# Patient Record
Sex: FEMALE | Race: WHITE | NOT HISPANIC OR LATINO | Employment: OTHER | URBAN - NONMETROPOLITAN AREA
[De-identification: names, ages, dates, MRNs, and addresses within clinical notes are randomized per-mention and may not be internally consistent; named-entity substitution may affect disease eponyms.]

---

## 2017-01-25 ENCOUNTER — APPOINTMENT (OUTPATIENT)
Dept: GENERAL RADIOLOGY | Facility: HOSPITAL | Age: 77
End: 2017-01-25

## 2017-01-25 ENCOUNTER — APPOINTMENT (OUTPATIENT)
Dept: CT IMAGING | Facility: HOSPITAL | Age: 77
End: 2017-01-25

## 2017-01-25 ENCOUNTER — HOSPITAL ENCOUNTER (EMERGENCY)
Facility: HOSPITAL | Age: 77
Discharge: HOME | End: 2017-01-25
Admitting: EMERGENCY MEDICINE

## 2017-01-25 VITALS
HEART RATE: 98 BPM | OXYGEN SATURATION: 98 % | BODY MASS INDEX: 21.82 KG/M2 | DIASTOLIC BLOOD PRESSURE: 70 MMHG | WEIGHT: 139 LBS | SYSTOLIC BLOOD PRESSURE: 120 MMHG | RESPIRATION RATE: 18 BRPM | TEMPERATURE: 97.8 F | HEIGHT: 67 IN

## 2017-01-25 DIAGNOSIS — R42 DIZZINESS: Primary | ICD-10-CM

## 2017-01-25 DIAGNOSIS — N30.01 ACUTE CYSTITIS WITH HEMATURIA: ICD-10-CM

## 2017-01-25 LAB
ALBUMIN SERPL-MCNC: 4.5 G/DL (ref 3.5–5)
ALBUMIN/GLOB SERPL: 1.3 G/DL (ref 1.1–2.5)
ALP SERPL-CCNC: 86 U/L (ref 24–120)
ALT SERPL W P-5'-P-CCNC: 29 U/L (ref 0–54)
AMYLASE SERPL-CCNC: 88 U/L (ref 30–110)
ANION GAP SERPL CALCULATED.3IONS-SCNC: 13 MMOL/L (ref 4–13)
APTT PPP: 26.4 SECONDS (ref 24.1–34.8)
AST SERPL-CCNC: 28 U/L (ref 7–45)
BACTERIA UR QL AUTO: ABNORMAL /HPF
BASOPHILS # BLD AUTO: 0.02 10*3/MM3 (ref 0–0.2)
BASOPHILS NFR BLD AUTO: 0.2 % (ref 0–2)
BILIRUB SERPL-MCNC: 0.8 MG/DL (ref 0.1–1)
BILIRUB UR QL STRIP: NEGATIVE
BUN BLD-MCNC: 43 MG/DL (ref 5–21)
BUN/CREAT SERPL: 37.1 (ref 7–25)
CALCIUM SPEC-SCNC: 10 MG/DL (ref 8.4–10.4)
CHLORIDE SERPL-SCNC: 95 MMOL/L (ref 98–110)
CLARITY UR: CLEAR
CO2 SERPL-SCNC: 32 MMOL/L (ref 24–31)
COLOR UR: YELLOW
CREAT BLD-MCNC: 1.16 MG/DL (ref 0.5–1.4)
CRP SERPL-MCNC: <0.5 MG/DL (ref 0–0.99)
DEPRECATED RDW RBC AUTO: 51.4 FL (ref 40–54)
DIGOXIN SERPL-MCNC: 1.1 NG/ML (ref 0.8–2)
EOSINOPHIL # BLD AUTO: 0.1 10*3/MM3 (ref 0–0.7)
EOSINOPHIL NFR BLD AUTO: 1.1 % (ref 0–4)
ERYTHROCYTE [DISTWIDTH] IN BLOOD BY AUTOMATED COUNT: 14.4 % (ref 12–15)
FLUAV AG NPH QL: NEGATIVE
FLUBV AG NPH QL IA: NEGATIVE
GFR SERPL CREATININE-BSD FRML MDRD: 45 ML/MIN/1.73
GLOBULIN UR ELPH-MCNC: 3.5 GM/DL
GLUCOSE BLD-MCNC: 154 MG/DL (ref 70–100)
GLUCOSE UR STRIP-MCNC: NEGATIVE MG/DL
HCT VFR BLD AUTO: 46 % (ref 37–47)
HGB BLD-MCNC: 15.1 G/DL (ref 12–16)
HGB UR QL STRIP.AUTO: NEGATIVE
HYALINE CASTS UR QL AUTO: ABNORMAL /LPF
IMM GRANULOCYTES # BLD: 0.04 10*3/MM3 (ref 0–0.03)
IMM GRANULOCYTES NFR BLD: 0.5 % (ref 0–5)
INR PPP: 0.91 (ref 0.91–1.09)
KETONES UR QL STRIP: NEGATIVE
LEUKOCYTE ESTERASE UR QL STRIP.AUTO: ABNORMAL
LIPASE SERPL-CCNC: 120 U/L (ref 23–203)
LYMPHOCYTES # BLD AUTO: 0.85 10*3/MM3 (ref 0.72–4.86)
LYMPHOCYTES NFR BLD AUTO: 9.7 % (ref 15–45)
MCH RBC QN AUTO: 32.6 PG (ref 28–32)
MCHC RBC AUTO-ENTMCNC: 32.8 G/DL (ref 33–36)
MCV RBC AUTO: 99.4 FL (ref 82–98)
MONOCYTES # BLD AUTO: 0.65 10*3/MM3 (ref 0.19–1.3)
MONOCYTES NFR BLD AUTO: 7.4 % (ref 4–12)
NEUTROPHILS # BLD AUTO: 7.1 10*3/MM3 (ref 1.87–8.4)
NEUTROPHILS NFR BLD AUTO: 81.1 % (ref 39–78)
NITRITE UR QL STRIP: POSITIVE
PH UR STRIP.AUTO: 7 [PH] (ref 5–8)
PLATELET # BLD AUTO: 162 10*3/MM3 (ref 130–400)
PMV BLD AUTO: 10.9 FL (ref 6–12)
POTASSIUM BLD-SCNC: 4.8 MMOL/L (ref 3.5–5.3)
PROT SERPL-MCNC: 8 G/DL (ref 6.3–8.7)
PROT UR QL STRIP: NEGATIVE
PROTHROMBIN TIME: 12.5 SECONDS (ref 11.9–14.6)
RBC # BLD AUTO: 4.63 10*6/MM3 (ref 4.2–5.4)
RBC # UR: ABNORMAL /HPF
REF LAB TEST METHOD: ABNORMAL
SODIUM BLD-SCNC: 140 MMOL/L (ref 135–145)
SP GR UR STRIP: 1.02 (ref 1–1.03)
SQUAMOUS #/AREA URNS HPF: ABNORMAL /HPF
TROPONIN I SERPL-MCNC: 0 NG/ML (ref 0–0.07)
UROBILINOGEN UR QL STRIP: ABNORMAL
WBC NRBC COR # BLD: 8.76 10*3/MM3 (ref 4.8–10.8)
WBC UR QL AUTO: ABNORMAL /HPF

## 2017-01-25 PROCEDURE — 80162 ASSAY OF DIGOXIN TOTAL: CPT | Performed by: NURSE PRACTITIONER

## 2017-01-25 PROCEDURE — 85730 THROMBOPLASTIN TIME PARTIAL: CPT | Performed by: NURSE PRACTITIONER

## 2017-01-25 PROCEDURE — 96361 HYDRATE IV INFUSION ADD-ON: CPT

## 2017-01-25 PROCEDURE — 87086 URINE CULTURE/COLONY COUNT: CPT | Performed by: NURSE PRACTITIONER

## 2017-01-25 PROCEDURE — 70450 CT HEAD/BRAIN W/O DYE: CPT

## 2017-01-25 PROCEDURE — 87186 SC STD MICRODIL/AGAR DIL: CPT | Performed by: NURSE PRACTITIONER

## 2017-01-25 PROCEDURE — 25010000002 CEFTRIAXONE: Performed by: NURSE PRACTITIONER

## 2017-01-25 PROCEDURE — 86140 C-REACTIVE PROTEIN: CPT | Performed by: NURSE PRACTITIONER

## 2017-01-25 PROCEDURE — 87040 BLOOD CULTURE FOR BACTERIA: CPT | Performed by: NURSE PRACTITIONER

## 2017-01-25 PROCEDURE — 82150 ASSAY OF AMYLASE: CPT | Performed by: NURSE PRACTITIONER

## 2017-01-25 PROCEDURE — 96365 THER/PROPH/DIAG IV INF INIT: CPT

## 2017-01-25 PROCEDURE — 85025 COMPLETE CBC W/AUTO DIFF WBC: CPT | Performed by: NURSE PRACTITIONER

## 2017-01-25 PROCEDURE — 81001 URINALYSIS AUTO W/SCOPE: CPT | Performed by: NURSE PRACTITIONER

## 2017-01-25 PROCEDURE — 93005 ELECTROCARDIOGRAM TRACING: CPT | Performed by: NURSE PRACTITIONER

## 2017-01-25 PROCEDURE — 87804 INFLUENZA ASSAY W/OPTIC: CPT | Performed by: NURSE PRACTITIONER

## 2017-01-25 PROCEDURE — 85610 PROTHROMBIN TIME: CPT | Performed by: NURSE PRACTITIONER

## 2017-01-25 PROCEDURE — 93010 ELECTROCARDIOGRAM REPORT: CPT | Performed by: INTERNAL MEDICINE

## 2017-01-25 PROCEDURE — 80053 COMPREHEN METABOLIC PANEL: CPT | Performed by: NURSE PRACTITIONER

## 2017-01-25 PROCEDURE — 83690 ASSAY OF LIPASE: CPT | Performed by: NURSE PRACTITIONER

## 2017-01-25 PROCEDURE — 84484 ASSAY OF TROPONIN QUANT: CPT

## 2017-01-25 PROCEDURE — 99284 EMERGENCY DEPT VISIT MOD MDM: CPT

## 2017-01-25 PROCEDURE — 71020 HC CHEST PA AND LATERAL: CPT

## 2017-01-25 PROCEDURE — 87088 URINE BACTERIA CULTURE: CPT | Performed by: NURSE PRACTITIONER

## 2017-01-25 RX ORDER — CEPHALEXIN 250 MG/1
500 CAPSULE ORAL 2 TIMES DAILY
Qty: 14 CAPSULE | Refills: 0 | Status: SHIPPED | OUTPATIENT
Start: 2017-01-25 | End: 2017-02-01

## 2017-01-25 RX ORDER — MECLIZINE HYDROCHLORIDE 25 MG/1
25 TABLET ORAL EVERY 6 HOURS PRN
Qty: 20 TABLET | Refills: 0 | Status: SHIPPED | OUTPATIENT
Start: 2017-01-25 | End: 2017-01-30

## 2017-01-25 RX ORDER — MECLIZINE HYDROCHLORIDE 25 MG/1
25 TABLET ORAL ONCE
Status: COMPLETED | OUTPATIENT
Start: 2017-01-25 | End: 2017-01-25

## 2017-01-25 RX ORDER — SODIUM CHLORIDE 0.9 % (FLUSH) 0.9 %
10 SYRINGE (ML) INJECTION AS NEEDED
Status: DISCONTINUED | OUTPATIENT
Start: 2017-01-25 | End: 2017-01-25 | Stop reason: HOSPADM

## 2017-01-25 RX ORDER — ONDANSETRON 4 MG/1
4 TABLET, ORALLY DISINTEGRATING ORAL EVERY 6 HOURS PRN
Qty: 12 TABLET | Refills: 0 | Status: SHIPPED | OUTPATIENT
Start: 2017-01-25 | End: 2017-01-28

## 2017-01-25 RX ORDER — METHYLPREDNISOLONE 4 MG/1
TABLET ORAL
Qty: 21 TABLET | Refills: 0 | Status: ON HOLD | OUTPATIENT
Start: 2017-01-25 | End: 2017-04-11

## 2017-01-25 RX ORDER — DIGOXIN 125 MCG
125 TABLET ORAL
COMMUNITY

## 2017-01-25 RX ADMIN — SODIUM CHLORIDE 1000 ML: 9 INJECTION, SOLUTION INTRAVENOUS at 15:45

## 2017-01-25 RX ADMIN — MECLIZINE HYDROCHLORIDE 25 MG: 25 TABLET ORAL at 20:08

## 2017-01-25 RX ADMIN — CEFTRIAXONE 1 G: 1 INJECTION, POWDER, FOR SOLUTION INTRAMUSCULAR; INTRAVENOUS at 20:21

## 2017-01-25 NOTE — ED PROVIDER NOTES
"Subjective   HPI Comments: Patient is a 76-year-old female who presents here today with complaint of vertigo.  Patient reports that on Sunday she felt that afternoon.  He states that she sit down and rest.  Symptoms resolved.  She states she has felt I past 2 days and got up this morning and resume activity.  She states she went to lunch and after lunch she became very dizzy.  She reports that she did had 3 episodes of emesis.  She states that she feels as though \"the room is spinning \".  Patient states the dizziness occurs whenever \"my eyes\".  Patient fall she denies any head injury trauma.  She denies any abdominal pain fever or headache.  She states that she was having a slight left earache on Sunday however this resolved.  She presents ER today for further evaluation.    Patient is a 76 y.o. female presenting with dizziness.   History provided by:  Patient   used: No    Dizziness   Quality:  Head spinning and vertigo  Severity:  Moderate  Onset quality:  Sudden  Duration:  2 hours  Timing:  Constant  Progression:  Unchanged  Chronicity:  New  Context: ear pain, head movement, inactivity and physical activity    Context: not when bending over, not with bowel movement, not with eye movement, not with loss of consciousness, not with medication, not when standing up and not when urinating    Relieved by:  Nothing  Worsened by:  Nothing  Ineffective treatments:  None tried  Associated symptoms: nausea and vomiting    Associated symptoms: no blood in stool, no chest pain, no diarrhea, no headaches, no hearing loss, no palpitations, no shortness of breath, no syncope, no tinnitus, no vision changes and no weakness    Risk factors: no anemia, no heart disease, no hx of stroke, no hx of vertigo, no Meniere's disease, no multiple medications and no new medications        Review of Systems   Constitutional: Negative for activity change, appetite change and fever.   HENT: Positive for ear pain. Negative " for congestion, hearing loss and tinnitus.    Respiratory: Negative for shortness of breath.    Cardiovascular: Negative for chest pain, palpitations and syncope.   Gastrointestinal: Positive for nausea and vomiting. Negative for blood in stool and diarrhea.   Endocrine: Negative for cold intolerance and heat intolerance.   Genitourinary: Negative for flank pain and frequency.   Musculoskeletal: Negative for back pain and neck pain.   Skin: Negative for color change and pallor.   Neurological: Positive for dizziness. Negative for weakness and headaches.   Hematological: Negative for adenopathy. Does not bruise/bleed easily.   Psychiatric/Behavioral: Negative for agitation and confusion.   All other systems reviewed and are negative.      Past Medical History   Diagnosis Date   • Abdominal pain, left lower quadrant    • Bowel habit changes    • CAD (coronary artery disease)      LAD stent    • COPD (chronic obstructive pulmonary disease)    • Diabetes mellitus    • DVT (deep venous thrombosis)    • Dysphagia    • Hypertension    • Lymphoma    • Pulmonary emboli    • Sleep apnea, obstructive        Allergies   Allergen Reactions   • Zantac [Ranitidine Hcl]        Past Surgical History   Procedure Laterality Date   • Lumbar spine surgery     • Hysterectomy       total abdominal   • Tonsillectomy     • Back surgery       lower   • Exploratory laparotomy  2005     relapse lymphoma   • Ankle surgery     • Hemorrhoidectomy     • Lung biopsy N/A 1991     open    • Cholecystectomy     • Small intestine surgery     • Colonoscopy w/ biopsies and polypectomy  07/22/2014     Adenomatous tubular type, Diverticulosis sigmoid colon   • Colonoscopy  04/23/2015     Last colon limted lower diverticulosis left colon, Internal Hemorrhoids   • Endoscopy  04/14/2010     Distal ring dilated 48 Fr   • Upper gastrointestinal endoscopy  10/23/2015     normal exam       Family History   Problem Relation Age of Onset   • No Known Problems  Mother    • No Known Problems Father    • Colon cancer Neg Hx    • Colon polyps Neg Hx        Social History     Social History   • Marital status:      Spouse name: N/A   • Number of children: N/A   • Years of education: N/A     Social History Main Topics   • Smoking status: Never Smoker   • Smokeless tobacco: None   • Alcohol use No   • Drug use: No   • Sexual activity: Not Asked     Other Topics Concern   • None     Social History Narrative           Objective   Physical Exam   Constitutional: She is oriented to person, place, and time. She appears well-developed and well-nourished.   HENT:   Head: Normocephalic and atraumatic.   Eyes: Conjunctivae are normal. Pupils are equal, round, and reactive to light.   Neck: Normal range of motion. Neck supple.   Cardiovascular: Normal rate, regular rhythm and normal heart sounds.    Pulmonary/Chest: Effort normal and breath sounds normal.   Abdominal: Soft. Bowel sounds are normal.   Musculoskeletal: Normal range of motion.   Neurological: She is alert and oriented to person, place, and time. She has normal strength. No cranial nerve deficit or sensory deficit. GCS eye subscore is 4. GCS verbal subscore is 5. GCS motor subscore is 6.   Skin: Skin is warm and dry.   Psychiatric: She has a normal mood and affect.   Nursing note and vitals reviewed.      Procedures         ED Course  ED Course   Comment By Time   CT head:   1. No CT evidence of an acute intracranial process.  2. Atrophy and chronic ischemic changes.     Allyssa Benson, APRN 01/25 8070   CXR: 1. Cardiomegaly and chronic lung changes.  2. No definite acute cardiopulmonary process. Allyssa Benson, APRN 01/25 1430   I reevaluated the patient at this time.  She reports that she is feeling much better at this time.  He states that she does not feel dizzy at this time.  Labs are reviewed at this time.  Her glucose was 154, chloride 95, CO2 32.  Her CBC showed no acute abnormalities.  Her CBC and CMP  were compared to previous labs done 11 months ago.  Her urinalysis shows a trace amount leuk esterase, positive nitrates, 0-2 RBCs, 0-2 WBC, 4+ bacteria her flu swab was negative. Allyssa Benson, APRN 01/25 1736   Patient orthostatic vital signs were normal. Allyssa Benson, APRN 01/25 1737   She did attempt ambulate with the assistance of the ER today however she did become very dizzy and stumbled and sent back to bed.  She did not have a fall. Allyssa Benson, APRN 01/25 1826   Chest the lab results as well as the CT scan results with the patient and her daughter.  Patient states she does have a known history of cardiomyopathy.  She is aware of this.  Patient states that she was offered admission to the hospital however she at this time does declined and prefers to go home.  I did have a lengthy discussion with the patient and the daughter regarding this and they are agreeable for to be discharged home at this time.  Patient does tell me up on this conversation that she did have her metoprolol increased began to take on Sunday when the symptoms began.  Advised her to please call Dr. Yo's office in the morning to discuss this with him if he would like to adjust the medication.  At this time she is going to receive 1 g of Rocephin IV prior to be discharged home.  She will also receive meclizine.  She was given be discharged home in stable condition with prescription for meclizine and a Medrol Dosepak as well as a prescription for Keflex.  Patient is advised follow-up Dr. Yo tomorrow for further evaluation.  She is advised of course to return to the ER for any worsening symptoms. Allyssa M Marcelino, APRN 1939   Pt case discussed with Dr. Francisco who agrees with plan of care.  Allyssa COKER Marcelino, APRN 1940                  MDM  Number of Diagnoses or Management Options  Acute cystitis with hematuria: new and requires workup  Dizziness: new and requires workup     Amount and/or Complexity of Data  Reviewed  Clinical lab tests: ordered and reviewed  Tests in the radiology section of CPT®: ordered and reviewed  Decide to obtain previous medical records or to obtain history from someone other than the patient: yes  Discuss the patient with other providers: yes  Independent visualization of images, tracings, or specimens: yes    Patient Progress  Patient progress: stable      Final diagnoses:   Dizziness   Acute cystitis with hematuria            Allyssa Benson, CARL  01/25/17 1941       CARL Rodney  01/25/17 1954

## 2017-01-26 NOTE — ED NOTES
PT REPORTS THAT SHE FELT FINE THIS MORNING, WENT TO LUNCH WITH A FRIEND AND AFTER SHE ATE, STILL AT THE [employer], SHE BECAME HOT AND SWEATY AND IMMEDIATLY VOMITED A LARGE AMOUNT OF YELLOW GREEN BILE. SHE WENT TO HER DOCTORS OFFICE AND WAS SENT TO THE [facility]     Jacquie Diego RN  01/25/17 2002

## 2017-01-27 LAB — BACTERIA SPEC AEROBE CULT: ABNORMAL

## 2017-01-30 LAB
BACTERIA SPEC AEROBE CULT: NORMAL
BACTERIA SPEC AEROBE CULT: NORMAL

## 2017-02-28 RX ORDER — ISOSORBIDE MONONITRATE 30 MG/1
TABLET, EXTENDED RELEASE ORAL
Qty: 90 TABLET | Refills: 3 | Status: SHIPPED | OUTPATIENT
Start: 2017-02-28 | End: 2017-06-26

## 2017-04-06 ENCOUNTER — HOSPITAL ENCOUNTER (INPATIENT)
Facility: HOSPITAL | Age: 77
LOS: 4 days | Discharge: HOME HEALTH | End: 2017-04-11
Attending: FAMILY MEDICINE | Admitting: INTERNAL MEDICINE

## 2017-04-06 ENCOUNTER — APPOINTMENT (OUTPATIENT)
Dept: GENERAL RADIOLOGY | Facility: HOSPITAL | Age: 77
End: 2017-04-06

## 2017-04-06 DIAGNOSIS — I47.1 SVT (SUPRAVENTRICULAR TACHYCARDIA) (HCC): ICD-10-CM

## 2017-04-06 DIAGNOSIS — R26.9 ABNORMALITY OF GAIT AND MOBILITY: Primary | ICD-10-CM

## 2017-04-06 DIAGNOSIS — I50.22 CHRONIC SYSTOLIC CONGESTIVE HEART FAILURE (HCC): ICD-10-CM

## 2017-04-06 DIAGNOSIS — Z78.9 DECREASED ACTIVITIES OF DAILY LIVING (ADL): ICD-10-CM

## 2017-04-06 DIAGNOSIS — R00.2 PALPITATIONS: ICD-10-CM

## 2017-04-06 PROCEDURE — 71010 HC CHEST PA OR AP: CPT

## 2017-04-06 PROCEDURE — 80053 COMPREHEN METABOLIC PANEL: CPT | Performed by: FAMILY MEDICINE

## 2017-04-06 PROCEDURE — 99285 EMERGENCY DEPT VISIT HI MDM: CPT

## 2017-04-06 PROCEDURE — 93010 ELECTROCARDIOGRAM REPORT: CPT | Performed by: INTERNAL MEDICINE

## 2017-04-06 PROCEDURE — 87040 BLOOD CULTURE FOR BACTERIA: CPT | Performed by: FAMILY MEDICINE

## 2017-04-06 PROCEDURE — 80162 ASSAY OF DIGOXIN TOTAL: CPT | Performed by: FAMILY MEDICINE

## 2017-04-06 PROCEDURE — 85025 COMPLETE CBC W/AUTO DIFF WBC: CPT | Performed by: FAMILY MEDICINE

## 2017-04-06 PROCEDURE — 84484 ASSAY OF TROPONIN QUANT: CPT | Performed by: FAMILY MEDICINE

## 2017-04-06 PROCEDURE — 85610 PROTHROMBIN TIME: CPT | Performed by: FAMILY MEDICINE

## 2017-04-06 PROCEDURE — 93005 ELECTROCARDIOGRAM TRACING: CPT | Performed by: FAMILY MEDICINE

## 2017-04-06 RX ORDER — DIAZEPAM 2 MG/1
2 TABLET ORAL 2 TIMES DAILY PRN
COMMUNITY
End: 2017-04-11 | Stop reason: HOSPADM

## 2017-04-06 RX ORDER — SODIUM CHLORIDE 0.9 % (FLUSH) 0.9 %
10 SYRINGE (ML) INJECTION AS NEEDED
Status: DISCONTINUED | OUTPATIENT
Start: 2017-04-06 | End: 2017-04-11 | Stop reason: HOSPADM

## 2017-04-07 ENCOUNTER — APPOINTMENT (OUTPATIENT)
Dept: CARDIOLOGY | Facility: HOSPITAL | Age: 77
End: 2017-04-07
Attending: INTERNAL MEDICINE

## 2017-04-07 PROBLEM — R00.2 PALPITATIONS: Status: ACTIVE | Noted: 2017-04-07

## 2017-04-07 PROBLEM — I47.1 SVT (SUPRAVENTRICULAR TACHYCARDIA) (HCC): Status: ACTIVE | Noted: 2017-04-07

## 2017-04-07 LAB
ALBUMIN SERPL-MCNC: 3.8 G/DL (ref 3.5–5)
ALBUMIN/GLOB SERPL: 1.4 G/DL (ref 1.1–2.5)
ALP SERPL-CCNC: 56 U/L (ref 24–120)
ALT SERPL W P-5'-P-CCNC: 23 U/L (ref 0–54)
ANION GAP SERPL CALCULATED.3IONS-SCNC: 13 MMOL/L (ref 4–13)
AST SERPL-CCNC: 24 U/L (ref 7–45)
BASOPHILS # BLD AUTO: 0.04 10*3/MM3 (ref 0–0.2)
BASOPHILS NFR BLD AUTO: 0.7 % (ref 0–2)
BILIRUB SERPL-MCNC: 0.6 MG/DL (ref 0.1–1)
BUN BLD-MCNC: 36 MG/DL (ref 5–21)
BUN/CREAT SERPL: 26.9 (ref 7–25)
CALCIUM SPEC-SCNC: 9.5 MG/DL (ref 8.4–10.4)
CHLORIDE SERPL-SCNC: 98 MMOL/L (ref 98–110)
CO2 SERPL-SCNC: 32 MMOL/L (ref 24–31)
CREAT BLD-MCNC: 1.34 MG/DL (ref 0.5–1.4)
DEPRECATED RDW RBC AUTO: 56.9 FL (ref 40–54)
DIGOXIN SERPL-MCNC: 1.8 NG/ML (ref 0.8–2)
EOSINOPHIL # BLD AUTO: 0.17 10*3/MM3 (ref 0–0.7)
EOSINOPHIL NFR BLD AUTO: 2.8 % (ref 0–4)
ERYTHROCYTE [DISTWIDTH] IN BLOOD BY AUTOMATED COUNT: 15.3 % (ref 12–15)
GFR SERPL CREATININE-BSD FRML MDRD: 38 ML/MIN/1.73
GLOBULIN UR ELPH-MCNC: 2.7 GM/DL
GLUCOSE BLD-MCNC: 128 MG/DL (ref 70–100)
GLUCOSE BLDC GLUCOMTR-MCNC: 104 MG/DL (ref 70–130)
GLUCOSE BLDC GLUCOMTR-MCNC: 105 MG/DL (ref 70–130)
GLUCOSE BLDC GLUCOMTR-MCNC: 130 MG/DL (ref 70–130)
GLUCOSE BLDC GLUCOMTR-MCNC: 99 MG/DL (ref 70–130)
HCT VFR BLD AUTO: 42.5 % (ref 37–47)
HGB BLD-MCNC: 13.4 G/DL (ref 12–16)
HOLD SPECIMEN: NORMAL
IMM GRANULOCYTES # BLD: 0.02 10*3/MM3 (ref 0–0.03)
IMM GRANULOCYTES NFR BLD: 0.3 % (ref 0–5)
INR PPP: 0.99 (ref 0.91–1.09)
LYMPHOCYTES # BLD AUTO: 1.56 10*3/MM3 (ref 0.72–4.86)
LYMPHOCYTES NFR BLD AUTO: 25.6 % (ref 15–45)
MCH RBC QN AUTO: 32.3 PG (ref 28–32)
MCHC RBC AUTO-ENTMCNC: 31.5 G/DL (ref 33–36)
MCV RBC AUTO: 102.4 FL (ref 82–98)
MONOCYTES # BLD AUTO: 0.67 10*3/MM3 (ref 0.19–1.3)
MONOCYTES NFR BLD AUTO: 11 % (ref 4–12)
NEUTROPHILS # BLD AUTO: 3.63 10*3/MM3 (ref 1.87–8.4)
NEUTROPHILS NFR BLD AUTO: 59.6 % (ref 39–78)
PLATELET # BLD AUTO: 184 10*3/MM3 (ref 130–400)
PMV BLD AUTO: 10.6 FL (ref 6–12)
POTASSIUM BLD-SCNC: 4.3 MMOL/L (ref 3.5–5.3)
PROT SERPL-MCNC: 6.5 G/DL (ref 6.3–8.7)
PROTHROMBIN TIME: 13.4 SECONDS (ref 11.9–14.6)
RBC # BLD AUTO: 4.15 10*6/MM3 (ref 4.2–5.4)
SODIUM BLD-SCNC: 143 MMOL/L (ref 135–145)
TROPONIN I SERPL-MCNC: 0.04 NG/ML (ref 0–0.03)
TROPONIN I SERPL-MCNC: 0.04 NG/ML (ref 0–0.07)
WBC NRBC COR # BLD: 6.09 10*3/MM3 (ref 4.8–10.8)
WHOLE BLOOD HOLD SPECIMEN: NORMAL
WHOLE BLOOD HOLD SPECIMEN: NORMAL

## 2017-04-07 PROCEDURE — 0399T ADULT TRANSTHORACIC ECHO COMPLETE WITH CONTRAST: CPT | Performed by: INTERNAL MEDICINE

## 2017-04-07 PROCEDURE — 93005 ELECTROCARDIOGRAM TRACING: CPT | Performed by: FAMILY MEDICINE

## 2017-04-07 PROCEDURE — 0399T HC MYOCARDL STRAIN IMAG QUAN ASSMT PER SESS: CPT

## 2017-04-07 PROCEDURE — 84484 ASSAY OF TROPONIN QUANT: CPT

## 2017-04-07 PROCEDURE — 93010 ELECTROCARDIOGRAM REPORT: CPT | Performed by: INTERNAL MEDICINE

## 2017-04-07 PROCEDURE — 94799 UNLISTED PULMONARY SVC/PX: CPT

## 2017-04-07 PROCEDURE — 82962 GLUCOSE BLOOD TEST: CPT

## 2017-04-07 PROCEDURE — 25010000002 ENOXAPARIN PER 10 MG: Performed by: INTERNAL MEDICINE

## 2017-04-07 PROCEDURE — 94760 N-INVAS EAR/PLS OXIMETRY 1: CPT

## 2017-04-07 PROCEDURE — 99222 1ST HOSP IP/OBS MODERATE 55: CPT | Performed by: INTERNAL MEDICINE

## 2017-04-07 PROCEDURE — 93306 TTE W/DOPPLER COMPLETE: CPT | Performed by: INTERNAL MEDICINE

## 2017-04-07 PROCEDURE — 25010000002 PERFLUTREN (DEFINITY) 8.476 MG IN SODIUM CHLORIDE 10 ML INJECTION: Performed by: FAMILY MEDICINE

## 2017-04-07 PROCEDURE — C8929 TTE W OR WO FOL WCON,DOPPLER: HCPCS

## 2017-04-07 RX ORDER — DIGOXIN 125 MCG
125 TABLET ORAL
Status: DISCONTINUED | OUTPATIENT
Start: 2017-04-07 | End: 2017-04-11 | Stop reason: HOSPADM

## 2017-04-07 RX ORDER — GABAPENTIN 300 MG/1
300 CAPSULE ORAL 3 TIMES DAILY
Status: DISCONTINUED | OUTPATIENT
Start: 2017-04-07 | End: 2017-04-11 | Stop reason: HOSPADM

## 2017-04-07 RX ORDER — NITROGLYCERIN 0.4 MG/1
0.4 TABLET SUBLINGUAL
Status: DISCONTINUED | OUTPATIENT
Start: 2017-04-07 | End: 2017-04-11 | Stop reason: HOSPADM

## 2017-04-07 RX ORDER — IPRATROPIUM BROMIDE AND ALBUTEROL SULFATE 2.5; .5 MG/3ML; MG/3ML
3 SOLUTION RESPIRATORY (INHALATION) EVERY 4 HOURS PRN
Status: DISCONTINUED | OUTPATIENT
Start: 2017-04-07 | End: 2017-04-11 | Stop reason: HOSPADM

## 2017-04-07 RX ORDER — NICOTINE POLACRILEX 4 MG
15 LOZENGE BUCCAL
Status: DISCONTINUED | OUTPATIENT
Start: 2017-04-07 | End: 2017-04-11 | Stop reason: HOSPADM

## 2017-04-07 RX ORDER — LISINOPRIL 10 MG/1
10 TABLET ORAL DAILY
Status: DISCONTINUED | OUTPATIENT
Start: 2017-04-07 | End: 2017-04-10

## 2017-04-07 RX ORDER — ONDANSETRON 2 MG/ML
4 INJECTION INTRAMUSCULAR; INTRAVENOUS EVERY 6 HOURS PRN
Status: DISCONTINUED | OUTPATIENT
Start: 2017-04-07 | End: 2017-04-11 | Stop reason: HOSPADM

## 2017-04-07 RX ORDER — ASPIRIN 81 MG/1
81 TABLET, CHEWABLE ORAL DAILY
Status: DISCONTINUED | OUTPATIENT
Start: 2017-04-07 | End: 2017-04-11 | Stop reason: HOSPADM

## 2017-04-07 RX ORDER — METOPROLOL SUCCINATE 25 MG/1
25 TABLET, EXTENDED RELEASE ORAL DAILY
Status: DISCONTINUED | OUTPATIENT
Start: 2017-04-07 | End: 2017-04-07

## 2017-04-07 RX ORDER — DEXTROSE MONOHYDRATE 25 G/50ML
25 INJECTION, SOLUTION INTRAVENOUS
Status: DISCONTINUED | OUTPATIENT
Start: 2017-04-07 | End: 2017-04-11 | Stop reason: HOSPADM

## 2017-04-07 RX ORDER — ACETAMINOPHEN 325 MG/1
650 TABLET ORAL EVERY 6 HOURS PRN
Status: DISCONTINUED | OUTPATIENT
Start: 2017-04-07 | End: 2017-04-11 | Stop reason: HOSPADM

## 2017-04-07 RX ORDER — ALPRAZOLAM 0.25 MG/1
0.25 TABLET ORAL EVERY 8 HOURS PRN
Status: DISCONTINUED | OUTPATIENT
Start: 2017-04-07 | End: 2017-04-07

## 2017-04-07 RX ORDER — AMIODARONE HYDROCHLORIDE 200 MG/1
200 TABLET ORAL
Status: DISCONTINUED | OUTPATIENT
Start: 2017-04-08 | End: 2017-04-10

## 2017-04-07 RX ORDER — AMIODARONE HYDROCHLORIDE 200 MG/1
400 TABLET ORAL ONCE
Status: COMPLETED | OUTPATIENT
Start: 2017-04-07 | End: 2017-04-07

## 2017-04-07 RX ORDER — PRAVASTATIN SODIUM 20 MG
20 TABLET ORAL DAILY
Status: DISCONTINUED | OUTPATIENT
Start: 2017-04-07 | End: 2017-04-11 | Stop reason: HOSPADM

## 2017-04-07 RX ORDER — FUROSEMIDE 20 MG/1
20 TABLET ORAL
Status: DISCONTINUED | OUTPATIENT
Start: 2017-04-07 | End: 2017-04-10

## 2017-04-07 RX ORDER — METOPROLOL SUCCINATE 50 MG/1
50 TABLET, EXTENDED RELEASE ORAL DAILY
Status: DISCONTINUED | OUTPATIENT
Start: 2017-04-07 | End: 2017-04-08

## 2017-04-07 RX ORDER — DIAZEPAM 2 MG/1
2 TABLET ORAL
Status: DISCONTINUED | OUTPATIENT
Start: 2017-04-07 | End: 2017-04-10

## 2017-04-07 RX ORDER — PANTOPRAZOLE SODIUM 40 MG/1
40 TABLET, DELAYED RELEASE ORAL
Status: DISCONTINUED | OUTPATIENT
Start: 2017-04-07 | End: 2017-04-11 | Stop reason: HOSPADM

## 2017-04-07 RX ORDER — SODIUM CHLORIDE 0.9 % (FLUSH) 0.9 %
1-10 SYRINGE (ML) INJECTION AS NEEDED
Status: DISCONTINUED | OUTPATIENT
Start: 2017-04-07 | End: 2017-04-11 | Stop reason: HOSPADM

## 2017-04-07 RX ORDER — ISOSORBIDE MONONITRATE 30 MG/1
30 TABLET, EXTENDED RELEASE ORAL
Status: DISCONTINUED | OUTPATIENT
Start: 2017-04-07 | End: 2017-04-11 | Stop reason: HOSPADM

## 2017-04-07 RX ORDER — FUROSEMIDE 20 MG/1
20 TABLET ORAL 2 TIMES DAILY
Status: DISCONTINUED | OUTPATIENT
Start: 2017-04-07 | End: 2017-04-07

## 2017-04-07 RX ADMIN — FUROSEMIDE 20 MG: 20 TABLET ORAL at 16:45

## 2017-04-07 RX ADMIN — AMIODARONE HYDROCHLORIDE 400 MG: 200 TABLET ORAL at 08:31

## 2017-04-07 RX ADMIN — ASPIRIN 81 MG 81 MG: 81 TABLET ORAL at 09:19

## 2017-04-07 RX ADMIN — SODIUM CHLORIDE 6 ML: 9 INJECTION INTRAMUSCULAR; INTRAVENOUS; SUBCUTANEOUS at 13:58

## 2017-04-07 RX ADMIN — DIAZEPAM 2 MG: 2 TABLET ORAL at 16:46

## 2017-04-07 RX ADMIN — ENOXAPARIN SODIUM 40 MG: 40 INJECTION SUBCUTANEOUS at 09:19

## 2017-04-07 RX ADMIN — ISOSORBIDE MONONITRATE 30 MG: 30 TABLET ORAL at 09:19

## 2017-04-07 RX ADMIN — FUROSEMIDE 20 MG: 20 TABLET ORAL at 09:19

## 2017-04-07 RX ADMIN — METOPROLOL SUCCINATE 50 MG: 50 TABLET, FILM COATED, EXTENDED RELEASE ORAL at 09:19

## 2017-04-07 RX ADMIN — GABAPENTIN 300 MG: 300 CAPSULE ORAL at 22:16

## 2017-04-07 RX ADMIN — GABAPENTIN 300 MG: 300 CAPSULE ORAL at 09:19

## 2017-04-07 RX ADMIN — PRAVASTATIN SODIUM 20 MG: 20 TABLET ORAL at 09:19

## 2017-04-07 RX ADMIN — LISINOPRIL 10 MG: 10 TABLET ORAL at 09:19

## 2017-04-07 RX ADMIN — PANTOPRAZOLE SODIUM 40 MG: 40 TABLET, DELAYED RELEASE ORAL at 06:28

## 2017-04-07 RX ADMIN — Medication 10 ML: at 00:00

## 2017-04-07 RX ADMIN — GABAPENTIN 300 MG: 300 CAPSULE ORAL at 16:45

## 2017-04-07 RX ADMIN — DIGOXIN 125 MCG: 0.12 TABLET ORAL at 12:02

## 2017-04-07 NOTE — PROGRESS NOTES
[facility] Medicine Services  INPATIENT PROGRESS NOTE    Length of Stay: 0  Date of Admission: 4/6/2017  Primary Care Physician: Александр Yo DO    Subjective   Chief Complaint: svt    HPI   Patient on one episode of SVT med team call.   and Dr. Carolina respond.  Attempt to give adenosine, patient converted back to sinus rhythm before the adenosine was initiated.  Patient denies chest pain, shortness breath.  This was constant discussed with Dr. Kelsey.  Increase Toprol to 50 daily.  Start patient on low-dose amiodarone.  She was just having this episode twice a week and this should become more more frequent.  They last about a minute usually.  Patient stated her left arm becomes dead weight.  Currently, symptom has resolved.      Review of Systems   Constitutional: Negative for activity change, appetite change, chills and fever.   HENT: Negative for hearing loss, nosebleeds, tinnitus and trouble swallowing.    Eyes: Negative for visual disturbance.   Respiratory: Negative for cough, chest tightness, shortness of breath and wheezing.    Cardiovascular: Negative for chest pain, palpitations and leg swelling.   Gastrointestinal: Negative for abdominal distention, abdominal pain, blood in stool, constipation, diarrhea, nausea and vomiting.   Endocrine: Negative for cold intolerance, heat intolerance, polydipsia, polyphagia and polyuria.   Genitourinary: Negative for decreased urine volume, difficulty urinating, dysuria, flank pain, frequency and hematuria.   Musculoskeletal: Negative for arthralgias, joint swelling and myalgias.   Skin: Negative for rash.   Allergic/Immunologic: Negative for immunocompromised state.   Neurological: Negative for dizziness, syncope, weakness, light-headedness and headaches.   Hematological: Negative for adenopathy. Does not bruise/bleed easily.   Psychiatric/Behavioral: Negative for confusion and sleep disturbance. The patient is not  nervous/anxious.           All pertinent negatives and positives are as above. All other systems have been reviewed and are negative unless otherwise stated.     Objective    Temp:  [97.2 °F (36.2 °C)-98.6 °F (37 °C)] 98.4 °F (36.9 °C)  Heart Rate:  [79-95] 87  Resp:  [16-22] 20  BP: ()/(63-89) 108/74  No intake or output data in the 24 hours ending 04/07/17 0832  Physical Exam   Constitutional: She is oriented to person, place, and time. She appears well-developed and well-nourished.   HENT:   Head: Normocephalic and atraumatic.   Eyes: Conjunctivae and EOM are normal. Pupils are equal, round, and reactive to light.   Neck: Neck supple. No JVD present. No thyromegaly present.   Cardiovascular: Normal rate, regular rhythm, normal heart sounds and intact distal pulses.  Exam reveals no gallop and no friction rub.    No murmur heard.  Pulmonary/Chest: Effort normal and breath sounds normal. No respiratory distress. She has no wheezes. She has no rales. She exhibits no tenderness.   Abdominal: Soft. Bowel sounds are normal. She exhibits no distension. There is no tenderness. There is no rebound and no guarding.   Musculoskeletal: Normal range of motion. She exhibits no edema, tenderness or deformity.   Lymphadenopathy:     She has no cervical adenopathy.   Neurological: She is alert and oriented to person, place, and time. She displays normal reflexes. No cranial nerve deficit. She exhibits normal muscle tone.   Skin: Skin is warm and dry. No rash noted.   Psychiatric: She has a normal mood and affect. Her behavior is normal. Judgment and thought content normal.       Results Review:  Lab Results (last 24 hours)     Procedure Component Value Units Date/Time    Blood Culture [[id number]] Collected:  04/06/17 2353    Specimen:  Blood from Blood, Venous Line Updated:  04/07/17 0007    Protime-INR [[id number]]  (Normal) Collected:  04/06/17 2353    Specimen:  Blood Updated:  04/07/17 0016     Protime 13.4 Seconds       INR 0.99    CBC & Differential [[id number]] Collected:  04/06/17 2353    Specimen:  Blood Updated:  04/07/17 0017    Narrative:       The following orders were created for panel order CBC & Differential.  Procedure                               Abnormality         Status                     ---------                               -----------         ------                     CBC Auto Differential[[id number]]         Abnormal            Final result                 Please view results for these tests on the individual orders.    CBC Auto Differential [[id number]]  (Abnormal) Collected:  04/06/17 2353    Specimen:  Blood Updated:  04/07/17 0017     WBC 6.09 10*3/mm3      RBC 4.15 (L) 10*6/mm3      Hemoglobin 13.4 g/dL      Hematocrit 42.5 %      .4 (H) fL      MCH 32.3 (H) pg      MCHC 31.5 (L) g/dL      RDW 15.3 (H) %      RDW-SD 56.9 (H) fl      MPV 10.6 fL      Platelets 184 10*3/mm3      Neutrophil % 59.6 %      Lymphocyte % 25.6 %      Monocyte % 11.0 %      Eosinophil % 2.8 %      Basophil % 0.7 %      Immature Grans % 0.3 %      Neutrophils, Absolute 3.63 10*3/mm3      Lymphocytes, Absolute 1.56 10*3/mm3      Monocytes, Absolute 0.67 10*3/mm3      Eosinophils, Absolute 0.17 10*3/mm3      Basophils, Absolute 0.04 10*3/mm3      Immature Grans, Absolute 0.02 10*3/mm3     Comprehensive Metabolic Panel [[id number]]  (Abnormal) Collected:  04/06/17 2353    Specimen:  Blood Updated:  04/07/17 0018     Glucose 128 (H) mg/dL      BUN 36 (H) mg/dL      Creatinine 1.34 mg/dL      Sodium 143 mmol/L      Potassium 4.3 mmol/L      Chloride 98 mmol/L      CO2 32.0 (H) mmol/L      Calcium 9.5 mg/dL      Total Protein 6.5 g/dL      Albumin 3.80 g/dL      ALT (SGPT) 23 U/L      AST (SGOT) 24 U/L      Alkaline Phosphatase 56 U/L      Total Bilirubin 0.6 mg/dL      eGFR Non African Amer 38 (L) mL/min/1.73      Globulin 2.7 gm/dL      A/G Ratio 1.4 g/dL      BUN/Creatinine Ratio 26.9 (H)     Anion Gap 13.0 mmol/L     Narrative:        The MDRD GFR formula is only valid for adults with stable renal function between ages 18 and 70.    Digoxin Level [[id number]]  (Normal) Collected:  04/06/17 2353    Specimen:  Blood Updated:  04/07/17 0038     Digoxin 1.80 ng/mL     Troponin [[id number]]  (Abnormal) Collected:  04/06/17 2353    Specimen:  Blood Updated:  04/07/17 0047     Troponin I 0.035 (H) ng/mL     POC Troponin, Rapid [[id number]]  (Normal) Collected:  04/07/17 0302    Specimen:  Blood Updated:  04/07/17 0315     Troponin I 0.04 ng/mL       Serial Number: [id number]    : [id number]       Green Top (Gel) [[id number]] Collected:  04/06/17 2353    Specimen:  Blood Updated:  04/07/17 0401     Extra Tube Hold for add-ons.      Auto resulted.       Green Top (No Gel) [[id number]] Collected:  04/06/17 2353    Specimen:  Blood Updated:  04/07/17 0401     Extra Tube Hold for add-ons.      Auto resulted.       Clover Draw [[id number]] Collected:  04/06/17 2353    Specimen:  Blood Updated:  04/07/17 0401    Narrative:       The following orders were created for panel order Clover Draw.  Procedure                               Abnormality         Status                     ---------                               -----------         ------                     Light Blue Top[[id number]]                                    Final result               Green Top (Gel)[[id number]]                                   Final result               Lavender Top[[id number]]                                      Final result               Red Top[[id number]]                                           Final result               Green Top (No Gel)[[id number]]                                Final result                 Please view results for these tests on the individual orders.    Light Blue Top [[id number]] Collected:  04/06/17 2353    Specimen:  Blood Updated:  04/07/17 0401     Extra Tube hold for add-on      Auto resulted       Lavender Top [[id number]] Collected:  04/06/17 2353    Specimen:   Blood Updated:  04/07/17 0401     Extra Tube hold for add-on      Auto resulted       Red Top [[id number]] Collected:  04/06/17 2353    Specimen:  Blood Updated:  04/07/17 0401     Extra Tube Hold for add-ons.      Auto resulted.       POC Glucose Fingerstick [[id number]]  (Normal) Collected:  04/07/17 0807    Specimen:  Blood Updated:  04/07/17 0818     Glucose 105 mg/dL       : [id number] Liss ChianeMeter ID: [id number]              Cultures:       Radiology Data:    Imaging Results (last 24 hours)     Procedure Component Value Units Date/Time    XR Chest 1 View [[id number]] Collected:  04/07/17 0711     Updated:  04/07/17 0711    Narrative:       EXAMINATION: XR CHEST 1 VW- 4/7/2017 7:07 AM CDT     HISTORY: Chest pain     COMPARISON: 01/25/2017     FINDINGS:  A left subclavian Port-A-Cath remains in place with tip at the  cavoatrial junction. Heart is magnified but felt to be enlarged. Similar  to the previous examination, there is bilateral perihilar interstitial  prominence. There is obscuration of the left hemidiaphragm compatible  with atelectasis. There is pleural fluid versus pleural thickening on  the left.       Impression:       Findings suggest some pulmonary vascular congestion and mild  pulmonary edema with a left pleural effusion. This appears superimposed  upon chronic interstitial changes.  This report was finalized on  by Dr. Zac Dempsey MD.          Allergies   Allergen Reactions   • Zantac [Ranitidine Hcl] Shortness Of Breath       Scheduled meds:     [START ON 4/8/2017] amiodarone 200 mg Oral Q24H   aspirin 81 mg Oral Daily   digoxin 125 mcg Oral Daily   enoxaparin 40 mg Subcutaneous Daily   furosemide 20 mg Oral BID   gabapentin 300 mg Oral TID   insulin lispro 2-7 Units Subcutaneous 4x Daily With Meals & Nightly   isosorbide mononitrate 30 mg Oral Q24H   lisinopril 10 mg Oral Daily   metoprolol succinate XL 25 mg Oral Daily   pantoprazole 40 mg Oral Q AM   pravastatin 20 mg Oral Daily        PRN meds:  •  acetaminophen  •  ALPRAZolam  •  dextrose  •  dextrose  •  glucagon (human recombinant)  •  ipratropium-albuterol  •  nitroglycerin  •  ondansetron  •  sodium chloride  •  sodium chloride    Assessment/Plan     Active Problems:    Chronic systolic congestive heart failure    Coronary artery disease involving native coronary artery of native heart without angina pectoris    Mixed hyperlipidemia    Type 2 diabetes mellitus without complication, without long-term current use of insulin    Palpitations    SVT (supraventricular tachycardia)      Plan:  SVT-converted back by it self, this is a recurrent thing, getting more frequent.  Start amiodarone daily.  Increase metoprolol to 50.  On digoxin.  Discussed with Dr. Kelsey.  Cardiac enzymes slightly elevated.    Diabetes.-Sliding scale    Systolic CHF/CAD- on aspirin, on Toprol, lisinopril, on statin, and imdur.  Echocardiogram pending.  Previous echo 11/2/2016- ejection fraction 30%, diastolic dysfunction grade 1, apical akinesis, aortic stenosis.    Discharge Planning:  Unknown    Rashad Mir MD   04/07/17   8:32 AM

## 2017-04-07 NOTE — PLAN OF CARE
Problem: Patient Care Overview (Adult)  Goal: Plan of Care Review  Outcome: Ongoing (interventions implemented as appropriate)    04/07/17 0600   Coping/Psychosocial Response Interventions   Plan Of Care Reviewed With patient;daughter   Patient Care Overview   Progress improving   Outcome Evaluation   Outcome Summary/Follow up Plan Admission complete, VSS, no c/o pain, cont to monitor       Goal: Adult Individualization and Mutuality  Outcome: Ongoing (interventions implemented as appropriate)    Problem: Acute Coronary Syndrome (ACS) (Adult)  Goal: Signs and Symptoms of Listed Potential Problems Will be Absent or Manageable (Acute Coronary Syndrome)  Outcome: Ongoing (interventions implemented as appropriate)    Problem: Fall Risk (Adult)  Goal: Identify Related Risk Factors and Signs and Symptoms  Outcome: Ongoing (interventions implemented as appropriate)  Goal: Absence of Falls  Outcome: Ongoing (interventions implemented as appropriate)

## 2017-04-07 NOTE — H&P
"   DATE:  04/07/2017  TIME: 5:48 a.m.     ADMITTING PHYSICIAN: Hermann Gomez MD for the hospitalist service.   PRIMARY CARE PROVIDER:  Александр Yo MD     HISTORY OF PRESENT ILLNESS:  Mrs. Torres is a 76-year-old  female who presents to [facility] due to a chief complaint of heart pounding. She relates that she has had 2 such episodes within the past 24 hours. Mrs. Torres relates a history of supraventricular tachycardia. She will often have as many as 2-3 episodes in a week. Presently, she is followed by Dr. Kelsey of the cardiology service. Mrs. Torres believes that her SVT is becoming more frequent and the episodes appear to be lasting longer. She presents now requesting evaluation and treatment. She has no other complaints at this time. She denies chest pain. She relates chronic shortness of breath. She relates these episodes feel \"funny.\"     Mrs. Torres relates a recent history of anxiety and depression. It is my understanding that her  of 58 years recently passed away.  She seems to be struggling with the loss of her . She has been using Valium for anxiety but has not been using any medications for depression. She and I have discussed such and she wishes to consider possibly being treated with antidepressants. Of note, she specifically denies any harmful ideation.     REVIEW OF SYSTEMS: Otherwise unremarkable from a cardiovascular, pulmonary, gastrointestinal, genitourinary, neurologic, psychiatric, metabolic and constitutional standpoint except as noted. She has had no unusual fatigue, malaise, lethargy, or weakness. She has had no fevers, chills, or sweats. Her appetite is stable as is her weight.  She denies chest pain, but relates recurring chest palpitations. She relates chronic shortness of breath, which she attributes to sarcoidosis she has had no lower extremity edema, orthopnea, cough, wheezing, or hemoptysis. She has had no abdominal pain, nausea, vomiting, diarrhea, or " constipation. She has had no dysphagia or odynophagia. She has had no hematemesis, hematochezia, or melena. She has had no flank pain, pelvic pain, hematuria, or dysuria. She has had no skin rashes, arthralgias, myalgias, or swollen joints. She has had no headache, confusion, memory deficits or loss of consciousness. She has had no recent changes in her vision or hearing. She has had no acute motor or sensory deficits. She has had no gait abnormalities, dizziness or falls. She has had no tremors.     PAST MEDICAL HISTORY:  1.   Hypertension.   2.   Dyslipidemia.   3.   Diabetes mellitus type 2.   4.   Coronary artery disease.   5.   Sarcoidosis.   6.   Lymphoma of uncertain type.   7.   Recurring supraventricular tachycardia.   8.   Diverticulosis.   9.   Lower extremity DVT with pulmonary embolism.   10. History of chronic anticoagulation with warfarin.   11. Vertigo.   12. Macular degeneration.   13. Blindness involving the right eye.   14. Syncope.     PAST SURGICAL HISTORY:  1.   Status post tonsillectomy.   2.   Status post appendectomy?   3.   Status post cholecystectomy.   4.   Status post bilateral tubal ligation.   5.   Status post hysterectomy.   6.   Status post cardiac stent deployment.   7.   Status post lumbosacral spine surgery on 3 occasions.   8.   Status post bilateral cataract resection.   9.   Status post exploratory laparotomy with lymph node biopsy.   10. Status post hemorrhoidectomy.   11. Status post right foot surgery.   12. Status post sinus surgery.     ALLERGIES:  ZANTAC.     HOME MEDICATIONS:  1.   Aspirin 81 mg p.o. daily.   2.   Calcitrate 1 p.o. b.i.d.   3.   Valium 2 mg p.o. b.i.d. p.r.n. for anxiety.   4.   Digoxin 0.125 mg p.o. daily.   5.   Lasix 20 mg p.o. b.i.d.   6.   Neurontin 300 mg p.o. t.i.d.   7.   Imdur 30 mg p.o. daily.   8.   Lisinopril 10 mg p.o. daily.   9.   Mag-Ox 250 mg p.o. daily.   10. Metformin 1 g p.o. b.i.d. with meals.   11. Toprol-XL 25 mg p.o. daily.   12.  Multiple vitamin 1 p.o. daily.   13. Prilosec 20 mg p.o. daily.   14. Pravachol 20 mg p.o. daily.   15. Prednisone 10 mg p.o. every other day.     Note, the above medications cannot be independently confirmed. There appeared to be some conflicting medications. For example, there are also additional doses of digoxin, methylprednisolone and Lopressor listed on her medications.     SOCIAL HISTORY: Significant for being a resident of [place], [place]. She lives alone. She is . She was  for 58 years. Her  recently . She has 2 sons and a daughter, all reported to be in good health. She is a retired . 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 She has a high school education. She has no history of tobacco, alcohol or drug use. She is Sikh. She has no recent history of travel outside this region.     She designates her daughter, Vic Mobley, to serve as a surrogate for healthcare matters should such become necessary.     The patient wishes to be a FULL CODE; however, she does not wish to remain on a formal life support for more than 72 hours. She also specifically does not wish to have a feeding tube placed under any circumstances. Her daughter, who is also her POWER-OF- was present throughout discussion and agrees with the above.     FAMILY HISTORY: Significant for having a brother and 3 sisters. Her brother is living, but has a history of stroke with significant disability. Her 3 sisters are likewise, living, although 1 has a significant history of COPD and uses home oxygen. Her father is  due to metastatic prostate cancer. Her mother is  due to abdominal cancer of uncertain type.     PHYSICAL EXAMINATION:    VITAL SIGNS: Temperature is 98.2, pulse is 80, respirations are 18 and unlabored, blood pressure is 113/89, O2 saturation is 95% breathing ambient air, weight is 143 pounds.     GENERAL: This is a 76-year-old  female appearing her documented  age. She is resting comfortably in bed. She is in no apparent distress. She is articulate in her speech. She is interactive and cooperative. She proves to be a fairly good historian. Her daughter and granddaughter were present throughout the interview and exam.     HEAD AND NECK: Essentially unremarkable except as noted. I see no signs of acute trauma. Eyes, nose, and throat appear grossly unremarkable. Sclerae are clear. There is no discharge from the nostrils. Mucous membranes are moist. Neck is supple. She has no cervical or clavicular adenopathy. She has no definite carotid bruits. There are no masses of the head or neck. Neck veins do not appear pathologically distended.     CARDIAC: Reveals S1 and S2 with a regular rhythm. She has no definite murmurs, rubs, or gallops.     LUNGS: Reveals bilateral breath sounds that are clear to auscultation throughout. She has no rales, wheezes, or rhonchi.     ABDOMEN: Reveals bowel sounds to be present. Her abdomen is nontender, nondistended and soft.     EXTREMITIES: No lower extremity edema, erythema or calf tenderness.     NEUROLOGIC: Reveals the patient to be awake and alert. She seems oriented to person, place, time and situation. Cranial nerves II-XII appear grossly intact. She exhibits no definite focal, motor or sensory deficits. She seems able to move all extremities without difficulty and at will. Her gait was not tested.     PSYCHIATRIC: Reveals her mood to be stable. Affect is flat. Thought processes are organized in that she is able to answer questions appropriately and provide a coherent history. Speech is fluent. There is no flight of ideas. There are no obvious short-term or long-term memory deficits. The patient relates episodes of anxiety. She relates also feeling depressed. She denies any sort of harmful ideation to herself or others at this time.     DIAGNOSTIC DATA: Laboratory studies demonstrate a metabolic panel, which is essentially unremarkable  except for glucose of 128. BUN is 36, CO2 is 32.     Liver function testing is unremarkable.     CBC demonstrates a white blood cell count of 6.09, hemoglobin 13.4, hematocrit 42.5 and platelet count of 184,000. She has a macrocytic red blood cell indices.     Prothrombin time is 13.4 with an INR of 0.99.     Digoxin is 1.8.     Initial troponin is 0.035 with a followup troponin 0.04.     Chest x-ray demonstrates partial opacification of the left lung. Formal interpretation by the radiologist is pending at this time.     This study is somewhat similar to previous studies date 2015 and 2016.     EKG demonstrates sinus rhythm with PVCs.  There is evidence of LVH. Inferior infarct of indeterminate age cannot be excluded. Likewise, anteroseptal infarct of indeterminate age cannot be excluded.     IMPRESSIONS:  1.   Recurring SVT.   2.   Coronary artery disease.   3.   Hypertension.   4.   Dyslipidemia.   5.   Diabetes mellitus type 2.   6.   History of lymphoma of uncertain type.   7.   Anxiety disorder.   8.   Depression.     PLAN: At this time, Ms. Torres will be admitted to [facility] for further evaluation and treatment. Her admitting diagnoses are as noted. Her condition at this time is judged to be stable. She will be placed on telemetry.     I have asked the nursing staff to obtain vital signs per protocol. She will be confined to bedrest with bathroom privileges with assistance. Her allergy to ZANTAC is noted.  I have asked the nursing staff to monitor input and output. Daily weights will be obtained. She will be maintained on an ADA diet. IV fluids will be saline locked. Oxygen will be used as needed to maintain her O2 saturations greater than 92%. She is a FULL CODE. Fall precautions are to be instituted. I will consult the cardiology service. As noted, Mrs. Torres has seen Dr. Kelsey in the past.     ADMISSION MEDICATIONS:    1.   Aspirin 81 mg p.o. daily.   2.   Digoxin 0.125 mg p.o. daily.   3.    Lovenox 40 mg subcutaneous daily.   4.   Lasix 20 mg p.o. b.i.d.   5.   Neurontin 300 mg p.o. t.i.d.   6.   Humalog sliding scale.   7.   Imdur 30 mg p.o. daily.   8.   Lisinopril 10 mg p.o. daily.   9.   Toprol-XL 25 mg p.o. daily.   10. Protonix 40 mg p.o. daily.   11. Pravachol 20 mg p.o. daily.   12. Tylenol 650 mg p.o. q.6 h. p.r.n. for fever and/or discomfort.   13. Xanax 0.25 mg p.o. q.8 h. p.r.n. for anxiety.   14. DuoNeb 1 unit q.4h. p.r.n. for shortness of breath.   15. Nitroglycerin sublingual tablets 0.4 mg p.o. p.r.n. for chest pain.   16. Zofran 4 mg IV q.6 h. p.r.n. for nausea and vomiting.     I will continue to follow Mrs. Torres closely through the morning pending return of the hospitalist team. The nursing staff may call should they have any questions or concerns. Please refer to the medical record for additional information, orders and/or comments.       cc:         YANICK Soliman/[id number]  D:  04/07/2017 07:06:36(Eastern Time)  T:  04/07/2017 07:28:54(Eastern Time)  Voice ID:  [id number]/Document ID:  [id number]

## 2017-04-07 NOTE — PROGRESS NOTES
Discharge Planning Assessment  [facility]     Patient Name: Padmini Torres  MRN: [id number]  Today's Date: 4/7/2017    Admit Date: 4/6/2017          Discharge Needs Assessment       04/07/17 1145    Living Environment    Lives With alone    Living Arrangements apartment    Quality Of Family Relationships supportive    Able to Return to Prior Living Arrangements yes    Discharge Needs Assessment    Concerns To Be Addressed no discharge needs identified;denies needs/concerns at this time    Readmission Within The Last 30 Days no previous admission in last 30 days    Anticipated Changes Related to Illness none    Equipment Currently Used at Home cane, straight;walker, rolling    Equipment Needed After Discharge none    Transportation Available family or friend will provide    Discharge Disposition home or self-care            Discharge Plan       04/07/17 1145    Case Management/Social Work Plan    Plan PT resides at home alone and has family who assist as needed. PT denies any dc needs at this time. PT was encouraged to accept  services, but PT has declined. PT states that she is not typically homebound. Will follow.     Patient/Family In Agreement With Plan yes        Discharge Placement     No information found                Demographic Summary     None            Functional Status     None            Psychosocial     None            Abuse/Neglect     None            Legal     None            Substance Abuse     None            Patient Forms     None          MERCEDEZ Paige

## 2017-04-07 NOTE — ED PROVIDER NOTES
Subjective   HPI Comments: Patient is a 76-year-old  female who presents tonight for rapid heart rate fatigue and numbness.  Patient reports that she has had an off and then again history of rapid heart rate where her heart rate go up to 200 to 2:30 at the highest that she seen.  Patient reports that sometime ago Dr. Brooks her cardiologist recommended implantation of a defibrillator.  She was reluctant to go through with this and so instead they put her on some medications to see how they would do.  At first this seemed to actually help however of the last several months she has had increasing episodes of this and finally tonight she had a couple of episodes the other one last one lasting longer than normal for her although she cannot [place] time.  She states that she was up above 200 when she checked her heart rate so she came in to be evaluated and treated.  During this time she began to feel very fatigued and had numbness of her hands and feet.  Patient reports that all symptoms are now resolved and she feels back to her normal self.  She reports that she did not take anything for this or attempt any other treatments.      History provided by:  Patient   used: No        Review of Systems   Constitutional: Positive for fatigue. Negative for activity change, appetite change, chills and fever.   HENT: Negative for congestion, dental problem and drooling.    Eyes: Negative for discharge and itching.   Respiratory: Negative for apnea, cough, chest tightness, shortness of breath, wheezing and stridor.    Cardiovascular: Positive for palpitations. Negative for chest pain and leg swelling.   Gastrointestinal: Negative for abdominal pain, diarrhea, nausea and vomiting.   Endocrine: Negative for polydipsia and polyuria.   Genitourinary: Negative for difficulty urinating and dysuria.   Skin: Negative for color change, pallor and rash.   Neurological: Positive for numbness. Negative for dizziness,  facial asymmetry and headaches.   Hematological: Negative for adenopathy. Does not bruise/bleed easily.   Psychiatric/Behavioral: Negative for behavioral problems and suicidal ideas.   All other systems reviewed and are negative.      Past Medical History:   Diagnosis Date   • Abdominal pain, left lower quadrant    • Bowel habit changes    • CAD (coronary artery disease)     LAD stent    • COPD (chronic obstructive pulmonary disease)    • Diabetes mellitus    • DVT (deep venous thrombosis)    • Dysphagia    • Hypertension    • Lymphoma    • Pulmonary emboli    • Sleep apnea, obstructive        Allergies   Allergen Reactions   • Zantac [Ranitidine Hcl] Shortness Of Breath       Past Surgical History:   Procedure Laterality Date   • ANKLE SURGERY     • BACK SURGERY      lower   • CHOLECYSTECTOMY     • COLONOSCOPY  04/23/2015    Last colon limted lower diverticulosis left colon, Internal Hemorrhoids   • COLONOSCOPY W/ BIOPSIES AND POLYPECTOMY  07/22/2014    Adenomatous tubular type, Diverticulosis sigmoid colon   • ENDOSCOPY  04/14/2010    Distal ring dilated 48 Fr   • EXPLORATORY LAPAROTOMY  2005    relapse lymphoma   • HEMORRHOIDECTOMY     • HYSTERECTOMY      total abdominal   • LUMBAR SPINE SURGERY     • LUNG BIOPSY N/A 1991    open    • SMALL INTESTINE SURGERY     • TONSILLECTOMY     • UPPER GASTROINTESTINAL ENDOSCOPY  10/23/2015    normal exam       Family History   Problem Relation Age of Onset   • No Known Problems Mother    • No Known Problems Father    • Colon cancer Neg Hx    • Colon polyps Neg Hx        Social History     Social History   • Marital status:      Spouse name: N/A   • Number of children: N/A   • Years of education: N/A     Social History Main Topics   • Smoking status: Never Smoker   • Smokeless tobacco: None   • Alcohol use No   • Drug use: No   • Sexual activity: Not Asked     Other Topics Concern   • None     Social History Narrative       Lab Results (last 24 hours)     Procedure  Component Value Units Date/Time    Digoxin Level [[id number]]  (Normal) Collected:  04/06/17 2353    Specimen:  Blood Updated:  04/07/17 0038     Digoxin 1.80 ng/mL     CBC & Differential [[id number]] Collected:  04/06/17 2353    Specimen:  Blood Updated:  04/07/17 0017    Narrative:       The following orders were created for panel order CBC & Differential.  Procedure                               Abnormality         Status                     ---------                               -----------         ------                     CBC Auto Differential[[id number]]         Abnormal            Final result                 Please view results for these tests on the individual orders.    Comprehensive Metabolic Panel [[id number]]  (Abnormal) Collected:  04/06/17 2353    Specimen:  Blood Updated:  04/07/17 0018     Glucose 128 (H) mg/dL      BUN 36 (H) mg/dL      Creatinine 1.34 mg/dL      Sodium 143 mmol/L      Potassium 4.3 mmol/L      Chloride 98 mmol/L      CO2 32.0 (H) mmol/L      Calcium 9.5 mg/dL      Total Protein 6.5 g/dL      Albumin 3.80 g/dL      ALT (SGPT) 23 U/L      AST (SGOT) 24 U/L      Alkaline Phosphatase 56 U/L      Total Bilirubin 0.6 mg/dL      eGFR Non African Amer 38 (L) mL/min/1.73      Globulin 2.7 gm/dL      A/G Ratio 1.4 g/dL      BUN/Creatinine Ratio 26.9 (H)     Anion Gap 13.0 mmol/L     Narrative:       The MDRD GFR formula is only valid for adults with stable renal function between ages 18 and 70.    Protime-INR [[id number]]  (Normal) Collected:  04/06/17 2353    Specimen:  Blood Updated:  04/07/17 0016     Protime 13.4 Seconds      INR 0.99    CBC Auto Differential [[id number]]  (Abnormal) Collected:  04/06/17 2353    Specimen:  Blood Updated:  04/07/17 0017     WBC 6.09 10*3/mm3      RBC 4.15 (L) 10*6/mm3      Hemoglobin 13.4 g/dL      Hematocrit 42.5 %      .4 (H) fL      MCH 32.3 (H) pg      MCHC 31.5 (L) g/dL      RDW 15.3 (H) %      RDW-SD 56.9 (H) fl      MPV 10.6 fL      Platelets 184  "10*3/mm3      Neutrophil % 59.6 %      Lymphocyte % 25.6 %      Monocyte % 11.0 %      Eosinophil % 2.8 %      Basophil % 0.7 %      Immature Grans % 0.3 %      Neutrophils, Absolute 3.63 10*3/mm3      Lymphocytes, Absolute 1.56 10*3/mm3      Monocytes, Absolute 0.67 10*3/mm3      Eosinophils, Absolute 0.17 10*3/mm3      Basophils, Absolute 0.04 10*3/mm3      Immature Grans, Absolute 0.02 10*3/mm3     Blood Culture [[id number]] Collected:  04/06/17 2353    Specimen:  Blood from Blood, Venous Line Updated:  04/07/17 0007    Troponin [[id number]]  (Abnormal) Collected:  04/06/17 2353    Specimen:  Blood Updated:  04/07/17 0047     Troponin I 0.035 (H) ng/mL     POC Troponin, Rapid [[id number]]  (Normal) Collected:  04/07/17 0302    Specimen:  Blood Updated:  04/07/17 0315     Troponin I 0.04 ng/mL       Serial Number: [id number]    : [id number]             Objective   Physical Exam   Constitutional: She is oriented to person, place, and time. She appears well-developed and well-nourished.   HENT:   Head: Normocephalic and atraumatic.   Eyes: Conjunctivae and EOM are normal. Pupils are equal, round, and reactive to light.   Neck: Normal range of motion. Neck supple.   Cardiovascular: Normal rate, regular rhythm, normal heart sounds and intact distal pulses.  Exam reveals no gallop and no friction rub.    No murmur heard.  Pulmonary/Chest: Effort normal and breath sounds normal. No respiratory distress. She has no wheezes. She has no rales. She exhibits no tenderness.   Abdominal: Soft. Bowel sounds are normal.   Musculoskeletal: She exhibits no edema, tenderness or deformity.   Neurological: She is alert and oriented to person, place, and time.   Skin: Skin is warm and dry.   Psychiatric: She has a normal mood and affect. Her behavior is normal.   Nursing note and vitals reviewed.      Procedures         XR Chest 1 View    (Results Pending)       /89  Pulse 82  Temp 97.2 °F (36.2 °C)  Resp 22  Ht 67\" (170.2 cm)  " Wt 143 lb (64.9 kg)  SpO2 94%  BMI 22.4 kg/m2    ED Course    ED Course       Medications   sodium chloride 0.9 % flush 10 mL (10 mL Intravenous Given 4/7/17 0000)            MDM  Number of Diagnoses or Management Options  Palpitations: new and requires workup  SVT (supraventricular tachycardia): new and requires workup     Amount and/or Complexity of Data Reviewed  Clinical lab tests: ordered and reviewed  Tests in the radiology section of CPT®: ordered and reviewed  Tests in the medicine section of CPT®: ordered and reviewed  Independent visualization of images, tracings, or specimens: yes    Risk of Complications, Morbidity, and/or Mortality  Presenting problems: moderate  Diagnostic procedures: moderate  Management options: moderate  General comments: Patient has been admitted to the service of Dr. Hermann Gomez for further evaluation and treatment.  Patient is currently in no acute distress.  All findings have been discussed with the patient she indicates agreement and understanding of the plan.    Patient Progress  Patient progress: improved      Final diagnoses:   Palpitations   SVT (supraventricular tachycardia)          Maria Isabel Rosario DO  04/07/17 0424

## 2017-04-07 NOTE — PLAN OF CARE
Problem: Patient Care Overview (Adult)  Goal: Plan of Care Review  Outcome: Ongoing (interventions implemented as appropriate)    04/07/17 1549   Coping/Psychosocial Response Interventions   Plan Of Care Reviewed With patient;daughter   Patient Care Overview   Progress no change   Outcome Evaluation   Outcome Summary/Follow up Plan VSS, no c/o pain, echo complete/results pending, s 79-92 with first degree/BBB, had 7 minute run SVT HR up to 227; RRT called;        Goal: Adult Individualization and Mutuality  Outcome: Ongoing (interventions implemented as appropriate)  Goal: Discharge Needs Assessment  Outcome: Ongoing (interventions implemented as appropriate)    Problem: Acute Coronary Syndrome (ACS) (Adult)  Goal: Signs and Symptoms of Listed Potential Problems Will be Absent or Manageable (Acute Coronary Syndrome)  Outcome: Ongoing (interventions implemented as appropriate)    Problem: Fall Risk (Adult)  Goal: Identify Related Risk Factors and Signs and Symptoms  Outcome: Outcome(s) achieved Date Met:  04/07/17  Goal: Absence of Falls  Outcome: Ongoing (interventions implemented as appropriate)    Problem: Arrhythmia/Dysrhythmia (Symptomatic) (Adult)  Goal: Signs and Symptoms of Listed Potential Problems Will be Absent or Manageable (Arrhythmia/Dysrhythmia)  Outcome: Ongoing (interventions implemented as appropriate)    Problem: Cardiac: Heart Failure (Adult)  Goal: Signs and Symptoms of Listed Potential Problems Will be Absent or Manageable (Cardiac: Heart Failure)  Outcome: Ongoing (interventions implemented as appropriate)

## 2017-04-07 NOTE — CONSULTS
[facility], [facility] Consult Note    Referring Provider: Mir  Reason for Consultation: SVT  Patient Care Team:  Александр Yo DO as PCP - General  Александр Yo DO as PCP - Family Medicine  Heber Liu MD as PCP - Claims Attributed - PLEASE DO NOT REMOVE  Freeman Kelsey MD as Cardiologist (Cardiology)  Александр Yo DO as MSSP ACO Attributed - PLEASE DO NOT REMOVE    Chief complaint heart palpitations    Subjective .     History of present illness:  This patient is a 75 y/o white female with a h/o CAD, CHF, and SVT.  She presents to the ER with heart palpitations.  She states that she is having more frequent episodes of heart palpitations with associated left arm heaviness and light headedness.  She admits to worsening CARRANZA with typical household chores.  She denies any CP, orthopnea, or leg swelling.  RRT was called this am for SVT.    Review of Systems  A 10-point review of systems is obtained and negative except for otherwise mentioned above.    History  Past Medical History:   Diagnosis Date   • Abdominal pain, left lower quadrant    • Bowel habit changes    • CAD (coronary artery disease)     LAD stent    • COPD (chronic obstructive pulmonary disease)    • Diabetes mellitus    • DVT (deep venous thrombosis)    • Dysphagia    • Hypertension    • Lymphoma    • Pulmonary emboli    • Sleep apnea, obstructive    , Past Surgical History:   Procedure Laterality Date   • ANKLE SURGERY     • BACK SURGERY      lower   • CHOLECYSTECTOMY     • COLONOSCOPY  04/23/2015    Last colon limted lower diverticulosis left colon, Internal Hemorrhoids   • COLONOSCOPY W/ BIOPSIES AND POLYPECTOMY  07/22/2014    Adenomatous tubular type, Diverticulosis sigmoid colon   • ENDOSCOPY  04/14/2010    Distal ring dilated 48 Fr   • EXPLORATORY LAPAROTOMY  2005    relapse lymphoma   • HEMORRHOIDECTOMY     • HYSTERECTOMY      total abdominal   • LUMBAR SPINE SURGERY     • LUNG BIOPSY N/A 1991     open    • SMALL INTESTINE SURGERY     • TONSILLECTOMY     • UPPER GASTROINTESTINAL ENDOSCOPY  10/23/2015    normal exam   , Family History   Problem Relation Age of Onset   • No Known Problems Mother    • No Known Problems Father    • Colon cancer Neg Hx    • Colon polyps Neg Hx    , Social History   Substance Use Topics   • Smoking status: Never Smoker   • Smokeless tobacco: None   • Alcohol use No   , Prescriptions Prior to Admission   Medication Sig Dispense Refill Last Dose   • aspirin 81 MG chewable tablet Chew 81 mg daily.   4/6/2017 at 0800   • calcium citrate-vitamin d (CALCITRATE) 315-250 MG-UNIT tablet tablet Take 1 tablet by mouth 2 (Two) Times a Day.   4/6/2017 at 0800   • diazePAM (VALIUM) 2 MG tablet Take 2 mg by mouth 2 (Two) Times a Day As Needed for Anxiety.   4/6/2017 at 2000   • digoxin (LANOXIN) 125 MCG tablet Take 125 mcg by mouth Daily.   4/6/2017 at 0800   • furosemide (LASIX) 20 MG tablet Take 20 mg by mouth 2 (two) times a day.   4/6/2017 at 0800   • gabapentin (NEURONTIN) 300 MG capsule Take 300 mg by mouth 3 (three) times a day.   4/6/2017 at 2000   • isosorbide mononitrate (IMDUR) 30 MG 24 hr tablet TAKE 1 TABLET EVERY DAY 90 tablet 3 4/6/2017 at 2000   • lisinopril (PRINIVIL,ZESTRIL) 10 MG tablet Take 10 mg by mouth daily.   4/6/2017 at 0800   • magnesium oxide (MAGOX) 400 (241.3 MG) MG tablet tablet Take 250 mg by mouth Daily.   4/6/2017 at 2000   • metFORMIN (GLUCOPHAGE) 1000 MG tablet Take 1,000 mg by mouth 2 (two) times a day with meals.   4/6/2017 at 0800   • metoprolol succinate XL (TOPROL-XL) 25 MG 24 hr tablet Take 25 mg by mouth daily.   4/6/2017 at 0800   • Multiple Vitamins-Minerals (MULTIVITAMIN ADULT PO) Take  by mouth.   4/6/2017 at Unknown time   • omeprazole (PriLOSEC) 20 MG capsule Take 20 mg by mouth daily.   4/6/2017 at Unknown time   • pravastatin (PRAVACHOL) 20 MG tablet Take 20 mg by mouth daily.   4/6/2017 at Unknown time   • predniSONE (DELTASONE) 10 MG tablet  "Take 10 mg by mouth Every Other Day.   4/6/2017 at 0800   • digoxin (LANOXIN) 0.25 MG/ML injection 0.25 mcg daily.   Unknown at Unknown time   • MethylPREDNISolone (MEDROL, MAXIMINO,) 4 MG tablet Take as directed on package instructions. 21 tablet 0 Unknown at Unknown time   • metoprolol tartrate (LOPRESSOR) 25 MG tablet Take 25 mg by mouth 2 (two) times a day.   Unknown at Unknown time    and Allergies:  Zantac [ranitidine hcl]    Objective     Vital Sign Min/Max for last 24 hours  Temp  Min: 97.2 °F (36.2 °C)  Max: 98.6 °F (37 °C)   BP  Min: 96/76  Max: 126/72   Pulse  Min: 79  Max: 95   Resp  Min: 16  Max: 22   SpO2  Min: 90 %  Max: 97 %   Flow (L/min)  Min: 2  Max: 2   Weight  Min: 143 lb (64.9 kg)  Max: 144 lb 9.6 oz (65.6 kg)     Flowsheet Rows         First Filed Value    Admission Height  67\" (170.2 cm) Documented at 04/06/2017 2343    Admission Weight  143 lb (64.9 kg) Documented at 04/06/2017 2343             Echo EF Estimated  Lab Results   Component Value Date    ECHOEFEST 30 11/02/2016         Physical Exam:     General Appearance:    Alert, cooperative, in no acute distress   Head:    Normocephalic, without obvious abnormality, atraumatic   Eyes:            Lids and lashes normal, conjunctivae and sclerae normal, no   icterus, PERRLA, EOMI   Throat:   Oral mucosa pink and moist   Neck:   No adenopathy, supple, trachea midline, no thyromegaly, no   carotid bruit, no JVD   Lungs:     Clear to auscultation bilaterally,respirations regular, even     and unlabored    Heart:    Regular rhythm and normal rate, normal S1 and S2, no            murmur, no gallop, no rub, no click   Chest Wall:    No abnormalities observed   Abdomen:     Normal bowel sounds present in all four quadrants, no       masses, no organomegaly, soft non-tender, non-distended    Extremities:   No edema, no cyanosis, no clubbing   Pulses:   Pulses palpable and equal bilaterally   Skin:   No bleeding, bruising or rash   Psychiatric:   Displays " appropriate mood and affect           Results Review:    I reviewed the patient's new clinical results.      Results from last 7 days  Lab Units 04/06/17  2353   WBC 10*3/mm3 6.09   HEMOGLOBIN g/dL 13.4   HEMATOCRIT % 42.5   PLATELETS 10*3/mm3 184       Results from last 7 days  Lab Units 04/06/17  2353   SODIUM mmol/L 143   POTASSIUM mmol/L 4.3   CHLORIDE mmol/L 98   TOTAL CO2 mmol/L 32.0*   BUN mg/dL 36*   CREATININE mg/dL 1.34   GLUCOSE mg/dL 128*   CALCIUM mg/dL 9.5       Results from last 7 days  Lab Units 04/06/17  2353   SODIUM mmol/L 143   POTASSIUM mmol/L 4.3   CHLORIDE mmol/L 98   TOTAL CO2 mmol/L 32.0*   BUN mg/dL 36*   CREATININE mg/dL 1.34   CALCIUM mg/dL 9.5   BILIRUBIN mg/dL 0.6   ALK PHOS U/L 56   ALT (SGPT) U/L 23   AST (SGOT) U/L 24   GLUCOSE mg/dL 128*       Results from last 7 days  Lab Units 04/06/17  2353   TROPONIN I ng/mL 0.035*         Assessment/Plan     1.  Heart palpitations  2.  Supraventricular Tachycardia  3.  Coronary artery disease  4.  Congestive heart failure  5.  Hypertension  6.  Hyperlipidemia  7.  Diabetes mellitus type 2  8.  Anxiety  9.  Depression    Await echo.  Trend cardiac markers.  Patient would like to reconsider AICD now.  Amiodarone started.  Will continue to monitor.      I discussed the patients findings and my recommendations with patient, nursing staff and consulting provider    Candie Mccormick PA-C  04/07/17  10:41 AM

## 2017-04-08 ENCOUNTER — APPOINTMENT (OUTPATIENT)
Dept: GENERAL RADIOLOGY | Facility: HOSPITAL | Age: 77
End: 2017-04-08

## 2017-04-08 LAB
ALBUMIN SERPL-MCNC: 3.3 G/DL (ref 3.5–5)
ALBUMIN/GLOB SERPL: 1.2 G/DL (ref 1.1–2.5)
ALP SERPL-CCNC: 58 U/L (ref 24–120)
ALT SERPL W P-5'-P-CCNC: 22 U/L (ref 0–54)
ANION GAP SERPL CALCULATED.3IONS-SCNC: 13 MMOL/L (ref 4–13)
ARTICHOKE IGE QN: 53 MG/DL (ref 0–99)
AST SERPL-CCNC: 23 U/L (ref 7–45)
BASOPHILS # BLD AUTO: 0.01 10*3/MM3 (ref 0–0.2)
BASOPHILS NFR BLD AUTO: 0.2 % (ref 0–2)
BH CV ECHO MEAS - AO MAX PG (FULL): 11.4 MMHG
BH CV ECHO MEAS - AO MAX PG: 14.1 MMHG
BH CV ECHO MEAS - AO MEAN PG (FULL): 5 MMHG
BH CV ECHO MEAS - AO MEAN PG: 6 MMHG
BH CV ECHO MEAS - AO ROOT AREA (BSA CORRECTED): 2.5
BH CV ECHO MEAS - AO ROOT AREA: 14.5 CM^2
BH CV ECHO MEAS - AO ROOT DIAM: 4.3 CM
BH CV ECHO MEAS - AO V2 MAX: 188 CM/SEC
BH CV ECHO MEAS - AO V2 MEAN: 109 CM/SEC
BH CV ECHO MEAS - AO V2 VTI: 43 CM
BH CV ECHO MEAS - AVA(I,A): 1.2 CM^2
BH CV ECHO MEAS - AVA(I,D): 1.2 CM^2
BH CV ECHO MEAS - AVA(V,A): 1.4 CM^2
BH CV ECHO MEAS - AVA(V,D): 1.4 CM^2
BH CV ECHO MEAS - BSA(HAYCOCK): 1.7 M^2
BH CV ECHO MEAS - BSA: 1.7 M^2
BH CV ECHO MEAS - BZI_BMI: 24.7 KILOGRAMS/M^2
BH CV ECHO MEAS - BZI_METRIC_HEIGHT: 162.6 CM
BH CV ECHO MEAS - BZI_METRIC_WEIGHT: 65.3 KG
BH CV ECHO MEAS - CONTRAST EF 4CH: 28.8 ML/M^2
BH CV ECHO MEAS - EDV(CUBED): 216 ML
BH CV ECHO MEAS - EDV(MOD-SP4): 205 ML
BH CV ECHO MEAS - EDV(TEICH): 180 ML
BH CV ECHO MEAS - EF(CUBED): 27.1 %
BH CV ECHO MEAS - EF(MOD-SP4): 28.8 %
BH CV ECHO MEAS - EF(TEICH): 21.5 %
BH CV ECHO MEAS - ESV(CUBED): 157.5 ML
BH CV ECHO MEAS - ESV(MOD-SP4): 146 ML
BH CV ECHO MEAS - ESV(TEICH): 141.3 ML
BH CV ECHO MEAS - FS: 10 %
BH CV ECHO MEAS - IVS/LVPW: 0.91
BH CV ECHO MEAS - IVSD: 1 CM
BH CV ECHO MEAS - LA DIMENSION: 4.3 CM
BH CV ECHO MEAS - LA/AO: 1
BH CV ECHO MEAS - LAT PEAK E' VEL: 7.9 CM/SEC
BH CV ECHO MEAS - LV DIASTOLIC VOL/BSA (35-75): 120.5 ML/M^2
BH CV ECHO MEAS - LV MASS(C)D: 263 GRAMS
BH CV ECHO MEAS - LV MASS(C)DI: 154.6 GRAMS/M^2
BH CV ECHO MEAS - LV MAX PG: 2.7 MMHG
BH CV ECHO MEAS - LV MEAN PG: 1 MMHG
BH CV ECHO MEAS - LV SYSTOLIC VOL/BSA (12-30): 85.8 ML/M^2
BH CV ECHO MEAS - LV V1 MAX: 82 CM/SEC
BH CV ECHO MEAS - LV V1 MEAN: 53.7 CM/SEC
BH CV ECHO MEAS - LV V1 VTI: 16.6 CM
BH CV ECHO MEAS - LVIDD: 6 CM
BH CV ECHO MEAS - LVIDS: 5.4 CM
BH CV ECHO MEAS - LVLD AP4: 9.4 CM
BH CV ECHO MEAS - LVLS AP4: 8.9 CM
BH CV ECHO MEAS - LVOT AREA (M): 3.1 CM^2
BH CV ECHO MEAS - LVOT AREA: 3.1 CM^2
BH CV ECHO MEAS - LVOT DIAM: 2 CM
BH CV ECHO MEAS - LVPWD: 1.1 CM
BH CV ECHO MEAS - MED PEAK E' VEL: 3.59 CM/SEC
BH CV ECHO MEAS - MR ALIAS VEL: 30.8 CM/SEC
BH CV ECHO MEAS - MR ERO: 0.04 CM^2
BH CV ECHO MEAS - MR FLOW RATE: 17.4 CM^3/SEC
BH CV ECHO MEAS - MR MAX PG: 62.4 MMHG
BH CV ECHO MEAS - MR MAX VEL: 395 CM/SEC
BH CV ECHO MEAS - MR MEAN PG: 38 MMHG
BH CV ECHO MEAS - MR MEAN VEL: 277 CM/SEC
BH CV ECHO MEAS - MR PISA RADIUS: 0.3 CM
BH CV ECHO MEAS - MR PISA: 0.57 CM^2
BH CV ECHO MEAS - MR VOLUME: 5.3 ML
BH CV ECHO MEAS - MR VTI: 120 CM
BH CV ECHO MEAS - MV A MAX VEL: 69.4 CM/SEC
BH CV ECHO MEAS - MV DEC TIME: 0.13 SEC
BH CV ECHO MEAS - MV E MAX VEL: 91.2 CM/SEC
BH CV ECHO MEAS - MV E/A: 1.3
BH CV ECHO MEAS - RAP SYSTOLE: 10 MMHG
BH CV ECHO MEAS - RVSP: 45.8 MMHG
BH CV ECHO MEAS - SI(AO): 367.1 ML/M^2
BH CV ECHO MEAS - SI(CUBED): 34.4 ML/M^2
BH CV ECHO MEAS - SI(LVOT): 30.7 ML/M^2
BH CV ECHO MEAS - SI(MOD-SP4): 34.7 ML/M^2
BH CV ECHO MEAS - SI(TEICH): 22.7 ML/M^2
BH CV ECHO MEAS - SV(AO): 624.4 ML
BH CV ECHO MEAS - SV(CUBED): 58.5 ML
BH CV ECHO MEAS - SV(LVOT): 52.2 ML
BH CV ECHO MEAS - SV(MOD-SP4): 59 ML
BH CV ECHO MEAS - SV(TEICH): 38.7 ML
BH CV ECHO MEAS - TR MAX VEL: 299 CM/SEC
BILIRUB SERPL-MCNC: 0.8 MG/DL (ref 0.1–1)
BUN BLD-MCNC: 37 MG/DL (ref 5–21)
BUN/CREAT SERPL: 23.1 (ref 7–25)
CALCIUM SPEC-SCNC: 9.3 MG/DL (ref 8.4–10.4)
CHLORIDE SERPL-SCNC: 101 MMOL/L (ref 98–110)
CHOLEST SERPL-MCNC: 126 MG/DL (ref 130–200)
CO2 SERPL-SCNC: 27 MMOL/L (ref 24–31)
CREAT BLD-MCNC: 1.6 MG/DL (ref 0.5–1.4)
DEPRECATED RDW RBC AUTO: 54.7 FL (ref 40–54)
E/E' RATIO: 25.4
EOSINOPHIL # BLD AUTO: 0.13 10*3/MM3 (ref 0–0.7)
EOSINOPHIL NFR BLD AUTO: 2.3 % (ref 0–4)
ERYTHROCYTE [DISTWIDTH] IN BLOOD BY AUTOMATED COUNT: 15 % (ref 12–15)
GFR SERPL CREATININE-BSD FRML MDRD: 31 ML/MIN/1.73
GLOBULIN UR ELPH-MCNC: 2.7 GM/DL
GLUCOSE BLD-MCNC: 83 MG/DL (ref 70–100)
GLUCOSE BLDC GLUCOMTR-MCNC: 106 MG/DL (ref 70–130)
GLUCOSE BLDC GLUCOMTR-MCNC: 132 MG/DL (ref 70–130)
GLUCOSE BLDC GLUCOMTR-MCNC: 84 MG/DL (ref 70–130)
GLUCOSE BLDC GLUCOMTR-MCNC: 95 MG/DL (ref 70–130)
HBA1C MFR BLD: 6 %
HCT VFR BLD AUTO: 40.6 % (ref 37–47)
HDLC SERPL-MCNC: 39 MG/DL
HGB BLD-MCNC: 13.2 G/DL (ref 12–16)
IMM GRANULOCYTES # BLD: 0.02 10*3/MM3 (ref 0–0.03)
IMM GRANULOCYTES NFR BLD: 0.3 % (ref 0–5)
LDLC/HDLC SERPL: 1.28 {RATIO}
LEFT ATRIUM VOLUME INDEX: 31.1 ML/M2
LEFT ATRIUM VOLUME: 52.9 CM3
LV EF 2D ECHO EST: 25 %
LYMPHOCYTES # BLD AUTO: 1.08 10*3/MM3 (ref 0.72–4.86)
LYMPHOCYTES NFR BLD AUTO: 18.9 % (ref 15–45)
MCH RBC QN AUTO: 32.4 PG (ref 28–32)
MCHC RBC AUTO-ENTMCNC: 32.5 G/DL (ref 33–36)
MCV RBC AUTO: 99.5 FL (ref 82–98)
MONOCYTES # BLD AUTO: 0.79 10*3/MM3 (ref 0.19–1.3)
MONOCYTES NFR BLD AUTO: 13.8 % (ref 4–12)
NEUTROPHILS # BLD AUTO: 3.69 10*3/MM3 (ref 1.87–8.4)
NEUTROPHILS NFR BLD AUTO: 64.5 % (ref 39–78)
NT-PROBNP SERPL-MCNC: ABNORMAL PG/ML (ref 0–1800)
PLATELET # BLD AUTO: 127 10*3/MM3 (ref 130–400)
PMV BLD AUTO: 11.3 FL (ref 6–12)
POTASSIUM BLD-SCNC: 4.3 MMOL/L (ref 3.5–5.3)
PROT SERPL-MCNC: 6 G/DL (ref 6.3–8.7)
RBC # BLD AUTO: 4.08 10*6/MM3 (ref 4.2–5.4)
SODIUM BLD-SCNC: 141 MMOL/L (ref 135–145)
TRIGL SERPL-MCNC: 186 MG/DL (ref 0–149)
TSH SERPL DL<=0.05 MIU/L-ACNC: 2.56 MIU/ML (ref 0.47–4.68)
VIT B12 BLD-MCNC: 760 PG/ML (ref 239–931)
WBC NRBC COR # BLD: 5.72 10*3/MM3 (ref 4.8–10.8)

## 2017-04-08 PROCEDURE — 82607 VITAMIN B-12: CPT | Performed by: FAMILY MEDICINE

## 2017-04-08 PROCEDURE — 25010000002 ENOXAPARIN PER 10 MG: Performed by: INTERNAL MEDICINE

## 2017-04-08 PROCEDURE — 84443 ASSAY THYROID STIM HORMONE: CPT | Performed by: FAMILY MEDICINE

## 2017-04-08 PROCEDURE — 71010 HC CHEST PA OR AP: CPT

## 2017-04-08 PROCEDURE — 85025 COMPLETE CBC W/AUTO DIFF WBC: CPT | Performed by: FAMILY MEDICINE

## 2017-04-08 PROCEDURE — 80061 LIPID PANEL: CPT | Performed by: FAMILY MEDICINE

## 2017-04-08 PROCEDURE — 83036 HEMOGLOBIN GLYCOSYLATED A1C: CPT | Performed by: FAMILY MEDICINE

## 2017-04-08 PROCEDURE — 83880 ASSAY OF NATRIURETIC PEPTIDE: CPT | Performed by: FAMILY MEDICINE

## 2017-04-08 PROCEDURE — 82962 GLUCOSE BLOOD TEST: CPT

## 2017-04-08 PROCEDURE — 99232 SBSQ HOSP IP/OBS MODERATE 35: CPT | Performed by: INTERNAL MEDICINE

## 2017-04-08 PROCEDURE — 80053 COMPREHEN METABOLIC PANEL: CPT | Performed by: FAMILY MEDICINE

## 2017-04-08 RX ORDER — POLYETHYLENE GLYCOL 3350 17 G/17G
17 POWDER, FOR SOLUTION ORAL DAILY
Status: DISCONTINUED | OUTPATIENT
Start: 2017-04-08 | End: 2017-04-11 | Stop reason: HOSPADM

## 2017-04-08 RX ORDER — METOPROLOL SUCCINATE 25 MG/1
25 TABLET, EXTENDED RELEASE ORAL DAILY
Status: DISCONTINUED | OUTPATIENT
Start: 2017-04-09 | End: 2017-04-10

## 2017-04-08 RX ADMIN — DIAZEPAM 2 MG: 2 TABLET ORAL at 08:23

## 2017-04-08 RX ADMIN — METOPROLOL SUCCINATE 50 MG: 50 TABLET, FILM COATED, EXTENDED RELEASE ORAL at 08:22

## 2017-04-08 RX ADMIN — ISOSORBIDE MONONITRATE 30 MG: 30 TABLET ORAL at 08:22

## 2017-04-08 RX ADMIN — GABAPENTIN 300 MG: 300 CAPSULE ORAL at 08:23

## 2017-04-08 RX ADMIN — ENOXAPARIN SODIUM 40 MG: 40 INJECTION SUBCUTANEOUS at 08:23

## 2017-04-08 RX ADMIN — POLYETHYLENE GLYCOL (3350) 17 G: 17 POWDER, FOR SOLUTION ORAL at 21:03

## 2017-04-08 RX ADMIN — ASPIRIN 81 MG 81 MG: 81 TABLET ORAL at 08:23

## 2017-04-08 RX ADMIN — PANTOPRAZOLE SODIUM 40 MG: 40 TABLET, DELAYED RELEASE ORAL at 05:28

## 2017-04-08 RX ADMIN — FUROSEMIDE 20 MG: 20 TABLET ORAL at 08:23

## 2017-04-08 RX ADMIN — LISINOPRIL 10 MG: 10 TABLET ORAL at 08:24

## 2017-04-08 RX ADMIN — AMIODARONE HYDROCHLORIDE 200 MG: 200 TABLET ORAL at 08:23

## 2017-04-08 RX ADMIN — GABAPENTIN 300 MG: 300 CAPSULE ORAL at 17:17

## 2017-04-08 RX ADMIN — GABAPENTIN 300 MG: 300 CAPSULE ORAL at 21:03

## 2017-04-08 RX ADMIN — DIGOXIN 125 MCG: 0.12 TABLET ORAL at 13:42

## 2017-04-08 RX ADMIN — PRAVASTATIN SODIUM 20 MG: 20 TABLET ORAL at 08:23

## 2017-04-08 NOTE — PLAN OF CARE
Problem: Patient Care Overview (Adult)  Goal: Plan of Care Review  Outcome: Ongoing (interventions implemented as appropriate)    04/08/17 1421   Coping/Psychosocial Response Interventions   Plan Of Care Reviewed With patient   Patient Care Overview   Progress improving   Outcome Evaluation   Outcome Summary/Follow up Plan patient reports feeling better today         Problem: Acute Coronary Syndrome (ACS) (Adult)  Goal: Signs and Symptoms of Listed Potential Problems Will be Absent or Manageable (Acute Coronary Syndrome)  Outcome: Ongoing (interventions implemented as appropriate)    Problem: Fall Risk (Adult)  Goal: Absence of Falls  Outcome: Ongoing (interventions implemented as appropriate)    Problem: Arrhythmia/Dysrhythmia (Symptomatic) (Adult)  Goal: Signs and Symptoms of Listed Potential Problems Will be Absent or Manageable (Arrhythmia/Dysrhythmia)  Outcome: Ongoing (interventions implemented as appropriate)    Problem: Cardiac: Heart Failure (Adult)  Goal: Signs and Symptoms of Listed Potential Problems Will be Absent or Manageable (Cardiac: Heart Failure)  Outcome: Ongoing (interventions implemented as appropriate)

## 2017-04-08 NOTE — PROGRESS NOTES
[facility] Medicine Services  INPATIENT PROGRESS NOTE    Length of Stay: 1  Date of Admission: 4/6/2017  Primary Care Physician: Александр Yo DO    Subjective   Chief Complaint: SVT    HPI    A. fib, rate controlled.  Hypotension, stable.  Patient denies any chest pain, shortness breath.  Echocardiogram showed worsening of ejection fraction from previous echocardiogram.    Review of Systems   Constitutional: Negative for activity change, appetite change, chills and fever.   HENT: Negative for hearing loss, nosebleeds, tinnitus and trouble swallowing.    Eyes: Negative for visual disturbance.   Respiratory: Negative for cough, chest tightness, shortness of breath and wheezing.    Cardiovascular: Negative for chest pain, palpitations and leg swelling.   Gastrointestinal: Negative for abdominal distention, abdominal pain, blood in stool, constipation, diarrhea, nausea and vomiting.   Endocrine: Negative for cold intolerance, heat intolerance, polydipsia, polyphagia and polyuria.   Genitourinary: Negative for decreased urine volume, difficulty urinating, dysuria, flank pain, frequency and hematuria.   Musculoskeletal: Negative for arthralgias, joint swelling and myalgias.   Skin: Negative for rash.   Allergic/Immunologic: Negative for immunocompromised state.   Neurological: Negative for dizziness, syncope, weakness, light-headedness and headaches.   Hematological: Negative for adenopathy. Does not bruise/bleed easily.   Psychiatric/Behavioral: Negative for confusion and sleep disturbance. The patient is not nervous/anxious.           All pertinent negatives and positives are as above. All other systems have been reviewed and are negative unless otherwise stated.     Objective    Temp:  [97.5 °F (36.4 °C)-98.4 °F (36.9 °C)] 97.7 °F (36.5 °C)  Heart Rate:  [] 84  Resp:  [18-20] 20  BP: ()/(41-67) 97/67    Intake/Output Summary (Last 24 hours) at 04/08/17 1023  Last data  filed at 04/07/17 1928   Gross per 24 hour   Intake              480 ml   Output                0 ml   Net              480 ml     Physical Exam   Constitutional: She is oriented to person, place, and time. She appears well-developed and well-nourished.   HENT:   Head: Normocephalic and atraumatic.   Eyes: Conjunctivae and EOM are normal. Pupils are equal, round, and reactive to light.   Neck: Neck supple. No JVD present. No thyromegaly present.   Cardiovascular: Normal rate, regular rhythm, normal heart sounds and intact distal pulses.  Exam reveals no gallop and no friction rub.    No murmur heard.  Pulmonary/Chest: Effort normal and breath sounds normal. No respiratory distress. She has no wheezes. She has no rales. She exhibits no tenderness.   Abdominal: Soft. Bowel sounds are normal. She exhibits no distension. There is no tenderness. There is no rebound and no guarding.   Musculoskeletal: Normal range of motion. She exhibits no edema, tenderness or deformity.   Lymphadenopathy:     She has no cervical adenopathy.   Neurological: She is alert and oriented to person, place, and time. She displays normal reflexes. No cranial nerve deficit. She exhibits normal muscle tone.   Skin: Skin is warm and dry. No rash noted.   Psychiatric: She has a normal mood and affect. Her behavior is normal. Judgment and thought content normal.       Results Review:  Lab Results (last 24 hours)     Procedure Component Value Units Date/Time    POC Glucose Fingerstick [[id number]]  (Normal) Collected:  04/07/17 1201    Specimen:  Blood Updated:  04/07/17 1213     Glucose 99 mg/dL       : [id number] Adrian Flanagan ID: [id number]       POC Glucose Fingerstick [[id number]]  (Normal) Collected:  04/07/17 1636    Specimen:  Blood Updated:  04/07/17 1713     Glucose 104 mg/dL       : [id number] Liss BurgessaneMeter ID: [id number]       POC Glucose Fingerstick [[id number]]  (Normal) Collected:  04/07/17 1950    Specimen:  Blood Updated:   04/07/17 2001     Glucose 130 mg/dL       : [id number] Naomy RiceMeter ID: [id number]       Blood Culture [[id number]]  (Normal) Collected:  04/06/17 2353    Specimen:  Blood from Blood, Venous Line Updated:  04/08/17 0105     Blood Culture No growth at 24 hours    CBC & Differential [[id number]] Collected:  04/08/17 0519    Specimen:  Blood Updated:  04/08/17 0554    Narrative:       The following orders were created for panel order CBC & Differential.  Procedure                               Abnormality         Status                     ---------                               -----------         ------                     CBC Auto Differential[[id number]]         Abnormal            Final result                 Please view results for these tests on the individual orders.    CBC Auto Differential [[id number]]  (Abnormal) Collected:  04/08/17 0519    Specimen:  Blood Updated:  04/08/17 0554     WBC 5.72 10*3/mm3      RBC 4.08 (L) 10*6/mm3      Hemoglobin 13.2 g/dL      Hematocrit 40.6 %      MCV 99.5 (H) fL      MCH 32.4 (H) pg      MCHC 32.5 (L) g/dL      RDW 15.0 %      RDW-SD 54.7 (H) fl      MPV 11.3 fL      Platelets 127 (L) 10*3/mm3      Neutrophil % 64.5 %      Lymphocyte % 18.9 %      Monocyte % 13.8 (H) %      Eosinophil % 2.3 %      Basophil % 0.2 %      Immature Grans % 0.3 %      Neutrophils, Absolute 3.69 10*3/mm3      Lymphocytes, Absolute 1.08 10*3/mm3      Monocytes, Absolute 0.79 10*3/mm3      Eosinophils, Absolute 0.13 10*3/mm3      Basophils, Absolute 0.01 10*3/mm3      Immature Grans, Absolute 0.02 10*3/mm3     TSH [[id number]]  (Normal) Collected:  04/08/17 0519    Specimen:  Blood Updated:  04/08/17 0633     TSH 2.560 mIU/mL     Comprehensive Metabolic Panel [[id number]]  (Abnormal) Collected:  04/08/17 0520    Specimen:  Blood Updated:  04/08/17 0636     Glucose 83 mg/dL      BUN 37 (H) mg/dL      Creatinine 1.60 (H) mg/dL      Sodium 141 mmol/L      Potassium 4.3 mmol/L      Chloride 101  mmol/L      CO2 27.0 mmol/L      Calcium 9.3 mg/dL      Total Protein 6.0 (L) g/dL      Albumin 3.30 (L) g/dL      ALT (SGPT) 22 U/L      AST (SGOT) 23 U/L      Alkaline Phosphatase 58 U/L      Total Bilirubin 0.8 mg/dL      eGFR Non African Amer 31 (L) mL/min/1.73      Globulin 2.7 gm/dL      A/G Ratio 1.2 g/dL      BUN/Creatinine Ratio 23.1     Anion Gap 13.0 mmol/L     Narrative:       The MDRD GFR formula is only valid for adults with stable renal function between ages 18 and 70.    BNP [[id number]]  (Abnormal) Collected:  04/08/17 0520    Specimen:  Blood Updated:  04/08/17 0644     proBNP 35010.0 (H) pg/mL     Lipid Panel [[id number]]  (Abnormal) Collected:  04/08/17 0520    Specimen:  Blood Updated:  04/08/17 0646     Total Cholesterol 126 (L) mg/dL      Triglycerides 186 (H) mg/dL      HDL Cholesterol 39 (L) mg/dL      LDL Cholesterol  53 mg/dL      LDL/HDL Ratio 1.28    Vitamin B12 [[id number]]  (Normal) Collected:  04/08/17 0519    Specimen:  Blood Updated:  04/08/17 0652     Vitamin B-12 760 pg/mL     Hemoglobin A1c [[id number]] Collected:  04/08/17 0519    Specimen:  Blood Updated:  04/08/17 0703     Hemoglobin A1C 6.0 %     Narrative:       Less than 6.0           Non-Diabetic Range  6.0-7.0                 ADA Therapeutic Target  Greater than 7.0        Action Suggested    POC Glucose Fingerstick [[id number]]  (Normal) Collected:  04/08/17 0812    Specimen:  Blood Updated:  04/08/17 0848     Glucose 95 mg/dL       : [id number] Liss ChianeMeter ID: [id number]              Cultures:  Blood Culture   Date Value Ref Range Status   04/06/2017 No growth at 24 hours  Preliminary       Radiology Data:        Interpretation Summary      · Left ventricular function is moderately decreased. Estimated EF = 25%.  · The left ventricular cavity is mildly dilated.  · Left ventricular diastolic dysfunction (grade I) consistent with impaired relaxation.  · Mild aortic valve stenosis is present.  · There is a trivial  pericardial effusion. There is no evidence of cardiac tamponade       Allergies   Allergen Reactions   • Zantac [Ranitidine Hcl] Shortness Of Breath       Scheduled meds:     amiodarone 200 mg Oral Q24H   aspirin 81 mg Oral Daily   diazePAM 2 mg Oral BID AC   digoxin 125 mcg Oral Daily   enoxaparin 40 mg Subcutaneous Daily   furosemide 20 mg Oral BID AC   gabapentin 300 mg Oral TID   insulin lispro 2-7 Units Subcutaneous 4x Daily With Meals & Nightly   isosorbide mononitrate 30 mg Oral Q24H   lisinopril 10 mg Oral Daily   metoprolol succinate XL 50 mg Oral Daily   pantoprazole 40 mg Oral Q AM   pravastatin 20 mg Oral Daily       PRN meds:  •  acetaminophen  •  dextrose  •  dextrose  •  glucagon (human recombinant)  •  ipratropium-albuterol  •  nitroglycerin  •  ondansetron  •  sodium chloride  •  sodium chloride    Assessment/Plan     Active Problems:    Chronic systolic congestive heart failure    Coronary artery disease involving native coronary artery of native heart without angina pectoris    Mixed hyperlipidemia    Type 2 diabetes mellitus without complication, without long-term current use of insulin    Palpitations    SVT (supraventricular tachycardia)      Plan:  SVT-converted back by it self, this is a recurrent thing, getting more frequent. Start amiodarone daily. Increase metoprolol to 50.  On digoxin. Discussed with Dr. Kelsey. Cardiac enzymes trend down to normal.     Hyperlipidemia -on pravachol    Diabetes.-Sliding scale.  Hemoglobin A1c 6.     Systolic CHF/CAD- on aspirin, on Toprol, lisinopril, on statin, and imdur.  Echocardiogram- ejection fraction 25%, pericardial effusion, diastolic dysfunction, grade 1.  Ejection fraction worsened since last echocardiogram.  BNP 11,000.  Possible ICD placement in the future.  On by mouth Lasix.     Discharge Planning: Pending cardiology.    Rashad Mir MD   04/08/17   10:23 AM

## 2017-04-08 NOTE — PROGRESS NOTES
"[facility], [facility] Progress Note     LOS: 1 day   Patient Care Team:  Александр Yo DO as PCP - General  Александр Yo DO as PCP - Family Medicine  Heber Liu MD as PCP - Claims Attributed - PLEASE DO NOT REMOVE  Freeman Kelsey MD as Cardiologist (Cardiology)  Александр Yo DO as MSSP ACO Attributed - PLEASE DO NOT REMOVE    Chief Complaint:  SVT  Subjective     Interval History:     No CP, SOB, or heart palpitations    Review of Systems:   A 10-point review of systems is obtained and negative except for otherwise mentioned above.    Objective     Vital Sign Min/Max for last 24 hours  Temp  Min: 97.5 °F (36.4 °C)  Max: 98.4 °F (36.9 °C)   BP  Min: 102/41  Max: 118/65   Pulse  Min: 80  Max: 115   Resp  Min: 18  Max: 20   SpO2  Min: 90 %  Max: 99 %   Flow (L/min)  Min: 2  Max: 2   Weight  Min: 143 lb 3.2 oz (65 kg)  Max: 143 lb 3.2 oz (65 kg)     Flowsheet Rows         First Filed Value    Admission Height  67\" (170.2 cm) Documented at 04/06/2017 2343    Admission Weight  143 lb (64.9 kg) Documented at 04/06/2017 2343          Physical Exam:   General Appearance: alert, appears stated age and cooperative  Lungs: clear to auscultation, respirations regular, respirations even and respirations unlabored  Heart: regular rhythm & normal rate, normal S1, S2, 2/6 murmur, no tim, no rub and no click  Extremities: trace edema       Results Review:     I reviewed the patient's new clinical results.      Results from last 7 days  Lab Units 04/08/17  0519   WBC 10*3/mm3 5.72   HEMOGLOBIN g/dL 13.2   HEMATOCRIT % 40.6   PLATELETS 10*3/mm3 127*       Results from last 7 days  Lab Units 04/08/17  0520   SODIUM mmol/L 141   POTASSIUM mmol/L 4.3   CHLORIDE mmol/L 101   TOTAL CO2 mmol/L 27.0   BUN mg/dL 37*   CREATININE mg/dL 1.60*   GLUCOSE mg/dL 83   CALCIUM mg/dL 9.3       Results from last 7 days  Lab Units 04/08/17  0520   SODIUM mmol/L 141   POTASSIUM mmol/L 4.3   CHLORIDE mmol/L " 101   TOTAL CO2 mmol/L 27.0   BUN mg/dL 37*   CREATININE mg/dL 1.60*   CALCIUM mg/dL 9.3   BILIRUBIN mg/dL 0.8   ALK PHOS U/L 58   ALT (SGPT) U/L 22   AST (SGOT) U/L 23   GLUCOSE mg/dL 83       Results from last 7 days  Lab Units 04/06/17  2353   TROPONIN I ng/mL 0.035*       Results from last 7 days  Lab Units 04/08/17  0519   TSH mIU/mL 2.560       Results from last 7 days  Lab Units 04/08/17  0520   CHOLESTEROL mg/dL 126*   TRIGLYCERIDES mg/dL 186*   HDL CHOL mg/dL 39*       Medication Review: yes    Assessment/Plan     1.  SVT  2.  Ischemic cardiomyopathy  3.  Chronic systolic congestive heart failure  4.  Coronary artery disease, stable    Continue amiodarone.  Continue OMT for HF.  Possible ICD implant in future.    Candie Mccormick PA-C  04/08/17  8:00 AM

## 2017-04-08 NOTE — PLAN OF CARE
Problem: Patient Care Overview (Adult)  Goal: Plan of Care Review  Outcome: Ongoing (interventions implemented as appropriate)    04/08/17 0239   Coping/Psychosocial Response Interventions   Plan Of Care Reviewed With patient   Patient Care Overview   Progress improving   Outcome Evaluation   Outcome Summary/Follow up Plan VSS, EF 30%, no c/o pain, will cont to monitor       Goal: Adult Individualization and Mutuality  Outcome: Ongoing (interventions implemented as appropriate)    Problem: Acute Coronary Syndrome (ACS) (Adult)  Goal: Signs and Symptoms of Listed Potential Problems Will be Absent or Manageable (Acute Coronary Syndrome)  Outcome: Ongoing (interventions implemented as appropriate)    Problem: Fall Risk (Adult)  Goal: Absence of Falls  Outcome: Ongoing (interventions implemented as appropriate)    Problem: Arrhythmia/Dysrhythmia (Symptomatic) (Adult)  Goal: Signs and Symptoms of Listed Potential Problems Will be Absent or Manageable (Arrhythmia/Dysrhythmia)  Outcome: Ongoing (interventions implemented as appropriate)    Problem: Cardiac: Heart Failure (Adult)  Goal: Signs and Symptoms of Listed Potential Problems Will be Absent or Manageable (Cardiac: Heart Failure)  Outcome: Ongoing (interventions implemented as appropriate)

## 2017-04-09 LAB
ALBUMIN SERPL-MCNC: 3.2 G/DL (ref 3.5–5)
ALBUMIN/GLOB SERPL: 1.2 G/DL (ref 1.1–2.5)
ALP SERPL-CCNC: 61 U/L (ref 24–120)
ALT SERPL W P-5'-P-CCNC: 20 U/L (ref 0–54)
ANION GAP SERPL CALCULATED.3IONS-SCNC: 8 MMOL/L (ref 4–13)
AST SERPL-CCNC: 18 U/L (ref 7–45)
BASOPHILS # BLD AUTO: 0.02 10*3/MM3 (ref 0–0.2)
BASOPHILS NFR BLD AUTO: 0.3 % (ref 0–2)
BILIRUB SERPL-MCNC: 0.7 MG/DL (ref 0.1–1)
BUN BLD-MCNC: 41 MG/DL (ref 5–21)
BUN/CREAT SERPL: 25.5 (ref 7–25)
CALCIUM SPEC-SCNC: 8.5 MG/DL (ref 8.4–10.4)
CHLORIDE SERPL-SCNC: 98 MMOL/L (ref 98–110)
CO2 SERPL-SCNC: 35 MMOL/L (ref 24–31)
CREAT BLD-MCNC: 1.61 MG/DL (ref 0.5–1.4)
DEPRECATED RDW RBC AUTO: 55.3 FL (ref 40–54)
EOSINOPHIL # BLD AUTO: 0.11 10*3/MM3 (ref 0–0.7)
EOSINOPHIL NFR BLD AUTO: 1.9 % (ref 0–4)
ERYTHROCYTE [DISTWIDTH] IN BLOOD BY AUTOMATED COUNT: 15.1 % (ref 12–15)
GFR SERPL CREATININE-BSD FRML MDRD: 31 ML/MIN/1.73
GLOBULIN UR ELPH-MCNC: 2.6 GM/DL
GLUCOSE BLD-MCNC: 103 MG/DL (ref 70–100)
GLUCOSE BLDC GLUCOMTR-MCNC: 103 MG/DL (ref 70–130)
GLUCOSE BLDC GLUCOMTR-MCNC: 164 MG/DL (ref 70–130)
GLUCOSE BLDC GLUCOMTR-MCNC: 87 MG/DL (ref 70–130)
GLUCOSE BLDC GLUCOMTR-MCNC: 91 MG/DL (ref 70–130)
HCT VFR BLD AUTO: 40.7 % (ref 37–47)
HGB BLD-MCNC: 12.9 G/DL (ref 12–16)
IMM GRANULOCYTES # BLD: 0.01 10*3/MM3 (ref 0–0.03)
IMM GRANULOCYTES NFR BLD: 0.2 % (ref 0–5)
LYMPHOCYTES # BLD AUTO: 1.34 10*3/MM3 (ref 0.72–4.86)
LYMPHOCYTES NFR BLD AUTO: 22.9 % (ref 15–45)
MCH RBC QN AUTO: 32.3 PG (ref 28–32)
MCHC RBC AUTO-ENTMCNC: 31.7 G/DL (ref 33–36)
MCV RBC AUTO: 101.8 FL (ref 82–98)
MONOCYTES # BLD AUTO: 0.77 10*3/MM3 (ref 0.19–1.3)
MONOCYTES NFR BLD AUTO: 13.2 % (ref 4–12)
NEUTROPHILS # BLD AUTO: 3.6 10*3/MM3 (ref 1.87–8.4)
NEUTROPHILS NFR BLD AUTO: 61.5 % (ref 39–78)
PLATELET # BLD AUTO: 146 10*3/MM3 (ref 130–400)
PMV BLD AUTO: 10.6 FL (ref 6–12)
POTASSIUM BLD-SCNC: 4.4 MMOL/L (ref 3.5–5.3)
PROT SERPL-MCNC: 5.8 G/DL (ref 6.3–8.7)
RBC # BLD AUTO: 4 10*6/MM3 (ref 4.2–5.4)
SODIUM BLD-SCNC: 141 MMOL/L (ref 135–145)
WBC NRBC COR # BLD: 5.85 10*3/MM3 (ref 4.8–10.8)

## 2017-04-09 PROCEDURE — G8978 MOBILITY CURRENT STATUS: HCPCS

## 2017-04-09 PROCEDURE — 63710000001 INSULIN LISPRO (HUMAN) PER 5 UNITS: Performed by: INTERNAL MEDICINE

## 2017-04-09 PROCEDURE — 82962 GLUCOSE BLOOD TEST: CPT

## 2017-04-09 PROCEDURE — 97162 PT EVAL MOD COMPLEX 30 MIN: CPT

## 2017-04-09 PROCEDURE — G8979 MOBILITY GOAL STATUS: HCPCS

## 2017-04-09 PROCEDURE — 80053 COMPREHEN METABOLIC PANEL: CPT | Performed by: FAMILY MEDICINE

## 2017-04-09 PROCEDURE — 97166 OT EVAL MOD COMPLEX 45 MIN: CPT

## 2017-04-09 PROCEDURE — 99232 SBSQ HOSP IP/OBS MODERATE 35: CPT | Performed by: INTERNAL MEDICINE

## 2017-04-09 PROCEDURE — 85025 COMPLETE CBC W/AUTO DIFF WBC: CPT | Performed by: FAMILY MEDICINE

## 2017-04-09 PROCEDURE — G8988 SELF CARE GOAL STATUS: HCPCS

## 2017-04-09 PROCEDURE — 25010000002 ENOXAPARIN PER 10 MG: Performed by: FAMILY MEDICINE

## 2017-04-09 PROCEDURE — G8987 SELF CARE CURRENT STATUS: HCPCS

## 2017-04-09 RX ADMIN — GABAPENTIN 300 MG: 300 CAPSULE ORAL at 21:14

## 2017-04-09 RX ADMIN — PRAVASTATIN SODIUM 20 MG: 20 TABLET ORAL at 09:05

## 2017-04-09 RX ADMIN — INSULIN LISPRO 2 UNITS: 100 INJECTION, SOLUTION INTRAVENOUS; SUBCUTANEOUS at 21:14

## 2017-04-09 RX ADMIN — DIGOXIN 125 MCG: 0.12 TABLET ORAL at 12:55

## 2017-04-09 RX ADMIN — GABAPENTIN 300 MG: 300 CAPSULE ORAL at 09:05

## 2017-04-09 RX ADMIN — METOPROLOL SUCCINATE 25 MG: 25 TABLET, FILM COATED, EXTENDED RELEASE ORAL at 09:05

## 2017-04-09 RX ADMIN — FUROSEMIDE 20 MG: 20 TABLET ORAL at 09:05

## 2017-04-09 RX ADMIN — GABAPENTIN 300 MG: 300 CAPSULE ORAL at 16:57

## 2017-04-09 RX ADMIN — ACETAMINOPHEN 650 MG: 325 TABLET ORAL at 21:48

## 2017-04-09 RX ADMIN — AMIODARONE HYDROCHLORIDE 200 MG: 200 TABLET ORAL at 09:05

## 2017-04-09 RX ADMIN — LISINOPRIL 10 MG: 10 TABLET ORAL at 09:04

## 2017-04-09 RX ADMIN — POLYETHYLENE GLYCOL (3350) 17 G: 17 POWDER, FOR SOLUTION ORAL at 09:04

## 2017-04-09 RX ADMIN — DIAZEPAM 2 MG: 2 TABLET ORAL at 17:00

## 2017-04-09 RX ADMIN — ASPIRIN 81 MG 81 MG: 81 TABLET ORAL at 09:05

## 2017-04-09 RX ADMIN — ISOSORBIDE MONONITRATE 30 MG: 30 TABLET ORAL at 09:05

## 2017-04-09 RX ADMIN — DIAZEPAM 2 MG: 2 TABLET ORAL at 09:05

## 2017-04-09 RX ADMIN — PANTOPRAZOLE SODIUM 40 MG: 40 TABLET, DELAYED RELEASE ORAL at 06:03

## 2017-04-09 RX ADMIN — ENOXAPARIN SODIUM 30 MG: 30 INJECTION SUBCUTANEOUS at 09:05

## 2017-04-09 NOTE — PROGRESS NOTES
"    RE:  Padmini Torres  :  1940         Subjective: Patient notes some low blood pressure earlier today.  Denies any significant dizziness but did feel that she was unsteady on her feet when walking with physical therapy.  Had no further significant dysrhythmias on telemetry.    ROS: Pertinent positives and negatives listed above.  All other systems reviewed and negative.    Objective:   /51 (BP Location: Right arm, Patient Position: Sitting)  Pulse 70  Temp 98.2 °F (36.8 °C) (Temporal Artery )   Resp 18  Ht 64.02\" (162.6 cm)  Wt 142 lb 12.8 oz (64.8 kg)  SpO2 94%  BMI 24.5 kg/m2  Temp:  [97.8 °F (36.6 °C)-99.3 °F (37.4 °C)] 98.2 °F (36.8 °C)  Heart Rate:  [70-89] 70  Resp:  [16-20] 18  BP: ()/(43-58) 134/51    Intake/Output Summary (Last 24 hours) at 17 1106  Last data filed at 17 1907   Gross per 24 hour   Intake              360 ml   Output                0 ml   Net              360 ml       Current Facility-Administered Medications:   •  acetaminophen (TYLENOL) tablet 650 mg, 650 mg, Oral, Q6H PRN, Hermann Gomez MD  •  amiodarone (PACERONE) tablet 200 mg, 200 mg, Oral, Q24H, Rashad Mir MD, 200 mg at 17 09  •  aspirin chewable tablet 81 mg, 81 mg, Oral, Daily, Hermann Gomez MD, 81 mg at 17  •  dextrose (D50W) solution 25 g, 25 g, Intravenous, Q15 Min PRN, Hermann Gomez MD  •  dextrose (GLUTOSE) oral gel 15 g, 15 g, Oral, Q15 Min PRN, Hermann Gomez MD  •  diazePAM (VALIUM) tablet 2 mg, 2 mg, Oral, BID AC, Rashad Mir MD, 2 mg at 17 09  •  digoxin (LANOXIN) tablet 125 mcg, 125 mcg, Oral, Daily, Hermann Gomez MD, 125 mcg at 17 1342  •  enoxaparin (LOVENOX) syringe 30 mg, 30 mg, Subcutaneous, Daily, Rashad Mir MD, 30 mg at 17  •  furosemide (LASIX) tablet 20 mg, 20 mg, Oral, BID AC, Rashad Mir MD, 20 mg at 17  •  gabapentin (NEURONTIN) capsule 300 mg, 300 mg, Oral, TID, Hermann Goemz MD, 300 mg at 17 " 0905  •  glucagon (human recombinant) (GLUCAGEN DIAGNOSTIC) injection 1 mg, 1 mg, Subcutaneous, Q15 Min PRN, Hermann Gomez MD  •  insulin lispro (humaLOG) injection 2-7 Units, 2-7 Units, Subcutaneous, 4x Daily With Meals & Nightly, Hermann Gomez MD  •  ipratropium-albuterol (DUO-NEB) nebulizer solution 3 mL, 3 mL, Nebulization, Q4H PRN, Hermann Gomez MD  •  isosorbide mononitrate (IMDUR) 24 hr tablet 30 mg, 30 mg, Oral, Q24H, Hermann Gomez MD, 30 mg at 04/09/17 0905  •  lisinopril (PRINIVIL,ZESTRIL) tablet 10 mg, 10 mg, Oral, Daily, Hermann Gomez MD, 10 mg at 04/09/17 0904  •  metoprolol succinate XL (TOPROL-XL) 24 hr tablet 25 mg, 25 mg, Oral, Daily, Rashad Mir MD, 25 mg at 04/09/17 0905  •  nitroglycerin (NITROSTAT) SL tablet 0.4 mg, 0.4 mg, Sublingual, Q5 Min PRN, Hermann Gomez MD  •  ondansetron (ZOFRAN) injection 4 mg, 4 mg, Intravenous, Q6H PRN, Hermann Gomez MD  •  pantoprazole (PROTONIX) EC tablet 40 mg, 40 mg, Oral, Q AM, Hermann Gomez MD, 40 mg at 04/09/17 0603  •  polyethylene glycol (MIRALAX) packet 17 g, 17 g, Oral, Daily, Rashad Mir MD, 17 g at 04/09/17 0904  •  pravastatin (PRAVACHOL) tablet 20 mg, 20 mg, Oral, Daily, Hermann Gomez MD, 20 mg at 04/09/17 0905  •  sodium chloride 0.9 % flush 1-10 mL, 1-10 mL, Intravenous, PRN, Hermann Gomez MD  •  sodium chloride 0.9 % flush 10 mL, 10 mL, Intravenous, PRN, Maria Isabel Rosario DO, 10 mL at 04/07/17 0000    Physical Exam:   Gen: Alert and oriented x3, no acute distress  Heart: Regular rate and rhythm, no murmurs, rubs, or gallops  Chest: Lungs clear to auscultation bilaterally, normal respiratory effort  Abd: Soft, non tender, bowel sounds are positive  Ext: Mild lower extremity erythema with trace edema      Lab Results (last 24 hours)     Procedure Component Value Units Date/Time    POC Glucose Fingerstick [[id number]]  (Abnormal) Collected:  04/08/17 1144    Specimen:  Blood Updated:  04/08/17 1155     Glucose 132 (H) mg/dL       : [id number]  Liss SarmientoMeter ID: [id number]       POC Glucose Fingerstick [[id number]]  (Normal) Collected:  04/08/17 1553    Specimen:  Blood Updated:  04/08/17 1616     Glucose 84 mg/dL       : [id number] Jaswant AllisonMeter ID: [id number]       POC Glucose Fingerstick [[id number]]  (Normal) Collected:  04/08/17 1911    Specimen:  Blood Updated:  04/08/17 1924     Glucose 106 mg/dL       : [id number] Jaswant AllisonMeter ID: [id number]       Blood Culture [[id number]]  (Normal) Collected:  04/06/17 2353    Specimen:  Blood from Blood, Venous Line Updated:  04/09/17 0105     Blood Culture No growth at 2 days    CBC & Differential [[id number]] Collected:  04/09/17 0424    Specimen:  Blood Updated:  04/09/17 0440    Narrative:       The following orders were created for panel order CBC & Differential.  Procedure                               Abnormality         Status                     ---------                               -----------         ------                     CBC Auto Differential[[id number]]         Abnormal            Final result                 Please view results for these tests on the individual orders.    CBC Auto Differential [[id number]]  (Abnormal) Collected:  04/09/17 0424    Specimen:  Blood Updated:  04/09/17 0440     WBC 5.85 10*3/mm3      RBC 4.00 (L) 10*6/mm3      Hemoglobin 12.9 g/dL      Hematocrit 40.7 %      .8 (H) fL      MCH 32.3 (H) pg      MCHC 31.7 (L) g/dL      RDW 15.1 (H) %      RDW-SD 55.3 (H) fl      MPV 10.6 fL      Platelets 146 10*3/mm3      Neutrophil % 61.5 %      Lymphocyte % 22.9 %      Monocyte % 13.2 (H) %      Eosinophil % 1.9 %      Basophil % 0.3 %      Immature Grans % 0.2 %      Neutrophils, Absolute 3.60 10*3/mm3      Lymphocytes, Absolute 1.34 10*3/mm3      Monocytes, Absolute 0.77 10*3/mm3      Eosinophils, Absolute 0.11 10*3/mm3      Basophils, Absolute 0.02 10*3/mm3      Immature Grans, Absolute 0.01 10*3/mm3     Comprehensive Metabolic Panel [[id number]]  (Abnormal)  Collected:  04/09/17 0424    Specimen:  Blood Updated:  04/09/17 0459     Glucose 103 (H) mg/dL      BUN 41 (H) mg/dL      Creatinine 1.61 (H) mg/dL      Sodium 141 mmol/L      Potassium 4.4 mmol/L      Chloride 98 mmol/L      CO2 35.0 (H) mmol/L      Calcium 8.5 mg/dL      Total Protein 5.8 (L) g/dL      Albumin 3.20 (L) g/dL      ALT (SGPT) 20 U/L      AST (SGOT) 18 U/L      Alkaline Phosphatase 61 U/L      Total Bilirubin 0.7 mg/dL      eGFR Non African Amer 31 (L) mL/min/1.73      Globulin 2.6 gm/dL      A/G Ratio 1.2 g/dL      BUN/Creatinine Ratio 25.5 (H)     Anion Gap 8.0 mmol/L     Narrative:       The MDRD GFR formula is only valid for adults with stable renal function between ages 18 and 70.    POC Glucose Fingerstick [[id number]]  (Normal) Collected:  04/09/17 0811    Specimen:  Blood Updated:  04/09/17 0830     Glucose 91 mg/dL       : [id number] Liss ChianeMeter ID: [id number]           Imaging Results (last 24 hours)     Procedure Component Value Units Date/Time    XR Chest 1 View [[id number]] Collected:  04/08/17 1644     Updated:  04/08/17 1708    Narrative:       XR CHEST 1 VW-     HISTORY: Increasing shortness of air.     FINDINGS: Today's exam is compared to a prior study of two days earlier.  There is cardiomegaly with mild pulmonary venous hypertension. No  evidence of breezy pulmonary edema. There is persistent consolidation  within the left lower lobe with partial silhouetting of the left  hemidiaphragm. A left-sided effusion is also present with blunting of  the costophrenic angle. A dual-lumen infusion catheter is in place via  left-sided approach.       Impression:       1. Persistent left lower lobe consolidation. There is a small left-sided  effusion with blunting of the costophrenic angle.  2. The right lung remains clear.  3. Cardiomegaly with mild pulmonary venous hypertension.  This report was finalized on 04/08/2017 17:05 by Dr. Andrea Bhat MD.            Assessment:   1.  Paroxysmal supraventricular tachycardia: stable  2. Chronic systolic congestive heart failure: EF 25%.  3. Coronary artery disease  4. Mixed hyperlipidemia  5. Type II diabetes mellitus      Plan:   - Stable from a cardiac standpoint  - Declines LifeVest, once to discuss ICD as an outpatient with Dr. Kelsey  - Continue current medical therapy  - Okay to discharge home from a cardiac standpoint.

## 2017-04-09 NOTE — PROGRESS NOTES
[facility]  INPATIENT PROGRESS NOTE    Length of Stay: 2  Date of Admission: 4/6/2017  Primary Care Physician: Александр Yo DO    Subjective   Chief Complaint: SVT    HPI   A. fib, rate is control.  Hypotension, stable.  Denies any chest pain, shortness breath.  Patient family wants to talk to  Dr. Kelsey tomorrow. Pt in agreement with icd.       Review of Systems   Constitutional: Negative for activity change, appetite change, chills and fever.   HENT: Negative for hearing loss, nosebleeds, tinnitus and trouble swallowing.   Eyes: Negative for visual disturbance.   Respiratory: Negative for cough, chest tightness, shortness of breath and wheezing.   Cardiovascular: Negative for chest pain, palpitations and leg swelling.   Gastrointestinal: Negative for abdominal distention, abdominal pain, blood in stool, constipation, diarrhea, nausea and vomiting.   Endocrine: Negative for cold intolerance, heat intolerance, polydipsia, polyphagia and polyuria.   Genitourinary: Negative for decreased urine volume, difficulty urinating, dysuria, flank pain, frequency and hematuria.   Musculoskeletal: Negative for arthralgias, joint swelling and myalgias.   Skin: Negative for rash.   Allergic/Immunologic: Negative for immunocompromised state.   Neurological: Negative for dizziness, syncope, weakness, light-headedness and headaches.   Hematological: Negative for adenopathy. Does not bruise/bleed easily.   Psychiatric/Behavioral: Negative for confusion and sleep disturbance. The patient is not nervous/anxious.     All pertinent negatives and positives are as above. All other systems have been reviewed and are negative unless otherwise stated.     Objective    Temp:  [97.8 °F (36.6 °C)-99.3 °F (37.4 °C)] 98.4 °F (36.9 °C)  Heart Rate:  [70-89] 87  Resp:  [16-20] 18  BP: ()/(43-57) 92/56    Intake/Output Summary (Last 24 hours) at 04/09/17 1546  Last data filed at 04/08/17  1907   Gross per 24 hour   Intake              360 ml   Output                0 ml   Net              360 ml     Physical Exam  Constitutional: She is oriented to person, place, and time. She appears well-developed and well-nourished.   HENT:   Head: Normocephalic and atraumatic.   Eyes: Conjunctivae and EOM are normal. Pupils are equal, round, and reactive to light.   Neck: Neck supple. No JVD present. No thyromegaly present.   Cardiovascular: Normal rate, regular rhythm, normal heart sounds and intact distal pulses. Exam reveals no gallop and no friction rub.   No murmur heard.  Pulmonary/Chest: Effort normal and breath sounds normal. No respiratory distress. She has no wheezes. She has no rales. She exhibits no tenderness.   Abdominal: Soft. Bowel sounds are normal. She exhibits no distension. There is no tenderness. There is no rebound and no guarding.   Musculoskeletal: Normal range of motion. She exhibits no edema, tenderness or deformity.   Lymphadenopathy:   She has no cervical adenopathy.   Neurological: She is alert and oriented to person, place, and time. She displays normal reflexes. No cranial nerve deficit. She exhibits normal muscle tone.   Skin: Skin is warm and dry. No rash noted.   Psychiatric: She has a normal mood and affect. Her behavior is normal. Judgment and thought content normal.      Results Review:  Lab Results (last 24 hours)     Procedure Component Value Units Date/Time    POC Glucose Fingerstick [[id number]]  (Normal) Collected:  04/08/17 1553    Specimen:  Blood Updated:  04/08/17 1616     Glucose 84 mg/dL       : [id number] Texere AllisonMeter ID: [id number]       POC Glucose Fingerstick [[id number]]  (Normal) Collected:  04/08/17 1911    Specimen:  Blood Updated:  04/08/17 1924     Glucose 106 mg/dL       : [id number] Texere AllisonMeter ID: [id number]       Blood Culture [[id number]]  (Normal) Collected:  04/06/17 2353    Specimen:  Blood from Blood, Venous Line Updated:  04/09/17  0105     Blood Culture No growth at 2 days    CBC & Differential [[id number]] Collected:  04/09/17 0424    Specimen:  Blood Updated:  04/09/17 0440    Narrative:       The following orders were created for panel order CBC & Differential.  Procedure                               Abnormality         Status                     ---------                               -----------         ------                     CBC Auto Differential[[id number]]         Abnormal            Final result                 Please view results for these tests on the individual orders.    CBC Auto Differential [[id number]]  (Abnormal) Collected:  04/09/17 0424    Specimen:  Blood Updated:  04/09/17 0440     WBC 5.85 10*3/mm3      RBC 4.00 (L) 10*6/mm3      Hemoglobin 12.9 g/dL      Hematocrit 40.7 %      .8 (H) fL      MCH 32.3 (H) pg      MCHC 31.7 (L) g/dL      RDW 15.1 (H) %      RDW-SD 55.3 (H) fl      MPV 10.6 fL      Platelets 146 10*3/mm3      Neutrophil % 61.5 %      Lymphocyte % 22.9 %      Monocyte % 13.2 (H) %      Eosinophil % 1.9 %      Basophil % 0.3 %      Immature Grans % 0.2 %      Neutrophils, Absolute 3.60 10*3/mm3      Lymphocytes, Absolute 1.34 10*3/mm3      Monocytes, Absolute 0.77 10*3/mm3      Eosinophils, Absolute 0.11 10*3/mm3      Basophils, Absolute 0.02 10*3/mm3      Immature Grans, Absolute 0.01 10*3/mm3     Comprehensive Metabolic Panel [[id number]]  (Abnormal) Collected:  04/09/17 0424    Specimen:  Blood Updated:  04/09/17 0459     Glucose 103 (H) mg/dL      BUN 41 (H) mg/dL      Creatinine 1.61 (H) mg/dL      Sodium 141 mmol/L      Potassium 4.4 mmol/L      Chloride 98 mmol/L      CO2 35.0 (H) mmol/L      Calcium 8.5 mg/dL      Total Protein 5.8 (L) g/dL      Albumin 3.20 (L) g/dL      ALT (SGPT) 20 U/L      AST (SGOT) 18 U/L      Alkaline Phosphatase 61 U/L      Total Bilirubin 0.7 mg/dL      eGFR Non African Amer 31 (L) mL/min/1.73      Globulin 2.6 gm/dL      A/G Ratio 1.2 g/dL      BUN/Creatinine Ratio  25.5 (H)     Anion Gap 8.0 mmol/L     Narrative:       The MDRD GFR formula is only valid for adults with stable renal function between ages 18 and 70.    POC Glucose Fingerstick [[id number]]  (Normal) Collected:  04/09/17 0811    Specimen:  Blood Updated:  04/09/17 0830     Glucose 91 mg/dL       : [id number] Liss ChianeMeter ID: [id number]       POC Glucose Fingerstick [[id number]]  (Normal) Collected:  04/09/17 1152    Specimen:  Blood Updated:  04/09/17 1209     Glucose 103 mg/dL       : [id number] Liss ChianeMeter ID: [id number]              Cultures:  Blood Culture   Date Value Ref Range Status   04/06/2017 No growth at 2 days  Preliminary       Radiology Data:    Imaging Results (last 24 hours)     Procedure Component Value Units Date/Time    XR Chest 1 View [[id number]] Collected:  04/08/17 1644     Updated:  04/08/17 1708    Narrative:       XR CHEST 1 VW-     HISTORY: Increasing shortness of air.     FINDINGS: Today's exam is compared to a prior study of two days earlier.  There is cardiomegaly with mild pulmonary venous hypertension. No  evidence of breezy pulmonary edema. There is persistent consolidation  within the left lower lobe with partial silhouetting of the left  hemidiaphragm. A left-sided effusion is also present with blunting of  the costophrenic angle. A dual-lumen infusion catheter is in place via  left-sided approach.       Impression:       1. Persistent left lower lobe consolidation. There is a small left-sided  effusion with blunting of the costophrenic angle.  2. The right lung remains clear.  3. Cardiomegaly with mild pulmonary venous hypertension.  This report was finalized on 04/08/2017 17:05 by Dr. Andrea Bhat MD.          Allergies   Allergen Reactions   • Zantac [Ranitidine Hcl] Shortness Of Breath       Scheduled meds:     amiodarone 200 mg Oral Q24H   aspirin 81 mg Oral Daily   diazePAM 2 mg Oral BID AC   digoxin 125 mcg Oral Daily   enoxaparin 30 mg Subcutaneous  Daily   furosemide 20 mg Oral BID AC   gabapentin 300 mg Oral TID   insulin lispro 2-7 Units Subcutaneous 4x Daily With Meals & Nightly   isosorbide mononitrate 30 mg Oral Q24H   lisinopril 10 mg Oral Daily   metoprolol succinate XL 25 mg Oral Daily   pantoprazole 40 mg Oral Q AM   polyethylene glycol 17 g Oral Daily   pravastatin 20 mg Oral Daily       PRN meds:  •  acetaminophen  •  dextrose  •  dextrose  •  glucagon (human recombinant)  •  ipratropium-albuterol  •  nitroglycerin  •  ondansetron  •  sodium chloride  •  sodium chloride    Assessment/Plan     Active Problems:    Chronic systolic congestive heart failure    Coronary artery disease involving native coronary artery of native heart without angina pectoris    Mixed hyperlipidemia    Type 2 diabetes mellitus without complication, without long-term current use of insulin    Palpitations    SVT (supraventricular tachycardia)      Plan:  SVT-converted back by it self, this is a recurrent thing, getting more frequent. Start amiodarone daily. Toprol xl decrease back to 25 due to hypotension. On digoxin.  Cardiac enzymes trend down to normal.      Hyperlipidemia -on pravachol     Diabetes.-Sliding scale. Hemoglobin A1c 6.      Systolic CHF/CAD- on aspirin, on Toprol, lisinopril, on statin, and imdur. Echocardiogram- ejection fraction 25%, pericardial effusion, diastolic dysfunction, grade 1. Ejection fraction worsened since last echocardiogram. BNP 11,000. Possible ICD placement in the future. On by mouth Lasix.      Discharge Planning: Patient wants to discuss ICD with Dr. Kelsey.   Plan for discharge tomorrow. Pt in agreement with icd.     Rashad Mir MD   04/09/17   3:46 PM

## 2017-04-09 NOTE — PLAN OF CARE
Problem: Patient Care Overview (Adult)  Goal: Plan of Care Review  Outcome: Ongoing (interventions implemented as appropriate)    04/09/17 1110   Coping/Psychosocial Response Interventions   Plan Of Care Reviewed With patient   Patient Care Overview   Progress progress toward functional goals as expected   Outcome Evaluation   Outcome Summary/Follow up Plan OT eval completed. Pt. required CGA/Min A for sit to stand t/f from bed. Pt. supervision for toileting ADL. Pt. CGA with straight cane for functional mob with LOB episiode and decreased visual attention to L side causing pt. to run into wall and grab on to hand rails in [place]. Pt. cont to benefit from skilled OT due to decreased balance, decreased strength, decreased activity tolerance, and decreased independence in ADLs. Anticipated dc home with HH and assist. 4/9/17 1057         Problem: Inpatient Occupational Therapy  Goal: Transfer Training Goal 1 LTG- OT  Outcome: Ongoing (interventions implemented as appropriate)    04/09/17 1110   Transfer Training OT LTG   Transfer Training OT LTG, Date Established 04/09/17   Transfer Training OT LTG, Time to Achieve by discharge   Transfer Training OT LTG, Activity Type all transfers   Transfer Training OT LTG, [place] Level independent   Transfer Training OT LTG, Assist Device cane, straight       Goal: Strength Goal LTG- OT  Outcome: Ongoing (interventions implemented as appropriate)    04/09/17 1110   Strength OT LTG   Strength Goal OT LTG, Date Established 04/09/17   Strength Goal OT LTG, Time to Achieve by discharge   Strength Goal OT LTG, Measure to Achieve Pt. will complete BUE strengthening exercises to increase BUE strength to 5/5 for ADLs       Goal: Patient Education Goal LTG- OT  Outcome: Ongoing (interventions implemented as appropriate)    04/09/17 1110   Patient Education OT LTG   Patient Education OT LTG, Date Established 04/09/17   Patient Education OT LTG, Time to Achieve by discharge   Patient  Education OT LTG, Education Type HEP;posture/body mechanics;home safety;energy conservation;work simplification;adaptive breathing   Patient Education OT LTG, Education Understanding independent;demonstrates adequately;verbalizes understanding   Patient Education OT LTG, Additional Goal Pt. will demonstrate good safety awaress by visually scanning environment and not grabbing onto items in [place].        Goal: ADL Goal LTG- OT  Outcome: Ongoing (interventions implemented as appropriate)    04/09/17 1110   ADL OT LTG   ADL OT LTG, Date Established 04/09/17   ADL OT LTG, Time to Achieve by discharge   ADL OT LTG, Activity Type ADL skills   ADL OT LTG, [place] Level independent

## 2017-04-09 NOTE — PLAN OF CARE
Problem: Inpatient Physical Therapy  Goal: Transfer Training Goal 1 LTG- PT  Outcome: Ongoing (interventions implemented as appropriate)    04/09/17 1117   Transfer Training PT LTG   Transfer Training PT LTG, Date Established 04/09/17   Transfer Training PT LTG, Time to Achieve by discharge   Transfer Training PT LTG, Activity Type all transfers   Transfer Training PT LTG, [place] Level conditional independence   Transfer Training PT LTG, Assist Device cane, straight       Goal: Gait Training Goal LTG- PT  Outcome: Ongoing (interventions implemented as appropriate)    04/09/17 1117   Gait Training PT LTG   Gait Training Goal PT LTG, Date Established 04/09/17   Gait Training Goal PT LTG, Time to Achieve by discharge   Gait Training Goal PT LTG, [place] Level conditional independence   Gait Training Goal PT LTG, Assist Device cane, straight   Gait Training Goal PT LTG, Distance to Achieve no LOB or sway and demonstrates knowledge of balance deficits       Goal: Stair Training Goal LTG- PT  Outcome: Ongoing (interventions implemented as appropriate)    04/09/17 1117   Stair Training PT LTG   Stair Training Goal PT LTG, Date Established 04/09/17   Stair Training Goal PT LTG, Time to Achieve by discharge   Stair Training Goal PT LTG, [place] Level supervision required   Stair Training Goal PT LTG, Assist Device 2 handrails   Stair Training Goal PT LTG, Distance to Achieve 4 (home)        Goal: Dynamic Standing Balance Goal LTG- PT  Outcome: Ongoing (interventions implemented as appropriate)    04/09/17 1117   Dynamic Standing Balance PT LTG   Dynamic Standing Balance PT LTG, Date Established 04/09/17   Dynamic Standing Balance PT LTG, Time to Achieve by discharge   Dynamic Standing Balance PT LTG, [place] Level supervision required   Dynamic Standing Balance PT LTG, Additional Goal dual task ambulation, head turns, no LOB or sway

## 2017-04-09 NOTE — PLAN OF CARE
Problem: Patient Care Overview (Adult)  Goal: Plan of Care Review  Outcome: Ongoing (interventions implemented as appropriate)    04/09/17 0329   Coping/Psychosocial Response Interventions   Plan Of Care Reviewed With patient   Patient Care Overview   Progress progress toward functional goals as expected         Problem: Acute Coronary Syndrome (ACS) (Adult)  Goal: Signs and Symptoms of Listed Potential Problems Will be Absent or Manageable (Acute Coronary Syndrome)  Outcome: Ongoing (interventions implemented as appropriate)    Problem: Fall Risk (Adult)  Goal: Absence of Falls  Outcome: Ongoing (interventions implemented as appropriate)    Problem: Arrhythmia/Dysrhythmia (Symptomatic) (Adult)  Goal: Signs and Symptoms of Listed Potential Problems Will be Absent or Manageable (Arrhythmia/Dysrhythmia)  Outcome: Ongoing (interventions implemented as appropriate)    Problem: Cardiac: Heart Failure (Adult)  Goal: Signs and Symptoms of Listed Potential Problems Will be Absent or Manageable (Cardiac: Heart Failure)  Outcome: Ongoing (interventions implemented as appropriate)

## 2017-04-09 NOTE — PLAN OF CARE
Problem: Patient Care Overview (Adult)  Goal: Plan of Care Review  Outcome: Ongoing (interventions implemented as appropriate)    04/09/17 1118   Coping/Psychosocial Response Interventions   Plan Of Care Reviewed With patient   Patient Care Overview   Progress progress toward functional goals as expected   Outcome Evaluation   Outcome Summary/Follow up Plan PT evaluation performed. Upon assessment pt presents primarily with cardiopulmonary deficits, decreased tolerance to activity and sluggish balance systems placing her at an increased risk for falls and impairing her functional levels. She was CGA for bed mobility, transfers and gait but she did demonstrate several LOB, sway and veers towards the left during ambulation with a SPC. She does demonstrate chronic visual impairments but her vestibular and somatosensory systems are also impaired. She is unaware of her balance deficits which further increases her chances of falling at home and/or community. We will work with her mainly regarding her cardiopulmonary, balance and gait distrubances in order to improve her levels before her return home and decrease her fall risk. We will also practice stairs. Thank you for this referral.

## 2017-04-09 NOTE — PLAN OF CARE
Problem: Patient Care Overview (Adult)  Goal: Plan of Care Review  Outcome: Ongoing (interventions implemented as appropriate)    04/09/17 2270   Coping/Psychosocial Response Interventions   Plan Of Care Reviewed With patient   Patient Care Overview   Progress no change   Outcome Evaluation   Outcome Summary/Follow up Plan Patient is staying overnight to see Dr. Kelsey tomorrow to discuss AICD placement. No complaints today other than weakness. Up with assist. Cont. to monitor overall condition and vitals.          Problem: Acute Coronary Syndrome (ACS) (Adult)  Goal: Signs and Symptoms of Listed Potential Problems Will be Absent or Manageable (Acute Coronary Syndrome)  Outcome: Ongoing (interventions implemented as appropriate)    Problem: Fall Risk (Adult)  Goal: Absence of Falls  Outcome: Ongoing (interventions implemented as appropriate)    Problem: Arrhythmia/Dysrhythmia (Symptomatic) (Adult)  Goal: Signs and Symptoms of Listed Potential Problems Will be Absent or Manageable (Arrhythmia/Dysrhythmia)  Outcome: Ongoing (interventions implemented as appropriate)    Problem: Cardiac: Heart Failure (Adult)  Goal: Signs and Symptoms of Listed Potential Problems Will be Absent or Manageable (Cardiac: Heart Failure)  Outcome: Ongoing (interventions implemented as appropriate)

## 2017-04-09 NOTE — PROGRESS NOTES
Acute Care - Physical Therapy Initial Evaluation  [facility]     Patient Name: Padmini Torres  : 1940  MRN: [id number]  Today's Date: 2017   Onset of Illness/Injury or Date of Surgery Date: 17  Date of Referral to PT: 17  Referring Physician: Dr Mir      Admit Date: 2017     Visit Dx:    ICD-10-CM ICD-9-CM   1. Abnormality of gait and mobility R26.9 781.2   2. Palpitations R00.2 785.1   3. SVT (supraventricular tachycardia) I47.1 427.89   4. Decreased activities of daily living (ADL) Z78.9 V49.89     Patient Active Problem List   Diagnosis   • Chronic systolic congestive heart failure   • Congestive heart failure with LV diastolic dysfunction, NYHA class 2   • Coronary artery disease involving native coronary artery of native heart without angina pectoris   • Mixed hyperlipidemia   • Type 2 diabetes mellitus without complication, without long-term current use of insulin   • Palpitations   • SVT (supraventricular tachycardia)     Past Medical History:   Diagnosis Date   • Abdominal pain, left lower quadrant    • Bowel habit changes    • CAD (coronary artery disease)     LAD stent    • COPD (chronic obstructive pulmonary disease)    • Diabetes mellitus    • DVT (deep venous thrombosis)    • Dysphagia    • Hypertension    • Lymphoma    • Pulmonary emboli    • Sleep apnea, obstructive      Past Surgical History:   Procedure Laterality Date   • ANKLE SURGERY     • BACK SURGERY      lower   • CHOLECYSTECTOMY     • COLONOSCOPY  2015    Last colon limted lower diverticulosis left colon, Internal Hemorrhoids   • COLONOSCOPY W/ BIOPSIES AND POLYPECTOMY  2014    Adenomatous tubular type, Diverticulosis sigmoid colon   • ENDOSCOPY  2010    Distal ring dilated 48 Fr   • EXPLORATORY LAPAROTOMY      relapse lymphoma   • HEMORRHOIDECTOMY     • HYSTERECTOMY      total abdominal   • LUMBAR SPINE SURGERY     • LUNG BIOPSY N/A     open    • SMALL INTESTINE SURGERY     •  TONSILLECTOMY     • UPPER GASTROINTESTINAL ENDOSCOPY  10/23/2015    normal exam          PT ASSESSMENT (last 72 hours)      PT Evaluation       04/09/17 1014 04/09/17 1013    Rehab Evaluation    Document Type evaluation   See MAR  -ND evaluation  -TC    Subjective Information agree to therapy;complains of;weakness  -ND no complaints;agree to therapy  -TC    General Information    Patient Profile Review yes  -ND yes  -TC    Onset of Illness/Injury or Date of Surgery Date 04/07/17  -ND 04/07/17  -TC    Referring Physician Dr Mir  -ND Dr Mir  -TC    General Observations Pt. sitting EOB, alert, 2L O2 via nc, telemetry  -ND pt was sitting EOB, on 2L O2 per NC   -TC    Pertinent History Of Current Problem pt complains of heart pounding that occurs at lease 2-3 times a week; she has a history of recurrent SVT, decreasing EF, CHF and CAD exacerbating her symptoms  -ND pt complains of heart pounding that occurs at lease 2-3 times a week; she has a history of recurrent SVT, decreasing EF, CHF and CAD exacerbating her symptoms   -TC    Precautions/Limitations fall precautions;oxygen therapy device and L/min  -ND fall precautions  -TC    Prior Level of Function independent:;all household mobility;community mobility;ADL's  -ND independent:  -TC    Equipment Currently Used at Home cane, straight;commode;shower chair  -ND cane, straight;commode;shower chair  -TC    Plans/Goals Discussed With patient;agreed upon  -ND patient;agreed upon  -TC    Risks Reviewed patient:;LOB;nausea/vomiting;dizziness;increased discomfort;change in vital signs;increased drainage;lines disloged  -ND patient:;dizziness;increased discomfort;change in vital signs  -TC    Benefits Reviewed patient:;improve function;increase independence;increase strength;decrease pain;increase balance;decrease risk of DVT;improve skin integrity;increase knowledge  -ND patient:;improve function;increase independence;increase strength;increase balance  -TC    Barriers  to Rehab medically complex  -ND     Living Environment    Lives With alone  -ND alone  -TC    Living Arrangements house  -ND house  -TC    Home Accessibility stairs (2 railings present);tub/shower is not walk in;grab bars present (toilet)  -ND stairs (2 railings present);stairs to enter home;tub/shower is not walk in;grab bars present (toilet)  -TC    Number of Stairs to Enter Home 4  -ND 4  -TC    Stair Railings at Home outside, present at both sides  -ND outside, present at both sides  -TC    Clinical Impression    Date of Referral to PT  04/08/17  -TC    Functional Level At Time Of Evaluation  CGA  -TC    Patient/Family Goals Statement  return home independently   -TC    Criteria for Skilled Therapeutic Interventions Met  treatment indicated  -TC    Pathology/Pathophysiology Noted (Describe Specifically for Each System)  musculoskeletal;cardiovascular;pulmonary  -TC    Functional Limitations in Following Categories (Describe Specific Limitations)  self-care;home management  -TC    Rehab Potential  good, to achieve stated therapy goals  -TC    Predicted Duration of Therapy Intervention (days/wks)  10 days  -TC    Vital Signs    Pretreatment Heart Rate (beats/min) 87  -ND 87  -TC    Intratreatment Heart Rate (beats/min) 95  -ND 95  -TC    Posttreatment Heart Rate (beats/min) 92  -ND 92  -TC    Pre SpO2 (%) 93  -ND 93  -TC    O2 Delivery Pre Treatment supplemental O2   2L O2  -ND supplemental O2   2L   -TC    Intra SpO2 (%) 85  -ND 85  -TC    O2 Delivery Intra Treatment supplemental O2   2L O2 per NC; resated to 93% within 30s )  -ND supplemental O2   2L O2 per NC; resated to 93% within 30s   -TC    Post SpO2 (%) 93  -ND 93  -TC    O2 Delivery Post Treatment supplemental O2   2L O2  -ND supplemental O2   2L  -TC    Pre Patient Position Sitting  -ND     Intra Patient Position Standing  -ND     Post Patient Position Sitting  -ND     Pain Assessment    Pain Assessment No/denies pain  -ND     Vision  Assessment/Intervention    Visual Impairment inattention to the left;needs cues to attend visually;WFL  -ND WFL with corrective lenses  -TC    Visual Impairment Comment Mild inattention to L causing pt to bump into wall/unsafe in general surroundings  -ND     Cognitive Assessment/Intervention    Current Cognitive/Communication Assessment functional  -ND functional  -TC    Orientation Status oriented x 4  -ND oriented x 4;situation  -TC    Follows Commands/Answers Questions 100% of the time;able to follow multi-step instructions;needs cueing  -ND     Personal Safety mild impairment;decreased awareness, need for safety;decreased awareness, need for assist;decreased insight to deficits  -ND mild impairment   unaware of balance impairments   -TC    Personal Safety Interventions fall prevention program maintained;gait belt;nonskid shoes/slippers when out of bed;supervised activity  -ND fall prevention program maintained;gait belt;muscle strengthening facilitated;nonskid shoes/slippers when out of bed  -TC    ROM (Range of Motion)    General ROM no range of motion deficits identified  -ND no range of motion deficits identified  -TC    MMT (Manual Muscle Testing)    General MMT Assessment upper extremity strength deficits identified  -ND no strength deficits identified  -TC    General MMT Assessment Detail Functionally 4/5  -ND no significant strength deficits noted   -TC    Bed Mobility, Assessment/Treatment    Bed Mobility, Comment sitting EOB  -ND pt sitting EOB and returned to chair   -TC    Transfer Assessment/Treatment    Transfers, Sit-Stand [place] verbal cues required;nonverbal cues required (demo/gesture);contact guard assist;minimum assist (75% patient effort)  -ND verbal cues required;nonverbal cues required (demo/gesture);supervision required  -TC    Transfers, Stand-Sit [place] verbal cues required;nonverbal cues required (demo/gesture);contact guard assist  -ND verbal cues required;supervision  required  -TC    Transfers, Sit-Stand-Sit, Assist Device straight cane  -ND     Toilet Transfer, [place] verbal cues required;nonverbal cues required (demo/gesture);supervision required  -ND supervision required;verbal cues required  -TC    Toilet Transfer, Assistive Device straight cane  -ND     Transfer, Safety Issues step length decreased  -ND     Transfer, Impairments strength decreased;impaired balance;postural control impaired  -ND     Gait Assessment/Treatment    Gait, [place] Level  verbal cues required;nonverbal cues required (demo/gesture);contact guard assist  -TC    Gait, Assistive Device  straight cane  -TC    Gait, Distance (Feet)  175   175, standing rest, 25ft standing rest 150ft to room  -TC    Gait, Gait Deviations  decreased heel strike;forward flexed posture;leans to the right;step length decreased;stride length decreased;stride width increased   heels shuffle   -TC    Gait, Safety Issues  balance decreased during turns;loses balance backward   LOB backward and to the L   -TC    Gait, Impairments  impaired balance;postural control impaired;impaired vision  -TC    Gait, Comment  Pt demonstrated 1 LOB posterolaterally to the left during ambulation with intermittent swaying and veering to the left. She was able to self correct but did require verbal cues and tactile assist of CGA.   -TC    Stairs Assessment/Treatment    Stairs, Comment  unsafe during evaluation to attempt   -TC    Motor Skills/Interventions    Additional Documentation Balance Skills Training (Group)  -ND     Balance Skills Training    Sitting-Level of Assistance Independent  -ND     Sitting-Balance Support Feet supported  -ND     Standing-Level of Assistance Contact guard  -ND     Static Standing Balance Support assistive device  -ND     Gait Balance-Level of Assistance Contact guard  -ND     Gait Balance Support assistive device  -ND     Sensory Assessment/Intervention    Sensory Impairment --   WFL per pt;  -ND      Positioning and Restraints    Pre-Treatment Position in bed  -ND in bed  -TC    Post Treatment Position chair  -ND chair  -TC    In Chair sitting;call light within reach;encouraged to call for assist  -ND sitting;call light within reach;encouraged to call for assist  -TC      04/07/17 1145 04/07/17 0400    General Information    Equipment Currently Used at Home cane, straight;walker, rolling  -[place] cane, straight  -JM    Living Environment    Lives With alone  -CONNER alone  -    Living Arrangements apartment  -CONNER apartment  -JM    Home Accessibility  no concerns  -    Stair Railings at Home  none  -    Type of Financial/Environmental Concern  none  -    Transportation Available family or friend will provide  -CONNER car  -      User Key  (r) = Recorded By, (t) = Taken By, (c) = Cosigned By    Initials Name Provider Type    JEAN Moralez, RN Registered Nurse    SALVATORE Haynes, PT Physical Therapist    CONNER Jennings, MSW     GERARDO Botello, OTR/L Occupational Therapist          Physical Therapy Education     Title: PT OT SLP Therapies (Active)     Topic: Physical Therapy (Active)     Point: Mobility training (Active)    Learning Progress Summary    Learner Readiness Method Response Comment Documented by Status   Patient Acceptance E NR POC, benefits of therapy, her balance deficits and increased fall risk, home safety precautions, posture and importance of improving this to dec fall risk and improve mechanics of gait.  04/09/17 1110 Active               Point: Body mechanics (Active)    Learning Progress Summary    Learner Readiness Method Response Comment Documented by Status   Patient Acceptance E NR POC, benefits of therapy, her balance deficits and increased fall risk, home safety precautions, posture and importance of improving this to dec fall risk and improve mechanics of gait.  04/09/17 1110 Active               Point: Precautions (Active)    Learning Progress Summary     Learner Readiness Method Response Comment Documented by Status   Patient Acceptance E NR POC, benefits of therapy, her balance deficits and increased fall risk, home safety precautions, posture and importance of improving this to dec fall risk and improve mechanics of gait.  04/09/17 1110 Active                      User Key     Initials Effective Dates Name Provider Type Discipline     08/02/16 -  Beata Haynes, PT Physical Therapist PT                PT Recommendation and Plan  Anticipated Discharge Disposition: home  Planned Therapy Interventions: balance training, gait training, neuromuscular re-education, ROM (Range of Motion), strengthening, stair training  PT Frequency: daily, 2 times/day  Plan of Care Review  Plan Of Care Reviewed With: patient  Progress: progress toward functional goals as expected  Outcome Summary/Follow up Plan: PT evaluation performed. Upon assessment pt presents primarily with cardiopulmonary deficits, decreased tolerance to activity and sluggish balance systems placing her at an increased risk for falls and impairing her functional levels. She was CGA for bed mobility, transfers and gait but she did demonstrate several LOB, sway and veers towards the left during ambulation with a SPC. She does demonstrate chronic visual impairments but her vestibular and somatosensory systems are also impaired. She is unaware of her balance deficits which further increases her chances of falling at home and/or community. We will work with her mainly regarding her cardiopulmonary, balance and gait distrubances in order to improve her levels before her return home and decrease her fall risk. We will also practice stairs. Thank you for this referral.           IP PT Goals       04/09/17 1117          Transfer Training PT LTG    Transfer Training PT LTG, Date Established 04/09/17  -TC      Transfer Training PT LTG, Time to Achieve by discharge  -TC      Transfer Training PT LTG, Activity Type  all transfers  -TC      Transfer Training PT LTG, [place] Level conditional independence  -TC      Transfer Training PT LTG, Assist Device cane, straight  -TC      Gait Training PT LTG    Gait Training Goal PT LTG, Date Established 04/09/17  -TC      Gait Training Goal PT LTG, Time to Achieve by discharge  -TC      Gait Training Goal PT LTG, [place] Level conditional independence  -TC      Gait Training Goal PT LTG, Assist Device cane, straight  -TC      Gait Training Goal PT LTG, Distance to Achieve no LOB or sway and demonstrates knowledge of balance deficits  -TC      Stair Training PT LTG    Stair Training Goal PT LTG, Date Established 04/09/17  -TC      Stair Training Goal PT LTG, Time to Achieve by discharge  -TC      Stair Training Goal PT LTG, [place] Level supervision required  -TC      Stair Training Goal PT LTG, Assist Device 2 handrails  -TC      Stair Training Goal PT LTG, Distance to Achieve 4 (home)   -TC      Dynamic Standing Balance PT LTG    Dynamic Standing Balance PT LTG, Date Established 04/09/17  -TC      Dynamic Standing Balance PT LTG, Time to Achieve by discharge  -TC      Dynamic Standing Balance PT LTG, [place] Level supervision required  -TC      Dynamic Standing Balance PT LTG, Additional Goal dual task ambulation, head turns, no LOB or sway   -TC        User Key  (r) = Recorded By, (t) = Taken By, (c) = Cosigned By    Initials Name Provider Type    TC Beata Haynes, PT Physical Therapist                Outcome Measures       04/09/17 1116 04/09/17 1100       How much help from another person do you currently need...    Turning from your back to your side while in flat bed without using bedrails?  4  -TC     Moving from lying on back to sitting on the side of a flat bed without bedrails?  4  -TC     Moving to and from a bed to a chair (including a wheelchair)?  3  -TC     Standing up from a chair using your arms (e.g., wheelchair, bedside chair)?  3  -TC      Climbing 3-5 steps with a railing?  3  -TC     To walk in hospital room?  3  -TC     AM-PAC 6 Clicks Score  20  -TC     How much help from another is currently needed...    Putting on and taking off regular lower body clothing? 3  -ND      Bathing (including washing, rinsing, and drying) 3  -ND      Toileting (which includes using toilet bed pan or urinal) 3  -ND      Putting on and taking off regular upper body clothing 4  -ND      Taking care of personal grooming (such as brushing teeth) 4  -ND      Eating meals 4  -ND      Score 21  -ND      Functional Assessment    Outcome Measure Options AM-PAC 6 Clicks Daily Activity (OT)  -ND AM-PAC 6 Clicks Basic Mobility (PT)  -TC       User Key  (r) = Recorded By, (t) = Taken By, (c) = Cosigned By    Initials Name Provider Type    TC Beata Haynes, PT Physical Therapist    ND Shira Botello, OTR/L Occupational Therapist           Time Calculation:       Therapy Charges for Today     Code Description Service Date Service Provider Modifiers Qty    34029479189 HC PT EVAL MOD COMPLEXITY 3 4/9/2017 Beata Haynes, PT GP 1    84907746735 HC PT MOBILITY CURRENT 4/9/2017 Beata Haynes, PT GP, CJ 1    14014813682 HC PT MOBILITY PROJECTED 4/9/2017 Beata Haynes, PT GP, CI 1          PT G-Codes  Outcome Measure Options: AM-PAC 6 Clicks Daily Activity (OT)  Functional Limitation: Mobility: Walking and moving around  Mobility: Walking and Moving Around Current Status (): At least 20 percent but less than 40 percent impaired, limited or restricted  Mobility: Walking and Moving Around Goal Status (): At least 1 percent but less than 20 percent impaired, limited or restricted      Beata Haynes, PT  4/9/2017

## 2017-04-09 NOTE — PROGRESS NOTES
Acute Care - Occupational Therapy Initial Evaluation  [facility]     Patient Name: Padmini Torres  : 1940  MRN: [id number]  Today's Date: 2017  Onset of Illness/Injury or Date of Surgery Date: 17  Date of Referral to OT: 17  Referring Physician: Dr Mir    Admit Date: 2017       ICD-10-CM ICD-9-CM   1. Abnormality of gait and mobility R26.9 781.2   2. Palpitations R00.2 785.1   3. SVT (supraventricular tachycardia) I47.1 427.89   4. Decreased activities of daily living (ADL) Z78.9 V49.89     Patient Active Problem List   Diagnosis   • Chronic systolic congestive heart failure   • Congestive heart failure with LV diastolic dysfunction, NYHA class 2   • Coronary artery disease involving native coronary artery of native heart without angina pectoris   • Mixed hyperlipidemia   • Type 2 diabetes mellitus without complication, without long-term current use of insulin   • Palpitations   • SVT (supraventricular tachycardia)     Past Medical History:   Diagnosis Date   • Abdominal pain, left lower quadrant    • Bowel habit changes    • CAD (coronary artery disease)     LAD stent    • COPD (chronic obstructive pulmonary disease)    • Diabetes mellitus    • DVT (deep venous thrombosis)    • Dysphagia    • Hypertension    • Lymphoma    • Pulmonary emboli    • Sleep apnea, obstructive      Past Surgical History:   Procedure Laterality Date   • ANKLE SURGERY     • BACK SURGERY      lower   • CHOLECYSTECTOMY     • COLONOSCOPY  2015    Last colon limted lower diverticulosis left colon, Internal Hemorrhoids   • COLONOSCOPY W/ BIOPSIES AND POLYPECTOMY  2014    Adenomatous tubular type, Diverticulosis sigmoid colon   • ENDOSCOPY  2010    Distal ring dilated 48 Fr   • EXPLORATORY LAPAROTOMY      relapse lymphoma   • HEMORRHOIDECTOMY     • HYSTERECTOMY      total abdominal   • LUMBAR SPINE SURGERY     • LUNG BIOPSY N/A     open    • SMALL INTESTINE SURGERY     • TONSILLECTOMY     •  UPPER GASTROINTESTINAL ENDOSCOPY  10/23/2015    normal exam          OT ASSESSMENT FLOWSHEET (last 72 hours)      OT Evaluation       04/09/17 1014 04/09/17 1013 04/07/17 1145 04/07/17 0400       Rehab Evaluation    Document Type evaluation   See MAR  -ND evaluation  -TC       Subjective Information agree to therapy;complains of;weakness  -ND no complaints;agree to therapy  -TC       General Information    Patient Profile Review yes  -ND yes  -TC       Onset of Illness/Injury or Date of Surgery Date 04/07/17  -ND 04/07/17  -TC       Referring Physician Dr Mir  -ND Dr Mir  -TC       General Observations Pt. sitting EOB, alert, 2L O2 via nc, telemetry  -ND pt was sitting EOB, on 2L O2 per NC   -TC       Pertinent History Of Current Problem pt complains of heart pounding that occurs at lease 2-3 times a week; she has a history of recurrent SVT, decreasing EF, CHF and CAD exacerbating her symptoms  -ND pt complains of heart pounding that occurs at lease 2-3 times a week; she has a history of recurrent SVT, decreasing EF, CHF and CAD exacerbating her symptoms   -TC       Precautions/Limitations fall precautions;oxygen therapy device and L/min  -ND fall precautions  -TC       Prior Level of Function independent:;all household mobility;community mobility;ADL's  -ND independent:  -TC       Equipment Currently Used at Home cane, straight;commode;shower chair  -ND cane, straight;commode;shower chair  -TC cane, straight;walker, rolling  -[place] cane, straight  -JM     Plans/Goals Discussed With patient;agreed upon  -ND patient;agreed upon  -TC       Risks Reviewed patient:;LOB;nausea/vomiting;dizziness;increased discomfort;change in vital signs;increased drainage;lines disloged  -ND patient:;dizziness;increased discomfort;change in vital signs  -TC       Benefits Reviewed patient:;improve function;increase independence;increase strength;decrease pain;increase balance;decrease risk of DVT;improve skin integrity;increase  knowledge  -ND patient:;improve function;increase independence;increase strength;increase balance  -       Barriers to Rehab medically complex  -ND        Living Environment    Lives With alone  -ND alone  -TC alone  -CONNER alone  -     Living Arrangements house  -ND house  -TC apartment  -CONNER apartment  -     Home Accessibility stairs (2 railings present);tub/shower is not walk in;grab bars present (toilet)  -ND stairs (2 railings present);stairs to enter home;tub/shower is not walk in;grab bars present (toilet)  -TC  no concerns  -     Number of Stairs to Enter Home 4  -ND 4  -TC       Stair Railings at Home outside, present at both sides  -ND outside, present at both sides  -  none  -     Type of Financial/Environmental Concern    none  -     Transportation Available   family or friend will provide  -CONNER car  -     Clinical Impression    Date of Referral to OT 04/09/17  -ND        OT Diagnosis decreased adl  -ND        Prognosis good  -ND        Impairments Found (describe specific impairments) aerobic capacity/endurance;ergonomics and body mechanics;gait, locomotion, and balance  -ND        Patient/Family Goals Statement to go home  -ND        Criteria for Skilled Therapeutic Interventions Met yes;treatment indicated  -ND        Rehab Potential good, to achieve stated therapy goals  -ND        Therapy Frequency 3-5 times/wk  -ND        Predicted Duration of Therapy Intervention (days/wks) 10 days  -ND        Anticipated Equipment Needs At Discharge --   none  -ND        Anticipated Discharge Disposition home;home with home health  -ND        Functional Level Prior    Ambulation    1-->assistive equipment   cane  -     Transferring    0-->independent  -     Toileting    0-->independent  -     Bathing    0-->independent  -     Dressing    0-->independent  -     Eating    0-->independent  -     Communication    0-->understands/communicates without difficulty  -     Swallowing     0-->swallows foods/liquids without difficulty  -JM     Vital Signs    Pretreatment Heart Rate (beats/min) 87  -ND 87  -TC       Intratreatment Heart Rate (beats/min) 95  -ND 95  -TC       Posttreatment Heart Rate (beats/min) 92  -ND 92  -TC       Pre SpO2 (%) 93  -ND 93  -TC       O2 Delivery Pre Treatment supplemental O2   2L O2  -ND supplemental O2   2L   -TC       Intra SpO2 (%) 85  -ND 85  -TC       O2 Delivery Intra Treatment supplemental O2   2L O2 per NC; resated to 93% within 30s )  -ND supplemental O2   2L O2 per NC; resated to 93% within 30s   -TC       Post SpO2 (%) 93  -ND 93  -TC       O2 Delivery Post Treatment supplemental O2   2L O2  -ND supplemental O2   2L  -TC       Pre Patient Position Sitting  -ND        Intra Patient Position Standing  -ND        Post Patient Position Sitting  -ND        Pain Assessment    Pain Assessment No/denies pain  -ND        Vision Assessment/Intervention    Visual Impairment inattention to the left;needs cues to attend visually;WFL  -ND WFL with corrective lenses  -TC       Visual Impairment Comment Mild inattention to L causing pt to bump into wall/unsafe in general surroundings  -ND        Cognitive Assessment/Intervention    Current Cognitive/Communication Assessment functional  -ND functional  -TC       Orientation Status oriented x 4  -ND oriented x 4;situation  -TC       Follows Commands/Answers Questions 100% of the time;able to follow multi-step instructions;needs cueing  -ND        Personal Safety mild impairment;decreased awareness, need for safety;decreased awareness, need for assist;decreased insight to deficits  -ND mild impairment   unaware of balance impairments   -TC       Personal Safety Interventions fall prevention program maintained;gait belt;nonskid shoes/slippers when out of bed;supervised activity  -ND fall prevention program maintained;gait belt;muscle strengthening facilitated;nonskid shoes/slippers when out of bed  -TC       ROM (Range of  Motion)    General ROM no range of motion deficits identified  -ND no range of motion deficits identified  -TC       MMT (Manual Muscle Testing)    General MMT Assessment upper extremity strength deficits identified  -ND no strength deficits identified  -TC       General MMT Assessment Detail Functionally 4/5  -ND no significant strength deficits noted   -TC       Bed Mobility, Assessment/Treatment    Bed Mobility, Comment sitting EOB  -ND pt sitting EOB and returned to chair   -TC       Transfer Assessment/Treatment    Transfers, Sit-Stand [place] verbal cues required;nonverbal cues required (demo/gesture);contact guard assist;minimum assist (75% patient effort)  -ND verbal cues required;nonverbal cues required (demo/gesture);supervision required  -TC       Transfers, Stand-Sit [place] verbal cues required;nonverbal cues required (demo/gesture);contact guard assist  -ND verbal cues required;supervision required  -TC       Transfers, Sit-Stand-Sit, Assist Device straight cane  -ND        Toilet Transfer, [place] verbal cues required;nonverbal cues required (demo/gesture);supervision required  -ND supervision required;verbal cues required  -TC       Toilet Transfer, Assistive Device straight cane  -ND        Transfer, Safety Issues step length decreased  -ND        Transfer, Impairments strength decreased;impaired balance;postural control impaired  -ND        Functional Mobility    Functional Mobility- Ind. Level verbal cues required;nonverbal cues required (demo/gesture);contact guard assist  -ND        Functional Mobility- Device straight cane  -ND        Functional Mobility-Distance (Feet) --   IN [place] in room  -ND        Functional Mobility- Safety Issues step length decreased  -ND        Functional Mobility- Comment Pt. shuffles feet requires verbal cueing for larger steps and visually attending to surroundings. Pt. had Episode of balance loss while ambulating in [place] and running into door   -ND         Stairs Assessment/Treatment    Stairs, Comment  unsafe during evaluation to attempt   -TC       Upper Body Dressing Assessment/Training    UB Dressing Assess/Train, Clothing Type doffing:;donning:;pull over  -ND        UB Dressing Assess/Train, Position edge of bed  -ND        UB Dressing Assess/Train, [place] set up required  -ND        Lower Body Dressing Assessment/Training    LB Dressing Assess/Train, Clothing Type doffing:;donning:;slipper socks  -ND        LB Dressing Assess/Train, Position edge of bed  -ND        LB Dressing Assess/Train, [place] supervision required  -ND        LB Dressing Assess/Train, Impairments impaired balance  -ND        Toileting Assessment/Training    Toileting Assess/Train, Assistive Device grab bars  -ND        Toileting Assess/Train, Position sitting;standing  -ND        Toileting Assess/Train, Indepen Level supervision required  -ND        Toileting Assess/Train, Impairments strength decreased;impaired balance  -ND        Motor Skills/Interventions    Additional Documentation Balance Skills Training (Group)  -ND        Balance Skills Training    Sitting-Level of Assistance Independent  -ND        Sitting-Balance Support Feet supported  -ND        Standing-Level of Assistance Contact guard  -ND        Static Standing Balance Support assistive device  -ND        Gait Balance-Level of Assistance Contact guard  -ND        Gait Balance Support assistive device  -ND        Sensory Assessment/Intervention    Sensory Impairment --   WFL per pt;  -ND        General Therapy Interventions    Planned Therapy Interventions activity intolerance;ADL retraining;balance training;bed mobility training;energy conservation;home exercise program;strengthening;transfer training;visual retraining  -ND        Positioning and Restraints    Pre-Treatment Position in bed  -ND in bed  -TC       Post Treatment Position chair  -ND chair  -TC       In Chair sitting;call light within  reach;encouraged to call for assist  -ND sitting;call light within reach;encouraged to call for assist  -TC         User Key  (r) = Recorded By, (t) = Taken By, (c) = Cosigned By    Initials Name Effective Dates    JEAN Moralez, RN 12/30/16 -     TC Beata Shalini [place], PT 08/02/16 -     CONNER Yana Jennings, MSW 09/15/16 -     ND Shira Botello, OTR/L 10/21/16 -            Occupational Therapy Education     Title: PT OT SLP Therapies (Active)     Topic: Occupational Therapy (Done)     Point: ADL training (Done)    Description: Instruct learner(s) on proper safety adaptation and remediation techniques during self care or transfers.   Instruct in proper use of assistive devices.    Learning Progress Summary    Learner Readiness Method Response Comment Documented by Status   Patient Acceptance E VU,NR Pt. educated on role of OT, safe t/f, attention to surroundings, progression with poc ND 04/09/17 1116 Done               Point: Home exercise program (Done)    Description: Instruct learner(s) on appropriate technique for monitoring, assisting and/or progressing therapeutic exercises/activities.    Learning Progress Summary    Learner Readiness Method Response Comment Documented by Status   Patient Acceptance E VU,NR Pt. educated on role of OT, safe t/f, attention to surroundings, progression with poc ND 04/09/17 1116 Done               Point: Body mechanics (Done)    Description: Instruct learner(s) on proper positioning and spine alignment during self-care, functional mobility activities and/or exercises.    Learning Progress Summary    Learner Readiness Method Response Comment Documented by Status   Patient Acceptance E VU,NR Pt. educated on role of OT, safe t/f, attention to surroundings, progression with poc ND 04/09/17 1116 Done                      User Key     Initials Effective Dates Name Provider Type Discipline    ND 10/21/16 -  Shira Botello, OTR/L Occupational Therapist OT                  OT  Recommendation and Plan  Anticipated Equipment Needs At Discharge:  (none)  Anticipated Discharge Disposition: home, home with home health  Planned Therapy Interventions: activity intolerance, ADL retraining, balance training, bed mobility training, energy conservation, home exercise program, strengthening, transfer training, visual retraining  Therapy Frequency: 3-5 times/wk  Plan of Care Review  Plan Of Care Reviewed With: patient  Progress: progress toward functional goals as expected  Outcome Summary/Follow up Plan: OT eval completed. Pt. required CGA/Min A for sit to stand t/f from bed. Pt. supervision for toileting ADL. Pt. CGA with straight cane for functional mob with LOB episiode and decreased visual attention to L side causing pt. to run into wall and grab on to hand rails in [place]. Pt. cont to benefit from skilled OT due to decreased balance, decreased strength, decreased activity tolerance, and decreased independence in ADLs. Anticipated dc home with HH and assist. 4/9/17 1057          OT Goals       04/09/17 1110          Transfer Training OT LTG    Transfer Training OT LTG, Date Established 04/09/17  -ND      Transfer Training OT LTG, Time to Achieve by discharge  -ND      Transfer Training OT LTG, Activity Type all transfers  -ND      Transfer Training OT LTG, [place] Level independent  -ND      Transfer Training OT LTG, Assist Device cane, straight  -ND      Strength OT LTG    Strength Goal OT LTG, Date Established 04/09/17  -ND      Strength Goal OT LTG, Time to Achieve by discharge  -ND      Strength Goal OT LTG, Measure to Achieve Pt. will complete BUE strengthening exercises to increase BUE strength to 5/5 for ADLs  -ND      Patient Education OT LTG    Patient Education OT LTG, Date Established 04/09/17  -ND      Patient Education OT LTG, Time to Achieve by discharge  -ND      Patient Education OT LTG, Education Type HEP;posture/body mechanics;home safety;energy conservation;work  simplification;adaptive breathing  -ND      Patient Education OT LTG, Education Understanding independent;demonstrates adequately;verbalizes understanding  -ND      Patient Education OT LTG, Additional Goal Pt. will demonstrate good safety awaress by visually scanning environment and not grabbing onto items in [place].   -ND      ADL OT LTG    ADL OT LTG, Date Established 04/09/17  -ND      ADL OT LTG, Time to Achieve by discharge  -ND      ADL OT LTG, Activity Type ADL skills  -ND      ADL OT LTG, [place] Level independent  -ND        User Key  (r) = Recorded By, (t) = Taken By, (c) = Cosigned By    Initials Name Provider Type    ND Shira Botello, OTR/L Occupational Therapist                Outcome Measures       04/09/17 1116 04/09/17 1100       How much help from another person do you currently need...    Turning from your back to your side while in flat bed without using bedrails?  4  -TC     Moving from lying on back to sitting on the side of a flat bed without bedrails?  4  -TC     Moving to and from a bed to a chair (including a wheelchair)?  3  -TC     Standing up from a chair using your arms (e.g., wheelchair, bedside chair)?  3  -TC     Climbing 3-5 steps with a railing?  3  -TC     To walk in hospital room?  3  -TC     AM-PAC 6 Clicks Score  20  -TC     How much help from another is currently needed...    Putting on and taking off regular lower body clothing? 3  -ND      Bathing (including washing, rinsing, and drying) 3  -ND      Toileting (which includes using toilet bed pan or urinal) 3  -ND      Putting on and taking off regular upper body clothing 4  -ND      Taking care of personal grooming (such as brushing teeth) 4  -ND      Eating meals 4  -ND      Score 21  -ND      Functional Assessment    Outcome Measure Options AM-PAC 6 Clicks Daily Activity (OT)  -ND AM-PAC 6 Clicks Basic Mobility (PT)  -TC       User Key  (r) = Recorded By, (t) = Taken By, (c) = Cosigned By    Initials Name Provider  Type    TC Beata Haynes, PT Physical Therapist    ND Shira Botello, OTR/L Occupational Therapist          Time Calculation:   OT Start Time: 1014  OT Stop Time: 1057  OT Time Calculation (min): 43 min    Therapy Charges for Today     Code Description Service Date Service Provider Modifiers Qty    87383888057 HC OT SELFCARE CURRENT 4/9/2017 Shira Botello, OTR/L GO, CJ 1    04066028340 HC OT SELFCARE PROJECTED 4/9/2017 Shira Botello OTR/L GO, CI 1    32393918329 HC OT EVAL MOD COMPLEXITY 3 4/9/2017 Shira Botello OTR/L GO, KX 1          OT G-codes  OT Functional Scales Options: AM-PAC 6 Clicks Daily Activity (OT)  Functional Limitation: Self care  Self Care Current Status (): At least 20 percent but less than 40 percent impaired, limited or restricted  Self Care Goal Status (): At least 1 percent but less than 20 percent impaired, limited or restricted    Shira Botello OTR/L  4/9/2017

## 2017-04-10 ENCOUNTER — APPOINTMENT (OUTPATIENT)
Dept: GENERAL RADIOLOGY | Facility: HOSPITAL | Age: 77
End: 2017-04-10

## 2017-04-10 ENCOUNTER — APPOINTMENT (OUTPATIENT)
Dept: MRI IMAGING | Facility: HOSPITAL | Age: 77
End: 2017-04-10

## 2017-04-10 LAB
ANION GAP SERPL CALCULATED.3IONS-SCNC: 11 MMOL/L (ref 4–13)
BUN BLD-MCNC: 38 MG/DL (ref 5–21)
BUN/CREAT SERPL: 23.9 (ref 7–25)
CALCIUM SPEC-SCNC: 8.7 MG/DL (ref 8.4–10.4)
CHLORIDE SERPL-SCNC: 97 MMOL/L (ref 98–110)
CO2 SERPL-SCNC: 32 MMOL/L (ref 24–31)
CREAT BLD-MCNC: 1.59 MG/DL (ref 0.5–1.4)
DEPRECATED RDW RBC AUTO: 56 FL (ref 40–54)
ERYTHROCYTE [DISTWIDTH] IN BLOOD BY AUTOMATED COUNT: 15.1 % (ref 12–15)
GFR SERPL CREATININE-BSD FRML MDRD: 31 ML/MIN/1.73
GLUCOSE BLD-MCNC: 78 MG/DL (ref 70–100)
GLUCOSE BLDC GLUCOMTR-MCNC: 147 MG/DL (ref 70–130)
GLUCOSE BLDC GLUCOMTR-MCNC: 154 MG/DL (ref 70–130)
GLUCOSE BLDC GLUCOMTR-MCNC: 82 MG/DL (ref 70–130)
GLUCOSE BLDC GLUCOMTR-MCNC: 92 MG/DL (ref 70–130)
HCT VFR BLD AUTO: 39.9 % (ref 37–47)
HGB BLD-MCNC: 12.6 G/DL (ref 12–16)
MCH RBC QN AUTO: 32 PG (ref 28–32)
MCHC RBC AUTO-ENTMCNC: 31.6 G/DL (ref 33–36)
MCV RBC AUTO: 101.3 FL (ref 82–98)
PLATELET # BLD AUTO: 143 10*3/MM3 (ref 130–400)
PMV BLD AUTO: 10.5 FL (ref 6–12)
POTASSIUM BLD-SCNC: 4.5 MMOL/L (ref 3.5–5.3)
RBC # BLD AUTO: 3.94 10*6/MM3 (ref 4.2–5.4)
SODIUM BLD-SCNC: 140 MMOL/L (ref 135–145)
WBC NRBC COR # BLD: 5.64 10*3/MM3 (ref 4.8–10.8)

## 2017-04-10 PROCEDURE — 82962 GLUCOSE BLOOD TEST: CPT

## 2017-04-10 PROCEDURE — 97535 SELF CARE MNGMENT TRAINING: CPT

## 2017-04-10 PROCEDURE — 97116 GAIT TRAINING THERAPY: CPT

## 2017-04-10 PROCEDURE — 97110 THERAPEUTIC EXERCISES: CPT

## 2017-04-10 PROCEDURE — 97530 THERAPEUTIC ACTIVITIES: CPT

## 2017-04-10 PROCEDURE — 25010000002 ENOXAPARIN PER 10 MG: Performed by: FAMILY MEDICINE

## 2017-04-10 PROCEDURE — 80048 BASIC METABOLIC PNL TOTAL CA: CPT | Performed by: FAMILY MEDICINE

## 2017-04-10 PROCEDURE — 73080 X-RAY EXAM OF ELBOW: CPT

## 2017-04-10 PROCEDURE — 73030 X-RAY EXAM OF SHOULDER: CPT

## 2017-04-10 PROCEDURE — 70551 MRI BRAIN STEM W/O DYE: CPT

## 2017-04-10 PROCEDURE — 99233 SBSQ HOSP IP/OBS HIGH 50: CPT | Performed by: INTERNAL MEDICINE

## 2017-04-10 PROCEDURE — 85027 COMPLETE CBC AUTOMATED: CPT | Performed by: FAMILY MEDICINE

## 2017-04-10 RX ORDER — LISINOPRIL 5 MG/1
5 TABLET ORAL DAILY
Status: DISCONTINUED | OUTPATIENT
Start: 2017-04-11 | End: 2017-04-11 | Stop reason: HOSPADM

## 2017-04-10 RX ORDER — DIAZEPAM 2 MG/1
1 TABLET ORAL
Status: DISCONTINUED | OUTPATIENT
Start: 2017-04-10 | End: 2017-04-11 | Stop reason: HOSPADM

## 2017-04-10 RX ORDER — AMIODARONE HYDROCHLORIDE 200 MG/1
100 TABLET ORAL
Status: DISCONTINUED | OUTPATIENT
Start: 2017-04-11 | End: 2017-04-11 | Stop reason: HOSPADM

## 2017-04-10 RX ORDER — METOPROLOL SUCCINATE 25 MG/1
12.5 TABLET, EXTENDED RELEASE ORAL DAILY
Status: DISCONTINUED | OUTPATIENT
Start: 2017-04-11 | End: 2017-04-11 | Stop reason: HOSPADM

## 2017-04-10 RX ADMIN — ISOSORBIDE MONONITRATE 30 MG: 30 TABLET ORAL at 08:58

## 2017-04-10 RX ADMIN — POLYETHYLENE GLYCOL (3350) 17 G: 17 POWDER, FOR SOLUTION ORAL at 08:59

## 2017-04-10 RX ADMIN — DIAZEPAM 2 MG: 2 TABLET ORAL at 09:01

## 2017-04-10 RX ADMIN — PRAVASTATIN SODIUM 20 MG: 20 TABLET ORAL at 08:57

## 2017-04-10 RX ADMIN — ASPIRIN 81 MG 81 MG: 81 TABLET ORAL at 08:58

## 2017-04-10 RX ADMIN — GABAPENTIN 300 MG: 300 CAPSULE ORAL at 08:58

## 2017-04-10 RX ADMIN — FUROSEMIDE 20 MG: 20 TABLET ORAL at 08:58

## 2017-04-10 RX ADMIN — ACETAMINOPHEN 650 MG: 325 TABLET ORAL at 10:02

## 2017-04-10 RX ADMIN — AMIODARONE HYDROCHLORIDE 200 MG: 200 TABLET ORAL at 08:58

## 2017-04-10 RX ADMIN — GABAPENTIN 300 MG: 300 CAPSULE ORAL at 17:18

## 2017-04-10 RX ADMIN — GABAPENTIN 300 MG: 300 CAPSULE ORAL at 20:20

## 2017-04-10 RX ADMIN — LISINOPRIL 10 MG: 10 TABLET ORAL at 08:57

## 2017-04-10 RX ADMIN — ACETAMINOPHEN 650 MG: 325 TABLET ORAL at 20:20

## 2017-04-10 RX ADMIN — ENOXAPARIN SODIUM 30 MG: 30 INJECTION SUBCUTANEOUS at 08:58

## 2017-04-10 RX ADMIN — PANTOPRAZOLE SODIUM 40 MG: 40 TABLET, DELAYED RELEASE ORAL at 06:09

## 2017-04-10 RX ADMIN — METOPROLOL SUCCINATE 25 MG: 25 TABLET, FILM COATED, EXTENDED RELEASE ORAL at 08:57

## 2017-04-10 RX ADMIN — DIAZEPAM 1 MG: 2 TABLET ORAL at 17:19

## 2017-04-10 RX ADMIN — DIGOXIN 125 MCG: 0.12 TABLET ORAL at 12:20

## 2017-04-10 RX ADMIN — INSULIN LISPRO 2 UNITS: 100 INJECTION, SOLUTION INTRAVENOUS; SUBCUTANEOUS at 12:21

## 2017-04-10 NOTE — PROGRESS NOTES
[facility] Medicine Services  INPATIENT PROGRESS NOTE    Length of Stay: 3  Date of Admission: 4/6/2017  Primary Care Physician: Александр Yo DO    Subjective   Chief Complaint: follow up    HPI   She is surrounded by very concern family members (left shoulder x-ray, left arm pain) - felt heavy   States at the beginning she could not raise it.  Unclear as to how long this has been going on because our conversation has moved from one question to the others (arrythmia/life vest, duration of palpitation, etc.)    Her family is also concerned that her speech has been slowed.  No gross weakness.  Pt been working with her. Concern about stroke.    From Dr. Cagle's standpoint covering for Dr. Kelsey, she is cleared to go.  However, family and patient has questions for Dr. Kelsey    Family is concern about her staying by herself but has no concrete input on how she forsee her sister.  I'll have  to revisit and look at options.        Review of Systems   Denies dizziness, lightheadedness, nausea, vomiting, abdominal pain, difficulty breathing  All pertinent negatives and positives are as above. All other systems have been reviewed and are negative unless otherwise stated.     Objective    Temp:  [97.9 °F (36.6 °C)-98.8 °F (37.1 °C)] 97.9 °F (36.6 °C)  Heart Rate:  [76-89] 80  Resp:  [16-20] 16  BP: ()/(40-69) 91/44  Physical Exam   Constitutional: She is oriented to person, place, and time. She appears well-developed and well-nourished.   HENT:   Head: Normocephalic and atraumatic.   droopy left eyelid particularly the lower part, no icterus or scleral injection   Eyes: Conjunctivae and EOM are normal. Pupils are equal, round, and reactive to light.   Neck: Normal range of motion. Neck supple. No JVD present. No tracheal deviation present. No thyromegaly present.   Cardiovascular: Normal rate, regular rhythm and normal heart sounds.    Pulmonary/Chest: Effort normal and  breath sounds normal.   Musculoskeletal: Normal range of motion.   No gross tenderness of left shoulder or crepitation or limitation of ROM, no elbow or arm discomfort   Lymphadenopathy:     She has no cervical adenopathy.   Neurological: She is alert and oriented to person, place, and time.   Skin: Skin is warm and dry.   Psychiatric: She has a normal mood and affect. Her behavior is normal. Judgment and thought content normal.           Results Review:  I have reviewed the labs, radiology results, and diagnostic studies.    Laboratory Data:     Results from last 7 days  Lab Units 04/10/17  0443 04/09/17  0424 04/08/17  0519   WBC 10*3/mm3 5.64 5.85 5.72   HEMOGLOBIN g/dL 12.6 12.9 13.2   HEMATOCRIT % 39.9 40.7 40.6   PLATELETS 10*3/mm3 143 146 127*          Results from last 7 days  Lab Units 04/10/17  0443 04/09/17  0424 04/08/17  0520 04/06/17  2353   SODIUM mmol/L 140 141 141 143   POTASSIUM mmol/L 4.5 4.4 4.3 4.3   CHLORIDE mmol/L 97* 98 101 98   TOTAL CO2 mmol/L 32.0* 35.0* 27.0 32.0*   BUN mg/dL 38* 41* 37* 36*   CREATININE mg/dL 1.59* 1.61* 1.60* 1.34   CALCIUM mg/dL 8.7 8.5 9.3 9.5   BILIRUBIN mg/dL  --  0.7 0.8 0.6   ALK PHOS U/L  --  61 58 56   ALT (SGPT) U/L  --  20 22 23   AST (SGOT) U/L  --  18 23 24   GLUCOSE mg/dL 78 103* 83 128*       Culture Data:   Blood Culture   Date Value Ref Range Status   04/06/2017 No growth at 3 days  Preliminary       Radiology Data:   Imaging Results (last 24 hours)     ** No results found for the last 24 hours. **          I have reviewed the patient current medications.     Assessment/Plan     Hospital Problem List     Chronic diastolic congestive heart failure    Coronary artery disease involving native coronary artery of native heart without angina pectoris    Mixed hyperlipidemia    Type 2 diabetes mellitus without complication, without long-term current use of insulin    Palpitations    SVT (supraventricular tachycardia)      In addition to above diagnoses    Left  arm/shoulder/elbow pain - ? arthritis  Slurred speech - concern for stroke  - MRi of brain; imaging study of arm    Inform Rick about Dr. Cagle's recommendation and patient's desire to see and ask futher questions from Dr. Kelsey.  Cont. Present management  Stop Lasix - ? Chronicity of elevated creatinine and low bP readings  Other plan per orders.    Ninoska (nurse) was with me in the entire time of encounter        Discharge Planning:prob in a day   Parish Perez MD   04/10/17   12:22 PM

## 2017-04-10 NOTE — PROGRESS NOTES
Continued Stay Note   [facility]     Patient Name: Padmini Torres  MRN: [id number]  Today's Date: 4/10/2017    Admit Date: 4/6/2017          Discharge Plan       04/10/17 0857    Case Management/Social Work Plan    Plan (P)  Pt continues to plan to dc home where she resides alone. Pt has family who can assist as needed. Pt has no known dc needs at this time. Sw will follow.     Patient/Family In Agreement With Plan (P)  yes              Discharge Codes     None            Britt Rivas

## 2017-04-10 NOTE — PLAN OF CARE
Problem: Patient Care Overview (Adult)  Goal: Plan of Care Review  Outcome: Ongoing (interventions implemented as appropriate)    04/10/17 0969   Coping/Psychosocial Response Interventions   Plan Of Care Reviewed With patient   Patient Care Overview   Progress progress towards functional goals is fair   Outcome Evaluation   Outcome Summary/Follow up Plan Pt. working well with PT. Pt. is SBA for transfers and CGA or ambulation. Pt. walked with straight cane, 150' x 2 with one standing rest. Pt. was slightly unsteady and had 2 LOB, but was able to self correct. Pt.'s O2 sat was 90% on 1 liter with activity. Pt. plans to return home upon discharge.

## 2017-04-10 NOTE — PLAN OF CARE
Problem: Patient Care Overview (Adult)  Goal: Plan of Care Review  Outcome: Ongoing (interventions implemented as appropriate)    04/10/17 1102   Coping/Psychosocial Response Interventions   Plan Of Care Reviewed With patient   Patient Care Overview   Progress improving   Outcome Evaluation   Outcome Summary/Follow up Plan Pt. had no problems with ue exs to increase her performance and act. rosalia with ot poc!

## 2017-04-10 NOTE — PLAN OF CARE
Problem: Patient Care Overview (Adult)  Goal: Plan of Care Review  Outcome: Ongoing (interventions implemented as appropriate)    04/10/17 1442   Coping/Psychosocial Response Interventions   Plan Of Care Reviewed With patient   Patient Care Overview   Progress improving   Outcome Evaluation   Outcome Summary/Follow up Plan Pt. improving with her adl ot poc ability!

## 2017-04-10 NOTE — PLAN OF CARE
Problem: Patient Care Overview (Adult)  Goal: Plan of Care Review  Outcome: Ongoing (interventions implemented as appropriate)    04/10/17 1829   Coping/Psychosocial Response Interventions   Plan Of Care Reviewed With patient   Patient Care Overview   Progress improving   Outcome Evaluation   Outcome Summary/Follow up Plan patient had complaints of pain in left arm one time this shift which was relieved with tylenol. walked with standby assistance with Physical therapy. no episodes of SVT this shift         Problem: Acute Coronary Syndrome (ACS) (Adult)  Goal: Signs and Symptoms of Listed Potential Problems Will be Absent or Manageable (Acute Coronary Syndrome)  Outcome: Ongoing (interventions implemented as appropriate)    Problem: Fall Risk (Adult)  Goal: Absence of Falls  Outcome: Ongoing (interventions implemented as appropriate)    Problem: Arrhythmia/Dysrhythmia (Symptomatic) (Adult)  Goal: Signs and Symptoms of Listed Potential Problems Will be Absent or Manageable (Arrhythmia/Dysrhythmia)  Outcome: Ongoing (interventions implemented as appropriate)    Problem: Cardiac: Heart Failure (Adult)  Goal: Signs and Symptoms of Listed Potential Problems Will be Absent or Manageable (Cardiac: Heart Failure)  Outcome: Ongoing (interventions implemented as appropriate)

## 2017-04-10 NOTE — PLAN OF CARE
Problem: Patient Care Overview (Adult)  Goal: Plan of Care Review  Outcome: Ongoing (interventions implemented as appropriate)    04/10/17 0317   Coping/Psychosocial Response Interventions   Plan Of Care Reviewed With patient   Patient Care Overview   Progress no change   Outcome Evaluation   Outcome Summary/Follow up Plan Dr. Kelsey to consult with pt today about AICD placement. EF 30% Medicated for c/o Left Arm pain with relief. BP remains low. Continue to monitor.         Problem: Acute Coronary Syndrome (ACS) (Adult)  Goal: Signs and Symptoms of Listed Potential Problems Will be Absent or Manageable (Acute Coronary Syndrome)  Outcome: Ongoing (interventions implemented as appropriate)    04/10/17 0317   Acute Coronary Syndrome (ACS)   Problems Assessed (Acute Coronary Syndrome (ACS)) all   Problems Present (Acute Coronary Syndrome (ACS)) none         Problem: Fall Risk (Adult)  Goal: Absence of Falls  Outcome: Ongoing (interventions implemented as appropriate)    04/10/17 0317   Fall Risk (Adult)   Absence of Falls making progress toward outcome         Problem: Arrhythmia/Dysrhythmia (Symptomatic) (Adult)  Goal: Signs and Symptoms of Listed Potential Problems Will be Absent or Manageable (Arrhythmia/Dysrhythmia)  Outcome: Ongoing (interventions implemented as appropriate)    04/10/17 0317   Arrhythmia/Dysrhythmia (Symptomatic)   Problems Assessed (Arrhythmia/Dysrhythmia) all   Problems Present (Arrhythmia/Dysrhythmia) other (see comments)  (EF 30%)         Problem: Cardiac: Heart Failure (Adult)  Goal: Signs and Symptoms of Listed Potential Problems Will be Absent or Manageable (Cardiac: Heart Failure)  Outcome: Ongoing (interventions implemented as appropriate)    04/10/17 0317   Cardiac: Heart Failure   Problems Assessed (Heart Failure) all   Problems Present (Heart Failure) none

## 2017-04-11 VITALS
BODY MASS INDEX: 24.97 KG/M2 | HEIGHT: 64 IN | HEART RATE: 84 BPM | WEIGHT: 146.25 LBS | OXYGEN SATURATION: 95 % | SYSTOLIC BLOOD PRESSURE: 108 MMHG | DIASTOLIC BLOOD PRESSURE: 50 MMHG | RESPIRATION RATE: 18 BRPM | TEMPERATURE: 97.8 F

## 2017-04-11 LAB
ANION GAP SERPL CALCULATED.3IONS-SCNC: 8 MMOL/L (ref 4–13)
BUN BLD-MCNC: 37 MG/DL (ref 5–21)
BUN/CREAT SERPL: 26.6 (ref 7–25)
CALCIUM SPEC-SCNC: 9 MG/DL (ref 8.4–10.4)
CHLORIDE SERPL-SCNC: 98 MMOL/L (ref 98–110)
CO2 SERPL-SCNC: 32 MMOL/L (ref 24–31)
CREAT BLD-MCNC: 1.39 MG/DL (ref 0.5–1.4)
GFR SERPL CREATININE-BSD FRML MDRD: 37 ML/MIN/1.73
GLUCOSE BLD-MCNC: 107 MG/DL (ref 70–100)
GLUCOSE BLDC GLUCOMTR-MCNC: 165 MG/DL (ref 70–130)
GLUCOSE BLDC GLUCOMTR-MCNC: 91 MG/DL (ref 70–130)
POTASSIUM BLD-SCNC: 4 MMOL/L (ref 3.5–5.3)
SODIUM BLD-SCNC: 138 MMOL/L (ref 135–145)

## 2017-04-11 PROCEDURE — 25010000002 ENOXAPARIN PER 10 MG: Performed by: FAMILY MEDICINE

## 2017-04-11 PROCEDURE — 97535 SELF CARE MNGMENT TRAINING: CPT | Performed by: OCCUPATIONAL THERAPIST

## 2017-04-11 PROCEDURE — 80048 BASIC METABOLIC PNL TOTAL CA: CPT | Performed by: INTERNAL MEDICINE

## 2017-04-11 PROCEDURE — 25010000002 HEPARIN FLUSH (PORCINE) 100 UNIT/ML SOLUTION: Performed by: INTERNAL MEDICINE

## 2017-04-11 PROCEDURE — 97110 THERAPEUTIC EXERCISES: CPT

## 2017-04-11 PROCEDURE — 82962 GLUCOSE BLOOD TEST: CPT

## 2017-04-11 PROCEDURE — 97116 GAIT TRAINING THERAPY: CPT

## 2017-04-11 RX ORDER — METOPROLOL SUCCINATE 25 MG/1
12.5 TABLET, EXTENDED RELEASE ORAL DAILY
Qty: 30 TABLET | Refills: 0 | Status: SHIPPED | OUTPATIENT
Start: 2017-04-11 | End: 2017-06-26 | Stop reason: DRUGHIGH

## 2017-04-11 RX ORDER — DIAZEPAM 2 MG/1
1 TABLET ORAL
Qty: 10 TABLET | Refills: 0 | Status: SHIPPED | OUTPATIENT
Start: 2017-04-11 | End: 2017-04-17

## 2017-04-11 RX ORDER — FUROSEMIDE 20 MG/1
10 TABLET ORAL DAILY
Qty: 15 TABLET | Refills: 0 | Status: SHIPPED | OUTPATIENT
Start: 2017-04-11 | End: 2017-06-26

## 2017-04-11 RX ORDER — AMIODARONE HYDROCHLORIDE 100 MG/1
100 TABLET ORAL
Qty: 30 TABLET | Refills: 0 | Status: SHIPPED | OUTPATIENT
Start: 2017-04-11 | End: 2017-05-01 | Stop reason: SDUPTHER

## 2017-04-11 RX ORDER — LISINOPRIL 5 MG/1
5 TABLET ORAL DAILY
Qty: 30 TABLET | Refills: 0 | Status: SHIPPED | OUTPATIENT
Start: 2017-04-11 | End: 2018-01-01 | Stop reason: HOSPADM

## 2017-04-11 RX ADMIN — PRAVASTATIN SODIUM 20 MG: 20 TABLET ORAL at 10:06

## 2017-04-11 RX ADMIN — ENOXAPARIN SODIUM 30 MG: 30 INJECTION SUBCUTANEOUS at 10:12

## 2017-04-11 RX ADMIN — ISOSORBIDE MONONITRATE 30 MG: 30 TABLET ORAL at 10:07

## 2017-04-11 RX ADMIN — ASPIRIN 81 MG 81 MG: 81 TABLET ORAL at 10:07

## 2017-04-11 RX ADMIN — ACETAMINOPHEN 650 MG: 325 TABLET ORAL at 02:16

## 2017-04-11 RX ADMIN — Medication 500 UNITS: at 11:35

## 2017-04-11 RX ADMIN — INSULIN LISPRO 2 UNITS: 100 INJECTION, SOLUTION INTRAVENOUS; SUBCUTANEOUS at 11:39

## 2017-04-11 RX ADMIN — DIAZEPAM 1 MG: 2 TABLET ORAL at 05:50

## 2017-04-11 RX ADMIN — GABAPENTIN 300 MG: 300 CAPSULE ORAL at 10:06

## 2017-04-11 RX ADMIN — METOPROLOL SUCCINATE 12.5 MG: 25 TABLET, FILM COATED, EXTENDED RELEASE ORAL at 10:06

## 2017-04-11 RX ADMIN — PANTOPRAZOLE SODIUM 40 MG: 40 TABLET, DELAYED RELEASE ORAL at 05:50

## 2017-04-11 RX ADMIN — DIGOXIN 125 MCG: 0.12 TABLET ORAL at 11:38

## 2017-04-11 RX ADMIN — AMIODARONE HYDROCHLORIDE 100 MG: 200 TABLET ORAL at 10:06

## 2017-04-11 RX ADMIN — LISINOPRIL 5 MG: 5 TABLET ORAL at 10:06

## 2017-04-11 NOTE — PLAN OF CARE
Problem: Patient Care Overview (Adult)  Goal: Plan of Care Review  Outcome: Ongoing (interventions implemented as appropriate)    04/11/17 1054   Coping/Psychosocial Response Interventions   Plan Of Care Reviewed With patient   Patient Care Overview   Progress progress toward functional goals as expected   Outcome Evaluation   Outcome Summary/Follow up Plan Pt. on RA with Ox SAT ranging from 88-94% during treatment. Pt. required stand by assist with sit to/from stand. Ambulated 200' with straight cane and CGA with no LOB. Pt. does tend to sway from side to side occasionally during ambulation. Pt. performed balance activities with B UE support on rail and CGA. Will benefit from continued endurance and balance activities.

## 2017-04-11 NOTE — PLAN OF CARE
Problem: Patient Care Overview (Adult)  Goal: Plan of Care Review  Outcome: Ongoing (interventions implemented as appropriate)    04/11/17 1518   Coping/Psychosocial Response Interventions   Plan Of Care Reviewed With patient   Patient Care Overview   Progress progress toward functional goals as expected   Outcome Evaluation   Outcome Summary/Follow up Plan Pt. is Mod I with sit to/from stand using straight cane. Pt. Performed B LE execises and ambulated 200' CGA with cane. Pt. ascended/descended 10 stairs with right handrail and a step-to pattern, stand by assist. Ox SAT ranged between 88-94% on RA during treatment. Pt. would benefit from continued skilled PT services to increase strength, endurance, and balance activities.

## 2017-04-11 NOTE — PLAN OF CARE
Problem: Patient Care Overview (Adult)  Goal: Plan of Care Review  Outcome: Ongoing (interventions implemented as appropriate)    04/11/17 0348   Coping/Psychosocial Response Interventions   Plan Of Care Reviewed With patient   Patient Care Overview   Progress improving   Outcome Evaluation   Outcome Summary/Follow up Plan Complaints of chronic back/hip pain, tylenol given per MAR. VSS. No runs of SVT this shift.        Goal: Adult Individualization and Mutuality  Outcome: Ongoing (interventions implemented as appropriate)  Goal: Discharge Needs Assessment  Outcome: Ongoing (interventions implemented as appropriate)    Problem: Acute Coronary Syndrome (ACS) (Adult)  Goal: Signs and Symptoms of Listed Potential Problems Will be Absent or Manageable (Acute Coronary Syndrome)  Outcome: Ongoing (interventions implemented as appropriate)    Problem: Fall Risk (Adult)  Goal: Absence of Falls  Outcome: Ongoing (interventions implemented as appropriate)    Problem: Arrhythmia/Dysrhythmia (Symptomatic) (Adult)  Goal: Signs and Symptoms of Listed Potential Problems Will be Absent or Manageable (Arrhythmia/Dysrhythmia)  Outcome: Ongoing (interventions implemented as appropriate)    Problem: Cardiac: Heart Failure (Adult)  Goal: Signs and Symptoms of Listed Potential Problems Will be Absent or Manageable (Cardiac: Heart Failure)  Outcome: Ongoing (interventions implemented as appropriate)

## 2017-04-11 NOTE — PROGRESS NOTES
Continued Stay Note   Candi     Patient Name: Padmini Torres  MRN: [id number]  Today's Date: 4/11/2017    Admit Date: 4/6/2017          Discharge Plan       04/11/17 0930    Case Management/Social Work Plan    Plan (P)  Sw spoke with pt regarding HH. Pt is agreeable to having HH and is requesting [facility]. Sw will send HH referral once entered.     Patient/Family In Agreement With Plan (P)  yes              Discharge Codes     None            Britt Rivas

## 2017-04-11 NOTE — PLAN OF CARE
Problem: Patient Care Overview (Adult)  Goal: Plan of Care Review  Outcome: Ongoing (interventions implemented as appropriate)    04/11/17 6126   Coping/Psychosocial Response Interventions   Plan Of Care Reviewed With patient   Patient Care Overview   Progress improving   Outcome Evaluation   Outcome Summary/Follow up Plan Pt. completed toileting, transfers, and fxl mobility at Mod I/S. O2 remained above 92 throughout tx. Rec pt. have HH at [place] to ensure safe enviroment.

## 2017-04-11 NOTE — PROGRESS NOTES
LOS: 3 days   Patient Care Team:  Александр Yo DO as PCP - General  Александр Yo DO as PCP - Family Medicine  Heber Liu MD as PCP - Claims Attributed - PLEASE DO NOT REMOVE  Freeman Kelsey MD as Cardiologist (Cardiology)  Александр Yo DO as MSSP ACO Attributed - PLEASE DO NOT REMOVE    Chief Complaint: Shortness of breath, SVT  Subjective  No chest pain   Mild shortness of breath  No new complaints  Feels very tired  Get tired on walking down 1 length of the hallway in 4B with reath    Interval History: Improved overall    Patient Complaints:   Denies chest pain currently. Denies excessive shortness of breath. Denies abdominal pain, nausea vomiting or diarrhoea.    Telemetry: no malignant arrhythmia. No significant pauses.    Review of Systems:    The following systems were reviewed and negative; ENT, respiratory,  gastrointestinal, integument, hematologic / lymphatic, neurological, behavioral/psych and allergies / immunologic    Labs:    WBC WBC   Date Value Ref Range Status   04/10/2017 5.64 4.80 - 10.80 10*3/mm3 Final   04/09/2017 5.85 4.80 - 10.80 10*3/mm3 Final   04/08/2017 5.72 4.80 - 10.80 10*3/mm3 Final      HGB Hemoglobin   Date Value Ref Range Status   04/10/2017 12.6 12.0 - 16.0 g/dL Final   04/09/2017 12.9 12.0 - 16.0 g/dL Final   04/08/2017 13.2 12.0 - 16.0 g/dL Final      HCT Hematocrit   Date Value Ref Range Status   04/10/2017 39.9 37.0 - 47.0 % Final   04/09/2017 40.7 37.0 - 47.0 % Final   04/08/2017 40.6 37.0 - 47.0 % Final      Platlets Platelets   Date Value Ref Range Status   04/10/2017 143 130 - 400 10*3/mm3 Final   04/09/2017 146 130 - 400 10*3/mm3 Final   04/08/2017 127 (L) 130 - 400 10*3/mm3 Final      MCV MCV   Date Value Ref Range Status   04/10/2017 101.3 (H) 82.0 - 98.0 fL Final   04/09/2017 101.8 (H) 82.0 - 98.0 fL Final   04/08/2017 99.5 (H) 82.0 - 98.0 fL Final        Results from last 7 days  Lab Units 04/10/17  0443 04/09/17  0424 04/08/17  0520 04/06/17  2376    SODIUM mmol/L 140 141 141 143   POTASSIUM mmol/L 4.5 4.4 4.3 4.3   CHLORIDE mmol/L 97* 98 101 98   TOTAL CO2 mmol/L 32.0* 35.0* 27.0 32.0*   BUN mg/dL 38* 41* 37* 36*   CREATININE mg/dL 1.59* 1.61* 1.60* 1.34   CALCIUM mg/dL 8.7 8.5 9.3 9.5   BILIRUBIN mg/dL  --  0.7 0.8 0.6   ALK PHOS U/L  --  61 58 56   ALT (SGPT) U/L  --  20 22 23   AST (SGOT) U/L  --  18 23 24   GLUCOSE mg/dL 78 103* 83 128*     Lab Results   Component Value Date    CKTOTAL 40 02/20/2016    CKMB 0.73 02/20/2016    TROPONINI 0.035 (H) 04/06/2017     PT/INR:  No results found for: PROTIME/No results found for: INR    Imaging Results (last 72 hours)     Procedure Component Value Units Date/Time    XR Chest 1 View [[id number]] Collected:  04/08/17 1644     Updated:  04/08/17 1708    Narrative:       XR CHEST 1 VW-     HISTORY: Increasing shortness of air.     FINDINGS: Today's exam is compared to a prior study of two days earlier.  There is cardiomegaly with mild pulmonary venous hypertension. No  evidence of breezy pulmonary edema. There is persistent consolidation  within the left lower lobe with partial silhouetting of the left  hemidiaphragm. A left-sided effusion is also present with blunting of  the costophrenic angle. A dual-lumen infusion catheter is in place via  left-sided approach.       Impression:       1. Persistent left lower lobe consolidation. There is a small left-sided  effusion with blunting of the costophrenic angle.  2. The right lung remains clear.  3. Cardiomegaly with mild pulmonary venous hypertension.  This report was finalized on 04/08/2017 17:05 by Dr. Andrea Bhat MD.    MRI Brain Without Contrast [[id number]] Collected:  04/10/17 1443     Updated:  04/10/17 1447    Narrative:       EXAMINATION: MRI BRAIN WO CONTRAST-     4/10/2017 3:28 PM EDT     HISTORY: slurred speech; R26.9-Unspecified abnormalities of gait and  mobility; R00.2-Palpitations; I47.1-Supraventricular tachycardia;  Z78.9-Other specified health  status     Noncontrast MR imaging of the brain.     No acute parenchymal ischemia based on the DWI sequence.     Moderate diffuse cortical atrophy.  Mild small vessel disease within the periventricular and subcortical  white matter.  Ventricle size is appropriate.     No brain hemorrhage or abnormal calcification.     Summary:  1. Atrophy and small vessel disease.  2. No acute intracranial abnormality.        This report was finalized on 04/10/2017 14:44 by Dr. Dank Unger MD.    XR Elbow 3+ View Left [[id number]] Collected:  04/10/17 1445     Updated:  04/10/17 1449    Narrative:       XR ELBOW 3+ VW LEFT- 4/10/2017 13:34 CST     History: LEFT elbow pain; R26.9-Unspecified abnormalities of gait and  mobility; R00.2-Palpitations; I47.1-Supraventricular tachycardia;  Z78.9-Other specified health status     Comparison: None      Findings:   Frontal, lateral and oblique views of the left elbow were obtained.      There is no fracture or dislocation. No significant joint space  narrowing is noted. There is no significant joint effusion.       No gross soft tissue abnormality is visualized.        Impression:       Impression:   1. Unremarkable radiographs of the left elbow.        This report was finalized on 04/10/2017 14:46 by Dr. Leighton Persaud MD.    XR Shoulder 2+ View Left [[id number]] Collected:  04/10/17 1446     Updated:  04/10/17 1450    Narrative:       Left shoulder, 3 views 4/10/2017 13:34 CST     History: pain left shoulder; R26.9-Unspecified abnormalities of gait and  mobility; R00.2-Palpitations; I47.1-Supraventricular tachycardia;  Z78.9-Other specified health status     Comparison: 02/20/2016      Findings:   Internal rotation, external rotation and scapular Y views of the left  shoulder are submitted.      There is no acute fracture or dislocation. Cystic changes are again  noted in the distal clavicle. The glenohumeral joint is maintained. The  patient's port is partially visualized. A suture line is  also seen in  the lower LEFT thorax.        Impression:       Impression:   1. Degenerative changes again noted in the LEFT acromioclavicular joint.        This report was finalized on 04/10/2017 14:47 by Dr. Leighton Persaud MD.          Objective     Medication Review:   Current Facility-Administered Medications   Medication Dose Route Frequency Provider Last Rate Last Dose   • acetaminophen (TYLENOL) tablet 650 mg  650 mg Oral Q6H PRN Hermann Gomez MD   650 mg at 04/10/17 2020   • [START ON 4/11/2017] amiodarone (PACERONE) tablet 100 mg  100 mg Oral Q24H Freeman Kelsey MD       • aspirin chewable tablet 81 mg  81 mg Oral Daily Hermann Gomez MD   81 mg at 04/10/17 0858   • dextrose (D50W) solution 25 g  25 g Intravenous Q15 Min PRN Hermann Gomez MD       • dextrose (GLUTOSE) oral gel 15 g  15 g Oral Q15 Min PRN Hermann Gomez MD       • diazePAM (VALIUM) tablet 1 mg  1 mg Oral BID AC Parish Perez MD   1 mg at 04/10/17 1719   • digoxin (LANOXIN) tablet 125 mcg  125 mcg Oral Daily Hermann Gomez MD   125 mcg at 04/10/17 1220   • enoxaparin (LOVENOX) syringe 30 mg  30 mg Subcutaneous Daily Rashad Mir MD   30 mg at 04/10/17 0858   • gabapentin (NEURONTIN) capsule 300 mg  300 mg Oral TID Hermann Gomez MD   300 mg at 04/10/17 2020   • glucagon (human recombinant) (GLUCAGEN DIAGNOSTIC) injection 1 mg  1 mg Subcutaneous Q15 Min PRN Hermann Gomez MD       • insulin lispro (humaLOG) injection 2-7 Units  2-7 Units Subcutaneous 4x Daily With Meals & Nightly Hermann Gomez MD   2 Units at 04/10/17 1221   • ipratropium-albuterol (DUO-NEB) nebulizer solution 3 mL  3 mL Nebulization Q4H PRN Hermann Gomez MD       • isosorbide mononitrate (IMDUR) 24 hr tablet 30 mg  30 mg Oral Q24H Hermann Gomez MD   30 mg at 04/10/17 0858   • [START ON 4/11/2017] lisinopril (PRINIVIL,ZESTRIL) tablet 5 mg  5 mg Oral Daily Freeman Kelsey MD       • [START ON 4/11/2017] metoprolol succinate XL (TOPROL-XL) 24 hr tablet 12.5 mg  12.5 mg Oral  "Daily Freeman Kelsey MD       • nitroglycerin (NITROSTAT) SL tablet 0.4 mg  0.4 mg Sublingual Q5 Min PRN Hermann Gomez MD       • ondansetron (ZOFRAN) injection 4 mg  4 mg Intravenous Q6H PRN Hermann Gomez MD       • pantoprazole (PROTONIX) EC tablet 40 mg  40 mg Oral Q AM Hermann Gomez MD   40 mg at 04/10/17 0609   • polyethylene glycol (MIRALAX) packet 17 g  17 g Oral Daily Rashad Mir MD   17 g at 04/10/17 0859   • pravastatin (PRAVACHOL) tablet 20 mg  20 mg Oral Daily Hermann Gomez MD   20 mg at 04/10/17 0857   • sodium chloride 0.9 % flush 1-10 mL  1-10 mL Intravenous PRN Hermann Gomez MD       • sodium chloride 0.9 % flush 10 mL  10 mL Intravenous PRN Maria Isabel Rosario DO   10 mL at 04/07/17 0000       Vital Sign Min/Max for last 24 hours  Temp  Min: 97.9 °F (36.6 °C)  Max: 98.8 °F (37.1 °C)   BP  Min: 86/47  Max: 135/69   Pulse  Min: 74  Max: 87   Resp  Min: 16  Max: 20   SpO2  Min: 92 %  Max: 97 %   Flow (L/min)  Min: 2  Max: 2   Weight  Min: 146 lb 4 oz (66.3 kg)  Max: 146 lb 4 oz (66.3 kg)     Flowsheet Rows         First Filed Value    Admission Height  67\" (170.2 cm) Documented at 04/06/2017 2343    Admission Weight  143 lb (64.9 kg) Documented at 04/06/2017 2343          Physical Exam:  Ears ears appear intact with no abnormalities noted  Nose nares normal, septum midline, mucosa normal and no drainage  Neck suppple, trachea midline, no thyromegaly, no carotid bruit and no JVD  Back no kyphosis present, no scoliosis present, no skin lesions, erythema, or scars, no tenderness to percussion or palpation and range of motion normal  Lungs respirations regular, respirations even and respirations unlabored  Heart normal S1, S2, 2/6 pansystolic murmur in the left sternal border,   no rub and no click  Abdomen normal bowel sounds, no masses, no hepatomegaly, no splenomegaly, no guarding and no rebound tenderness  Skin no bleeding, bruising or rash     Results Review:   I reviewed the patient's new clinical " results.  I reviewed the patient's new imaging results and agree with the interpretation.  I reviewed the patient's other test results and agree with the interpretation  I personally viewed and interpreted the patient's EKG/Telemetry data  Discussed with patient and family    Medication Review: Performed    Assessment/Plan     Active Problems:    Chronic systolic congestive heart failure    Coronary artery disease involving native coronary artery of native heart without angina pectoris    Mixed hyperlipidemia    Type 2 diabetes mellitus without complication, without long-term current use of insulin    Palpitations    SVT (supraventricular tachycardia)    Plan  Decrease Amiodarone to 100 mg daily  Decrease Metoprolol to 12.5 mg  Decrease Lisinopril to 5 mg daily  May need BiVICD in future  Has severe LV dysfunction for several years now  Needs physical therapy now  Supportive care  Telemetry  Optimal medical therapy  Deep vein thrombosis prophylaxis/precautions  Low salt cardiac diet.   Avoid NSAIDS    Freeman Kelsey MD  04/10/17  10:58 PM

## 2017-04-11 NOTE — DISCHARGE SUMMARY
"    [facility] Medicine Services  DISCHARGE SUMMARY       Date of Admission: 4/6/2017  Date of Discharge:  4/11/2017  Primary Care Physician: Александр Yo,     Discharge Diagnoses:  Hospital Problem List     Chronic systolic congestive heart failure    Coronary artery disease involving native coronary artery of native heart without angina pectoris    Mixed hyperlipidemia    Type 2 diabetes mellitus without complication, without long-term current use of insulin    Palpitations    SVT (supraventricular tachycardia)   Low blood pressure reading - improved   Elevated creatinine likely secondary to low blood pressure reading (prerenal) - resolved          Presenting Problem/History of Present Illness:  Palpitations [R00.2]  Palpitations [R00.2]         Hospital Course  Padmini is a 76-year-old  woman who presented with chief complaint of heart pounding he has had 2 episodes within the past 24 hours prior to admission.  She relates history of supraventricular tachycardia.  She has known history of chronic systolic heart failure.  It has been recommended for her to use LifeVest however she was adamant on using this.   She is anxious about the idea of being \"shocked\".  Her medications has been adjusted by Dr. Kelsey  particularly she had low normal blood pressure readings and increase in serum creatinine during this hospitalization.  Prior to discharge him her blood pressure has been running anywhere from /49-64.  Her serum creatinine is 1.39 from 1.59 prior to discharge.  She denies any symptoms such as palpitation in the past 24 hours, lightheadedness or dizziness.       She has not complained of any slurred speech or significant pain in her left arm.  She underwent MRI of the brain which showed no acute intracranial process.  X-ray of her shoulder and elbow did not show any significant findings besides acromioclavicular joint arthritis.      Overall she is improved and " felt to be medically stable to be released from hospitalization.  I referred her  to home health.  I informed her about the changes made in her medication.  She expressed readiness to go home.    Note that she previously takes metformin 1000 mg per day.  Given her serum creatinine of 1.39, I stopped this as I am unsure of which direction her serum creatinine subsequently will fall.   Her A1c level is 6.  I will defer to her primary care provider follow-up on this matter.    Consults:  Dr. Kelsey - cardiologists      Pertinent Test Results:  Lab Results (last 72 hours)     Procedure Component Value Units Date/Time    POC Glucose Fingerstick [[id number]]  (Normal) Collected:  04/08/17 1553    Specimen:  Blood Updated:  04/08/17 1616     Glucose 84 mg/dL       : [id number] Wood AllisonMeter ID: [id number]       POC Glucose Fingerstick [[id number]]  (Normal) Collected:  04/08/17 1911    Specimen:  Blood Updated:  04/08/17 1924     Glucose 106 mg/dL       : [id number] Wood AllisonMeter ID: [id number]       CBC & Differential [[id number]] Collected:  04/09/17 0424    Specimen:  Blood Updated:  04/09/17 0440    Narrative:       The following orders were created for panel order CBC & Differential.  Procedure                               Abnormality         Status                     ---------                               -----------         ------                     CBC Auto Differential[[id number]]         Abnormal            Final result                 Please view results for these tests on the individual orders.    CBC Auto Differential [[id number]]  (Abnormal) Collected:  04/09/17 0424    Specimen:  Blood Updated:  04/09/17 0440     WBC 5.85 10*3/mm3      RBC 4.00 (L) 10*6/mm3      Hemoglobin 12.9 g/dL      Hematocrit 40.7 %      .8 (H) fL      MCH 32.3 (H) pg      MCHC 31.7 (L) g/dL      RDW 15.1 (H) %      RDW-SD 55.3 (H) fl      MPV 10.6 fL      Platelets 146 10*3/mm3      Neutrophil % 61.5 %      Lymphocyte %  22.9 %      Monocyte % 13.2 (H) %      Eosinophil % 1.9 %      Basophil % 0.3 %      Immature Grans % 0.2 %      Neutrophils, Absolute 3.60 10*3/mm3      Lymphocytes, Absolute 1.34 10*3/mm3      Monocytes, Absolute 0.77 10*3/mm3      Eosinophils, Absolute 0.11 10*3/mm3      Basophils, Absolute 0.02 10*3/mm3      Immature Grans, Absolute 0.01 10*3/mm3     Comprehensive Metabolic Panel [[id number]]  (Abnormal) Collected:  04/09/17 0424    Specimen:  Blood Updated:  04/09/17 0459     Glucose 103 (H) mg/dL      BUN 41 (H) mg/dL      Creatinine 1.61 (H) mg/dL      Sodium 141 mmol/L      Potassium 4.4 mmol/L      Chloride 98 mmol/L      CO2 35.0 (H) mmol/L      Calcium 8.5 mg/dL      Total Protein 5.8 (L) g/dL      Albumin 3.20 (L) g/dL      ALT (SGPT) 20 U/L      AST (SGOT) 18 U/L      Alkaline Phosphatase 61 U/L      Total Bilirubin 0.7 mg/dL      eGFR Non African Amer 31 (L) mL/min/1.73      Globulin 2.6 gm/dL      A/G Ratio 1.2 g/dL      BUN/Creatinine Ratio 25.5 (H)     Anion Gap 8.0 mmol/L     Narrative:       The MDRD GFR formula is only valid for adults with stable renal function between ages 18 and 70.    POC Glucose Fingerstick [[id number]]  (Normal) Collected:  04/09/17 0811    Specimen:  Blood Updated:  04/09/17 0830     Glucose 91 mg/dL       : [id number] Liss ChianeMeter ID: [id number]       POC Glucose Fingerstick [[id number]]  (Normal) Collected:  04/09/17 1152    Specimen:  Blood Updated:  04/09/17 1209     Glucose 103 mg/dL       : [id number] Liss ChianeMeter ID: [id number]       POC Glucose Fingerstick [[id number]]  (Normal) Collected:  04/09/17 1705    Specimen:  Blood Updated:  04/09/17 1729     Glucose 87 mg/dL       : [id number] [facility] ID: [id number]       POC Glucose Fingerstick [[id number]]  (Abnormal) Collected:  04/09/17 1933    Specimen:  Blood Updated:  04/09/17 1944     Glucose 164 (H) mg/dL       : [id number] Juanjose Morton ID: [id number]       CBC (No Diff)  [[id number]]  (Abnormal) Collected:  04/10/17 0443    Specimen:  Blood Updated:  04/10/17 0515     WBC 5.64 10*3/mm3      RBC 3.94 (L) 10*6/mm3      Hemoglobin 12.6 g/dL      Hematocrit 39.9 %      .3 (H) fL      MCH 32.0 pg      MCHC 31.6 (L) g/dL      RDW 15.1 (H) %      RDW-SD 56.0 (H) fl      MPV 10.5 fL      Platelets 143 10*3/mm3     Basic Metabolic Panel [[id number]]  (Abnormal) Collected:  04/10/17 0443    Specimen:  Blood Updated:  04/10/17 0616     Glucose 78 mg/dL      BUN 38 (H) mg/dL      Creatinine 1.59 (H) mg/dL      Sodium 140 mmol/L      Potassium 4.5 mmol/L      Chloride 97 (L) mmol/L      CO2 32.0 (H) mmol/L      Calcium 8.7 mg/dL      eGFR Non African Amer 31 (L) mL/min/1.73      BUN/Creatinine Ratio 23.9     Anion Gap 11.0 mmol/L     Narrative:       The MDRD GFR formula is only valid for adults with stable renal function between ages 18 and 70.    POC Glucose Fingerstick [[id number]]  (Normal) Collected:  04/10/17 0813    Specimen:  Blood Updated:  04/10/17 0824     Glucose 82 mg/dL       : [id number] Xavier FranciscoaMeter ID: [id number]       POC Glucose Fingerstick [[id number]]  (Abnormal) Collected:  04/10/17 1102    Specimen:  Blood Updated:  04/10/17 1113     Glucose 154 (H) mg/dL       : [id number] Xavier FranciscoaMeter ID: [id number]       POC Glucose Fingerstick [[id number]]  (Normal) Collected:  04/10/17 1650    Specimen:  Blood Updated:  04/10/17 1722     Glucose 92 mg/dL       : [id number] Lawrence MortonaMeter ID: [id number]       POC Glucose Fingerstick [[id number]]  (Abnormal) Collected:  04/10/17 2019    Specimen:  Blood Updated:  04/10/17 2037     Glucose 147 (H) mg/dL       : [id number] DeBoe AutumnMeter ID: [id number]       Blood Culture [[id number]]  (Normal) Collected:  04/06/17 2353    Specimen:  Blood from Blood, Venous Line Updated:  04/11/17 0107     Blood Culture No growth at 4 days    Basic Metabolic Panel [[id number]]  (Abnormal) Collected:  04/11/17 0700     Specimen:  Blood Updated:  04/11/17 0753     Glucose 107 (H) mg/dL      BUN 37 (H) mg/dL      Creatinine 1.39 mg/dL      Sodium 138 mmol/L      Potassium 4.0 mmol/L      Chloride 98 mmol/L      CO2 32.0 (H) mmol/L      Calcium 9.0 mg/dL      eGFR Non African Amer 37 (L) mL/min/1.73      BUN/Creatinine Ratio 26.6 (H)     Anion Gap 8.0 mmol/L     Narrative:       The MDRD GFR formula is only valid for adults with stable renal function between ages 18 and 70.    POC Glucose Fingerstick [[id number]]  (Normal) Collected:  04/11/17 0813    Specimen:  Blood Updated:  04/11/17 0830     Glucose 91 mg/dL       : [id number] Novaliqter ID: [id number]       POC Glucose Fingerstick [[id number]]  (Abnormal) Collected:  04/11/17 1042    Specimen:  Blood Updated:  04/11/17 1201     Glucose 165 (H) mg/dL       : [id number] The SandpitaMeter ID: [id number]           Imaging Results (last 72 hours)     Procedure Component Value Units Date/Time    XR Chest 1 View [[id number]] Collected:  04/08/17 1644     Updated:  04/08/17 1708    Narrative:       XR CHEST 1 VW-     HISTORY: Increasing shortness of air.     FINDINGS: Today's exam is compared to a prior study of two days earlier.  There is cardiomegaly with mild pulmonary venous hypertension. No  evidence of breezy pulmonary edema. There is persistent consolidation  within the left lower lobe with partial silhouetting of the left  hemidiaphragm. A left-sided effusion is also present with blunting of  the costophrenic angle. A dual-lumen infusion catheter is in place via  left-sided approach.       Impression:       1. Persistent left lower lobe consolidation. There is a small left-sided  effusion with blunting of the costophrenic angle.  2. The right lung remains clear.  3. Cardiomegaly with mild pulmonary venous hypertension.  This report was finalized on 04/08/2017 17:05 by Dr. Andrea Bhat MD.    MRI Brain Without Contrast [[id number]] Collected:   04/10/17 1443     Updated:  04/10/17 1447    Narrative:       EXAMINATION: MRI BRAIN WO CONTRAST-     4/10/2017 3:28 PM EDT     HISTORY: slurred speech; R26.9-Unspecified abnormalities of gait and  mobility; R00.2-Palpitations; I47.1-Supraventricular tachycardia;  Z78.9-Other specified health status     Noncontrast MR imaging of the brain.     No acute parenchymal ischemia based on the DWI sequence.     Moderate diffuse cortical atrophy.  Mild small vessel disease within the periventricular and subcortical  white matter.  Ventricle size is appropriate.     No brain hemorrhage or abnormal calcification.     Summary:  1. Atrophy and small vessel disease.  2. No acute intracranial abnormality.        This report was finalized on 04/10/2017 14:44 by Dr. Dank Unger MD.    XR Elbow 3+ View Left [[id number]] Collected:  04/10/17 1445     Updated:  04/10/17 1449    Narrative:       XR ELBOW 3+ VW LEFT- 4/10/2017 13:34 CST     History: LEFT elbow pain; R26.9-Unspecified abnormalities of gait and  mobility; R00.2-Palpitations; I47.1-Supraventricular tachycardia;  Z78.9-Other specified health status     Comparison: None      Findings:   Frontal, lateral and oblique views of the left elbow were obtained.      There is no fracture or dislocation. No significant joint space  narrowing is noted. There is no significant joint effusion.       No gross soft tissue abnormality is visualized.        Impression:       Impression:   1. Unremarkable radiographs of the left elbow.        This report was finalized on 04/10/2017 14:46 by Dr. Leighton Persaud MD.    XR Shoulder 2+ View Left [[id number]] Collected:  04/10/17 1446     Updated:  04/10/17 1450    Narrative:       Left shoulder, 3 views 4/10/2017 13:34 CST     History: pain left shoulder; R26.9-Unspecified abnormalities of gait and  mobility; R00.2-Palpitations; I47.1-Supraventricular tachycardia;  Z78.9-Other specified health status     Comparison: 02/20/2016      Findings:  "  Internal rotation, external rotation and scapular Y views of the left  shoulder are submitted.      There is no acute fracture or dislocation. Cystic changes are again  noted in the distal clavicle. The glenohumeral joint is maintained. The  patient's port is partially visualized. A suture line is also seen in  the lower LEFT thorax.        Impression:       Impression:   1. Degenerative changes again noted in the LEFT acromioclavicular joint.        This report was finalized on 04/10/2017 14:47 by Dr. Leighton Persaud MD.            Condition on Discharge:  Stable    Physical Exam on Discharge:  /50 (BP Location: Left arm, Patient Position: Sitting)  Pulse 84  Temp 97.8 °F (36.6 °C) (Temporal Artery )   Resp 18  Ht 64.02\" (162.6 cm)  Wt 146 lb 4 oz (66.3 kg)  SpO2 95%  BMI 25.09 kg/m2  Physical Exam  Constitutional: She is oriented to person, place, and time. She appears well-developed and well-nourished.   HENT:   Head: Normocephalic and atraumatic.   droopy left eyelid particularly the lower part, no icterus or scleral injection   Eyes: Conjunctivae and EOM are normal. Pupils are equal, round, and reactive to light.   Neck: Normal range of motion. Neck supple. No JVD present. No tracheal deviation present. No thyromegaly present.   Cardiovascular: Normal rate, regular rhythm and normal heart sounds.   Pulmonary/Chest: Effort normal and breath sounds normal.   Musculoskeletal: Normal range of motion.   No gross tenderness of left shoulder or crepitation or limitation of ROM, no elbow or arm discomfort   Lymphadenopathy:   She has no cervical adenopathy.   Neurological: She is alert and oriented to person, place, and time.   Skin: Skin is warm and dry.   Psychiatric: She has a normal mood and affect. Her behavior is normal. Judgment and thought content normal.           Discharge Disposition:  Home-Health Care [facility]    Discharge Medications:   Padmini Torres   Home Medication Instructions " [facility]:[id number]    Printed on:04/11/17 4394   Medication Information                      amiodarone (PACERONE) 100 MG tablet  Take 1 tablet by mouth Daily.             aspirin 81 MG chewable tablet  Chew 81 mg daily.             calcium citrate-vitamin d (CALCITRATE) 315-250 MG-UNIT tablet tablet  Take 1 tablet by mouth 2 (Two) Times a Day.             diazePAM (VALIUM) 2 MG tablet  Take 0.5 tablets by mouth 2 (Two) Times a Day Before Meals for 12 doses.             digoxin (LANOXIN) 125 MCG tablet  Take 125 mcg by mouth Daily.             furosemide (LASIX) 20 MG tablet  Take 0.5 tablets by mouth Daily.             gabapentin (NEURONTIN) 300 MG capsule  Take 300 mg by mouth 3 (three) times a day.             isosorbide mononitrate (IMDUR) 30 MG 24 hr tablet  TAKE 1 TABLET EVERY DAY             lisinopril (PRINIVIL,ZESTRIL) 5 MG tablet  Take 1 tablet by mouth Daily.             magnesium oxide (MAGOX) 400 (241.3 MG) MG tablet tablet  Take 250 mg by mouth Daily.             metoprolol succinate XL (TOPROL-XL) 25 MG 24 hr tablet  Take 0.5 tablets by mouth Daily.             Multiple Vitamins-Minerals (MULTIVITAMIN ADULT PO)  Take  by mouth.             omeprazole (PriLOSEC) 20 MG capsule  Take 20 mg by mouth daily.             pravastatin (PRAVACHOL) 20 MG tablet  Take 20 mg by mouth daily.                 Discharge Diet:   Diet Instructions     Diet: Regular, Consistent Carbohydrate, Cardiac; Thin Liquids, No Restrictions       Discharge Diet:   Regular  Consistent Carbohydrate  Cardiac      Fluid Consistency:  Thin Liquids, No Restrictions                 Discharge Care Plan / Instructions: Encouraged to weigh herself daily and record and bring record to his primary care provider to make necessary adjustments on medications; encouraged to monitor blood pressure and similarly bring record to his primary care provider.  Informed about the changes in medications and rationale of the changes made    Activity at  Discharge:   Activity Instructions     Activity as Tolerated                     Follow-up Appointments: Dr. Yo in 1 week; Dr. Kelsey per his recommendation  Test Results Pending at Discharge:    Order Current Status    Blood Culture Preliminary result           Parish Perez MD  04/11/17  3:29 PM    Time: Greater than 30 minutes    Please note that portions of this note may have been completed with a voice recognition program. Efforts were made to edit the dictations, but occasionally words are mistranscribed.

## 2017-04-12 LAB — BACTERIA SPEC AEROBE CULT: NORMAL

## 2017-04-12 NOTE — PLAN OF CARE
Problem: Inpatient Physical Therapy  Goal: Transfer Training Goal 1 LTG- PT  Outcome: Unable to achieve outcome(s) by discharge Date Met:  04/12/17 04/09/17 1117 04/12/17 0805   Transfer Training PT LTG   Transfer Training PT LTG, Date Established 04/09/17 --    Transfer Training PT LTG, Time to Achieve by discharge --    Transfer Training PT LTG, Activity Type all transfers --    Transfer Training PT LTG, [place] Level conditional independence --    Transfer Training PT LTG, Assist Device cane, straight --    Transfer Training PT LTG, Date Goal Reviewed --  04/12/17   Transfer Training PT LTG, Outcome --  goal met       Goal: Gait Training Goal LTG- PT  Outcome: Unable to achieve outcome(s) by discharge Date Met:  04/12/17 04/09/17 1117 04/12/17 0805   Gait Training PT LTG   Gait Training Goal PT LTG, Date Established 04/09/17 --    Gait Training Goal PT LTG, Time to Achieve by discharge --    Gait Training Goal PT LTG, [place] Level conditional independence --    Gait Training Goal PT LTG, Assist Device cane, straight --    Gait Training Goal PT LTG, Distance to Achieve no LOB or sway and demonstrates knowledge of balance deficits --    Gait Training Goal PT LTG, Date Goal Reviewed --  04/12/17   Gait Training Goal PT LTG, Outcome --  goal not met   Gait Training Goal PT LTG, Reason Goal Not Met --  discharged from facility       Goal: Stair Training Goal LTG- PT  Outcome: Unable to achieve outcome(s) by discharge Date Met:  04/12/17 04/09/17 1117 04/12/17 0805   Stair Training PT LTG   Stair Training Goal PT LTG, Date Established 04/09/17 --    Stair Training Goal PT LTG, Time to Achieve by discharge --    Stair Training Goal PT LTG, [place] Level supervision required --    Stair Training Goal PT LTG, Assist Device 2 handrails --    Stair Training Goal PT LTG, Distance to Achieve 4 (home)  --    Stair Training Goal PT LTG, Date Goal Reviewed --  04/12/17   Stair Training Goal PT LTG,  Outcome --  goal not met   Stair Training Goal PT LTG, Reason Goal Not Met --  discharged from facility       Goal: Dynamic Standing Balance Goal LTG- PT  Outcome: Unable to achieve outcome(s) by discharge Date Met:  04/12/17 04/09/17 1117 04/12/17 0805   Dynamic Standing Balance PT LTG   Dynamic Standing Balance PT LTG, Date Established 04/09/17 --    Dynamic Standing Balance PT LTG, Time to Achieve by discharge --    Dynamic Standing Balance PT LTG, [place] Level supervision required --    Dynamic Standing Balance PT LTG, Additional Goal dual task ambulation, head turns, no LOB or sway  --    Dynamic Standing Balance PT LTG, Date Goal Reviewed --  04/12/17   Dynamic Standing Balance PT LTG, Outcome --  goal not met   Dynamic Standing Balance PT LTG, Reason Goal Not Met --  discharged from facility

## 2017-04-12 NOTE — THERAPY DISCHARGE NOTE
Acute Care - Physical Therapy Discharge Summary  [facility]       Patient Name: Padmini Torres  : 1940  MRN: [id number]    Today's Date: 2017  Onset of Illness/Injury or Date of Surgery Date: 17    Date of Referral to PT: 17  Referring Physician: Dr Mir      Admit Date: 2017      PT Recommendation and Plan    Visit Dx:    ICD-10-CM ICD-9-CM   1. Abnormality of gait and mobility R26.9 781.2   2. Palpitations R00.2 785.1   3. SVT (supraventricular tachycardia) I47.1 427.89   4. Decreased activities of daily living (ADL) Z78.9 V49.89   5. Chronic systolic congestive heart failure I50.22 428.22     428.0             Outcome Measures       17 1123 17 1000 04/10/17 1335    How much help from another person do you currently need...    Turning from your back to your side while in flat bed without using bedrails?  4  -LIBORIO (r) RR (t) LIBORIO (c)     Moving from lying on back to sitting on the side of a flat bed without bedrails?  4  -LIBORIO (r) RR (t) LIBORIO (c)     Moving to and from a bed to a chair (including a wheelchair)?  4  -LIBORIO (r) RR (t) LIBORIO (c)     Standing up from a chair using your arms (e.g., wheelchair, bedside chair)?  4  -LIBORIO (r) RR (t) LIBORIO (c)     Climbing 3-5 steps with a railing?  3  -LIBORIO (r) RR (t) LIBORIO (c)     To walk in hospital room?  3  -LIBORIO (r) RR (t) LIBORIO (c)     AM-PAC 6 Clicks Score  22  -LIBORIO (r) RR (t)     How much help from another is currently needed...    Putting on and taking off regular lower body clothing? 4  -CH  4  -CJ    Bathing (including washing, rinsing, and drying) 4  -CH  4  -CJ    Toileting (which includes using toilet bed pan or urinal) 4  -CH  3  -CJ    Putting on and taking off regular upper body clothing 4  -CH  4  -CJ    Taking care of personal grooming (such as brushing teeth) 4  -CH  4  -CJ    Eating meals 4  -CH  4  -CJ    Score 24  -CH  23  -CJ    Functional Assessment    Outcome Measure Options  AM-PAC 6 Clicks Basic Mobility (PT)  -LIBORIO AM-PAC 6 Clicks  Daily Activity (OT)  -      04/10/17 0939 04/10/17 0725 04/09/17 1116    How much help from another person do you currently need...    Turning from your back to your side while in flat bed without using bedrails? 4  -MF      Moving from lying on back to sitting on the side of a flat bed without bedrails? 4  -MF      Moving to and from a bed to a chair (including a wheelchair)? 3  -MF      Standing up from a chair using your arms (e.g., wheelchair, bedside chair)? 3  -MF      Climbing 3-5 steps with a railing? 3  -MF      To walk in hospital room? 3  -MF      AM-PAC 6 Clicks Score 20  -MF      How much help from another is currently needed...    Putting on and taking off regular lower body clothing?  3  - 3  -ND    Bathing (including washing, rinsing, and drying)  3  - 3  -ND    Toileting (which includes using toilet bed pan or urinal)  3  - 3  -ND    Putting on and taking off regular upper body clothing  4  - 4  -ND    Taking care of personal grooming (such as brushing teeth)  4  - 4  -ND    Eating meals  4  - 4  -ND    Score  21  - 21  -ND    Functional Assessment    Outcome Measure Options AM-PAC 6 Clicks Basic Mobility (PT)  -MF AM-PAC 6 Clicks Daily Activity (OT)  - AM-PAC 6 Clicks Daily Activity (OT)  -ND      04/09/17 1100          How much help from another person do you currently need...    Turning from your back to your side while in flat bed without using bedrails? 4  -TC      Moving from lying on back to sitting on the side of a flat bed without bedrails? 4  -TC      Moving to and from a bed to a chair (including a wheelchair)? 3  -TC      Standing up from a chair using your arms (e.g., wheelchair, bedside chair)? 3  -TC      Climbing 3-5 steps with a railing? 3  -TC      To walk in hospital room? 3  -TC      AM-PAC 6 Clicks Score 20  -TC      Functional Assessment    Outcome Measure Options AM-PAC 6 Clicks Basic Mobility (PT)  -TC        User Key  (r) = Recorded By, (t) = Taken By, (c)  = Cosigned By    Initials Name Provider Type    CH Ludmila Dean, OTR/L Occupational Therapist    FLORI Ford, SLAUGHTER/L Occupational Therapy Assistant    LIBORIO Haque, PT DPT Physical Therapist    MF Nicki Benson, PTA Physical Therapy Assistant     Beata Haynes, PT Physical Therapist    GERARDO Botello, OTR/L Occupational Therapist    RR Rosibel Stern, PTA Student PTA Student                      IP PT Goals       04/12/17 0805 04/09/17 1117       Transfer Training PT LTG    Transfer Training PT LTG, Date Established  04/09/17  -TC     Transfer Training PT LTG, Time to Achieve  by discharge  -TC     Transfer Training PT LTG, Activity Type  all transfers  -TC     Transfer Training PT LTG, [place] Level  conditional independence  -TC     Transfer Training PT LTG, Assist Device  cane, straight  -TC     Transfer Training PT  LTG, Date Goal Reviewed 04/12/17  -      Transfer Training PT LTG, Outcome goal met  -      Gait Training PT LTG    Gait Training Goal PT LTG, Date Established  04/09/17  -TC     Gait Training Goal PT LTG, Time to Achieve  by discharge  -TC     Gait Training Goal PT LTG, [place] Level  conditional independence  -TC     Gait Training Goal PT LTG, Assist Device  cane, straight  -TC     Gait Training Goal PT LTG, Distance to Achieve  no LOB or sway and demonstrates knowledge of balance deficits  -TC     Gait Training Goal PT LTG, Date Goal Reviewed 04/12/17  -      Gait Training Goal PT LTG, Outcome goal not met  -      Gait Training Goal PT LTG, Reason Goal Not Met discharged from facility  -      Stair Training PT LTG    Stair Training Goal PT LTG, Date Established  04/09/17  -     Stair Training Goal PT LTG, Time to Achieve  by discharge  -TC     Stair Training Goal PT LTG, [place] Level  supervision required  -TC     Stair Training Goal PT LTG, Assist Device  2 handrails  -TC     Stair Training Goal PT LTG, Distance to Achieve  4 (home)    -     Stair Training Goal PT LTG, Date Goal Reviewed 04/12/17  -      Stair Training Goal PT LTG, Outcome goal not met  -      Stair Training Goal PT LTG, Reason Goal Not Met discharged from facility  -      Dynamic Standing Balance PT LTG    Dynamic Standing Balance PT LTG, Date Established  04/09/17  -     Dynamic Standing Balance PT LTG, Time to Achieve  by discharge  -     Dynamic Standing Balance PT LTG, [place] Level  supervision required  -     Dynamic Standing Balance PT LTG, Additional Goal  dual task ambulation, head turns, no LOB or sway   -     Dynamic Standing Balance PT LTG, Date Goal Reviewed 04/12/17  -      Dynamic Standing Balance PT LTG, Outcome goal not met  -      Dynamic Standing Balance PT LTG, Reason Goal Not Met discharged from facility  -        User Key  (r) = Recorded By, (t) = Taken By, (c) = Cosigned By    Initials Name Provider Type    LYNSEY Benson, PTA Physical Therapy Assistant     Beata Haynes, PT Physical Therapist              PT Discharge Summary  Anticipated Discharge Disposition: home with home health  Reason for Discharge: Discharge from facility  Outcomes Achieved: Patient able to partially acheive established goals  Discharge Destination: Home with home health      Nicki Benson PTA   4/12/2017

## 2017-04-12 NOTE — THERAPY DISCHARGE NOTE
Acute Care - Occupational Therapy Discharge Summary  [facility]     Patient Name: Padmini Torres  : 1940  MRN: [id number]    Today's Date: 2017  Onset of Illness/Injury or Date of Surgery Date: 17    Date of Referral to OT: 17  Referring Physician: Dr Mir      Admit Date: 2017        OT Recommendation and Plan    Visit Dx:    ICD-10-CM ICD-9-CM   1. Abnormality of gait and mobility R26.9 781.2   2. Palpitations R00.2 785.1   3. SVT (supraventricular tachycardia) I47.1 427.89   4. Decreased activities of daily living (ADL) Z78.9 V49.89   5. Chronic systolic congestive heart failure I50.22 428.22     428.0                     OT Goals       17 0711 17 1110       Transfer Training OT LTG    Transfer Training OT LTG, Date Established  17  -ND     Transfer Training OT LTG, Time to Achieve  by discharge  -ND     Transfer Training OT LTG, Activity Type  all transfers  -ND     Transfer Training OT LTG, [place] Level  independent  -ND     Transfer Training OT LTG, Assist Device  cane, straight  -ND     Transfer Training OT LTG, Date Goal Reviewed 17  -TS      Transfer Training OT LTG, Outcome goal not met  -TS      Transfer Training OT LTG, Reason Goal Not Met discharged from facility  -TS      Strength OT LTG    Strength Goal OT LTG, Date Established  17  -ND     Strength Goal OT LTG, Time to Achieve  by discharge  -ND     Strength Goal OT LTG, Measure to Achieve  Pt. will complete BUE strengthening exercises to increase BUE strength to 5/5 for ADLs  -ND     Strength Goal OT LTG, Date Goal Reviewed 17  -TS      Strength Goal OT LTG, Outcome goal not met  -TS      Strength Goal OT LTG, Reason Goal Not Met discharged from facility  -TS      Patient Education OT LTG    Patient Education OT LTG, Date Established  17  -ND     Patient Education OT LTG, Time to Achieve  by discharge  -ND     Patient Education OT LTG, Education Type  HEP;posture/body  mechanics;home safety;energy conservation;work simplification;adaptive breathing  -ND     Patient Education OT LTG, Education Understanding  independent;demonstrates adequately;verbalizes understanding  -ND     Patient Education OT LTG, Additional Goal  Pt. will demonstrate good safety awaress by visually scanning environment and not grabbing onto items in [place].   -ND     Patient Education OT LTG, Date Goal Reviewed 04/12/17  -TS      Patient Education OT LTG Outcome goal not met  -TS      Patient Education OT LTG, Reason Goal Not Met discharged from facility  -TS      ADL OT LTG    ADL OT LTG, Date Established  04/09/17  -ND     ADL OT LTG, Time to Achieve  by discharge  -ND     ADL OT LTG, Activity Type  ADL skills  -ND     ADL OT LTG, [place] Level  independent  -ND     ADL OT LTG, Date Goal Reviewed 04/12/17  -TS      ADL OT LTG, Outcome goal not met  -TS      ADL OT LTG, Reason Goal Not Met discharged from facility  -TS        User Key  (r) = Recorded By, (t) = Taken By, (c) = Cosigned By    Initials Name Provider Type     Darlene Membreno SLAUGHTER/L Occupational Therapy Assistant    RUBIA Ordonez/L Occupational Therapist                Outcome Measures       04/11/17 1123 04/11/17 1000 04/10/17 1335    How much help from another person do you currently need...    Turning from your back to your side while in flat bed without using bedrails?  4  -LIBORIO (r) RR (t) LIBORIO (c)     Moving from lying on back to sitting on the side of a flat bed without bedrails?  4  -LIBORIO (r) RR (t) LIBORIO (c)     Moving to and from a bed to a chair (including a wheelchair)?  4  -LIBORIO (r) RR (t) LIBORIO (c)     Standing up from a chair using your arms (e.g., wheelchair, bedside chair)?  4  -LIBORIO (r) RR (t) LIBORIO (c)     Climbing 3-5 steps with a railing?  3  -LIBORIO (r) RR (t) LIBORIO (c)     To walk in hospital room?  3  -LIBORIO (r) RR (t) LIBORIO (c)     AM-PAC 6 Clicks Score  22  -LIBORIO (r) RR (t)     How much help from another is currently needed...     Putting on and taking off regular lower body clothing? 4  -CH  4  -CJ    Bathing (including washing, rinsing, and drying) 4  -  4  -CJ    Toileting (which includes using toilet bed pan or urinal) 4  -  3  -CJ    Putting on and taking off regular upper body clothing 4  -  4  -CJ    Taking care of personal grooming (such as brushing teeth) 4  -  4  -CJ    Eating meals 4  -  4  -CJ    Score 24  -CH  23  -CJ    Functional Assessment    Outcome Measure Options  AM-PAC 6 Clicks Basic Mobility (PT)  -LIBORIO AM-PAC 6 Clicks Daily Activity (OT)  -CJ      04/10/17 0939 04/10/17 0725 04/09/17 1116    How much help from another person do you currently need...    Turning from your back to your side while in flat bed without using bedrails? 4  -MF      Moving from lying on back to sitting on the side of a flat bed without bedrails? 4  -MF      Moving to and from a bed to a chair (including a wheelchair)? 3  -MF      Standing up from a chair using your arms (e.g., wheelchair, bedside chair)? 3  -MF      Climbing 3-5 steps with a railing? 3  -MF      To walk in hospital room? 3  -MF      AM-PAC 6 Clicks Score 20  -MF      How much help from another is currently needed...    Putting on and taking off regular lower body clothing?  3  - 3  -ND    Bathing (including washing, rinsing, and drying)  3  - 3  -ND    Toileting (which includes using toilet bed pan or urinal)  3  - 3  -ND    Putting on and taking off regular upper body clothing  4  - 4  -ND    Taking care of personal grooming (such as brushing teeth)  4  - 4  -ND    Eating meals  4  - 4  -ND    Score  21  - 21  -ND    Functional Assessment    Outcome Measure Options AM-PAC 6 Clicks Basic Mobility (PT)  -MF AM-PAC 6 Clicks Daily Activity (OT)  -CJ AM-PAC 6 Clicks Daily Activity (OT)  -ND      04/09/17 1100          How much help from another person do you currently need...    Turning from your back to your side while in flat bed without using bedrails? 4   -TC      Moving from lying on back to sitting on the side of a flat bed without bedrails? 4  -TC      Moving to and from a bed to a chair (including a wheelchair)? 3  -TC      Standing up from a chair using your arms (e.g., wheelchair, bedside chair)? 3  -TC      Climbing 3-5 steps with a railing? 3  -TC      To walk in hospital room? 3  -TC      AM-PAC 6 Clicks Score 20  -TC      Functional Assessment    Outcome Measure Options AM-PAC 6 Clicks Basic Mobility (PT)  -TC        User Key  (r) = Recorded By, (t) = Taken By, (c) = Cosigned By    Initials Name Provider Type    CH Ludmila Dean, OTR/L Occupational Therapist    FLORI Ford, SLAUGHTER/L Occupational Therapy Assistant    LIBORIO Haque, PT DPT Physical Therapist    LYNSEY Benson, PTA Physical Therapy Assistant    TC Beata Haynes, PT Physical Therapist    GERARDO Botello, OTR/L Occupational Therapist    RR Rosibel Stern, PTA Student PTA Student              OT Discharge Summary  Reason for Discharge: Discharge from facility  Outcomes Achieved: Refer to plan of care for updates on goals achieved  Discharge Destination: Home with assist, Home with home health      Darlene Membreno, SUKHJINDER/CIELO  4/12/2017

## 2017-04-12 NOTE — PLAN OF CARE
Problem: Inpatient Occupational Therapy  Goal: Transfer Training Goal 1 LTG- OT  Outcome: Unable to achieve outcome(s) by discharge Date Met:  04/12/17 04/09/17 1110 04/12/17 0711   Transfer Training OT LTG   Transfer Training OT LTG, Date Established 04/09/17 --    Transfer Training OT LTG, Time to Achieve by discharge --    Transfer Training OT LTG, Activity Type all transfers --    Transfer Training OT LTG, [place] Level independent --    Transfer Training OT LTG, Assist Device cane, straight --    Transfer Training OT LTG, Date Goal Reviewed --  04/12/17   Transfer Training OT LTG, Outcome --  goal not met   Transfer Training OT LTG, Reason Goal Not Met --  discharged from facility       Goal: Strength Goal LTG- OT  Outcome: Unable to achieve outcome(s) by discharge Date Met:  04/12/17 04/09/17 1110 04/12/17 0711   Strength OT LTG   Strength Goal OT LTG, Date Established 04/09/17 --    Strength Goal OT LTG, Time to Achieve by discharge --    Strength Goal OT LTG, Measure to Achieve Pt. will complete BUE strengthening exercises to increase BUE strength to 5/5 for ADLs --    Strength Goal OT LTG, Date Goal Reviewed --  04/12/17   Strength Goal OT LTG, Outcome --  goal not met   Strength Goal OT LTG, Reason Goal Not Met --  discharged from facility       Goal: Patient Education Goal LTG- OT  Outcome: Unable to achieve outcome(s) by discharge Date Met:  04/12/17 04/09/17 1110 04/12/17 0711   Patient Education OT LTG   Patient Education OT LTG, Date Established 04/09/17 --    Patient Education OT LTG, Time to Achieve by discharge --    Patient Education OT LTG, Education Type HEP;posture/body mechanics;home safety;energy conservation;work simplification;adaptive breathing --    Patient Education OT LTG, Education Understanding independent;demonstrates adequately;verbalizes understanding --    Patient Education OT LTG, Additional Goal Pt. will demonstrate good safety awaress by visually scanning  environment and not grabbing onto items in [place].  --    Patient Education OT LTG, Date Goal Reviewed --  04/12/17   Patient Education OT LTG Outcome --  goal not met   Patient Education OT LTG, Reason Goal Not Met --  discharged from facility       Goal: ADL Goal LTG- OT  Outcome: Unable to achieve outcome(s) by discharge Date Met:  04/12/17 04/09/17 1110 04/12/17 0711   ADL OT LTG   ADL OT LTG, Date Established 04/09/17 --    ADL OT LTG, Time to Achieve by discharge --    ADL OT LTG, Activity Type ADL skills --    ADL OT LTG, [place] Level independent --    ADL OT LTG, Date Goal Reviewed --  04/12/17   ADL OT LTG, Outcome --  goal not met   ADL OT LTG, Reason Goal Not Met --  discharged from facility

## 2017-05-01 RX ORDER — AMIODARONE HYDROCHLORIDE 100 MG/1
100 TABLET ORAL
Qty: 90 TABLET | Refills: 0 | Status: SHIPPED | OUTPATIENT
Start: 2017-05-01 | End: 2017-07-08 | Stop reason: SDUPTHER

## 2017-05-25 ENCOUNTER — TRANSCRIBE ORDERS (OUTPATIENT)
Dept: ADMINISTRATIVE | Facility: HOSPITAL | Age: 77
End: 2017-05-25

## 2017-05-25 ENCOUNTER — HOSPITAL ENCOUNTER (OUTPATIENT)
Dept: CT IMAGING | Facility: HOSPITAL | Age: 77
Discharge: HOME | End: 2017-05-25
Attending: INTERNAL MEDICINE | Admitting: INTERNAL MEDICINE

## 2017-05-25 DIAGNOSIS — K57.30 DIVERTICULOSIS OF COLON WITHOUT DIVERTICULITIS: Primary | ICD-10-CM

## 2017-05-25 DIAGNOSIS — K57.30 DIVERTICULOSIS OF COLON WITHOUT DIVERTICULITIS: ICD-10-CM

## 2017-05-25 PROCEDURE — 74176 CT ABD & PELVIS W/O CONTRAST: CPT

## 2017-06-26 ENCOUNTER — APPOINTMENT (OUTPATIENT)
Dept: GENERAL RADIOLOGY | Facility: HOSPITAL | Age: 77
End: 2017-06-26

## 2017-06-26 ENCOUNTER — HOSPITAL ENCOUNTER (INPATIENT)
Facility: HOSPITAL | Age: 77
LOS: 4 days | Discharge: HOME | End: 2017-06-30
Attending: FAMILY MEDICINE | Admitting: FAMILY MEDICINE

## 2017-06-26 DIAGNOSIS — R09.02 HYPOXIA: ICD-10-CM

## 2017-06-26 DIAGNOSIS — R79.89 ELEVATED TSH: ICD-10-CM

## 2017-06-26 DIAGNOSIS — N28.9 ACUTE RENAL INSUFFICIENCY: ICD-10-CM

## 2017-06-26 DIAGNOSIS — I50.9 CONGESTIVE HEART FAILURE, UNSPECIFIED CONGESTIVE HEART FAILURE CHRONICITY, UNSPECIFIED CONGESTIVE HEART FAILURE TYPE: Primary | ICD-10-CM

## 2017-06-26 PROBLEM — R06.02 SOB (SHORTNESS OF BREATH) ON EXERTION: Status: ACTIVE | Noted: 2017-06-26

## 2017-06-26 LAB
ALBUMIN SERPL-MCNC: 4.3 G/DL (ref 3.5–5)
ALBUMIN/GLOB SERPL: 1.6 G/DL (ref 1.1–2.5)
ALP SERPL-CCNC: 62 U/L (ref 24–120)
ALT SERPL W P-5'-P-CCNC: 25 U/L (ref 0–54)
ANION GAP SERPL CALCULATED.3IONS-SCNC: 14 MMOL/L (ref 4–13)
APTT PPP: 30.9 SECONDS (ref 24.1–34.8)
ARTERIAL PATENCY WRIST A: ABNORMAL
AST SERPL-CCNC: 23 U/L (ref 7–45)
ATMOSPHERIC PRESS: ABNORMAL MMHG
BASE EXCESS BLDA CALC-SCNC: 2.6 MMOL/L (ref -2–2)
BASOPHILS # BLD AUTO: 0.03 10*3/MM3 (ref 0–0.2)
BASOPHILS NFR BLD AUTO: 0.5 % (ref 0–2)
BDY SITE: ABNORMAL
BILIRUB SERPL-MCNC: 0.9 MG/DL (ref 0.1–1)
BILIRUB UR QL STRIP: NEGATIVE
BUN BLD-MCNC: 41 MG/DL (ref 5–21)
BUN/CREAT SERPL: 25.3 (ref 7–25)
CALCIUM SPEC-SCNC: 10.2 MG/DL (ref 8.4–10.4)
CHLORIDE SERPL-SCNC: 97 MMOL/L (ref 98–110)
CK MB SERPL-CCNC: 1.17 NG/ML (ref 0–5)
CLARITY UR: CLEAR
CO2 SERPL-SCNC: 31 MMOL/L (ref 24–31)
COLOR UR: YELLOW
CREAT BLD-MCNC: 1.62 MG/DL (ref 0.5–1.4)
DEPRECATED RDW RBC AUTO: 61 FL (ref 40–54)
DIGOXIN SERPL-MCNC: 1.8 NG/ML (ref 0.8–2)
EOSINOPHIL # BLD AUTO: 0.1 10*3/MM3 (ref 0–0.7)
EOSINOPHIL NFR BLD AUTO: 1.5 % (ref 0–4)
ERYTHROCYTE [DISTWIDTH] IN BLOOD BY AUTOMATED COUNT: 16.7 % (ref 12–15)
GAS FLOW AIRWAY: 1 LPM
GFR SERPL CREATININE-BSD FRML MDRD: 31 ML/MIN/1.73
GLOBULIN UR ELPH-MCNC: 2.7 GM/DL
GLUCOSE BLD-MCNC: 97 MG/DL (ref 70–100)
GLUCOSE UR STRIP-MCNC: NEGATIVE MG/DL
HCO3 BLDA-SCNC: 27.9 MMOL/L (ref 22–26)
HCT VFR BLD AUTO: 47.1 % (ref 37–47)
HGB BLD-MCNC: 14.9 G/DL (ref 12–16)
HGB UR QL STRIP.AUTO: NEGATIVE
IMM GRANULOCYTES # BLD: 0.02 10*3/MM3 (ref 0–0.03)
IMM GRANULOCYTES NFR BLD: 0.3 % (ref 0–5)
INR PPP: 0.96 (ref 0.91–1.09)
KETONES UR QL STRIP: NEGATIVE
LEUKOCYTE ESTERASE UR QL STRIP.AUTO: NEGATIVE
LYMPHOCYTES # BLD AUTO: 1.43 10*3/MM3 (ref 0.72–4.86)
LYMPHOCYTES NFR BLD AUTO: 22.1 % (ref 15–45)
MCH RBC QN AUTO: 31.7 PG (ref 28–32)
MCHC RBC AUTO-ENTMCNC: 31.6 G/DL (ref 33–36)
MCV RBC AUTO: 100.2 FL (ref 82–98)
MODALITY: ABNORMAL
MONOCYTES # BLD AUTO: 0.78 10*3/MM3 (ref 0.19–1.3)
MONOCYTES NFR BLD AUTO: 12 % (ref 4–12)
MYOGLOBIN SERPL-MCNC: 72.3 NG/ML (ref 0–110)
NEUTROPHILS # BLD AUTO: 4.12 10*3/MM3 (ref 1.87–8.4)
NEUTROPHILS NFR BLD AUTO: 63.6 % (ref 39–78)
NITRITE UR QL STRIP: NEGATIVE
NT-PROBNP SERPL-MCNC: ABNORMAL PG/ML (ref 0–1800)
PCO2 BLDA: 45.2 MM HG (ref 35–45)
PH BLDA: 7.41 PH UNITS (ref 7.35–7.45)
PH UR STRIP.AUTO: <=5 [PH] (ref 5–8)
PLATELET # BLD AUTO: 183 10*3/MM3 (ref 130–400)
PMV BLD AUTO: 10.9 FL (ref 6–12)
PO2 BLDA: 78.4 MM HG (ref 80–100)
POTASSIUM BLD-SCNC: 4.9 MMOL/L (ref 3.5–5.3)
PROT SERPL-MCNC: 7 G/DL (ref 6.3–8.7)
PROT UR QL STRIP: NEGATIVE
PROTHROMBIN TIME: 13.1 SECONDS (ref 11.9–14.6)
RBC # BLD AUTO: 4.7 10*6/MM3 (ref 4.2–5.4)
SAO2 % BLDCOA: 95.6 % (ref 94–100)
SAO2 % BLDCOA: 95.6 % (ref 94–100)
SODIUM BLD-SCNC: 142 MMOL/L (ref 135–145)
SP GR UR STRIP: 1.01 (ref 1–1.03)
T4 FREE SERPL-MCNC: 1.46 NG/DL (ref 0.78–2.19)
TROPONIN I SERPL-MCNC: 0.02 NG/ML (ref 0–0.07)
TSH SERPL DL<=0.05 MIU/L-ACNC: 11.4 MIU/ML (ref 0.47–4.68)
UROBILINOGEN UR QL STRIP: NORMAL
WBC NRBC COR # BLD: 6.48 10*3/MM3 (ref 4.8–10.8)

## 2017-06-26 PROCEDURE — 85610 PROTHROMBIN TIME: CPT | Performed by: FAMILY MEDICINE

## 2017-06-26 PROCEDURE — 93010 ELECTROCARDIOGRAM REPORT: CPT | Performed by: INTERNAL MEDICINE

## 2017-06-26 PROCEDURE — 25010000002 FUROSEMIDE PER 20 MG: Performed by: FAMILY MEDICINE

## 2017-06-26 PROCEDURE — 36600 WITHDRAWAL OF ARTERIAL BLOOD: CPT

## 2017-06-26 PROCEDURE — 85730 THROMBOPLASTIN TIME PARTIAL: CPT | Performed by: FAMILY MEDICINE

## 2017-06-26 PROCEDURE — 82803 BLOOD GASES ANY COMBINATION: CPT

## 2017-06-26 PROCEDURE — 83874 ASSAY OF MYOGLOBIN: CPT | Performed by: FAMILY MEDICINE

## 2017-06-26 PROCEDURE — 83880 ASSAY OF NATRIURETIC PEPTIDE: CPT | Performed by: FAMILY MEDICINE

## 2017-06-26 PROCEDURE — 81003 URINALYSIS AUTO W/O SCOPE: CPT | Performed by: FAMILY MEDICINE

## 2017-06-26 PROCEDURE — 84439 ASSAY OF FREE THYROXINE: CPT | Performed by: FAMILY MEDICINE

## 2017-06-26 PROCEDURE — 84443 ASSAY THYROID STIM HORMONE: CPT | Performed by: FAMILY MEDICINE

## 2017-06-26 PROCEDURE — 80162 ASSAY OF DIGOXIN TOTAL: CPT | Performed by: FAMILY MEDICINE

## 2017-06-26 PROCEDURE — 80053 COMPREHEN METABOLIC PANEL: CPT | Performed by: FAMILY MEDICINE

## 2017-06-26 PROCEDURE — 84484 ASSAY OF TROPONIN QUANT: CPT

## 2017-06-26 PROCEDURE — 93005 ELECTROCARDIOGRAM TRACING: CPT | Performed by: FAMILY MEDICINE

## 2017-06-26 PROCEDURE — 82553 CREATINE MB FRACTION: CPT | Performed by: FAMILY MEDICINE

## 2017-06-26 PROCEDURE — 71010 HC CHEST PA OR AP: CPT

## 2017-06-26 PROCEDURE — 25010000002 ENOXAPARIN PER 10 MG: Performed by: FAMILY MEDICINE

## 2017-06-26 PROCEDURE — 85025 COMPLETE CBC W/AUTO DIFF WBC: CPT | Performed by: FAMILY MEDICINE

## 2017-06-26 PROCEDURE — 99284 EMERGENCY DEPT VISIT MOD MDM: CPT

## 2017-06-26 RX ORDER — I-VITE, TAB 1000-60-2MG (60/BT) 300MCG-200
1 TAB ORAL EVERY 12 HOURS SCHEDULED
Status: DISCONTINUED | OUTPATIENT
Start: 2017-06-26 | End: 2017-06-30 | Stop reason: HOSPADM

## 2017-06-26 RX ORDER — FUROSEMIDE 10 MG/ML
20 INJECTION INTRAMUSCULAR; INTRAVENOUS ONCE
Status: COMPLETED | OUTPATIENT
Start: 2017-06-26 | End: 2017-06-26

## 2017-06-26 RX ORDER — PANTOPRAZOLE SODIUM 40 MG/10ML
40 INJECTION, POWDER, LYOPHILIZED, FOR SOLUTION INTRAVENOUS
Status: DISCONTINUED | OUTPATIENT
Start: 2017-06-27 | End: 2017-06-27 | Stop reason: CLARIF

## 2017-06-26 RX ORDER — ASPIRIN 81 MG/1
81 TABLET ORAL DAILY
COMMUNITY

## 2017-06-26 RX ORDER — DIGOXIN 125 MCG
125 TABLET ORAL
Status: DISCONTINUED | OUTPATIENT
Start: 2017-06-27 | End: 2017-06-30 | Stop reason: HOSPADM

## 2017-06-26 RX ORDER — ASPIRIN 81 MG/1
81 TABLET, CHEWABLE ORAL DAILY
Status: DISCONTINUED | OUTPATIENT
Start: 2017-06-26 | End: 2017-06-30 | Stop reason: HOSPADM

## 2017-06-26 RX ORDER — MAGNESIUM OXIDE 400 MG/1
400 TABLET ORAL DAILY
COMMUNITY
End: 2017-06-30 | Stop reason: HOSPADM

## 2017-06-26 RX ORDER — GABAPENTIN 300 MG/1
300 CAPSULE ORAL EVERY 8 HOURS SCHEDULED
Status: DISCONTINUED | OUTPATIENT
Start: 2017-06-26 | End: 2017-06-30 | Stop reason: HOSPADM

## 2017-06-26 RX ORDER — ISOSORBIDE MONONITRATE 30 MG/1
30 TABLET, EXTENDED RELEASE ORAL
Status: DISCONTINUED | OUTPATIENT
Start: 2017-06-26 | End: 2017-06-30 | Stop reason: HOSPADM

## 2017-06-26 RX ORDER — METOPROLOL SUCCINATE 25 MG/1
25 TABLET, EXTENDED RELEASE ORAL DAILY
COMMUNITY

## 2017-06-26 RX ORDER — POLYETHYLENE GLYCOL 3350 17 G/17G
17 POWDER, FOR SOLUTION ORAL ONCE
Status: DISCONTINUED | OUTPATIENT
Start: 2017-06-26 | End: 2017-06-26

## 2017-06-26 RX ORDER — FUROSEMIDE 10 MG/ML
20 INJECTION INTRAMUSCULAR; INTRAVENOUS 2 TIMES DAILY
Status: DISCONTINUED | OUTPATIENT
Start: 2017-06-26 | End: 2017-06-28

## 2017-06-26 RX ORDER — VIT A/VIT C/VIT E/ZINC/COPPER 2148-113
1 TABLET ORAL EVERY 12 HOURS
COMMUNITY
End: 2018-01-01

## 2017-06-26 RX ORDER — METOPROLOL SUCCINATE 25 MG/1
12.5 TABLET, EXTENDED RELEASE ORAL DAILY
Status: DISCONTINUED | OUTPATIENT
Start: 2017-06-26 | End: 2017-06-30 | Stop reason: HOSPADM

## 2017-06-26 RX ORDER — FUROSEMIDE 20 MG/1
20 TABLET ORAL DAILY
COMMUNITY
End: 2017-06-30 | Stop reason: HOSPADM

## 2017-06-26 RX ORDER — ATORVASTATIN CALCIUM 10 MG/1
10 TABLET, FILM COATED ORAL DAILY
Status: DISCONTINUED | OUTPATIENT
Start: 2017-06-27 | End: 2017-06-30 | Stop reason: HOSPADM

## 2017-06-26 RX ORDER — SENNOSIDES 8.6 MG
650 CAPSULE ORAL EVERY 8 HOURS SCHEDULED
Status: DISCONTINUED | OUTPATIENT
Start: 2017-06-26 | End: 2017-06-30 | Stop reason: HOSPADM

## 2017-06-26 RX ORDER — SODIUM CHLORIDE 0.9 % (FLUSH) 0.9 %
10 SYRINGE (ML) INJECTION AS NEEDED
Status: DISCONTINUED | OUTPATIENT
Start: 2017-06-26 | End: 2017-06-30 | Stop reason: HOSPADM

## 2017-06-26 RX ORDER — ISOSORBIDE MONONITRATE 30 MG/1
30 TABLET, EXTENDED RELEASE ORAL DAILY
COMMUNITY
End: 2018-01-01

## 2017-06-26 RX ORDER — SENNOSIDES 8.6 MG
650 CAPSULE ORAL EVERY 12 HOURS
COMMUNITY

## 2017-06-26 RX ORDER — PANTOPRAZOLE SODIUM 40 MG/10ML
40 INJECTION, POWDER, LYOPHILIZED, FOR SOLUTION INTRAVENOUS
Status: DISCONTINUED | OUTPATIENT
Start: 2017-06-27 | End: 2017-06-26 | Stop reason: CLARIF

## 2017-06-26 RX ORDER — SODIUM CHLORIDE 0.9 % (FLUSH) 0.9 %
1-10 SYRINGE (ML) INJECTION AS NEEDED
Status: DISCONTINUED | OUTPATIENT
Start: 2017-06-26 | End: 2017-06-30 | Stop reason: HOSPADM

## 2017-06-26 RX ORDER — LISINOPRIL 5 MG/1
5 TABLET ORAL DAILY
Status: DISCONTINUED | OUTPATIENT
Start: 2017-06-27 | End: 2017-06-30 | Stop reason: HOSPADM

## 2017-06-26 RX ORDER — AMIODARONE HYDROCHLORIDE 200 MG/1
100 TABLET ORAL
Status: DISCONTINUED | OUTPATIENT
Start: 2017-06-27 | End: 2017-06-30 | Stop reason: HOSPADM

## 2017-06-26 RX ORDER — POLYETHYLENE GLYCOL 3350 17 G/17G
17 POWDER, FOR SOLUTION ORAL DAILY
Status: DISCONTINUED | OUTPATIENT
Start: 2017-06-26 | End: 2017-06-30 | Stop reason: HOSPADM

## 2017-06-26 RX ADMIN — ISOSORBIDE MONONITRATE 30 MG: 30 TABLET, EXTENDED RELEASE ORAL at 17:54

## 2017-06-26 RX ADMIN — ACETAMINOPHEN 650 MG: 650 TABLET, FILM COATED, EXTENDED RELEASE ORAL at 20:36

## 2017-06-26 RX ADMIN — ENOXAPARIN SODIUM 30 MG: 30 INJECTION SUBCUTANEOUS at 17:55

## 2017-06-26 RX ADMIN — FUROSEMIDE 20 MG: 10 INJECTION, SOLUTION INTRAMUSCULAR; INTRAVENOUS at 11:56

## 2017-06-26 RX ADMIN — Medication 1 TABLET: at 20:36

## 2017-06-26 RX ADMIN — GABAPENTIN 300 MG: 300 CAPSULE ORAL at 20:36

## 2017-06-26 RX ADMIN — POLYETHYLENE GLYCOL 3350 17 G: 17 POWDER, FOR SOLUTION ORAL at 20:36

## 2017-06-26 RX ADMIN — FUROSEMIDE 20 MG: 10 INJECTION, SOLUTION INTRAMUSCULAR; INTRAVENOUS at 17:55

## 2017-06-27 ENCOUNTER — APPOINTMENT (OUTPATIENT)
Dept: CARDIOLOGY | Facility: HOSPITAL | Age: 77
End: 2017-06-27
Attending: FAMILY MEDICINE

## 2017-06-27 LAB
ALBUMIN SERPL-MCNC: 3.5 G/DL (ref 3.5–5)
ALBUMIN/GLOB SERPL: 1.5 G/DL (ref 1.1–2.5)
ALP SERPL-CCNC: 49 U/L (ref 24–120)
ALT SERPL W P-5'-P-CCNC: 29 U/L (ref 0–54)
ANION GAP SERPL CALCULATED.3IONS-SCNC: 8 MMOL/L (ref 4–13)
ARTICHOKE IGE QN: 44 MG/DL (ref 0–99)
AST SERPL-CCNC: 19 U/L (ref 7–45)
BASOPHILS # BLD AUTO: 0.03 10*3/MM3 (ref 0–0.2)
BASOPHILS NFR BLD AUTO: 0.7 % (ref 0–2)
BH CV ECHO MEAS - AO MAX PG (FULL): 13.1 MMHG
BH CV ECHO MEAS - AO MAX PG: 15.1 MMHG
BH CV ECHO MEAS - AO MEAN PG (FULL): 8 MMHG
BH CV ECHO MEAS - AO MEAN PG: 9 MMHG
BH CV ECHO MEAS - AO ROOT AREA (BSA CORRECTED): 2
BH CV ECHO MEAS - AO ROOT AREA: 10.2 CM^2
BH CV ECHO MEAS - AO ROOT DIAM: 3.6 CM
BH CV ECHO MEAS - AO V2 MAX: 194 CM/SEC
BH CV ECHO MEAS - AO V2 MEAN: 138 CM/SEC
BH CV ECHO MEAS - AO V2 VTI: 35 CM
BH CV ECHO MEAS - AVA(I,A): 1.2 CM^2
BH CV ECHO MEAS - AVA(I,D): 1.2 CM^2
BH CV ECHO MEAS - AVA(V,A): 1.1 CM^2
BH CV ECHO MEAS - AVA(V,D): 1.1 CM^2
BH CV ECHO MEAS - BSA(HAYCOCK): 1.8 M^2
BH CV ECHO MEAS - BSA: 1.8 M^2
BH CV ECHO MEAS - BZI_BMI: 24.6 KILOGRAMS/M^2
BH CV ECHO MEAS - BZI_METRIC_HEIGHT: 170.2 CM
BH CV ECHO MEAS - BZI_METRIC_WEIGHT: 71.2 KG
BH CV ECHO MEAS - CONTRAST EF 4CH: 15.5 ML/M^2
BH CV ECHO MEAS - EDV(CUBED): 123.5 ML
BH CV ECHO MEAS - EDV(MOD-SP4): 148 ML
BH CV ECHO MEAS - EDV(TEICH): 117.1 ML
BH CV ECHO MEAS - EF(CUBED): 23.7 %
BH CV ECHO MEAS - EF(MOD-SP4): 15.5 %
BH CV ECHO MEAS - EF(TEICH): 19 %
BH CV ECHO MEAS - ESV(CUBED): 94.2 ML
BH CV ECHO MEAS - ESV(MOD-SP4): 125 ML
BH CV ECHO MEAS - ESV(TEICH): 94.9 ML
BH CV ECHO MEAS - FS: 8.6 %
BH CV ECHO MEAS - IVS/LVPW: 1.2
BH CV ECHO MEAS - IVSD: 1.1 CM
BH CV ECHO MEAS - LA DIMENSION: 3.8 CM
BH CV ECHO MEAS - LA/AO: 1.1
BH CV ECHO MEAS - LV DIASTOLIC VOL/BSA (35-75): 81.1 ML/M^2
BH CV ECHO MEAS - LV MASS(C)D: 180.7 GRAMS
BH CV ECHO MEAS - LV MASS(C)DI: 99 GRAMS/M^2
BH CV ECHO MEAS - LV MAX PG: 1.9 MMHG
BH CV ECHO MEAS - LV MEAN PG: 1 MMHG
BH CV ECHO MEAS - LV SYSTOLIC VOL/BSA (12-30): 68.5 ML/M^2
BH CV ECHO MEAS - LV V1 MAX: 69.6 CM/SEC
BH CV ECHO MEAS - LV V1 MEAN: 46.8 CM/SEC
BH CV ECHO MEAS - LV V1 VTI: 13.4 CM
BH CV ECHO MEAS - LVIDD: 5 CM
BH CV ECHO MEAS - LVIDS: 4.6 CM
BH CV ECHO MEAS - LVLD AP4: 9.2 CM
BH CV ECHO MEAS - LVLS AP4: 8.2 CM
BH CV ECHO MEAS - LVOT AREA (M): 3.1 CM^2
BH CV ECHO MEAS - LVOT AREA: 3.1 CM^2
BH CV ECHO MEAS - LVOT DIAM: 2 CM
BH CV ECHO MEAS - LVPWD: 0.9 CM
BH CV ECHO MEAS - MV A MAX VEL: 88.3 CM/SEC
BH CV ECHO MEAS - MV DEC TIME: 0.12 SEC
BH CV ECHO MEAS - MV E MAX VEL: 80.2 CM/SEC
BH CV ECHO MEAS - MV E/A: 0.91
BH CV ECHO MEAS - PI END-D VEL: 102 CM/SEC
BH CV ECHO MEAS - RAP SYSTOLE: 5 MMHG
BH CV ECHO MEAS - RVSP: 31.8 MMHG
BH CV ECHO MEAS - SI(AO): 195.3 ML/M^2
BH CV ECHO MEAS - SI(CUBED): 16.1 ML/M^2
BH CV ECHO MEAS - SI(LVOT): 23.1 ML/M^2
BH CV ECHO MEAS - SI(MOD-SP4): 12.6 ML/M^2
BH CV ECHO MEAS - SI(TEICH): 12.2 ML/M^2
BH CV ECHO MEAS - SV(AO): 356.3 ML
BH CV ECHO MEAS - SV(CUBED): 29.3 ML
BH CV ECHO MEAS - SV(LVOT): 42.1 ML
BH CV ECHO MEAS - SV(MOD-SP4): 23 ML
BH CV ECHO MEAS - SV(TEICH): 22.3 ML
BH CV ECHO MEAS - TR MAX VEL: 259 CM/SEC
BILIRUB SERPL-MCNC: 0.7 MG/DL (ref 0.1–1)
BUN BLD-MCNC: 42 MG/DL (ref 5–21)
BUN/CREAT SERPL: 27.3 (ref 7–25)
CALCIUM SPEC-SCNC: 9.4 MG/DL (ref 8.4–10.4)
CHLORIDE SERPL-SCNC: 98 MMOL/L (ref 98–110)
CHOLEST SERPL-MCNC: 116 MG/DL (ref 130–200)
CO2 SERPL-SCNC: 35 MMOL/L (ref 24–31)
CREAT BLD-MCNC: 1.54 MG/DL (ref 0.5–1.4)
DEPRECATED RDW RBC AUTO: 60 FL (ref 40–54)
EOSINOPHIL # BLD AUTO: 0.11 10*3/MM3 (ref 0–0.7)
EOSINOPHIL NFR BLD AUTO: 2.7 % (ref 0–4)
ERYTHROCYTE [DISTWIDTH] IN BLOOD BY AUTOMATED COUNT: 16.5 % (ref 12–15)
GFR SERPL CREATININE-BSD FRML MDRD: 33 ML/MIN/1.73
GLOBULIN UR ELPH-MCNC: 2.4 GM/DL
GLUCOSE BLD-MCNC: 67 MG/DL (ref 70–100)
HBA1C MFR BLD: 6.2 %
HCT VFR BLD AUTO: 43 % (ref 37–47)
HDLC SERPL-MCNC: 47 MG/DL
HGB BLD-MCNC: 13.3 G/DL (ref 12–16)
IMM GRANULOCYTES # BLD: 0.01 10*3/MM3 (ref 0–0.03)
IMM GRANULOCYTES NFR BLD: 0.2 % (ref 0–5)
LDLC/HDLC SERPL: 0.98 {RATIO}
LV EF 2D ECHO EST: 25 %
LYMPHOCYTES # BLD AUTO: 1.3 10*3/MM3 (ref 0.72–4.86)
LYMPHOCYTES NFR BLD AUTO: 31.4 % (ref 15–45)
MCH RBC QN AUTO: 31.1 PG (ref 28–32)
MCHC RBC AUTO-ENTMCNC: 30.9 G/DL (ref 33–36)
MCV RBC AUTO: 100.5 FL (ref 82–98)
MONOCYTES # BLD AUTO: 0.59 10*3/MM3 (ref 0.19–1.3)
MONOCYTES NFR BLD AUTO: 14.3 % (ref 4–12)
NEUTROPHILS # BLD AUTO: 2.1 10*3/MM3 (ref 1.87–8.4)
NEUTROPHILS NFR BLD AUTO: 50.7 % (ref 39–78)
NT-PROBNP SERPL-MCNC: ABNORMAL PG/ML (ref 0–1800)
PLATELET # BLD AUTO: 151 10*3/MM3 (ref 130–400)
PMV BLD AUTO: 11.2 FL (ref 6–12)
POTASSIUM BLD-SCNC: 4.4 MMOL/L (ref 3.5–5.3)
PROT SERPL-MCNC: 5.9 G/DL (ref 6.3–8.7)
RBC # BLD AUTO: 4.28 10*6/MM3 (ref 4.2–5.4)
SODIUM BLD-SCNC: 141 MMOL/L (ref 135–145)
T3FREE SERPL-MCNC: 3.15 PG/ML (ref 2.77–5.27)
T4 FREE SERPL-MCNC: 1.6 NG/DL (ref 0.78–2.19)
TRIGL SERPL-MCNC: 115 MG/DL (ref 0–149)
TSH SERPL DL<=0.05 MIU/L-ACNC: 10.6 MIU/ML (ref 0.47–4.68)
WBC NRBC COR # BLD: 4.14 10*3/MM3 (ref 4.8–10.8)

## 2017-06-27 PROCEDURE — 85025 COMPLETE CBC W/AUTO DIFF WBC: CPT | Performed by: FAMILY MEDICINE

## 2017-06-27 PROCEDURE — 25010000002 ENOXAPARIN PER 10 MG: Performed by: FAMILY MEDICINE

## 2017-06-27 PROCEDURE — 83880 ASSAY OF NATRIURETIC PEPTIDE: CPT | Performed by: FAMILY MEDICINE

## 2017-06-27 PROCEDURE — 84443 ASSAY THYROID STIM HORMONE: CPT | Performed by: FAMILY MEDICINE

## 2017-06-27 PROCEDURE — 84439 ASSAY OF FREE THYROXINE: CPT | Performed by: FAMILY MEDICINE

## 2017-06-27 PROCEDURE — 93321 DOPPLER ECHO F-UP/LMTD STD: CPT | Performed by: INTERNAL MEDICINE

## 2017-06-27 PROCEDURE — 25010000002 PERFLUTREN (DEFINITY) 8.476 MG IN SODIUM CHLORIDE 10 ML INJECTION: Performed by: FAMILY MEDICINE

## 2017-06-27 PROCEDURE — C8924 2D TTE W OR W/O FOL W/CON,FU: HCPCS

## 2017-06-27 PROCEDURE — 83036 HEMOGLOBIN GLYCOSYLATED A1C: CPT | Performed by: FAMILY MEDICINE

## 2017-06-27 PROCEDURE — 84481 FREE ASSAY (FT-3): CPT | Performed by: FAMILY MEDICINE

## 2017-06-27 PROCEDURE — 93325 DOPPLER ECHO COLOR FLOW MAPG: CPT

## 2017-06-27 PROCEDURE — 93321 DOPPLER ECHO F-UP/LMTD STD: CPT

## 2017-06-27 PROCEDURE — 25010000002 FUROSEMIDE PER 20 MG: Performed by: FAMILY MEDICINE

## 2017-06-27 PROCEDURE — 99222 1ST HOSP IP/OBS MODERATE 55: CPT | Performed by: INTERNAL MEDICINE

## 2017-06-27 PROCEDURE — 93308 TTE F-UP OR LMTD: CPT | Performed by: INTERNAL MEDICINE

## 2017-06-27 PROCEDURE — 80053 COMPREHEN METABOLIC PANEL: CPT | Performed by: FAMILY MEDICINE

## 2017-06-27 PROCEDURE — 93325 DOPPLER ECHO COLOR FLOW MAPG: CPT | Performed by: INTERNAL MEDICINE

## 2017-06-27 PROCEDURE — 80061 LIPID PANEL: CPT | Performed by: FAMILY MEDICINE

## 2017-06-27 RX ORDER — PANTOPRAZOLE SODIUM 40 MG/10ML
40 INJECTION, POWDER, LYOPHILIZED, FOR SOLUTION INTRAVENOUS
Status: DISCONTINUED | OUTPATIENT
Start: 2017-06-28 | End: 2017-06-28

## 2017-06-27 RX ADMIN — FUROSEMIDE 20 MG: 10 INJECTION, SOLUTION INTRAMUSCULAR; INTRAVENOUS at 18:12

## 2017-06-27 RX ADMIN — ACETAMINOPHEN 650 MG: 650 TABLET, FILM COATED, EXTENDED RELEASE ORAL at 14:06

## 2017-06-27 RX ADMIN — ATORVASTATIN CALCIUM 10 MG: 10 TABLET, FILM COATED ORAL at 10:22

## 2017-06-27 RX ADMIN — ISOSORBIDE MONONITRATE 30 MG: 30 TABLET, EXTENDED RELEASE ORAL at 10:22

## 2017-06-27 RX ADMIN — SODIUM CHLORIDE 3 ML: 9 INJECTION INTRAMUSCULAR; INTRAVENOUS; SUBCUTANEOUS at 09:56

## 2017-06-27 RX ADMIN — Medication 1 TABLET: at 21:07

## 2017-06-27 RX ADMIN — GABAPENTIN 300 MG: 300 CAPSULE ORAL at 14:06

## 2017-06-27 RX ADMIN — GABAPENTIN 300 MG: 300 CAPSULE ORAL at 06:22

## 2017-06-27 RX ADMIN — Medication 1 TABLET: at 10:23

## 2017-06-27 RX ADMIN — POLYETHYLENE GLYCOL 3350 17 G: 17 POWDER, FOR SOLUTION ORAL at 10:21

## 2017-06-27 RX ADMIN — ACETAMINOPHEN 650 MG: 650 TABLET, FILM COATED, EXTENDED RELEASE ORAL at 06:22

## 2017-06-27 RX ADMIN — AMIODARONE HYDROCHLORIDE 100 MG: 200 TABLET ORAL at 10:22

## 2017-06-27 RX ADMIN — LISINOPRIL 5 MG: 5 TABLET ORAL at 10:22

## 2017-06-27 RX ADMIN — ENOXAPARIN SODIUM 30 MG: 30 INJECTION SUBCUTANEOUS at 18:12

## 2017-06-27 RX ADMIN — GABAPENTIN 300 MG: 300 CAPSULE ORAL at 21:07

## 2017-06-27 RX ADMIN — ACETAMINOPHEN 650 MG: 650 TABLET, FILM COATED, EXTENDED RELEASE ORAL at 21:07

## 2017-06-27 RX ADMIN — METOPROLOL SUCCINATE 12.5 MG: 25 TABLET, FILM COATED, EXTENDED RELEASE ORAL at 10:22

## 2017-06-27 RX ADMIN — Medication 81 MG: at 10:23

## 2017-06-27 RX ADMIN — DIGOXIN 125 MCG: 0.12 TABLET ORAL at 12:39

## 2017-06-27 RX ADMIN — FUROSEMIDE 20 MG: 10 INJECTION, SOLUTION INTRAMUSCULAR; INTRAVENOUS at 10:27

## 2017-06-27 RX ADMIN — PANTOPRAZOLE SODIUM 40 MG: 40 INJECTION, POWDER, FOR SOLUTION INTRAVENOUS at 06:22

## 2017-06-28 LAB
ALBUMIN SERPL-MCNC: 3.3 G/DL (ref 3.5–5)
ALBUMIN/GLOB SERPL: 1.3 G/DL (ref 1.1–2.5)
ALP SERPL-CCNC: 51 U/L (ref 24–120)
ALT SERPL W P-5'-P-CCNC: 28 U/L (ref 0–54)
ANION GAP SERPL CALCULATED.3IONS-SCNC: 8 MMOL/L (ref 4–13)
AST SERPL-CCNC: 18 U/L (ref 7–45)
BASOPHILS # BLD AUTO: 0.02 10*3/MM3 (ref 0–0.2)
BASOPHILS NFR BLD AUTO: 0.4 % (ref 0–2)
BILIRUB SERPL-MCNC: 0.8 MG/DL (ref 0.1–1)
BUN BLD-MCNC: 43 MG/DL (ref 5–21)
BUN/CREAT SERPL: 27.2 (ref 7–25)
CALCIUM SPEC-SCNC: 9.1 MG/DL (ref 8.4–10.4)
CHLORIDE SERPL-SCNC: 94 MMOL/L (ref 98–110)
CO2 SERPL-SCNC: 37 MMOL/L (ref 24–31)
CREAT BLD-MCNC: 1.58 MG/DL (ref 0.5–1.4)
DEPRECATED RDW RBC AUTO: 59.7 FL (ref 40–54)
EOSINOPHIL # BLD AUTO: 0.08 10*3/MM3 (ref 0–0.7)
EOSINOPHIL NFR BLD AUTO: 1.8 % (ref 0–4)
ERYTHROCYTE [DISTWIDTH] IN BLOOD BY AUTOMATED COUNT: 16.3 % (ref 12–15)
GFR SERPL CREATININE-BSD FRML MDRD: 32 ML/MIN/1.73
GLOBULIN UR ELPH-MCNC: 2.5 GM/DL
GLUCOSE BLD-MCNC: 101 MG/DL (ref 70–100)
HCT VFR BLD AUTO: 41.7 % (ref 37–47)
HGB BLD-MCNC: 13 G/DL (ref 12–16)
IMM GRANULOCYTES # BLD: 0 10*3/MM3 (ref 0–0.03)
IMM GRANULOCYTES NFR BLD: 0 % (ref 0–5)
LYMPHOCYTES # BLD AUTO: 0.99 10*3/MM3 (ref 0.72–4.86)
LYMPHOCYTES NFR BLD AUTO: 21.9 % (ref 15–45)
MCH RBC QN AUTO: 31.4 PG (ref 28–32)
MCHC RBC AUTO-ENTMCNC: 31.2 G/DL (ref 33–36)
MCV RBC AUTO: 100.7 FL (ref 82–98)
MONOCYTES # BLD AUTO: 0.7 10*3/MM3 (ref 0.19–1.3)
MONOCYTES NFR BLD AUTO: 15.5 % (ref 4–12)
NEUTROPHILS # BLD AUTO: 2.73 10*3/MM3 (ref 1.87–8.4)
NEUTROPHILS NFR BLD AUTO: 60.4 % (ref 39–78)
PLATELET # BLD AUTO: 130 10*3/MM3 (ref 130–400)
PMV BLD AUTO: 10.6 FL (ref 6–12)
POTASSIUM BLD-SCNC: 4.4 MMOL/L (ref 3.5–5.3)
PROT SERPL-MCNC: 5.8 G/DL (ref 6.3–8.7)
RBC # BLD AUTO: 4.14 10*6/MM3 (ref 4.2–5.4)
SODIUM BLD-SCNC: 139 MMOL/L (ref 135–145)
WBC NRBC COR # BLD: 4.52 10*3/MM3 (ref 4.8–10.8)

## 2017-06-28 PROCEDURE — 25010000002 FUROSEMIDE PER 20 MG: Performed by: FAMILY MEDICINE

## 2017-06-28 PROCEDURE — 99232 SBSQ HOSP IP/OBS MODERATE 35: CPT | Performed by: INTERNAL MEDICINE

## 2017-06-28 PROCEDURE — 80053 COMPREHEN METABOLIC PANEL: CPT | Performed by: FAMILY MEDICINE

## 2017-06-28 PROCEDURE — 25010000002 ENOXAPARIN PER 10 MG: Performed by: FAMILY MEDICINE

## 2017-06-28 PROCEDURE — 85025 COMPLETE CBC W/AUTO DIFF WBC: CPT | Performed by: FAMILY MEDICINE

## 2017-06-28 RX ORDER — PANTOPRAZOLE SODIUM 40 MG/1
40 TABLET, DELAYED RELEASE ORAL
Status: DISCONTINUED | OUTPATIENT
Start: 2017-06-28 | End: 2017-06-30 | Stop reason: HOSPADM

## 2017-06-28 RX ORDER — BUMETANIDE 0.25 MG/ML
1 INJECTION INTRAMUSCULAR; INTRAVENOUS 2 TIMES DAILY
Status: DISCONTINUED | OUTPATIENT
Start: 2017-06-28 | End: 2017-06-30 | Stop reason: HOSPADM

## 2017-06-28 RX ADMIN — ACETAMINOPHEN 650 MG: 650 TABLET, FILM COATED, EXTENDED RELEASE ORAL at 21:19

## 2017-06-28 RX ADMIN — Medication 81 MG: at 08:40

## 2017-06-28 RX ADMIN — BUMETANIDE 1 MG: 0.25 INJECTION, SOLUTION INTRAMUSCULAR; INTRAVENOUS at 17:54

## 2017-06-28 RX ADMIN — ISOSORBIDE MONONITRATE 30 MG: 30 TABLET, EXTENDED RELEASE ORAL at 08:40

## 2017-06-28 RX ADMIN — ATORVASTATIN CALCIUM 10 MG: 10 TABLET, FILM COATED ORAL at 08:40

## 2017-06-28 RX ADMIN — ACETAMINOPHEN 650 MG: 650 TABLET, FILM COATED, EXTENDED RELEASE ORAL at 15:20

## 2017-06-28 RX ADMIN — Medication 1 TABLET: at 21:19

## 2017-06-28 RX ADMIN — ACETAMINOPHEN 650 MG: 650 TABLET, FILM COATED, EXTENDED RELEASE ORAL at 06:28

## 2017-06-28 RX ADMIN — GABAPENTIN 300 MG: 300 CAPSULE ORAL at 06:28

## 2017-06-28 RX ADMIN — METOPROLOL SUCCINATE 12.5 MG: 25 TABLET, FILM COATED, EXTENDED RELEASE ORAL at 08:40

## 2017-06-28 RX ADMIN — LISINOPRIL 5 MG: 5 TABLET ORAL at 08:40

## 2017-06-28 RX ADMIN — DIGOXIN 125 MCG: 0.12 TABLET ORAL at 12:36

## 2017-06-28 RX ADMIN — POLYETHYLENE GLYCOL 3350 17 G: 17 POWDER, FOR SOLUTION ORAL at 08:40

## 2017-06-28 RX ADMIN — PANTOPRAZOLE SODIUM 40 MG: 40 TABLET, DELAYED RELEASE ORAL at 06:28

## 2017-06-28 RX ADMIN — Medication 1 TABLET: at 12:36

## 2017-06-28 RX ADMIN — GABAPENTIN 300 MG: 300 CAPSULE ORAL at 21:19

## 2017-06-28 RX ADMIN — GABAPENTIN 300 MG: 300 CAPSULE ORAL at 15:20

## 2017-06-28 RX ADMIN — FUROSEMIDE 20 MG: 10 INJECTION, SOLUTION INTRAMUSCULAR; INTRAVENOUS at 08:40

## 2017-06-28 RX ADMIN — AMIODARONE HYDROCHLORIDE 100 MG: 200 TABLET ORAL at 08:40

## 2017-06-28 RX ADMIN — ENOXAPARIN SODIUM 30 MG: 30 INJECTION SUBCUTANEOUS at 17:54

## 2017-06-29 LAB
ALBUMIN SERPL-MCNC: 3.4 G/DL (ref 3.5–5)
ALBUMIN/GLOB SERPL: 1.3 G/DL (ref 1.1–2.5)
ALP SERPL-CCNC: 61 U/L (ref 24–120)
ALT SERPL W P-5'-P-CCNC: 24 U/L (ref 0–54)
ANION GAP SERPL CALCULATED.3IONS-SCNC: 9 MMOL/L (ref 4–13)
AST SERPL-CCNC: 19 U/L (ref 7–45)
BASOPHILS # BLD AUTO: 0.03 10*3/MM3 (ref 0–0.2)
BASOPHILS NFR BLD AUTO: 0.6 % (ref 0–2)
BILIRUB SERPL-MCNC: 0.8 MG/DL (ref 0.1–1)
BUN BLD-MCNC: 43 MG/DL (ref 5–21)
BUN/CREAT SERPL: 28.9 (ref 7–25)
CALCIUM SPEC-SCNC: 8.9 MG/DL (ref 8.4–10.4)
CHLORIDE SERPL-SCNC: 92 MMOL/L (ref 98–110)
CO2 SERPL-SCNC: 38 MMOL/L (ref 24–31)
CREAT BLD-MCNC: 1.49 MG/DL (ref 0.5–1.4)
DEPRECATED RDW RBC AUTO: 58.6 FL (ref 40–54)
EOSINOPHIL # BLD AUTO: 0.1 10*3/MM3 (ref 0–0.7)
EOSINOPHIL NFR BLD AUTO: 2.1 % (ref 0–4)
ERYTHROCYTE [DISTWIDTH] IN BLOOD BY AUTOMATED COUNT: 16.4 % (ref 12–15)
GFR SERPL CREATININE-BSD FRML MDRD: 34 ML/MIN/1.73
GLOBULIN UR ELPH-MCNC: 2.7 GM/DL
GLUCOSE BLD-MCNC: 76 MG/DL (ref 70–100)
HCT VFR BLD AUTO: 44.3 % (ref 37–47)
HGB BLD-MCNC: 13.9 G/DL (ref 12–16)
IMM GRANULOCYTES # BLD: 0.01 10*3/MM3 (ref 0–0.03)
IMM GRANULOCYTES NFR BLD: 0.2 % (ref 0–5)
LYMPHOCYTES # BLD AUTO: 1.65 10*3/MM3 (ref 0.72–4.86)
LYMPHOCYTES NFR BLD AUTO: 34.1 % (ref 15–45)
MCH RBC QN AUTO: 31 PG (ref 28–32)
MCHC RBC AUTO-ENTMCNC: 31.4 G/DL (ref 33–36)
MCV RBC AUTO: 98.9 FL (ref 82–98)
MONOCYTES # BLD AUTO: 0.71 10*3/MM3 (ref 0.19–1.3)
MONOCYTES NFR BLD AUTO: 14.7 % (ref 4–12)
NEUTROPHILS # BLD AUTO: 2.34 10*3/MM3 (ref 1.87–8.4)
NEUTROPHILS NFR BLD AUTO: 48.3 % (ref 39–78)
PLATELET # BLD AUTO: 147 10*3/MM3 (ref 130–400)
PMV BLD AUTO: 11 FL (ref 6–12)
POTASSIUM BLD-SCNC: 4.4 MMOL/L (ref 3.5–5.3)
PROT SERPL-MCNC: 6.1 G/DL (ref 6.3–8.7)
RBC # BLD AUTO: 4.48 10*6/MM3 (ref 4.2–5.4)
SODIUM BLD-SCNC: 139 MMOL/L (ref 135–145)
WBC NRBC COR # BLD: 4.84 10*3/MM3 (ref 4.8–10.8)

## 2017-06-29 PROCEDURE — 99232 SBSQ HOSP IP/OBS MODERATE 35: CPT | Performed by: INTERNAL MEDICINE

## 2017-06-29 PROCEDURE — 80053 COMPREHEN METABOLIC PANEL: CPT | Performed by: FAMILY MEDICINE

## 2017-06-29 PROCEDURE — 25010000002 ENOXAPARIN PER 10 MG: Performed by: FAMILY MEDICINE

## 2017-06-29 PROCEDURE — 85025 COMPLETE CBC W/AUTO DIFF WBC: CPT | Performed by: FAMILY MEDICINE

## 2017-06-29 RX ADMIN — Medication 1 TABLET: at 20:05

## 2017-06-29 RX ADMIN — Medication 1 TABLET: at 08:57

## 2017-06-29 RX ADMIN — LISINOPRIL 5 MG: 5 TABLET ORAL at 08:57

## 2017-06-29 RX ADMIN — ACETAMINOPHEN 650 MG: 650 TABLET, FILM COATED, EXTENDED RELEASE ORAL at 06:07

## 2017-06-29 RX ADMIN — ACETAMINOPHEN 650 MG: 650 TABLET, FILM COATED, EXTENDED RELEASE ORAL at 21:20

## 2017-06-29 RX ADMIN — BUMETANIDE 1 MG: 0.25 INJECTION, SOLUTION INTRAMUSCULAR; INTRAVENOUS at 18:29

## 2017-06-29 RX ADMIN — BUMETANIDE 1 MG: 0.25 INJECTION, SOLUTION INTRAMUSCULAR; INTRAVENOUS at 08:58

## 2017-06-29 RX ADMIN — GABAPENTIN 300 MG: 300 CAPSULE ORAL at 21:21

## 2017-06-29 RX ADMIN — DIGOXIN 125 MCG: 0.12 TABLET ORAL at 12:22

## 2017-06-29 RX ADMIN — PANTOPRAZOLE SODIUM 40 MG: 40 TABLET, DELAYED RELEASE ORAL at 06:07

## 2017-06-29 RX ADMIN — POLYETHYLENE GLYCOL 3350 17 G: 17 POWDER, FOR SOLUTION ORAL at 08:58

## 2017-06-29 RX ADMIN — ATORVASTATIN CALCIUM 10 MG: 10 TABLET, FILM COATED ORAL at 08:57

## 2017-06-29 RX ADMIN — ISOSORBIDE MONONITRATE 30 MG: 30 TABLET, EXTENDED RELEASE ORAL at 08:57

## 2017-06-29 RX ADMIN — ENOXAPARIN SODIUM 30 MG: 30 INJECTION SUBCUTANEOUS at 18:34

## 2017-06-29 RX ADMIN — Medication 81 MG: at 08:57

## 2017-06-29 RX ADMIN — METOPROLOL SUCCINATE 12.5 MG: 25 TABLET, FILM COATED, EXTENDED RELEASE ORAL at 08:57

## 2017-06-29 RX ADMIN — GABAPENTIN 300 MG: 300 CAPSULE ORAL at 14:27

## 2017-06-29 RX ADMIN — ACETAMINOPHEN 650 MG: 650 TABLET, FILM COATED, EXTENDED RELEASE ORAL at 14:27

## 2017-06-29 RX ADMIN — GABAPENTIN 300 MG: 300 CAPSULE ORAL at 06:07

## 2017-06-29 RX ADMIN — AMIODARONE HYDROCHLORIDE 100 MG: 200 TABLET ORAL at 08:58

## 2017-06-30 VITALS
WEIGHT: 156.2 LBS | SYSTOLIC BLOOD PRESSURE: 106 MMHG | BODY MASS INDEX: 24.52 KG/M2 | RESPIRATION RATE: 20 BRPM | OXYGEN SATURATION: 91 % | TEMPERATURE: 97.4 F | HEIGHT: 67 IN | HEART RATE: 87 BPM | DIASTOLIC BLOOD PRESSURE: 56 MMHG

## 2017-06-30 LAB
ANION GAP SERPL CALCULATED.3IONS-SCNC: 9 MMOL/L (ref 4–13)
BASOPHILS # BLD AUTO: 0.01 10*3/MM3 (ref 0–0.2)
BASOPHILS NFR BLD AUTO: 0.2 % (ref 0–2)
BUN BLD-MCNC: 40 MG/DL (ref 5–21)
BUN/CREAT SERPL: 26.8 (ref 7–25)
CALCIUM SPEC-SCNC: 9.1 MG/DL (ref 8.4–10.4)
CHLORIDE SERPL-SCNC: 93 MMOL/L (ref 98–110)
CO2 SERPL-SCNC: 39 MMOL/L (ref 24–31)
CREAT BLD-MCNC: 1.49 MG/DL (ref 0.5–1.4)
DEPRECATED RDW RBC AUTO: 58.8 FL (ref 40–54)
EOSINOPHIL # BLD AUTO: 0.07 10*3/MM3 (ref 0–0.7)
EOSINOPHIL NFR BLD AUTO: 1.5 % (ref 0–4)
ERYTHROCYTE [DISTWIDTH] IN BLOOD BY AUTOMATED COUNT: 16.3 % (ref 12–15)
GFR SERPL CREATININE-BSD FRML MDRD: 34 ML/MIN/1.73
GLUCOSE BLD-MCNC: 67 MG/DL (ref 70–100)
HCT VFR BLD AUTO: 47.8 % (ref 37–47)
HGB BLD-MCNC: 15.2 G/DL (ref 12–16)
IMM GRANULOCYTES # BLD: 0.01 10*3/MM3 (ref 0–0.03)
IMM GRANULOCYTES NFR BLD: 0.2 % (ref 0–5)
LYMPHOCYTES # BLD AUTO: 0.97 10*3/MM3 (ref 0.72–4.86)
LYMPHOCYTES NFR BLD AUTO: 21.2 % (ref 15–45)
MCH RBC QN AUTO: 31.5 PG (ref 28–32)
MCHC RBC AUTO-ENTMCNC: 31.8 G/DL (ref 33–36)
MCV RBC AUTO: 99 FL (ref 82–98)
MONOCYTES # BLD AUTO: 0.59 10*3/MM3 (ref 0.19–1.3)
MONOCYTES NFR BLD AUTO: 12.9 % (ref 4–12)
NEUTROPHILS # BLD AUTO: 2.93 10*3/MM3 (ref 1.87–8.4)
NEUTROPHILS NFR BLD AUTO: 64 % (ref 39–78)
PLATELET # BLD AUTO: 145 10*3/MM3 (ref 130–400)
PMV BLD AUTO: 10.9 FL (ref 6–12)
POTASSIUM BLD-SCNC: 4.1 MMOL/L (ref 3.5–5.3)
RBC # BLD AUTO: 4.83 10*6/MM3 (ref 4.2–5.4)
SODIUM BLD-SCNC: 141 MMOL/L (ref 135–145)
WBC NRBC COR # BLD: 4.58 10*3/MM3 (ref 4.8–10.8)

## 2017-06-30 PROCEDURE — 80048 BASIC METABOLIC PNL TOTAL CA: CPT | Performed by: FAMILY MEDICINE

## 2017-06-30 PROCEDURE — 99232 SBSQ HOSP IP/OBS MODERATE 35: CPT | Performed by: INTERNAL MEDICINE

## 2017-06-30 PROCEDURE — 85025 COMPLETE CBC W/AUTO DIFF WBC: CPT | Performed by: FAMILY MEDICINE

## 2017-06-30 PROCEDURE — 25010000002 HEPARIN FLUSH (PORCINE) 100 UNIT/ML SOLUTION: Performed by: FAMILY MEDICINE

## 2017-06-30 RX ORDER — SODIUM CHLORIDE 0.9 % (FLUSH) 0.9 %
10 SYRINGE (ML) INJECTION EVERY 12 HOURS SCHEDULED
Status: DISCONTINUED | OUTPATIENT
Start: 2017-06-30 | End: 2017-06-30 | Stop reason: HOSPADM

## 2017-06-30 RX ORDER — SODIUM CHLORIDE 0.9 % (FLUSH) 0.9 %
10 SYRINGE (ML) INJECTION AS NEEDED
Status: DISCONTINUED | OUTPATIENT
Start: 2017-06-30 | End: 2017-06-30 | Stop reason: HOSPADM

## 2017-06-30 RX ORDER — BUMETANIDE 1 MG/1
1 TABLET ORAL DAILY
Qty: 30 TABLET | Refills: 2 | Status: SHIPPED | OUTPATIENT
Start: 2017-06-30 | End: 2017-06-30

## 2017-06-30 RX ORDER — PANTOPRAZOLE SODIUM 40 MG/1
40 TABLET, DELAYED RELEASE ORAL DAILY
Qty: 30 TABLET | Refills: 2 | Status: SHIPPED | OUTPATIENT
Start: 2017-06-30 | End: 2017-08-16 | Stop reason: ALTCHOICE

## 2017-06-30 RX ORDER — BUMETANIDE 1 MG/1
1 TABLET ORAL DAILY
Qty: 30 TABLET | Refills: 2 | Status: SHIPPED | OUTPATIENT
Start: 2017-06-30 | End: 2017-08-30

## 2017-06-30 RX ADMIN — POLYETHYLENE GLYCOL 3350 17 G: 17 POWDER, FOR SOLUTION ORAL at 09:20

## 2017-06-30 RX ADMIN — Medication 500 UNITS: at 16:00

## 2017-06-30 RX ADMIN — ACETAMINOPHEN 650 MG: 650 TABLET, FILM COATED, EXTENDED RELEASE ORAL at 14:40

## 2017-06-30 RX ADMIN — DIGOXIN 125 MCG: 0.12 TABLET ORAL at 11:24

## 2017-06-30 RX ADMIN — BUMETANIDE 1 MG: 0.25 INJECTION, SOLUTION INTRAMUSCULAR; INTRAVENOUS at 09:20

## 2017-06-30 RX ADMIN — LISINOPRIL 5 MG: 5 TABLET ORAL at 09:20

## 2017-06-30 RX ADMIN — ATORVASTATIN CALCIUM 10 MG: 10 TABLET, FILM COATED ORAL at 09:20

## 2017-06-30 RX ADMIN — METOPROLOL SUCCINATE 12.5 MG: 25 TABLET, FILM COATED, EXTENDED RELEASE ORAL at 09:20

## 2017-06-30 RX ADMIN — AMIODARONE HYDROCHLORIDE 100 MG: 200 TABLET ORAL at 09:21

## 2017-06-30 RX ADMIN — Medication 10 ML: at 16:01

## 2017-06-30 RX ADMIN — Medication 81 MG: at 09:20

## 2017-06-30 RX ADMIN — GABAPENTIN 300 MG: 300 CAPSULE ORAL at 14:40

## 2017-06-30 RX ADMIN — ACETAMINOPHEN 650 MG: 650 TABLET, FILM COATED, EXTENDED RELEASE ORAL at 05:11

## 2017-06-30 RX ADMIN — PANTOPRAZOLE SODIUM 40 MG: 40 TABLET, DELAYED RELEASE ORAL at 05:10

## 2017-06-30 RX ADMIN — GABAPENTIN 300 MG: 300 CAPSULE ORAL at 05:11

## 2017-06-30 RX ADMIN — Medication 1 TABLET: at 09:20

## 2017-06-30 RX ADMIN — ISOSORBIDE MONONITRATE 30 MG: 30 TABLET, EXTENDED RELEASE ORAL at 09:20

## 2017-07-10 RX ORDER — AMIODARONE HYDROCHLORIDE 100 MG/1
TABLET ORAL
Qty: 90 TABLET | Refills: 11 | Status: SHIPPED | OUTPATIENT
Start: 2017-07-10 | End: 2018-01-01 | Stop reason: SDUPTHER

## 2017-08-16 ENCOUNTER — OFFICE VISIT (OUTPATIENT)
Dept: CARDIOLOGY | Facility: CLINIC | Age: 77
End: 2017-08-16

## 2017-08-16 VITALS
HEART RATE: 79 BPM | BODY MASS INDEX: 20.4 KG/M2 | HEIGHT: 67 IN | WEIGHT: 130 LBS | DIASTOLIC BLOOD PRESSURE: 52 MMHG | SYSTOLIC BLOOD PRESSURE: 100 MMHG

## 2017-08-16 DIAGNOSIS — I25.10 CORONARY ARTERY DISEASE INVOLVING NATIVE CORONARY ARTERY OF NATIVE HEART WITHOUT ANGINA PECTORIS: ICD-10-CM

## 2017-08-16 DIAGNOSIS — E78.2 MIXED HYPERLIPIDEMIA: ICD-10-CM

## 2017-08-16 DIAGNOSIS — I50.30 CONGESTIVE HEART FAILURE WITH LV DIASTOLIC DYSFUNCTION, NYHA CLASS 2 (HCC): ICD-10-CM

## 2017-08-16 DIAGNOSIS — I50.22 CHRONIC SYSTOLIC CONGESTIVE HEART FAILURE (HCC): Primary | ICD-10-CM

## 2017-08-16 DIAGNOSIS — I83.892 VARICOSE VEINS OF LEFT LOWER EXTREMITIES WITH OTHER COMPLICATIONS: ICD-10-CM

## 2017-08-16 DIAGNOSIS — N28.9 RENAL INSUFFICIENCY: ICD-10-CM

## 2017-08-16 DIAGNOSIS — E11.9 TYPE 2 DIABETES MELLITUS WITHOUT COMPLICATION, WITHOUT LONG-TERM CURRENT USE OF INSULIN (HCC): ICD-10-CM

## 2017-08-16 DIAGNOSIS — I47.1 SVT (SUPRAVENTRICULAR TACHYCARDIA) (HCC): ICD-10-CM

## 2017-08-16 DIAGNOSIS — R06.02 SOB (SHORTNESS OF BREATH) ON EXERTION: ICD-10-CM

## 2017-08-16 DIAGNOSIS — I50.9 CONGESTIVE HEART FAILURE, UNSPECIFIED CONGESTIVE HEART FAILURE CHRONICITY, UNSPECIFIED CONGESTIVE HEART FAILURE TYPE: ICD-10-CM

## 2017-08-16 DIAGNOSIS — R00.2 PALPITATIONS: ICD-10-CM

## 2017-08-16 PROCEDURE — 99214 OFFICE O/P EST MOD 30 MIN: CPT | Performed by: INTERNAL MEDICINE

## 2017-08-16 PROCEDURE — 93000 ELECTROCARDIOGRAM COMPLETE: CPT | Performed by: INTERNAL MEDICINE

## 2017-08-16 RX ORDER — METOCLOPRAMIDE 5 MG/1
5 TABLET ORAL DAILY
COMMUNITY
Start: 2017-08-07 | End: 2018-01-01

## 2017-08-16 NOTE — PROGRESS NOTES
Padmini Torres  [id number]  1940  77 y.o.  female    Referring Provider: Александр Yo DO    Reason for Follow-up Visit: coronary artery disease  Stented coronary artery.   Severe LV dysfunction  She does not want ICD or Lifevest  Engorged left leg veins  congestive heart failure   Subjective .     History of present illness:  Padmini Torres is a 77 y.o. yo female with history of congestive heart failure who presents today for   Chief Complaint   Patient presents with   • Congestive Heart Failure     2 mo d/c f/u   • Coronary Artery Disease   .    History  Past Medical History:   Diagnosis Date   • Abdominal pain, left lower quadrant    • Bowel habit changes    • CAD (coronary artery disease)     LAD stent    • COPD (chronic obstructive pulmonary disease)    • Diabetes mellitus    • DVT (deep venous thrombosis)    • Dysphagia    • Hypertension    • Lymphoma    • Pulmonary emboli    • Sleep apnea, obstructive    ,   Past Surgical History:   Procedure Laterality Date   • ANKLE SURGERY     • BACK SURGERY      lower   • CHOLECYSTECTOMY     • COLONOSCOPY  04/23/2015    Last colon limted lower diverticulosis left colon, Internal Hemorrhoids   • COLONOSCOPY W/ BIOPSIES AND POLYPECTOMY  07/22/2014    Adenomatous tubular type, Diverticulosis sigmoid colon   • CORONARY STENT PLACEMENT      x 1   • ENDOSCOPY  04/14/2010    Distal ring dilated 48 Fr   • EXPLORATORY LAPAROTOMY  2005    relapse lymphoma   • HEMORRHOIDECTOMY     • HYSTERECTOMY      total abdominal   • LUMBAR SPINE SURGERY     • LUNG BIOPSY N/A 1991    open    • SMALL INTESTINE SURGERY     • TONSILLECTOMY     • UPPER GASTROINTESTINAL ENDOSCOPY  10/23/2015    normal exam   ,   Family History   Problem Relation Age of Onset   • No Known Problems Mother    • No Known Problems Father    • Heart disease Maternal Grandmother    • Colon cancer Neg Hx    • Colon polyps Neg Hx    ,   Social History   Substance Use Topics   • Smoking status: Never Smoker   • Smokeless  tobacco: None   • Alcohol use No   ,     Medications  Current Outpatient Prescriptions   Medication Sig Dispense Refill   • acetaminophen (TYLENOL ARTHRITIS PAIN) 650 MG 8 hr tablet Take 650 mg by mouth Every 12 (Twelve) Hours.     • amiodarone (PACERONE) 100 MG tablet TAKE 1 TABLET EVERY DAY 90 tablet 11   • aspirin 81 MG EC tablet Take 81 mg by mouth Daily.     • bumetanide (BUMEX) 1 MG tablet Take 1 tablet by mouth Daily. 30 tablet 2   • Calcium Carbonate-Vitamin D 600-200 MG-UNIT capsule Take 1 capsule by mouth Daily.     • digoxin (LANOXIN) 125 MCG tablet Take 125 mcg by mouth Daily.     • gabapentin (NEURONTIN) 300 MG capsule Take 300 mg by mouth 3 (three) times a day.     • isosorbide mononitrate (IMDUR) 30 MG 24 hr tablet Take 30 mg by mouth Daily.     • lisinopril (PRINIVIL,ZESTRIL) 5 MG tablet Take 1 tablet by mouth Daily. 30 tablet 0   • metoclopramide (REGLAN) 5 MG tablet Take 5 mg by mouth Daily.     • metoprolol succinate XL (TOPROL-XL) 25 MG 24 hr tablet Take 25 mg by mouth Daily.     • Multiple Vitamins-Minerals (PRESERVISION AREDS) tablet Take 1 tablet by mouth Every 12 (Twelve) Hours.     • pravastatin (PRAVACHOL) 20 MG tablet Take 20 mg by mouth daily.       No current facility-administered medications for this visit.        Allergies:  Zantac [ranitidine hcl]    Review of Systems  Review of Systems   Constitution: Positive for weakness and malaise/fatigue.   HENT: Negative.    Eyes: Negative.    Cardiovascular: Positive for dyspnea on exertion. Negative for chest pain, claudication, cyanosis, irregular heartbeat, leg swelling, near-syncope, orthopnea, palpitations, paroxysmal nocturnal dyspnea and syncope.   Respiratory: Negative.    Endocrine: Negative.    Hematologic/Lymphatic: Negative.    Skin: Negative.    Musculoskeletal: Positive for arthritis and back pain.   Gastrointestinal: Negative for anorexia.   Genitourinary: Negative.    Psychiatric/Behavioral: Negative.        Objective  "    Physical Exam:  /52  Pulse 79  Ht 67\" (170.2 cm)  Wt 130 lb (59 kg)  BMI 20.36 kg/m2  Physical Exam   Constitutional: She appears well-developed.   HENT:   Head: Normocephalic.   Neck: Normal carotid pulses and no JVD present. No tracheal tenderness present. Carotid bruit is not present. No tracheal deviation and no edema present.   Cardiovascular: Regular rhythm and normal pulses.    Murmur heard.   Systolic murmur is present with a grade of 2/6   Pulmonary/Chest: Effort normal. No stridor.   Abdominal: Soft.   Neurological: She is alert. She has normal strength. No cranial nerve deficit or sensory deficit.   Skin: Skin is warm.   Psychiatric: She has a normal mood and affect. Her speech is normal and behavior is normal.   Severely dilated left leg veins    Results Review:       ECG 12 Lead  Date/Time: 8/16/2017 10:13 AM  Performed by: ZAINAB LORA  Authorized by: ZAINAB LORA   Comparison: compared with previous ECG from 6/26/2017  Similar to previous ECG  Rhythm: sinus rhythm  Rate: normal  Conduction: left bundle branch block  QRS axis: left              Assessment/Plan   Patient Active Problem List   Diagnosis   • Chronic systolic congestive heart failure   • Congestive heart failure with LV diastolic dysfunction, NYHA class 2   • Coronary artery disease involving native coronary artery of native heart without angina pectoris   • Mixed hyperlipidemia   • Type 2 diabetes mellitus without complication, without long-term current use of insulin   • Palpitations   • SVT (supraventricular tachycardia)   • Congestive heart failure   • SOB (shortness of breath) on exertion   • Renal insufficiency   • 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 No chest pain or excessive dyspnea. No palpitations. No significant pedal edema. Compliant with medications and diet. Latest labs and medications reviewed.  Severe LV dysfunction       Plan:  Flexible diuretic " dosing  Monitor for any signs of bleeding including red or dark stools. Fall precautions.  Patient is asked to monitor BP at home or work, several times per month and return with written values at next office visit.   Keep follow up with me in 3 months  Close follow up with you as scheduled.  Intensive factor modifications.  See order list.   She prefers medical therapy and not ICD      Return in about 4 months (around 12/16/2017).

## 2017-08-30 ENCOUNTER — OFFICE VISIT (OUTPATIENT)
Dept: VASCULAR SURGERY | Facility: CLINIC | Age: 77
End: 2017-08-30

## 2017-08-30 VITALS
WEIGHT: 130 LBS | HEIGHT: 67 IN | HEART RATE: 74 BPM | BODY MASS INDEX: 20.4 KG/M2 | SYSTOLIC BLOOD PRESSURE: 120 MMHG | DIASTOLIC BLOOD PRESSURE: 64 MMHG

## 2017-08-30 DIAGNOSIS — I87.2 VENOUS (PERIPHERAL) INSUFFICIENCY: Primary | ICD-10-CM

## 2017-08-30 DIAGNOSIS — E78.2 MIXED HYPERLIPIDEMIA: ICD-10-CM

## 2017-08-30 DIAGNOSIS — I83.93 VARICOSE VEINS OF BOTH LOWER EXTREMITIES: ICD-10-CM

## 2017-08-30 PROCEDURE — 99213 OFFICE O/P EST LOW 20 MIN: CPT | Performed by: NURSE PRACTITIONER

## 2017-08-30 RX ORDER — FUROSEMIDE 20 MG/1
TABLET ORAL
COMMUNITY
Start: 2017-08-29 | End: 2018-01-01

## 2017-08-30 NOTE — PROGRESS NOTES
08/30/2017      Freeman Kelsey MD  [address]  [place] [address]    Padmini Torres  1940    Chief Complaint   Patient presents with   • Varicose Veins     Complaint of lower extremity varicose veins with burning and stinging.       Dear Freeman Kelsey MD:      HPI  I had the pleasure of seeing your patient Padmini Torres in the office today.  Thank you kindly for this consultation.  As you recall, Padmini Torres is a 77 y.o.  female who you are currently following for Routine health maintenance.  She does have complaints of varicose veins to her left lower extremity and reports some burning and stinging to the largest 1.  She does have a history of bilateral lower extremity DVT about 6 years ago with pulmonary embolus.  She was undergoing chemotherapy at that time and was placed on Coumadin.  She does state that she is no longer taking this.  She denies wearing compression stockings however has tried them in the past.  She has mild swelling to her lower extremities and denies any cramping or heaviness or tiredness to her lower extremities.  She does report congestive heart failure in the past requiring hospitalization.    Past Medical History:   Diagnosis Date   • Abdominal pain, left lower quadrant    • Bowel habit changes    • CAD (coronary artery disease)     LAD stent    • COPD (chronic obstructive pulmonary disease)    • Diabetes mellitus    • DVT (deep venous thrombosis)    • Dysphagia    • Hypertension    • Lymphoma    • Pulmonary emboli    • Varicose veins of legs        Past Surgical History:   Procedure Laterality Date   • ANKLE SURGERY     • BACK SURGERY      lower   • CHOLECYSTECTOMY     • COLONOSCOPY  04/23/2015    Last colon limted lower diverticulosis left colon, Internal Hemorrhoids   • COLONOSCOPY W/ BIOPSIES AND POLYPECTOMY  07/22/2014    Adenomatous tubular type, Diverticulosis sigmoid colon   • CORONARY STENT PLACEMENT      x 1   • ENDOSCOPY  04/14/2010    Distal ring dilated 48  Fr   • EXPLORATORY LAPAROTOMY  2005    relapse lymphoma   • HEMORRHOIDECTOMY     • HYSTERECTOMY      total abdominal   • LUMBAR SPINE SURGERY     • LUNG BIOPSY N/A 1991    open    • SMALL INTESTINE SURGERY     • TONSILLECTOMY     • UPPER GASTROINTESTINAL ENDOSCOPY  10/23/2015    normal exam       Family History   Problem Relation Age of Onset   • Diabetes Mother    • Cancer Father      prostate with bone metastasis   • Heart disease Maternal Grandmother    • Stroke Brother    • Colon cancer Neg Hx    • Colon polyps Neg Hx        Social History     Social History   • Marital status:      Spouse name: N/A   • Number of children: N/A   • Years of education: N/A     Occupational History   • Not on file.     Social History Main Topics   • Smoking status: Never Smoker   • Smokeless tobacco: Never Used   • Alcohol use No   • Drug use: No   • Sexual activity: Defer     Other Topics Concern   • Not on file     Social History Narrative       Allergies   Allergen Reactions   • Zantac [Ranitidine Hcl] Shortness Of Breath       Prior to Admission medications    Medication Sig Start Date End Date Taking? Authorizing Provider   acetaminophen (TYLENOL ARTHRITIS PAIN) 650 MG 8 hr tablet Take 650 mg by mouth Every 12 (Twelve) Hours.   Yes Historical Provider, MD   amiodarone (PACERONE) 100 MG tablet TAKE 1 TABLET EVERY DAY 7/10/17  Yes Freeman Kelsey MD   aspirin 81 MG EC tablet Take 81 mg by mouth Daily.   Yes Historical Provider, MD   Calcium Carbonate-Vitamin D 600-200 MG-UNIT capsule Take 1 capsule by mouth Daily.   Yes Historical Provider, MD   digoxin (LANOXIN) 125 MCG tablet Take 125 mcg by mouth Daily.   Yes Historical Provider, MD   furosemide (LASIX) 20 MG tablet  8/29/17  Yes Historical Provider, MD   gabapentin (NEURONTIN) 300 MG capsule Take 300 mg by mouth 3 (three) times a day.   Yes Historical Provider, MD   isosorbide mononitrate (IMDUR) 30 MG 24 hr tablet Take 30 mg by mouth Daily.   Yes Historical Provider, MD  "  lisinopril (PRINIVIL,ZESTRIL) 5 MG tablet Take 1 tablet by mouth Daily. 4/11/17  Yes Parish Perez MD   metoclopramide (REGLAN) 5 MG tablet Take 5 mg by mouth Daily. 8/7/17  Yes Historical Provider, MD   metoprolol succinate XL (TOPROL-XL) 25 MG 24 hr tablet Take 25 mg by mouth Daily.   Yes Historical Provider, MD   Multiple Vitamins-Minerals (PRESERVISION AREDS) tablet Take 1 tablet by mouth Every 12 (Twelve) Hours.   Yes Historical Provider, MD   pravastatin (PRAVACHOL) 20 MG tablet Take 20 mg by mouth daily.   Yes Historical Provider, MD   bumetanide (BUMEX) 1 MG tablet Take 1 tablet by mouth Daily. 6/30/17 8/30/17 Yes Rashad Mir MD       Review of Systems   Constitutional: Negative.    HENT: Negative.    Eyes: Negative.    Respiratory: Negative.    Cardiovascular: Positive for leg swelling.        Varicose veins to bilateral lower extremities left greater than right   Gastrointestinal: Negative.    Endocrine: Negative.    Genitourinary: Negative.    Musculoskeletal: Negative.         Hip pain, uses a cane to aid in ambulation   Skin: Negative.    Allergic/Immunologic: Negative.    Neurological: Negative.    Hematological: Negative.    Psychiatric/Behavioral: Negative.        /64  Pulse 74  Ht 67\" (170.2 cm)  Wt 130 lb (59 kg)  BMI 20.36 kg/m2  Physical Exam   Constitutional: She is oriented to person, place, and time. She appears well-developed and well-nourished. No distress.   HENT:   Head: Normocephalic and atraumatic.   Mouth/Throat: Oropharynx is clear and moist.   Eyes: Pupils are equal, round, and reactive to light. No scleral icterus.   Neck: Normal range of motion. Neck supple. No JVD present. Carotid bruit is not present. No thyromegaly present.   Cardiovascular: Normal rate, regular rhythm, S2 normal, intact distal pulses and normal pulses.  Exam reveals no gallop and no friction rub.    Murmur heard.  Varicose veins to bilateral lower extremities left greater than right " with mild swelling   Pulmonary/Chest: Effort normal and breath sounds normal.   Abdominal: Soft. Normal aorta and bowel sounds are normal. There is no hepatosplenomegaly.   Musculoskeletal: Normal range of motion.   Neurological: She is alert and oriented to person, place, and time. No cranial nerve deficit.   Skin: Skin is warm and dry. She is not diaphoretic.   Psychiatric: She has a normal mood and affect. Her behavior is normal. Judgment and thought content normal.   Nursing note and vitals reviewed.      Patient Active Problem List   Diagnosis   • Chronic systolic congestive heart failure   • Congestive heart failure with LV diastolic dysfunction, NYHA class 2   • Coronary artery disease involving native coronary artery of native heart without angina pectoris   • Mixed hyperlipidemia   • Type 2 diabetes mellitus without complication, without long-term current use of insulin   • Palpitations   • SVT (supraventricular tachycardia)   • Congestive heart failure   • SOB (shortness of breath) on exertion   • Renal insufficiency   • 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   • Varicose veins of both lower extremities         ICD-10-CM ICD-9-CM   1. Venous (peripheral) insufficiency I87.2 459.81   2. Mixed hyperlipidemia E78.2 272.2   3. Varicose veins of both lower extremities I83.93 454.9       Plan: After thoroughly evaluating Padmini Torres, I believe the best course of action is to initially remain conservative from a vascular standpoint.  I will give her a prescription for compression stockings in the 20-30 mm pressure gradient range.  I did give her instructions on how to wears these on a daily basis.  We will see her back in 3 months with a venous valvular insufficiency study.  The patient can continue taking her current medication regimen as previously planned.  This was all discussed in full with complete understanding.    Thank you for allowing me to participate in the care of your  patient.  Please do not hesitate with any questions or concerns.  I will keep you aware of any further encounters with Padmini Torres.        Sincerely yours,         CARL Orta, DO

## 2017-09-11 ENCOUNTER — TRANSCRIBE ORDERS (OUTPATIENT)
Dept: ADMINISTRATIVE | Facility: HOSPITAL | Age: 77
End: 2017-09-11

## 2017-09-11 DIAGNOSIS — R63.4 LOSS OF WEIGHT: Primary | ICD-10-CM

## 2017-09-14 ENCOUNTER — HOSPITAL ENCOUNTER (OUTPATIENT)
Dept: CT IMAGING | Facility: HOSPITAL | Age: 77
Discharge: HOME | End: 2017-09-14
Attending: INTERNAL MEDICINE | Admitting: INTERNAL MEDICINE

## 2017-09-14 DIAGNOSIS — R63.4 LOSS OF WEIGHT: ICD-10-CM

## 2017-09-14 LAB
CREAT BLD-MCNC: 1.18 MG/DL (ref 0.5–1.4)
CREAT BLDA-MCNC: 1.4 MG/DL (ref 0.6–1.3)
GFR SERPL CREATININE-BSD FRML MDRD: 44 ML/MIN/1.73

## 2017-09-14 PROCEDURE — 82565 ASSAY OF CREATININE: CPT | Performed by: INTERNAL MEDICINE

## 2017-09-14 PROCEDURE — 82565 ASSAY OF CREATININE: CPT

## 2017-09-14 PROCEDURE — 74177 CT ABD & PELVIS W/CONTRAST: CPT

## 2017-09-14 PROCEDURE — 71260 CT THORAX DX C+: CPT

## 2017-09-14 PROCEDURE — 0 IOPAMIDOL 61 % SOLUTION: Performed by: INTERNAL MEDICINE

## 2017-09-14 RX ADMIN — IOPAMIDOL 100 ML: 612 INJECTION, SOLUTION INTRAVENOUS at 11:30

## 2017-10-13 ENCOUNTER — PATIENT OUTREACH (OUTPATIENT)
Dept: CASE MANAGEMENT | Facility: OTHER | Age: 77
End: 2017-10-13

## 2017-10-13 NOTE — OUTREACH NOTE
Patient stated she is doing okay. Stated she is following prescribed medication regimen. Stated she received her flu vaccine at [facility] recently, and eye exam completed about one month ago with Dr Calderon. No questions or concerns voiced at time of call.

## 2017-12-20 ENCOUNTER — OFFICE VISIT (OUTPATIENT)
Dept: CARDIOLOGY | Facility: CLINIC | Age: 77
End: 2017-12-20

## 2017-12-20 VITALS
WEIGHT: 132 LBS | HEART RATE: 78 BPM | BODY MASS INDEX: 20.72 KG/M2 | SYSTOLIC BLOOD PRESSURE: 102 MMHG | OXYGEN SATURATION: 95 % | DIASTOLIC BLOOD PRESSURE: 58 MMHG | HEIGHT: 67 IN

## 2017-12-20 DIAGNOSIS — I25.10 CORONARY ARTERY DISEASE INVOLVING NATIVE CORONARY ARTERY OF NATIVE HEART WITHOUT ANGINA PECTORIS: ICD-10-CM

## 2017-12-20 DIAGNOSIS — I50.9 CONGESTIVE HEART FAILURE, UNSPECIFIED CONGESTIVE HEART FAILURE CHRONICITY, UNSPECIFIED CONGESTIVE HEART FAILURE TYPE: ICD-10-CM

## 2017-12-20 DIAGNOSIS — R06.02 SOB (SHORTNESS OF BREATH) ON EXERTION: ICD-10-CM

## 2017-12-20 DIAGNOSIS — E11.9 TYPE 2 DIABETES MELLITUS WITHOUT COMPLICATION, WITHOUT LONG-TERM CURRENT USE OF INSULIN (HCC): ICD-10-CM

## 2017-12-20 DIAGNOSIS — I50.22 CHRONIC SYSTOLIC CONGESTIVE HEART FAILURE (HCC): Primary | ICD-10-CM

## 2017-12-20 DIAGNOSIS — I50.30 CONGESTIVE HEART FAILURE WITH LV DIASTOLIC DYSFUNCTION, NYHA CLASS 2 (HCC): ICD-10-CM

## 2017-12-20 DIAGNOSIS — I47.1 SVT (SUPRAVENTRICULAR TACHYCARDIA) (HCC): ICD-10-CM

## 2017-12-20 DIAGNOSIS — R00.2 PALPITATIONS: ICD-10-CM

## 2017-12-20 DIAGNOSIS — E78.2 MIXED HYPERLIPIDEMIA: ICD-10-CM

## 2017-12-20 DIAGNOSIS — N28.9 RENAL INSUFFICIENCY: ICD-10-CM

## 2017-12-20 PROCEDURE — 99214 OFFICE O/P EST MOD 30 MIN: CPT | Performed by: INTERNAL MEDICINE

## 2017-12-20 PROCEDURE — 93000 ELECTROCARDIOGRAM COMPLETE: CPT | Performed by: INTERNAL MEDICINE

## 2017-12-20 NOTE — PROGRESS NOTES
Padmini Torres  [id number]  1940  77 y.o.  female    Referring Provider: Александр Yo DO    Reason for Follow-up Visit:  Routine follow up.  coronary artery disease  Stented coronary artery.   Severe LV dysfunction  She does not want ICD or Lifevest  Engorged left leg veins  congestive heart failure   Overall dramatically better    Subjective    Overall feeling well   No chest pain   Moderate exertional shortness of breath on exertion relieved with rest  No significant cough or wheezing  Going on for several months  No palpitations  No associated chest pain  No significant pedal edema  No fever or chills  No significant expectoration  No hemoptysis  No presyncope or syncope No palpitations  No significant pedal edema  Compliant with medications and dietary advice  Poor effort tolerance  Effort tolerance limited more by orthopedic rather than cardiac related issues.    No presyncope or syncope  Compliant with medications    .     History of present illness:  Padmini Torres is a 77 y.o. yo female with history of congestive heart failure who presents today for   Chief Complaint   Patient presents with   • Congestive Heart Failure     4 MON FU    • Shortness of Breath   .    History  Past Medical History:   Diagnosis Date   • Abdominal pain, left lower quadrant    • Bowel habit changes    • CAD (coronary artery disease)     LAD stent    • COPD (chronic obstructive pulmonary disease)    • Diabetes mellitus    • DVT (deep venous thrombosis)    • Dysphagia    • Hypertension    • Lymphoma    • Pulmonary emboli    • Varicose veins of legs    ,   Past Surgical History:   Procedure Laterality Date   • ANKLE SURGERY     • BACK SURGERY      lower   • CARDIAC CATHETERIZATION     • CHOLECYSTECTOMY     • COLONOSCOPY  04/23/2015    Last colon limted lower diverticulosis left colon, Internal Hemorrhoids   • COLONOSCOPY W/ BIOPSIES AND POLYPECTOMY  07/22/2014    Adenomatous tubular type, Diverticulosis sigmoid colon   •  CORONARY STENT PLACEMENT      x 1   • ENDOSCOPY  04/14/2010    Distal ring dilated 48 Fr   • EXPLORATORY LAPAROTOMY  2005    relapse lymphoma   • HEMORRHOIDECTOMY     • HYSTERECTOMY      total abdominal   • LUMBAR SPINE SURGERY     • LUNG BIOPSY N/A 1991    open    • SMALL INTESTINE SURGERY     • TONSILLECTOMY     • UPPER GASTROINTESTINAL ENDOSCOPY  10/23/2015    normal exam   ,   Family History   Problem Relation Age of Onset   • Diabetes Mother    • Cancer Father      prostate with bone metastasis   • Heart disease Maternal Grandmother    • Stroke Brother    • Colon cancer Neg Hx    • Colon polyps Neg Hx    ,   Social History   Substance Use Topics   • Smoking status: Never Smoker   • Smokeless tobacco: Never Used   • Alcohol use No   ,     Medications  Current Outpatient Prescriptions   Medication Sig Dispense Refill   • acetaminophen (TYLENOL ARTHRITIS PAIN) 650 MG 8 hr tablet Take 650 mg by mouth Every 12 (Twelve) Hours.     • amiodarone (PACERONE) 100 MG tablet TAKE 1 TABLET EVERY DAY 90 tablet 11   • aspirin 81 MG EC tablet Take 81 mg by mouth Daily.     • Calcium Carbonate-Vitamin D 600-200 MG-UNIT capsule Take 1 capsule by mouth Daily.     • digoxin (LANOXIN) 125 MCG tablet Take 125 mcg by mouth Daily.     • furosemide (LASIX) 20 MG tablet      • gabapentin (NEURONTIN) 300 MG capsule Take 300 mg by mouth 3 (three) times a day.     • lisinopril (PRINIVIL,ZESTRIL) 5 MG tablet Take 1 tablet by mouth Daily. 30 tablet 0   • metoprolol succinate XL (TOPROL-XL) 25 MG 24 hr tablet Take 25 mg by mouth Daily.     • pravastatin (PRAVACHOL) 20 MG tablet Take 20 mg by mouth daily.     • isosorbide mononitrate (IMDUR) 30 MG 24 hr tablet Take 30 mg by mouth Daily.     • metoclopramide (REGLAN) 5 MG tablet Take 5 mg by mouth Daily.     • Multiple Vitamins-Minerals (PRESERVISION AREDS) tablet Take 1 tablet by mouth Every 12 (Twelve) Hours.       No current facility-administered medications for this visit.        Allergies:   "Zantac [ranitidine hcl]    Review of Systems  Review of Systems   Constitution: Positive for weakness and malaise/fatigue.   HENT: Negative.    Eyes: Negative.    Cardiovascular: Positive for dyspnea on exertion. Negative for chest pain, claudication, cyanosis, irregular heartbeat, leg swelling, near-syncope, orthopnea, palpitations, paroxysmal nocturnal dyspnea and syncope.   Respiratory: Negative.    Endocrine: Negative.    Hematologic/Lymphatic: Negative.    Skin: Negative.    Musculoskeletal: Positive for arthritis and back pain.   Gastrointestinal: Negative for anorexia.   Genitourinary: Negative.    Psychiatric/Behavioral: Negative.        Objective     Physical Exam:  /58  Pulse 78  Ht 170.2 cm (67.01\")  Wt 59.9 kg (132 lb)  SpO2 95%  BMI 20.67 kg/m2  Physical Exam   Constitutional: She appears well-developed.   HENT:   Head: Normocephalic.   Neck: Normal carotid pulses and no JVD present. No tracheal tenderness present. Carotid bruit is not present. No tracheal deviation and no edema present.   Cardiovascular: Regular rhythm and normal pulses.    Murmur heard.   Systolic murmur is present with a grade of 2/6   Pulmonary/Chest: Effort normal. No stridor.   Abdominal: Soft.   Neurological: She is alert. She has normal strength. No cranial nerve deficit or sensory deficit.   Skin: Skin is warm.   Psychiatric: She has a normal mood and affect. Her speech is normal and behavior is normal.   Severely dilated left leg veins    Results Review:       ECG 12 Lead  Date/Time: 12/20/2017 9:36 AM  Performed by: ZAINAB LORA  Authorized by: ZAINAB LORA   Comparison: compared with previous ECG from 4/16/2017  Similar to previous ECG  Rhythm: sinus rhythm  Conduction: right bundle branch block and 1st degree  ST Segments: ST segments normal  Clinical impression: abnormal ECG          Results for orders placed during the hospital encounter of 06/26/17   Adult Transthoracic Echo Limited With Contrast    Narrative " "· Left ventricular systolic function is severely decreased. Estimated EF =   25%.  · calcification of the aortic valve  · Mild aortic valve stenosis is present.  · No evidence of pulmonary hypertension is present.  · Similar to 4/8/17        Assessment/Plan   Patient Active Problem List   Diagnosis   • Chronic systolic congestive heart failure   • Congestive heart failure with LV diastolic dysfunction, NYHA class 2   • Coronary artery disease involving native coronary artery of native heart without angina pectoris   • Mixed hyperlipidemia   • Type 2 diabetes mellitus without complication, without long-term current use of insulin   • Palpitations   • SVT (supraventricular tachycardia)   • Congestive heart failure   • SOB (shortness of breath) on exertion   • Renal insufficiency   • 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   • Varicose veins of both lower extremities       Stable doing well. No chest pain or excessive dyspnea. No palpitations. No significant pedal edema. Compliant with medications and diet. Latest labs and medications reviewed.  Severe LV dysfunction       Plan:    Flexible diuretic dosing  Low salt/ HTN/ Heart healthy carbohydrate restricted cardiac diet as applicable to this patient's current diagnoses.   This handout has relevant information regarding shopping for food, preparing meals, what to eat at restaurants, tracking of food intake, information regarding sodium intake and salt content, how to read food labels, knowing what to eat, tips reagarding physical activity, calorie count and calorie expenditure. What foods to avoid. Information regarding alcoholic drinks along with \"good\" and \"bad\" fats.  Relevant printed educational materials given pertinent to above diagnoses     Weigh yourself frequently, at least weekly, preferably daily, call me if more than 2 pounds a day or 5 pounds a week weight gain  Monitor for any signs of bleeding including red or dark stools. Fall " precautions.  Patient is asked to monitor BP at home or work, several times per month and return with written values at next office visit.   Keep follow up with me in 3 months  Close follow up with you as scheduled.  Intensive factor modifications.  See order list.   She prefers medical therapy and not ICD  Gave a copy of my notes and relevant tests/ prior ECG etc for the patient to review and follow specific advise and relevant findings if any, prognosis, along with my current and future plans.   Orders Placed This Encounter   Procedures   • ECG 12 Lead   • Adult Transthoracic Echo Limited W/ Cont if Necessary Per Protocol       Return in about 5 months (around 5/20/2018).

## 2018-01-01 ENCOUNTER — READMISSION MANAGEMENT (OUTPATIENT)
Dept: CALL CENTER | Facility: HOSPITAL | Age: 78
End: 2018-01-01

## 2018-01-01 ENCOUNTER — APPOINTMENT (OUTPATIENT)
Dept: PULMONOLOGY | Facility: HOSPITAL | Age: 78
End: 2018-01-01
Attending: INTERNAL MEDICINE

## 2018-01-01 ENCOUNTER — HOSPITAL ENCOUNTER (OUTPATIENT)
Dept: CARDIOLOGY | Facility: HOSPITAL | Age: 78
Discharge: HOME | End: 2018-06-11
Attending: INTERNAL MEDICINE

## 2018-01-01 ENCOUNTER — APPOINTMENT (OUTPATIENT)
Dept: GENERAL RADIOLOGY | Facility: HOSPITAL | Age: 78
End: 2018-01-01

## 2018-01-01 ENCOUNTER — APPOINTMENT (OUTPATIENT)
Dept: CARDIOLOGY | Facility: HOSPITAL | Age: 78
End: 2018-01-01
Attending: INTERNAL MEDICINE

## 2018-01-01 ENCOUNTER — TELEPHONE (OUTPATIENT)
Dept: CARDIOLOGY | Facility: CLINIC | Age: 78
End: 2018-01-01

## 2018-01-01 ENCOUNTER — HOSPITAL ENCOUNTER (OUTPATIENT)
Dept: CT IMAGING | Facility: HOSPITAL | Age: 78
Discharge: HOME | End: 2018-03-27
Attending: INTERNAL MEDICINE | Admitting: INTERNAL MEDICINE

## 2018-01-01 ENCOUNTER — HOSPITAL ENCOUNTER (OUTPATIENT)
Dept: CARDIOLOGY | Facility: HOSPITAL | Age: 78
Discharge: HOME | End: 2018-04-25
Attending: INTERNAL MEDICINE | Admitting: INTERNAL MEDICINE

## 2018-01-01 ENCOUNTER — HOSPITAL ENCOUNTER (OUTPATIENT)
Dept: GENERAL RADIOLOGY | Facility: HOSPITAL | Age: 78
Discharge: HOME | End: 2018-12-11
Attending: INTERNAL MEDICINE | Admitting: INTERNAL MEDICINE

## 2018-01-01 ENCOUNTER — TRANSCRIBE ORDERS (OUTPATIENT)
Dept: GENERAL RADIOLOGY | Facility: HOSPITAL | Age: 78
End: 2018-01-01

## 2018-01-01 ENCOUNTER — HOSPITAL ENCOUNTER (INPATIENT)
Facility: HOSPITAL | Age: 78
LOS: 4 days | Discharge: HOME | End: 2018-12-04
Attending: EMERGENCY MEDICINE | Admitting: FAMILY MEDICINE

## 2018-01-01 ENCOUNTER — HOSPITAL ENCOUNTER (INPATIENT)
Facility: HOSPITAL | Age: 78
LOS: 3 days | Discharge: HOME | End: 2018-11-08
Attending: EMERGENCY MEDICINE | Admitting: INTERNAL MEDICINE

## 2018-01-01 ENCOUNTER — PROCEDURE VISIT (OUTPATIENT)
Dept: OTOLARYNGOLOGY | Facility: CLINIC | Age: 78
End: 2018-01-01

## 2018-01-01 ENCOUNTER — HOSPITAL ENCOUNTER (OUTPATIENT)
Dept: CARDIOLOGY | Facility: HOSPITAL | Age: 78
Discharge: HOME | End: 2018-05-08
Attending: INTERNAL MEDICINE | Admitting: INTERNAL MEDICINE

## 2018-01-01 ENCOUNTER — TRANSCRIBE ORDERS (OUTPATIENT)
Dept: ADMINISTRATIVE | Facility: HOSPITAL | Age: 78
End: 2018-01-01

## 2018-01-01 ENCOUNTER — APPOINTMENT (OUTPATIENT)
Dept: CT IMAGING | Facility: HOSPITAL | Age: 78
End: 2018-01-01

## 2018-01-01 ENCOUNTER — HOSPITAL ENCOUNTER (INPATIENT)
Facility: HOSPITAL | Age: 78
LOS: 3 days | Discharge: HOME | End: 2018-11-02
Attending: EMERGENCY MEDICINE | Admitting: FAMILY MEDICINE

## 2018-01-01 ENCOUNTER — OFFICE VISIT (OUTPATIENT)
Dept: OTOLARYNGOLOGY | Facility: CLINIC | Age: 78
End: 2018-01-01

## 2018-01-01 ENCOUNTER — EPISODE CHANGES (OUTPATIENT)
Dept: CASE MANAGEMENT | Facility: OTHER | Age: 78
End: 2018-01-01

## 2018-01-01 ENCOUNTER — OFFICE VISIT (OUTPATIENT)
Dept: CARDIOLOGY | Facility: CLINIC | Age: 78
End: 2018-01-01

## 2018-01-01 ENCOUNTER — APPOINTMENT (OUTPATIENT)
Dept: CARDIOLOGY | Facility: HOSPITAL | Age: 78
End: 2018-01-01

## 2018-01-01 ENCOUNTER — APPOINTMENT (OUTPATIENT)
Dept: ULTRASOUND IMAGING | Facility: HOSPITAL | Age: 78
End: 2018-01-01

## 2018-01-01 VITALS
OXYGEN SATURATION: 99 % | DIASTOLIC BLOOD PRESSURE: 52 MMHG | HEART RATE: 92 BPM | SYSTOLIC BLOOD PRESSURE: 97 MMHG | TEMPERATURE: 97.3 F | RESPIRATION RATE: 18 BRPM | WEIGHT: 132 LBS | HEIGHT: 67 IN | BODY MASS INDEX: 20.72 KG/M2

## 2018-01-01 VITALS
SYSTOLIC BLOOD PRESSURE: 120 MMHG | DIASTOLIC BLOOD PRESSURE: 50 MMHG | HEART RATE: 80 BPM | HEIGHT: 67 IN | BODY MASS INDEX: 20.25 KG/M2 | OXYGEN SATURATION: 97 % | WEIGHT: 129 LBS

## 2018-01-01 VITALS
WEIGHT: 130.2 LBS | OXYGEN SATURATION: 97 % | SYSTOLIC BLOOD PRESSURE: 116 MMHG | HEIGHT: 67 IN | TEMPERATURE: 97.8 F | DIASTOLIC BLOOD PRESSURE: 60 MMHG | BODY MASS INDEX: 20.44 KG/M2 | HEART RATE: 74 BPM | RESPIRATION RATE: 18 BRPM

## 2018-01-01 VITALS
WEIGHT: 129 LBS | TEMPERATURE: 98.6 F | DIASTOLIC BLOOD PRESSURE: 50 MMHG | HEIGHT: 67 IN | BODY MASS INDEX: 20.25 KG/M2 | SYSTOLIC BLOOD PRESSURE: 94 MMHG

## 2018-01-01 VITALS — DIASTOLIC BLOOD PRESSURE: 83 MMHG | HEART RATE: 80 BPM | SYSTOLIC BLOOD PRESSURE: 149 MMHG

## 2018-01-01 VITALS
SYSTOLIC BLOOD PRESSURE: 121 MMHG | HEART RATE: 80 BPM | HEIGHT: 67 IN | DIASTOLIC BLOOD PRESSURE: 62 MMHG | OXYGEN SATURATION: 98 % | BODY MASS INDEX: 20.62 KG/M2 | WEIGHT: 131.4 LBS

## 2018-01-01 VITALS
SYSTOLIC BLOOD PRESSURE: 100 MMHG | WEIGHT: 125 LBS | BODY MASS INDEX: 20.09 KG/M2 | HEIGHT: 66 IN | RESPIRATION RATE: 18 BRPM | DIASTOLIC BLOOD PRESSURE: 74 MMHG | HEART RATE: 89 BPM | TEMPERATURE: 97.2 F | OXYGEN SATURATION: 92 %

## 2018-01-01 VITALS
DIASTOLIC BLOOD PRESSURE: 64 MMHG | BODY MASS INDEX: 20.05 KG/M2 | WEIGHT: 128 LBS | OXYGEN SATURATION: 96 % | SYSTOLIC BLOOD PRESSURE: 112 MMHG | HEART RATE: 86 BPM

## 2018-01-01 VITALS
BODY MASS INDEX: 20.72 KG/M2 | TEMPERATURE: 98.4 F | SYSTOLIC BLOOD PRESSURE: 118 MMHG | HEIGHT: 67 IN | WEIGHT: 132 LBS | DIASTOLIC BLOOD PRESSURE: 62 MMHG

## 2018-01-01 VITALS
SYSTOLIC BLOOD PRESSURE: 120 MMHG | BODY MASS INDEX: 20.72 KG/M2 | HEIGHT: 67 IN | WEIGHT: 132 LBS | DIASTOLIC BLOOD PRESSURE: 68 MMHG

## 2018-01-01 DIAGNOSIS — H93.12 TINNITUS OF LEFT EAR: ICD-10-CM

## 2018-01-01 DIAGNOSIS — I83.892 VARICOSE VEINS OF LEFT LOWER EXTREMITY WITH OTHER COMPLICATIONS: ICD-10-CM

## 2018-01-01 DIAGNOSIS — I50.22 CHRONIC SYSTOLIC CONGESTIVE HEART FAILURE (HCC): Primary | ICD-10-CM

## 2018-01-01 DIAGNOSIS — I50.30 CONGESTIVE HEART FAILURE WITH LV DIASTOLIC DYSFUNCTION, NYHA CLASS 2 (HCC): ICD-10-CM

## 2018-01-01 DIAGNOSIS — R42 INTERMITTENT VERTIGO: ICD-10-CM

## 2018-01-01 DIAGNOSIS — R06.02 SOB (SHORTNESS OF BREATH) ON EXERTION: ICD-10-CM

## 2018-01-01 DIAGNOSIS — R13.19 ESOPHAGEAL DYSPHAGIA: ICD-10-CM

## 2018-01-01 DIAGNOSIS — R00.2 PALPITATIONS: ICD-10-CM

## 2018-01-01 DIAGNOSIS — K21.9 GASTROESOPHAGEAL REFLUX DISEASE, ESOPHAGITIS PRESENCE NOT SPECIFIED: ICD-10-CM

## 2018-01-01 DIAGNOSIS — I25.10 CORONARY ARTERY DISEASE INVOLVING NATIVE CORONARY ARTERY OF NATIVE HEART WITHOUT ANGINA PECTORIS: ICD-10-CM

## 2018-01-01 DIAGNOSIS — I83.93 VARICOSE VEINS OF BOTH LOWER EXTREMITIES: ICD-10-CM

## 2018-01-01 DIAGNOSIS — N28.9 RENAL INSUFFICIENCY: ICD-10-CM

## 2018-01-01 DIAGNOSIS — R42 VERTIGO: ICD-10-CM

## 2018-01-01 DIAGNOSIS — J18.9 UNRESOLVED PNEUMONIA: ICD-10-CM

## 2018-01-01 DIAGNOSIS — R91.8 LUNG MASS: ICD-10-CM

## 2018-01-01 DIAGNOSIS — E11.9 TYPE 2 DIABETES MELLITUS WITHOUT COMPLICATION, WITHOUT LONG-TERM CURRENT USE OF INSULIN (HCC): ICD-10-CM

## 2018-01-01 DIAGNOSIS — I47.1 SVT (SUPRAVENTRICULAR TACHYCARDIA) (HCC): ICD-10-CM

## 2018-01-01 DIAGNOSIS — H81.02 MENIERE'S DISEASE OF LEFT EAR: Primary | ICD-10-CM

## 2018-01-01 DIAGNOSIS — J18.9 UNRESOLVED PNEUMONIA: Primary | ICD-10-CM

## 2018-01-01 DIAGNOSIS — Z78.9 IMPAIRED MOBILITY AND ADLS: ICD-10-CM

## 2018-01-01 DIAGNOSIS — H81.02 MENIERE'S DISEASE OF LEFT EAR: ICD-10-CM

## 2018-01-01 DIAGNOSIS — E78.2 MIXED HYPERLIPIDEMIA: ICD-10-CM

## 2018-01-01 DIAGNOSIS — J44.9 CHRONIC OBSTRUCTIVE PULMONARY DISEASE, UNSPECIFIED COPD TYPE (HCC): ICD-10-CM

## 2018-01-01 DIAGNOSIS — Z74.09 IMPAIRED MOBILITY AND ADLS: ICD-10-CM

## 2018-01-01 DIAGNOSIS — Z74.09 IMPAIRED FUNCTIONAL MOBILITY, BALANCE, GAIT, AND ENDURANCE: ICD-10-CM

## 2018-01-01 DIAGNOSIS — I50.9 ACUTE ON CHRONIC CONGESTIVE HEART FAILURE, UNSPECIFIED HEART FAILURE TYPE (HCC): Primary | ICD-10-CM

## 2018-01-01 DIAGNOSIS — I50.23 ACUTE ON CHRONIC SYSTOLIC CONGESTIVE HEART FAILURE (HCC): Primary | ICD-10-CM

## 2018-01-01 DIAGNOSIS — J18.9 PNEUMONIA OF RIGHT LUNG DUE TO INFECTIOUS ORGANISM, UNSPECIFIED PART OF LUNG: Primary | ICD-10-CM

## 2018-01-01 DIAGNOSIS — I50.20 SYSTOLIC CONGESTIVE HEART FAILURE, UNSPECIFIED CONGESTIVE HEART FAILURE CHRONICITY: ICD-10-CM

## 2018-01-01 DIAGNOSIS — I42.9 CARDIOMYOPATHY, UNSPECIFIED TYPE (HCC): ICD-10-CM

## 2018-01-01 DIAGNOSIS — R59.0 CERVICAL LYMPHADENOPATHY: ICD-10-CM

## 2018-01-01 DIAGNOSIS — R59.0 CERVICAL LYMPHADENOPATHY: Primary | ICD-10-CM

## 2018-01-01 DIAGNOSIS — H90.3 SENSORINEURAL HEARING LOSS (SNHL) OF BOTH EARS: Primary | ICD-10-CM

## 2018-01-01 DIAGNOSIS — J42 CHRONIC BRONCHITIS, UNSPECIFIED CHRONIC BRONCHITIS TYPE (HCC): Primary | ICD-10-CM

## 2018-01-01 DIAGNOSIS — I50.20 SYSTOLIC CONGESTIVE HEART FAILURE, UNSPECIFIED HF CHRONICITY (HCC): ICD-10-CM

## 2018-01-01 LAB
25(OH)D3 SERPL-MCNC: 38.1 NG/ML (ref 30–100)
ALBUMIN SERPL-MCNC: 2.9 G/DL (ref 3.5–5)
ALBUMIN SERPL-MCNC: 3 G/DL (ref 3.5–5)
ALBUMIN SERPL-MCNC: 3.1 G/DL (ref 3.5–5)
ALBUMIN SERPL-MCNC: 3.1 G/DL (ref 3.5–5)
ALBUMIN SERPL-MCNC: 3.5 G/DL (ref 3.5–5)
ALBUMIN SERPL-MCNC: 3.5 G/DL (ref 3.5–5)
ALBUMIN SERPL-MCNC: 3.6 G/DL (ref 3.5–5)
ALBUMIN SERPL-MCNC: 3.9 G/DL (ref 3.5–5)
ALBUMIN/GLOB SERPL: 1 G/DL (ref 1.1–2.5)
ALBUMIN/GLOB SERPL: 1.1 G/DL (ref 1.1–2.5)
ALBUMIN/GLOB SERPL: 1.1 G/DL (ref 1.1–2.5)
ALBUMIN/GLOB SERPL: 1.2 G/DL (ref 1.1–2.5)
ALBUMIN/GLOB SERPL: 1.3 G/DL (ref 1.1–2.5)
ALP SERPL-CCNC: 47 U/L (ref 24–120)
ALP SERPL-CCNC: 55 U/L (ref 24–120)
ALP SERPL-CCNC: 63 U/L (ref 24–120)
ALP SERPL-CCNC: 63 U/L (ref 24–120)
ALP SERPL-CCNC: 64 U/L (ref 24–120)
ALP SERPL-CCNC: 68 U/L (ref 24–120)
ALP SERPL-CCNC: 79 U/L (ref 24–120)
ALP SERPL-CCNC: 83 U/L (ref 24–120)
ALP SERPL-CCNC: 87 U/L (ref 24–120)
ALP SERPL-CCNC: 97 U/L (ref 24–120)
ALT SERPL W P-5'-P-CCNC: 15 U/L (ref 0–54)
ALT SERPL W P-5'-P-CCNC: 17 U/L (ref 0–54)
ALT SERPL W P-5'-P-CCNC: 21 U/L (ref 0–54)
ALT SERPL W P-5'-P-CCNC: 25 U/L (ref 0–54)
ALT SERPL W P-5'-P-CCNC: 28 U/L (ref 0–54)
ALT SERPL W P-5'-P-CCNC: 32 U/L (ref 0–54)
ALT SERPL W P-5'-P-CCNC: 36 U/L (ref 0–54)
ALT SERPL W P-5'-P-CCNC: <15 U/L (ref 0–54)
AMMONIA BLD-SCNC: <9 UMOL/L (ref 9–33)
ANION GAP SERPL CALCULATED.3IONS-SCNC: 10 MMOL/L (ref 4–13)
ANION GAP SERPL CALCULATED.3IONS-SCNC: 11 MMOL/L (ref 4–13)
ANION GAP SERPL CALCULATED.3IONS-SCNC: 11 MMOL/L (ref 4–13)
ANION GAP SERPL CALCULATED.3IONS-SCNC: 13 MMOL/L (ref 4–13)
ANION GAP SERPL CALCULATED.3IONS-SCNC: 13 MMOL/L (ref 4–13)
ANION GAP SERPL CALCULATED.3IONS-SCNC: 15 MMOL/L (ref 4–13)
ANION GAP SERPL CALCULATED.3IONS-SCNC: 19 MMOL/L (ref 4–13)
ANION GAP SERPL CALCULATED.3IONS-SCNC: 7 MMOL/L (ref 4–13)
ANION GAP SERPL CALCULATED.3IONS-SCNC: 9 MMOL/L (ref 4–13)
APTT PPP: 34.6 SECONDS (ref 24.1–34.8)
APTT PPP: 35.7 SECONDS (ref 24.1–34.8)
ARTERIAL PATENCY WRIST A: POSITIVE
ARTICHOKE IGE QN: 53 MG/DL (ref 0–99)
ARTICHOKE IGE QN: 59 MG/DL (ref 0–99)
AST SERPL-CCNC: 18 U/L (ref 7–45)
AST SERPL-CCNC: 19 U/L (ref 7–45)
AST SERPL-CCNC: 23 U/L (ref 7–45)
AST SERPL-CCNC: 23 U/L (ref 7–45)
AST SERPL-CCNC: 27 U/L (ref 7–45)
AST SERPL-CCNC: 29 U/L (ref 7–45)
AST SERPL-CCNC: 31 U/L (ref 7–45)
AST SERPL-CCNC: 39 U/L (ref 7–45)
AST SERPL-CCNC: 48 U/L (ref 7–45)
AST SERPL-CCNC: 49 U/L (ref 7–45)
ATMOSPHERIC PRESS: 749 MMHG
BACTERIA SPEC AEROBE CULT: NORMAL
BACTERIA SPEC AEROBE CULT: NORMAL
BACTERIA UR QL AUTO: ABNORMAL /HPF
BACTERIA UR QL AUTO: ABNORMAL /HPF
BASE EXCESS BLDA CALC-SCNC: 7.7 MMOL/L (ref 0–2)
BASOPHILS # BLD AUTO: 0.01 10*3/MM3 (ref 0–0.2)
BASOPHILS # BLD AUTO: 0.02 10*3/MM3 (ref 0–0.2)
BASOPHILS # BLD AUTO: 0.03 10*3/MM3 (ref 0–0.2)
BASOPHILS # BLD AUTO: 0.03 10*3/MM3 (ref 0–0.2)
BASOPHILS # BLD AUTO: 0.04 10*3/MM3 (ref 0–0.2)
BASOPHILS # BLD AUTO: 0.05 10*3/MM3 (ref 0–0.2)
BASOPHILS NFR BLD AUTO: 0.2 % (ref 0–2)
BASOPHILS NFR BLD AUTO: 0.3 % (ref 0–2)
BASOPHILS NFR BLD AUTO: 0.5 % (ref 0–2)
BASOPHILS NFR BLD AUTO: 0.9 % (ref 0–2)
BDY SITE: ABNORMAL
BH CV ECHO MEAS - AI DEC SLOPE: 231 CM/SEC^2
BH CV ECHO MEAS - AI MAX PG: 30 MMHG
BH CV ECHO MEAS - AI MAX VEL: 274 CM/SEC
BH CV ECHO MEAS - AI P1/2T: 347.4 MSEC
BH CV ECHO MEAS - AO MAX PG (FULL): 21.6 MMHG
BH CV ECHO MEAS - AO MAX PG (FULL): 9.9 MMHG
BH CV ECHO MEAS - AO MAX PG: 11.7 MMHG
BH CV ECHO MEAS - AO MAX PG: 25 MMHG
BH CV ECHO MEAS - AO MEAN PG (FULL): 14 MMHG
BH CV ECHO MEAS - AO MEAN PG (FULL): 6 MMHG
BH CV ECHO MEAS - AO MEAN PG: 16 MMHG
BH CV ECHO MEAS - AO MEAN PG: 7 MMHG
BH CV ECHO MEAS - AO ROOT AREA (BSA CORRECTED): 2
BH CV ECHO MEAS - AO ROOT AREA (BSA CORRECTED): 2.3
BH CV ECHO MEAS - AO ROOT AREA: 11.3 CM^2
BH CV ECHO MEAS - AO ROOT AREA: 9.1 CM^2
BH CV ECHO MEAS - AO ROOT DIAM: 3.4 CM
BH CV ECHO MEAS - AO ROOT DIAM: 3.8 CM
BH CV ECHO MEAS - AO V2 MAX: 171 CM/SEC
BH CV ECHO MEAS - AO V2 MAX: 250 CM/SEC
BH CV ECHO MEAS - AO V2 MEAN: 119 CM/SEC
BH CV ECHO MEAS - AO V2 MEAN: 188 CM/SEC
BH CV ECHO MEAS - AO V2 VTI: 30.4 CM
BH CV ECHO MEAS - AO V2 VTI: 60.9 CM
BH CV ECHO MEAS - AVA(I,A): 1.1 CM^2
BH CV ECHO MEAS - AVA(I,A): 1.4 CM^2
BH CV ECHO MEAS - AVA(I,D): 1.1 CM^2
BH CV ECHO MEAS - AVA(I,D): 1.4 CM^2
BH CV ECHO MEAS - AVA(V,A): 1.1 CM^2
BH CV ECHO MEAS - AVA(V,A): 1.7 CM^2
BH CV ECHO MEAS - AVA(V,D): 1.1 CM^2
BH CV ECHO MEAS - AVA(V,D): 1.7 CM^2
BH CV ECHO MEAS - BSA(HAYCOCK): 1.7 M^2
BH CV ECHO MEAS - BSA(HAYCOCK): 1.7 M^2
BH CV ECHO MEAS - BSA: 1.7 M^2
BH CV ECHO MEAS - BSA: 1.7 M^2
BH CV ECHO MEAS - BZI_BMI: 20 KILOGRAMS/M^2
BH CV ECHO MEAS - BZI_BMI: 20.7 KILOGRAMS/M^2
BH CV ECHO MEAS - BZI_METRIC_HEIGHT: 170.2 CM
BH CV ECHO MEAS - BZI_METRIC_HEIGHT: 170.2 CM
BH CV ECHO MEAS - BZI_METRIC_WEIGHT: 58.1 KG
BH CV ECHO MEAS - BZI_METRIC_WEIGHT: 59.9 KG
BH CV ECHO MEAS - EDV(CUBED): 125 ML
BH CV ECHO MEAS - EDV(CUBED): 134.2 ML
BH CV ECHO MEAS - EDV(MOD-SP4): 99.1 ML
BH CV ECHO MEAS - EDV(TEICH): 118.2 ML
BH CV ECHO MEAS - EDV(TEICH): 124.9 ML
BH CV ECHO MEAS - EF(CUBED): 27.5 %
BH CV ECHO MEAS - EF(CUBED): 71.3 %
BH CV ECHO MEAS - EF(MOD-SP4): 35.5 %
BH CV ECHO MEAS - EF(TEICH): 22.1 %
BH CV ECHO MEAS - EF(TEICH): 62.7 %
BH CV ECHO MEAS - ESV(CUBED): 35.9 ML
BH CV ECHO MEAS - ESV(CUBED): 97.3 ML
BH CV ECHO MEAS - ESV(MOD-SP4): 63.9 ML
BH CV ECHO MEAS - ESV(TEICH): 44.1 ML
BH CV ECHO MEAS - ESV(TEICH): 97.3 ML
BH CV ECHO MEAS - FS: 10.2 %
BH CV ECHO MEAS - FS: 34 %
BH CV ECHO MEAS - IVS/LVPW: 1
BH CV ECHO MEAS - IVS/LVPW: 1.2
BH CV ECHO MEAS - IVSD: 0.9 CM
BH CV ECHO MEAS - IVSD: 0.94 CM
BH CV ECHO MEAS - LA DIMENSION: 3.2 CM
BH CV ECHO MEAS - LA DIMENSION: 3.7 CM
BH CV ECHO MEAS - LA/AO: 0.94
BH CV ECHO MEAS - LA/AO: 0.97
BH CV ECHO MEAS - LAT PEAK E' VEL: 7.6 CM/SEC
BH CV ECHO MEAS - LAT PEAK E' VEL: 9.7 CM/SEC
BH CV ECHO MEAS - LV DIASTOLIC VOL/BSA (35-75): 59.2 ML/M^2
BH CV ECHO MEAS - LV MASS(C)D: 155.9 GRAMS
BH CV ECHO MEAS - LV MASS(C)D: 158.2 GRAMS
BH CV ECHO MEAS - LV MASS(C)DI: 93.2 GRAMS/M^2
BH CV ECHO MEAS - LV MASS(C)DI: 93.4 GRAMS/M^2
BH CV ECHO MEAS - LV MAX PG: 1.8 MMHG
BH CV ECHO MEAS - LV MAX PG: 3.4 MMHG
BH CV ECHO MEAS - LV MEAN PG: 1 MMHG
BH CV ECHO MEAS - LV MEAN PG: 2 MMHG
BH CV ECHO MEAS - LV SYSTOLIC VOL/BSA (12-30): 38.2 ML/M^2
BH CV ECHO MEAS - LV V1 MAX: 66.2 CM/SEC
BH CV ECHO MEAS - LV V1 MAX: 91.8 CM/SEC
BH CV ECHO MEAS - LV V1 MEAN: 42.5 CM/SEC
BH CV ECHO MEAS - LV V1 MEAN: 62.2 CM/SEC
BH CV ECHO MEAS - LV V1 VTI: 12.1 CM
BH CV ECHO MEAS - LV V1 VTI: 19.2 CM
BH CV ECHO MEAS - LVIDD: 5 CM
BH CV ECHO MEAS - LVIDD: 5.6 CM
BH CV ECHO MEAS - LVIDS: 3.3 CM
BH CV ECHO MEAS - LVIDS: 4.6 CM
BH CV ECHO MEAS - LVLD AP4: 7.8 CM
BH CV ECHO MEAS - LVLS AP4: 7.3 CM
BH CV ECHO MEAS - LVOT AREA (M): 2.8 CM^2
BH CV ECHO MEAS - LVOT AREA (M): 4.5 CM^2
BH CV ECHO MEAS - LVOT AREA: 2.8 CM^2
BH CV ECHO MEAS - LVOT AREA: 4.5 CM^2
BH CV ECHO MEAS - LVOT DIAM: 1.9 CM
BH CV ECHO MEAS - LVOT DIAM: 2.4 CM
BH CV ECHO MEAS - LVPWD: 0.79 CM
BH CV ECHO MEAS - LVPWD: 0.9 CM
BH CV ECHO MEAS - MED PEAK E' VEL: 4.33 CM/SEC
BH CV ECHO MEAS - MED PEAK E' VEL: 5.98 CM/SEC
BH CV ECHO MEAS - MR MAX PG: 42.2 MMHG
BH CV ECHO MEAS - MR MAX VEL: 324.7 CM/SEC
BH CV ECHO MEAS - MR MEAN PG: 26 MMHG
BH CV ECHO MEAS - MR MEAN VEL: 230 CM/SEC
BH CV ECHO MEAS - MR VTI: 98 CM
BH CV ECHO MEAS - MV A MAX VEL: 117 CM/SEC
BH CV ECHO MEAS - MV A MAX VEL: 81 CM/SEC
BH CV ECHO MEAS - MV DEC TIME: 0.19 SEC
BH CV ECHO MEAS - MV DEC TIME: 0.22 SEC
BH CV ECHO MEAS - MV E MAX VEL: 90.7 CM/SEC
BH CV ECHO MEAS - MV E MAX VEL: 99.9 CM/SEC
BH CV ECHO MEAS - MV E/A: 0.85
BH CV ECHO MEAS - MV E/A: 1.1
BH CV ECHO MEAS - PI END-D VEL: 77.9 CM/SEC
BH CV ECHO MEAS - PI END-D VEL: 86.2 CM/SEC
BH CV ECHO MEAS - RAP SYSTOLE: 5 MMHG
BH CV ECHO MEAS - RAP SYSTOLE: 5 MMHG
BH CV ECHO MEAS - RVSP: 17.3 MMHG
BH CV ECHO MEAS - RVSP: 31.4 MMHG
BH CV ECHO MEAS - SI(AO): 206.1 ML/M^2
BH CV ECHO MEAS - SI(AO): 326.3 ML/M^2
BH CV ECHO MEAS - SI(CUBED): 22 ML/M^2
BH CV ECHO MEAS - SI(CUBED): 52.6 ML/M^2
BH CV ECHO MEAS - SI(LVOT): 20.5 ML/M^2
BH CV ECHO MEAS - SI(LVOT): 51.3 ML/M^2
BH CV ECHO MEAS - SI(MOD-SP4): 21 ML/M^2
BH CV ECHO MEAS - SI(TEICH): 16.5 ML/M^2
BH CV ECHO MEAS - SI(TEICH): 43.7 ML/M^2
BH CV ECHO MEAS - SV(AO): 344.8 ML
BH CV ECHO MEAS - SV(AO): 552.9 ML
BH CV ECHO MEAS - SV(CUBED): 36.9 ML
BH CV ECHO MEAS - SV(CUBED): 89.1 ML
BH CV ECHO MEAS - SV(LVOT): 34.3 ML
BH CV ECHO MEAS - SV(LVOT): 86.9 ML
BH CV ECHO MEAS - SV(MOD-SP4): 35.2 ML
BH CV ECHO MEAS - SV(TEICH): 27.6 ML
BH CV ECHO MEAS - SV(TEICH): 74.1 ML
BH CV ECHO MEAS - TR MAX VEL: 175 CM/SEC
BH CV ECHO MEAS - TR MAX VEL: 257 CM/SEC
BH CV ECHO MEASUREMENTS AVERAGE E/E' RATIO: 12.93
BH CV ECHO MEASUREMENTS AVERAGE E/E' RATIO: 14.71
BH CV NUCLEAR PRIOR STUDY: 3
BH CV STRESS BP STAGE 1: NORMAL
BH CV STRESS COMMENTS STAGE 1: NORMAL
BH CV STRESS DOSE REGADENOSON STAGE 1: 0.4
BH CV STRESS DURATION MIN STAGE 1: 0
BH CV STRESS DURATION SEC STAGE 1: 10
BH CV STRESS HR STAGE 1: 93
BH CV STRESS PROTOCOL 1: NORMAL
BH CV STRESS RECOVERY BP: NORMAL MMHG
BH CV STRESS RECOVERY HR: 87 BPM
BH CV STRESS STAGE 1: 1
BILIRUB SERPL-MCNC: 0.3 MG/DL (ref 0.1–1)
BILIRUB SERPL-MCNC: 0.3 MG/DL (ref 0.1–1)
BILIRUB SERPL-MCNC: 0.4 MG/DL (ref 0.1–1)
BILIRUB SERPL-MCNC: 0.5 MG/DL (ref 0.1–1)
BILIRUB SERPL-MCNC: 0.5 MG/DL (ref 0.1–1)
BILIRUB SERPL-MCNC: 0.6 MG/DL (ref 0.1–1)
BILIRUB SERPL-MCNC: 0.6 MG/DL (ref 0.1–1)
BILIRUB SERPL-MCNC: 0.7 MG/DL (ref 0.1–1)
BILIRUB SERPL-MCNC: 0.7 MG/DL (ref 0.1–1)
BILIRUB SERPL-MCNC: 0.9 MG/DL (ref 0.1–1)
BILIRUB UR QL STRIP: NEGATIVE
BILIRUB UR QL STRIP: NEGATIVE
BODY TEMPERATURE: 37 C
BUN BLD-MCNC: 32 MG/DL (ref 5–21)
BUN BLD-MCNC: 33 MG/DL (ref 5–21)
BUN BLD-MCNC: 33 MG/DL (ref 5–21)
BUN BLD-MCNC: 35 MG/DL (ref 5–21)
BUN BLD-MCNC: 37 MG/DL (ref 5–21)
BUN BLD-MCNC: 37 MG/DL (ref 5–21)
BUN BLD-MCNC: 38 MG/DL (ref 5–21)
BUN BLD-MCNC: 39 MG/DL (ref 5–21)
BUN BLD-MCNC: 40 MG/DL (ref 5–21)
BUN BLD-MCNC: 43 MG/DL (ref 5–21)
BUN BLD-MCNC: 44 MG/DL (ref 5–21)
BUN BLD-MCNC: 49 MG/DL (ref 5–21)
BUN BLD-MCNC: 50 MG/DL (ref 5–21)
BUN BLD-MCNC: 52 MG/DL (ref 5–21)
BUN BLD-MCNC: 57 MG/DL (ref 5–21)
BUN/CREAT SERPL: 24.6 (ref 7–25)
BUN/CREAT SERPL: 25.4 (ref 7–25)
BUN/CREAT SERPL: 26.7 (ref 7–25)
BUN/CREAT SERPL: 26.7 (ref 7–25)
BUN/CREAT SERPL: 27 (ref 7–25)
BUN/CREAT SERPL: 28.3 (ref 7–25)
BUN/CREAT SERPL: 28.9 (ref 7–25)
BUN/CREAT SERPL: 28.9 (ref 7–25)
BUN/CREAT SERPL: 29.8 (ref 7–25)
BUN/CREAT SERPL: 30.6 (ref 7–25)
BUN/CREAT SERPL: 32.2 (ref 7–25)
BUN/CREAT SERPL: 33.8 (ref 7–25)
BUN/CREAT SERPL: 34.2 (ref 7–25)
BUN/CREAT SERPL: 35 (ref 7–25)
BUN/CREAT SERPL: 35.6 (ref 7–25)
CALCIUM SPEC-SCNC: 7.3 MG/DL (ref 8.4–10.4)
CALCIUM SPEC-SCNC: 7.6 MG/DL (ref 8.4–10.4)
CALCIUM SPEC-SCNC: 7.7 MG/DL (ref 8.4–10.4)
CALCIUM SPEC-SCNC: 8 MG/DL (ref 8.4–10.4)
CALCIUM SPEC-SCNC: 8.1 MG/DL (ref 8.4–10.4)
CALCIUM SPEC-SCNC: 8.2 MG/DL (ref 8.4–10.4)
CALCIUM SPEC-SCNC: 8.3 MG/DL (ref 8.4–10.4)
CALCIUM SPEC-SCNC: 8.4 MG/DL (ref 8.4–10.4)
CALCIUM SPEC-SCNC: 8.5 MG/DL (ref 8.4–10.4)
CALCIUM SPEC-SCNC: 8.7 MG/DL (ref 8.4–10.4)
CALCIUM SPEC-SCNC: 8.9 MG/DL (ref 8.4–10.4)
CALCIUM SPEC-SCNC: 9.1 MG/DL (ref 8.4–10.4)
CHLORIDE SERPL-SCNC: 90 MMOL/L (ref 98–110)
CHLORIDE SERPL-SCNC: 91 MMOL/L (ref 98–110)
CHLORIDE SERPL-SCNC: 91 MMOL/L (ref 98–110)
CHLORIDE SERPL-SCNC: 93 MMOL/L (ref 98–110)
CHLORIDE SERPL-SCNC: 93 MMOL/L (ref 98–110)
CHLORIDE SERPL-SCNC: 94 MMOL/L (ref 98–110)
CHLORIDE SERPL-SCNC: 95 MMOL/L (ref 98–110)
CHLORIDE SERPL-SCNC: 97 MMOL/L (ref 98–110)
CHLORIDE SERPL-SCNC: 98 MMOL/L (ref 98–110)
CHLORIDE SERPL-SCNC: 98 MMOL/L (ref 98–110)
CHOLEST SERPL-MCNC: 102 MG/DL (ref 130–200)
CHOLEST SERPL-MCNC: 125 MG/DL (ref 130–200)
CLARITY UR: CLEAR
CLARITY UR: CLEAR
CO2 SERPL-SCNC: 25 MMOL/L (ref 24–31)
CO2 SERPL-SCNC: 27 MMOL/L (ref 24–31)
CO2 SERPL-SCNC: 30 MMOL/L (ref 24–31)
CO2 SERPL-SCNC: 32 MMOL/L (ref 24–31)
CO2 SERPL-SCNC: 33 MMOL/L (ref 24–31)
CO2 SERPL-SCNC: 34 MMOL/L (ref 24–31)
CO2 SERPL-SCNC: 36 MMOL/L (ref 24–31)
CO2 SERPL-SCNC: 37 MMOL/L (ref 24–31)
COLOR UR: YELLOW
COLOR UR: YELLOW
CREAT BLD-MCNC: 1.13 MG/DL (ref 0.5–1.4)
CREAT BLD-MCNC: 1.14 MG/DL (ref 0.5–1.4)
CREAT BLD-MCNC: 1.17 MG/DL (ref 0.5–1.4)
CREAT BLD-MCNC: 1.18 MG/DL (ref 0.5–1.4)
CREAT BLD-MCNC: 1.28 MG/DL (ref 0.5–1.4)
CREAT BLD-MCNC: 1.31 MG/DL (ref 0.5–1.4)
CREAT BLD-MCNC: 1.34 MG/DL (ref 0.5–1.4)
CREAT BLD-MCNC: 1.37 MG/DL (ref 0.5–1.4)
CREAT BLD-MCNC: 1.38 MG/DL (ref 0.5–1.4)
CREAT BLD-MCNC: 1.4 MG/DL (ref 0.5–1.4)
CREAT BLD-MCNC: 1.44 MG/DL (ref 0.5–1.4)
CREAT BLD-MCNC: 1.45 MG/DL (ref 0.5–1.4)
CREAT BLD-MCNC: 1.46 MG/DL (ref 0.5–1.4)
CREAT BLD-MCNC: 1.61 MG/DL (ref 0.5–1.4)
CREAT BLD-MCNC: 1.63 MG/DL (ref 0.5–1.4)
CREAT BLD-MCNC: 1.87 MG/DL (ref 0.5–1.4)
CREAT BLDA-MCNC: 1.7 MG/DL (ref 0.6–1.3)
CREAT UR-MCNC: 39.8 MG/DL
D DIMER PPP FEU-MCNC: 0.88 MG/L (FEU) (ref 0–0.5)
D-LACTATE SERPL-SCNC: 1.4 MMOL/L (ref 0.5–2)
DEPRECATED RDW RBC AUTO: 52 FL (ref 40–54)
DEPRECATED RDW RBC AUTO: 53.4 FL (ref 40–54)
DEPRECATED RDW RBC AUTO: 54.1 FL (ref 40–54)
DEPRECATED RDW RBC AUTO: 54.5 FL (ref 40–54)
DEPRECATED RDW RBC AUTO: 54.7 FL (ref 40–54)
DEPRECATED RDW RBC AUTO: 55.6 FL (ref 40–54)
DEPRECATED RDW RBC AUTO: 57.3 FL (ref 40–54)
DEPRECATED RDW RBC AUTO: 58.6 FL (ref 40–54)
DEPRECATED RDW RBC AUTO: 59.8 FL (ref 40–54)
DEPRECATED RDW RBC AUTO: 60.1 FL (ref 40–54)
DEPRECATED RDW RBC AUTO: 62.4 FL (ref 40–54)
DEPRECATED RDW RBC AUTO: 62.7 FL (ref 40–54)
DIGOXIN SERPL-MCNC: 1.4 NG/ML (ref 0.8–2)
DIGOXIN SERPL-MCNC: 1.5 NG/ML (ref 0.8–2)
EOSINOPHIL # BLD AUTO: 0 10*3/MM3 (ref 0–0.7)
EOSINOPHIL # BLD AUTO: 0.02 10*3/MM3 (ref 0–0.7)
EOSINOPHIL # BLD AUTO: 0.04 10*3/MM3 (ref 0–0.7)
EOSINOPHIL # BLD AUTO: 0.05 10*3/MM3 (ref 0–0.7)
EOSINOPHIL # BLD AUTO: 0.06 10*3/MM3 (ref 0–0.7)
EOSINOPHIL # BLD AUTO: 0.12 10*3/MM3 (ref 0–0.7)
EOSINOPHIL NFR BLD AUTO: 0 % (ref 0–4)
EOSINOPHIL NFR BLD AUTO: 0.2 % (ref 0–4)
EOSINOPHIL NFR BLD AUTO: 0.3 % (ref 0–4)
EOSINOPHIL NFR BLD AUTO: 0.4 % (ref 0–4)
EOSINOPHIL NFR BLD AUTO: 0.7 % (ref 0–4)
EOSINOPHIL NFR BLD AUTO: 2.1 % (ref 0–4)
EOSINOPHIL SPEC QL WRIGHT STN: NORMAL %
ERYTHROCYTE [DISTWIDTH] IN BLOOD BY AUTOMATED COUNT: 14.9 % (ref 12–15)
ERYTHROCYTE [DISTWIDTH] IN BLOOD BY AUTOMATED COUNT: 15 % (ref 12–15)
ERYTHROCYTE [DISTWIDTH] IN BLOOD BY AUTOMATED COUNT: 15.1 % (ref 12–15)
ERYTHROCYTE [DISTWIDTH] IN BLOOD BY AUTOMATED COUNT: 15.3 % (ref 12–15)
ERYTHROCYTE [DISTWIDTH] IN BLOOD BY AUTOMATED COUNT: 15.3 % (ref 12–15)
ERYTHROCYTE [DISTWIDTH] IN BLOOD BY AUTOMATED COUNT: 16.8 % (ref 12–15)
ERYTHROCYTE [DISTWIDTH] IN BLOOD BY AUTOMATED COUNT: 17.1 % (ref 12–15)
ERYTHROCYTE [DISTWIDTH] IN BLOOD BY AUTOMATED COUNT: 17.1 % (ref 12–15)
ERYTHROCYTE [DISTWIDTH] IN BLOOD BY AUTOMATED COUNT: 17.2 % (ref 12–15)
ERYTHROCYTE [DISTWIDTH] IN BLOOD BY AUTOMATED COUNT: 17.2 % (ref 12–15)
FERRITIN SERPL-MCNC: 171 NG/ML (ref 11.1–264)
FLUAV AG NPH QL: NEGATIVE
FLUBV AG NPH QL IA: NEGATIVE
FOLATE SERPL-MCNC: >20 NG/ML
GFR SERPL CREATININE-BSD FRML MDRD: 26 ML/MIN/1.73
GFR SERPL CREATININE-BSD FRML MDRD: 30 ML/MIN/1.73
GFR SERPL CREATININE-BSD FRML MDRD: 31 ML/MIN/1.73
GFR SERPL CREATININE-BSD FRML MDRD: 35 ML/MIN/1.73
GFR SERPL CREATININE-BSD FRML MDRD: 36 ML/MIN/1.73
GFR SERPL CREATININE-BSD FRML MDRD: 37 ML/MIN/1.73
GFR SERPL CREATININE-BSD FRML MDRD: 37 ML/MIN/1.73
GFR SERPL CREATININE-BSD FRML MDRD: 38 ML/MIN/1.73
GFR SERPL CREATININE-BSD FRML MDRD: 39 ML/MIN/1.73
GFR SERPL CREATININE-BSD FRML MDRD: 40 ML/MIN/1.73
GFR SERPL CREATININE-BSD FRML MDRD: 44 ML/MIN/1.73
GFR SERPL CREATININE-BSD FRML MDRD: 45 ML/MIN/1.73
GFR SERPL CREATININE-BSD FRML MDRD: 46 ML/MIN/1.73
GFR SERPL CREATININE-BSD FRML MDRD: 46 ML/MIN/1.73
GLOBULIN UR ELPH-MCNC: 2.3 GM/DL
GLOBULIN UR ELPH-MCNC: 2.4 GM/DL
GLOBULIN UR ELPH-MCNC: 2.4 GM/DL
GLOBULIN UR ELPH-MCNC: 2.8 GM/DL
GLOBULIN UR ELPH-MCNC: 2.8 GM/DL
GLOBULIN UR ELPH-MCNC: 2.9 GM/DL
GLOBULIN UR ELPH-MCNC: 3 GM/DL
GLOBULIN UR ELPH-MCNC: 3 GM/DL
GLOBULIN UR ELPH-MCNC: 3.2 GM/DL
GLOBULIN UR ELPH-MCNC: 3.3 GM/DL
GLUCOSE BLD-MCNC: 103 MG/DL (ref 70–100)
GLUCOSE BLD-MCNC: 108 MG/DL (ref 70–100)
GLUCOSE BLD-MCNC: 118 MG/DL (ref 70–100)
GLUCOSE BLD-MCNC: 121 MG/DL (ref 70–100)
GLUCOSE BLD-MCNC: 121 MG/DL (ref 70–100)
GLUCOSE BLD-MCNC: 143 MG/DL (ref 70–100)
GLUCOSE BLD-MCNC: 154 MG/DL (ref 70–100)
GLUCOSE BLD-MCNC: 241 MG/DL (ref 70–100)
GLUCOSE BLD-MCNC: 48 MG/DL (ref 70–100)
GLUCOSE BLD-MCNC: 71 MG/DL (ref 70–100)
GLUCOSE BLD-MCNC: 78 MG/DL (ref 70–100)
GLUCOSE BLD-MCNC: 84 MG/DL (ref 70–100)
GLUCOSE BLD-MCNC: 88 MG/DL (ref 70–100)
GLUCOSE BLD-MCNC: 90 MG/DL (ref 70–100)
GLUCOSE BLD-MCNC: 98 MG/DL (ref 70–100)
GLUCOSE BLDC GLUCOMTR-MCNC: 108 MG/DL (ref 70–130)
GLUCOSE BLDC GLUCOMTR-MCNC: 109 MG/DL (ref 70–130)
GLUCOSE BLDC GLUCOMTR-MCNC: 111 MG/DL (ref 70–130)
GLUCOSE BLDC GLUCOMTR-MCNC: 113 MG/DL (ref 70–130)
GLUCOSE BLDC GLUCOMTR-MCNC: 115 MG/DL (ref 70–130)
GLUCOSE BLDC GLUCOMTR-MCNC: 119 MG/DL (ref 70–130)
GLUCOSE BLDC GLUCOMTR-MCNC: 120 MG/DL (ref 70–130)
GLUCOSE BLDC GLUCOMTR-MCNC: 120 MG/DL (ref 70–130)
GLUCOSE BLDC GLUCOMTR-MCNC: 121 MG/DL (ref 70–130)
GLUCOSE BLDC GLUCOMTR-MCNC: 122 MG/DL (ref 70–130)
GLUCOSE BLDC GLUCOMTR-MCNC: 132 MG/DL (ref 70–130)
GLUCOSE BLDC GLUCOMTR-MCNC: 133 MG/DL (ref 70–130)
GLUCOSE BLDC GLUCOMTR-MCNC: 134 MG/DL (ref 70–130)
GLUCOSE BLDC GLUCOMTR-MCNC: 134 MG/DL (ref 70–130)
GLUCOSE BLDC GLUCOMTR-MCNC: 136 MG/DL (ref 70–130)
GLUCOSE BLDC GLUCOMTR-MCNC: 139 MG/DL (ref 70–130)
GLUCOSE BLDC GLUCOMTR-MCNC: 139 MG/DL (ref 70–130)
GLUCOSE BLDC GLUCOMTR-MCNC: 143 MG/DL (ref 70–130)
GLUCOSE BLDC GLUCOMTR-MCNC: 147 MG/DL (ref 70–130)
GLUCOSE BLDC GLUCOMTR-MCNC: 150 MG/DL (ref 70–130)
GLUCOSE BLDC GLUCOMTR-MCNC: 153 MG/DL (ref 70–130)
GLUCOSE BLDC GLUCOMTR-MCNC: 153 MG/DL (ref 70–130)
GLUCOSE BLDC GLUCOMTR-MCNC: 159 MG/DL (ref 70–130)
GLUCOSE BLDC GLUCOMTR-MCNC: 160 MG/DL (ref 70–130)
GLUCOSE BLDC GLUCOMTR-MCNC: 161 MG/DL (ref 70–130)
GLUCOSE BLDC GLUCOMTR-MCNC: 163 MG/DL (ref 70–130)
GLUCOSE BLDC GLUCOMTR-MCNC: 164 MG/DL (ref 70–130)
GLUCOSE BLDC GLUCOMTR-MCNC: 164 MG/DL (ref 70–130)
GLUCOSE BLDC GLUCOMTR-MCNC: 169 MG/DL (ref 70–130)
GLUCOSE BLDC GLUCOMTR-MCNC: 174 MG/DL (ref 70–130)
GLUCOSE BLDC GLUCOMTR-MCNC: 183 MG/DL (ref 70–130)
GLUCOSE BLDC GLUCOMTR-MCNC: 187 MG/DL (ref 70–130)
GLUCOSE BLDC GLUCOMTR-MCNC: 189 MG/DL (ref 70–130)
GLUCOSE BLDC GLUCOMTR-MCNC: 192 MG/DL (ref 70–130)
GLUCOSE BLDC GLUCOMTR-MCNC: 200 MG/DL (ref 70–130)
GLUCOSE BLDC GLUCOMTR-MCNC: 207 MG/DL (ref 70–130)
GLUCOSE BLDC GLUCOMTR-MCNC: 212 MG/DL (ref 70–130)
GLUCOSE BLDC GLUCOMTR-MCNC: 215 MG/DL (ref 70–130)
GLUCOSE BLDC GLUCOMTR-MCNC: 224 MG/DL (ref 70–130)
GLUCOSE BLDC GLUCOMTR-MCNC: 243 MG/DL (ref 70–130)
GLUCOSE BLDC GLUCOMTR-MCNC: 244 MG/DL (ref 70–130)
GLUCOSE BLDC GLUCOMTR-MCNC: 264 MG/DL (ref 70–130)
GLUCOSE BLDC GLUCOMTR-MCNC: 96 MG/DL (ref 70–130)
GLUCOSE UR STRIP-MCNC: NEGATIVE MG/DL
GLUCOSE UR STRIP-MCNC: NEGATIVE MG/DL
HBA1C MFR BLD: 5.3 %
HBA1C MFR BLD: 5.6 %
HCO3 BLDA-SCNC: 32.5 MMOL/L (ref 20–26)
HCT VFR BLD AUTO: 28 % (ref 37–47)
HCT VFR BLD AUTO: 29.9 % (ref 37–47)
HCT VFR BLD AUTO: 30.5 % (ref 37–47)
HCT VFR BLD AUTO: 30.7 % (ref 37–47)
HCT VFR BLD AUTO: 30.8 % (ref 37–47)
HCT VFR BLD AUTO: 32 % (ref 37–47)
HCT VFR BLD AUTO: 32.7 % (ref 37–47)
HCT VFR BLD AUTO: 32.9 % (ref 37–47)
HCT VFR BLD AUTO: 34.5 % (ref 37–47)
HCT VFR BLD AUTO: 37.2 % (ref 37–47)
HCT VFR BLD AUTO: 38.3 % (ref 37–47)
HCT VFR BLD AUTO: 38.9 % (ref 37–47)
HDLC SERPL-MCNC: 45 MG/DL
HDLC SERPL-MCNC: 55 MG/DL
HGB BLD-MCNC: 10.1 G/DL (ref 12–16)
HGB BLD-MCNC: 10.3 G/DL (ref 12–16)
HGB BLD-MCNC: 10.5 G/DL (ref 12–16)
HGB BLD-MCNC: 10.7 G/DL (ref 12–16)
HGB BLD-MCNC: 11.3 G/DL (ref 12–16)
HGB BLD-MCNC: 11.7 G/DL (ref 12–16)
HGB BLD-MCNC: 12 G/DL (ref 12–16)
HGB BLD-MCNC: 12.2 G/DL (ref 12–16)
HGB BLD-MCNC: 9.1 G/DL (ref 12–16)
HGB BLD-MCNC: 9.6 G/DL (ref 12–16)
HGB BLD-MCNC: 9.8 G/DL (ref 12–16)
HGB BLD-MCNC: 9.9 G/DL (ref 12–16)
HGB UR QL STRIP.AUTO: NEGATIVE
HGB UR QL STRIP.AUTO: NEGATIVE
HOLD SPECIMEN: NORMAL
HOLD SPECIMEN: NORMAL
HYALINE CASTS UR QL AUTO: ABNORMAL /LPF
HYALINE CASTS UR QL AUTO: ABNORMAL /LPF
IMM GRANULOCYTES # BLD: 0.03 10*3/MM3 (ref 0–0.03)
IMM GRANULOCYTES # BLD: 0.03 10*3/MM3 (ref 0–0.03)
IMM GRANULOCYTES # BLD: 0.04 10*3/MM3 (ref 0–0.03)
IMM GRANULOCYTES # BLD: 0.05 10*3/MM3 (ref 0–0.03)
IMM GRANULOCYTES # BLD: 0.05 10*3/MM3 (ref 0–0.03)
IMM GRANULOCYTES # BLD: 0.07 10*3/MM3 (ref 0–0.03)
IMM GRANULOCYTES # BLD: 0.07 10*3/MM3 (ref 0–0.03)
IMM GRANULOCYTES # BLD: 0.09 10*3/MM3 (ref 0–0.03)
IMM GRANULOCYTES NFR BLD: 0.4 % (ref 0–5)
IMM GRANULOCYTES NFR BLD: 0.5 % (ref 0–5)
IMM GRANULOCYTES NFR BLD: 0.6 % (ref 0–5)
IMM GRANULOCYTES NFR BLD: 0.6 % (ref 0–5)
IMM GRANULOCYTES NFR BLD: 0.7 % (ref 0–5)
IMM GRANULOCYTES NFR BLD: 0.7 % (ref 0–5)
IMM GRANULOCYTES NFR BLD: 0.8 % (ref 0–5)
IMM GRANULOCYTES NFR BLD: 1 % (ref 0–5)
INR PPP: 0.92 (ref 0.91–1.09)
INR PPP: 1.06 (ref 0.91–1.09)
INR PPP: 1.16 (ref 0.91–1.09)
IRON 24H UR-MRATE: 19 MCG/DL (ref 42–180)
IRON SATN MFR SERPL: 6 % (ref 20–45)
KETONES UR QL STRIP: NEGATIVE
KETONES UR QL STRIP: NEGATIVE
LDLC/HDLC SERPL: 0.9 {RATIO}
LDLC/HDLC SERPL: 0.93 {RATIO}
LEFT ATRIUM VOLUME INDEX: 29 ML/M2
LEFT ATRIUM VOLUME INDEX: 34.5 ML/M2
LEFT ATRIUM VOLUME: 49 CM3
LEFT ATRIUM VOLUME: 57.6 CM3
LEUKOCYTE ESTERASE UR QL STRIP.AUTO: ABNORMAL
LEUKOCYTE ESTERASE UR QL STRIP.AUTO: ABNORMAL
LV EF 2D ECHO EST: 30 %
LV EF 2D ECHO EST: 55 %
LV EF NUC BP: 42 %
LYMPHOCYTES # BLD AUTO: 0.25 10*3/MM3 (ref 0.72–4.86)
LYMPHOCYTES # BLD AUTO: 0.28 10*3/MM3 (ref 0.72–4.86)
LYMPHOCYTES # BLD AUTO: 0.41 10*3/MM3 (ref 0.72–4.86)
LYMPHOCYTES # BLD AUTO: 0.56 10*3/MM3 (ref 0.72–4.86)
LYMPHOCYTES # BLD AUTO: 0.86 10*3/MM3 (ref 0.72–4.86)
LYMPHOCYTES # BLD AUTO: 0.98 10*3/MM3 (ref 0.72–4.86)
LYMPHOCYTES # BLD AUTO: 1 10*3/MM3 (ref 0.72–4.86)
LYMPHOCYTES # BLD AUTO: 1.01 10*3/MM3 (ref 0.72–4.86)
LYMPHOCYTES # BLD AUTO: 1.15 10*3/MM3 (ref 0.72–4.86)
LYMPHOCYTES # BLD AUTO: 1.56 10*3/MM3 (ref 0.72–4.86)
LYMPHOCYTES NFR BLD AUTO: 10.2 % (ref 15–45)
LYMPHOCYTES NFR BLD AUTO: 11.8 % (ref 15–45)
LYMPHOCYTES NFR BLD AUTO: 19.7 % (ref 15–45)
LYMPHOCYTES NFR BLD AUTO: 4.7 % (ref 15–45)
LYMPHOCYTES NFR BLD AUTO: 6.2 % (ref 15–45)
LYMPHOCYTES NFR BLD AUTO: 6.6 % (ref 15–45)
LYMPHOCYTES NFR BLD AUTO: 7.9 % (ref 15–45)
LYMPHOCYTES NFR BLD AUTO: 8.5 % (ref 15–45)
LYMPHOCYTES NFR BLD AUTO: 9.4 % (ref 15–45)
LYMPHOCYTES NFR BLD AUTO: 9.9 % (ref 15–45)
Lab: ABNORMAL
MAGNESIUM SERPL-MCNC: 1.5 MG/DL (ref 1.4–2.2)
MAGNESIUM SERPL-MCNC: 1.5 MG/DL (ref 1.4–2.2)
MAGNESIUM SERPL-MCNC: 1.7 MG/DL (ref 1.4–2.2)
MAXIMAL PREDICTED HEART RATE: 141 BPM
MAXIMAL PREDICTED HEART RATE: 141 BPM
MAXIMAL PREDICTED HEART RATE: 142 BPM
MCH RBC QN AUTO: 31 PG (ref 28–32)
MCH RBC QN AUTO: 31.4 PG (ref 28–32)
MCH RBC QN AUTO: 31.5 PG (ref 28–32)
MCH RBC QN AUTO: 31.6 PG (ref 28–32)
MCH RBC QN AUTO: 31.7 PG (ref 28–32)
MCH RBC QN AUTO: 31.7 PG (ref 28–32)
MCH RBC QN AUTO: 31.8 PG (ref 28–32)
MCH RBC QN AUTO: 31.9 PG (ref 28–32)
MCHC RBC AUTO-ENTMCNC: 31.3 G/DL (ref 33–36)
MCHC RBC AUTO-ENTMCNC: 31.3 G/DL (ref 33–36)
MCHC RBC AUTO-ENTMCNC: 31.4 G/DL (ref 33–36)
MCHC RBC AUTO-ENTMCNC: 31.5 G/DL (ref 33–36)
MCHC RBC AUTO-ENTMCNC: 31.8 G/DL (ref 33–36)
MCHC RBC AUTO-ENTMCNC: 32.1 G/DL (ref 33–36)
MCHC RBC AUTO-ENTMCNC: 32.5 G/DL (ref 33–36)
MCHC RBC AUTO-ENTMCNC: 32.5 G/DL (ref 33–36)
MCHC RBC AUTO-ENTMCNC: 32.7 G/DL (ref 33–36)
MCHC RBC AUTO-ENTMCNC: 32.8 G/DL (ref 33–36)
MCHC RBC AUTO-ENTMCNC: 32.8 G/DL (ref 33–36)
MCHC RBC AUTO-ENTMCNC: 32.9 G/DL (ref 33–36)
MCV RBC AUTO: 100.5 FL (ref 82–98)
MCV RBC AUTO: 100.8 FL (ref 82–98)
MCV RBC AUTO: 101.2 FL (ref 82–98)
MCV RBC AUTO: 95.8 FL (ref 82–98)
MCV RBC AUTO: 95.8 FL (ref 82–98)
MCV RBC AUTO: 95.9 FL (ref 82–98)
MCV RBC AUTO: 96.2 FL (ref 82–98)
MCV RBC AUTO: 97.7 FL (ref 82–98)
MCV RBC AUTO: 98.1 FL (ref 82–98)
MCV RBC AUTO: 98.2 FL (ref 82–98)
MCV RBC AUTO: 99 FL (ref 82–98)
MCV RBC AUTO: 99 FL (ref 82–98)
MODALITY: ABNORMAL
MONOCYTES # BLD AUTO: 0.09 10*3/MM3 (ref 0.19–1.3)
MONOCYTES # BLD AUTO: 0.36 10*3/MM3 (ref 0.19–1.3)
MONOCYTES # BLD AUTO: 0.38 10*3/MM3 (ref 0.19–1.3)
MONOCYTES # BLD AUTO: 0.47 10*3/MM3 (ref 0.19–1.3)
MONOCYTES # BLD AUTO: 0.82 10*3/MM3 (ref 0.19–1.3)
MONOCYTES # BLD AUTO: 0.9 10*3/MM3 (ref 0.19–1.3)
MONOCYTES # BLD AUTO: 0.99 10*3/MM3 (ref 0.19–1.3)
MONOCYTES # BLD AUTO: 1.07 10*3/MM3 (ref 0.19–1.3)
MONOCYTES # BLD AUTO: 1.24 10*3/MM3 (ref 0.19–1.3)
MONOCYTES # BLD AUTO: 1.31 10*3/MM3 (ref 0.19–1.3)
MONOCYTES NFR BLD AUTO: 10.3 % (ref 4–12)
MONOCYTES NFR BLD AUTO: 10.3 % (ref 4–12)
MONOCYTES NFR BLD AUTO: 10.4 % (ref 4–12)
MONOCYTES NFR BLD AUTO: 14 % (ref 4–12)
MONOCYTES NFR BLD AUTO: 2.2 % (ref 4–12)
MONOCYTES NFR BLD AUTO: 5.8 % (ref 4–12)
MONOCYTES NFR BLD AUTO: 6.3 % (ref 4–12)
MONOCYTES NFR BLD AUTO: 7.9 % (ref 4–12)
MONOCYTES NFR BLD AUTO: 9 % (ref 4–12)
MONOCYTES NFR BLD AUTO: 9.4 % (ref 4–12)
NEUTROPHILS # BLD AUTO: 10.22 10*3/MM3 (ref 1.87–8.4)
NEUTROPHILS # BLD AUTO: 10.3 10*3/MM3 (ref 1.87–8.4)
NEUTROPHILS # BLD AUTO: 3.63 10*3/MM3 (ref 1.87–8.4)
NEUTROPHILS # BLD AUTO: 3.68 10*3/MM3 (ref 1.87–8.4)
NEUTROPHILS # BLD AUTO: 4.85 10*3/MM3 (ref 1.87–8.4)
NEUTROPHILS # BLD AUTO: 5.32 10*3/MM3 (ref 1.87–8.4)
NEUTROPHILS # BLD AUTO: 5.43 10*3/MM3 (ref 1.87–8.4)
NEUTROPHILS # BLD AUTO: 6.77 10*3/MM3 (ref 1.87–8.4)
NEUTROPHILS # BLD AUTO: 7.57 10*3/MM3 (ref 1.87–8.4)
NEUTROPHILS # BLD AUTO: 9.61 10*3/MM3 (ref 1.87–8.4)
NEUTROPHILS NFR BLD AUTO: 62.8 % (ref 39–78)
NEUTROPHILS NFR BLD AUTO: 77.5 % (ref 39–78)
NEUTROPHILS NFR BLD AUTO: 77.9 % (ref 39–78)
NEUTROPHILS NFR BLD AUTO: 78.5 % (ref 39–78)
NEUTROPHILS NFR BLD AUTO: 81.2 % (ref 39–78)
NEUTROPHILS NFR BLD AUTO: 81.3 % (ref 39–78)
NEUTROPHILS NFR BLD AUTO: 81.8 % (ref 39–78)
NEUTROPHILS NFR BLD AUTO: 86.8 % (ref 39–78)
NEUTROPHILS NFR BLD AUTO: 88.3 % (ref 39–78)
NEUTROPHILS NFR BLD AUTO: 90.1 % (ref 39–78)
NITRITE UR QL STRIP: NEGATIVE
NITRITE UR QL STRIP: NEGATIVE
NRBC BLD MANUAL-RTO: 0 /100 WBC (ref 0–0)
NRBC BLD MANUAL-RTO: 0.3 /100 WBC (ref 0–0)
NT-PROBNP SERPL-MCNC: 7660 PG/ML (ref 0–1800)
NT-PROBNP SERPL-MCNC: ABNORMAL PG/ML (ref 0–1800)
OSMOLALITY UR: 329 MOSM/KG (ref 601–850)
PCO2 BLDA: 45.5 MM HG (ref 35–45)
PERCENT MAX PREDICTED HR: 65.96 %
PH BLDA: 7.46 PH UNITS (ref 7.35–7.45)
PH UR STRIP.AUTO: <=5 [PH] (ref 5–8)
PH UR STRIP.AUTO: <=5 [PH] (ref 5–8)
PHOSPHATE SERPL-MCNC: 2.7 MG/DL (ref 2.5–4.5)
PLATELET # BLD AUTO: 161 10*3/MM3 (ref 130–400)
PLATELET # BLD AUTO: 180 10*3/MM3 (ref 130–400)
PLATELET # BLD AUTO: 186 10*3/MM3 (ref 130–400)
PLATELET # BLD AUTO: 187 10*3/MM3 (ref 130–400)
PLATELET # BLD AUTO: 199 10*3/MM3 (ref 130–400)
PLATELET # BLD AUTO: 213 10*3/MM3 (ref 130–400)
PLATELET # BLD AUTO: 229 10*3/MM3 (ref 130–400)
PLATELET # BLD AUTO: 240 10*3/MM3 (ref 130–400)
PLATELET # BLD AUTO: 260 10*3/MM3 (ref 130–400)
PLATELET # BLD AUTO: 297 10*3/MM3 (ref 130–400)
PLATELET # BLD AUTO: 322 10*3/MM3 (ref 130–400)
PLATELET # BLD AUTO: 328 10*3/MM3 (ref 130–400)
PMV BLD AUTO: 10 FL (ref 6–12)
PMV BLD AUTO: 10.1 FL (ref 6–12)
PMV BLD AUTO: 10.3 FL (ref 6–12)
PMV BLD AUTO: 10.4 FL (ref 6–12)
PMV BLD AUTO: 10.5 FL (ref 6–12)
PMV BLD AUTO: 10.6 FL (ref 6–12)
PMV BLD AUTO: 10.8 FL (ref 6–12)
PMV BLD AUTO: 9.7 FL (ref 6–12)
PMV BLD AUTO: 9.8 FL (ref 6–12)
PMV BLD AUTO: 9.8 FL (ref 6–12)
PO2 BLDA: 58.7 MM HG (ref 83–108)
POTASSIUM BLD-SCNC: 3.5 MMOL/L (ref 3.5–5.3)
POTASSIUM BLD-SCNC: 3.5 MMOL/L (ref 3.5–5.3)
POTASSIUM BLD-SCNC: 3.6 MMOL/L (ref 3.5–5.3)
POTASSIUM BLD-SCNC: 3.6 MMOL/L (ref 3.5–5.3)
POTASSIUM BLD-SCNC: 3.7 MMOL/L (ref 3.5–5.3)
POTASSIUM BLD-SCNC: 3.8 MMOL/L (ref 3.5–5.3)
POTASSIUM BLD-SCNC: 3.9 MMOL/L (ref 3.5–5.3)
POTASSIUM BLD-SCNC: 4.1 MMOL/L (ref 3.5–5.3)
POTASSIUM BLD-SCNC: 4.1 MMOL/L (ref 3.5–5.3)
POTASSIUM BLD-SCNC: 4.3 MMOL/L (ref 3.5–5.3)
POTASSIUM BLD-SCNC: 4.5 MMOL/L (ref 3.5–5.3)
PROCALCITONIN SERPL-MCNC: <0.25 NG/ML
PROT SERPL-MCNC: 5.2 G/DL (ref 6.3–8.7)
PROT SERPL-MCNC: 5.3 G/DL (ref 6.3–8.7)
PROT SERPL-MCNC: 5.5 G/DL (ref 6.3–8.7)
PROT SERPL-MCNC: 5.7 G/DL (ref 6.3–8.7)
PROT SERPL-MCNC: 5.9 G/DL (ref 6.3–8.7)
PROT SERPL-MCNC: 6.1 G/DL (ref 6.3–8.7)
PROT SERPL-MCNC: 6.3 G/DL (ref 6.3–8.7)
PROT SERPL-MCNC: 6.7 G/DL (ref 6.3–8.7)
PROT SERPL-MCNC: 6.9 G/DL (ref 6.3–8.7)
PROT SERPL-MCNC: 6.9 G/DL (ref 6.3–8.7)
PROT UR QL STRIP: NEGATIVE
PROT UR QL STRIP: NEGATIVE
PROT UR-MCNC: 12 MG/DL (ref 0–13.5)
PROTHROMBIN TIME: 12.6 SECONDS (ref 11.9–14.6)
PROTHROMBIN TIME: 14.1 SECONDS (ref 11.9–14.6)
PROTHROMBIN TIME: 15.2 SECONDS (ref 11.9–14.6)
PTH-INTACT SERPL-MCNC: 230.7 PG/ML (ref 7.5–53.5)
RBC # BLD AUTO: 2.85 10*6/MM3 (ref 4.2–5.4)
RBC # BLD AUTO: 3.06 10*6/MM3 (ref 4.2–5.4)
RBC # BLD AUTO: 3.11 10*6/MM3 (ref 4.2–5.4)
RBC # BLD AUTO: 3.11 10*6/MM3 (ref 4.2–5.4)
RBC # BLD AUTO: 3.2 10*6/MM3 (ref 4.2–5.4)
RBC # BLD AUTO: 3.25 10*6/MM3 (ref 4.2–5.4)
RBC # BLD AUTO: 3.34 10*6/MM3 (ref 4.2–5.4)
RBC # BLD AUTO: 3.4 10*6/MM3 (ref 4.2–5.4)
RBC # BLD AUTO: 3.6 10*6/MM3 (ref 4.2–5.4)
RBC # BLD AUTO: 3.69 10*6/MM3 (ref 4.2–5.4)
RBC # BLD AUTO: 3.81 10*6/MM3 (ref 4.2–5.4)
RBC # BLD AUTO: 3.93 10*6/MM3 (ref 4.2–5.4)
RBC # UR: ABNORMAL /HPF
RBC # UR: ABNORMAL /HPF
REF LAB TEST METHOD: ABNORMAL
REF LAB TEST METHOD: ABNORMAL
SAO2 % BLDCOA: 89.6 % (ref 94–99)
SODIUM BLD-SCNC: 137 MMOL/L (ref 135–145)
SODIUM BLD-SCNC: 137 MMOL/L (ref 135–145)
SODIUM BLD-SCNC: 138 MMOL/L (ref 135–145)
SODIUM BLD-SCNC: 139 MMOL/L (ref 135–145)
SODIUM BLD-SCNC: 139 MMOL/L (ref 135–145)
SODIUM BLD-SCNC: 140 MMOL/L (ref 135–145)
SODIUM BLD-SCNC: 141 MMOL/L (ref 135–145)
SODIUM BLD-SCNC: 142 MMOL/L (ref 135–145)
SODIUM UR-SCNC: 66 MMOL/L (ref 30–90)
SP GR UR STRIP: 1.01 (ref 1–1.03)
SP GR UR STRIP: 1.01 (ref 1–1.03)
SQUAMOUS #/AREA URNS HPF: ABNORMAL /HPF
SQUAMOUS #/AREA URNS HPF: ABNORMAL /HPF
STRESS BASELINE BP: NORMAL MMHG
STRESS BASELINE HR: 80 BPM
STRESS PERCENT HR: 78 %
STRESS POST EXERCISE DUR MIN: 0 MIN
STRESS POST EXERCISE DUR SEC: 10 SEC
STRESS POST PEAK BP: NORMAL MMHG
STRESS POST PEAK HR: 93 BPM
STRESS TARGET HR: 120 BPM
STRESS TARGET HR: 120 BPM
STRESS TARGET HR: 121 BPM
T3FREE SERPL-MCNC: 1.5 PG/ML (ref 2–4.4)
T3FREE SERPL-MCNC: 2.1 PG/ML (ref 2–4.4)
T4 FREE SERPL-MCNC: 0.67 NG/DL (ref 0.78–2.19)
T4 FREE SERPL-MCNC: 1.07 NG/DL (ref 0.78–2.19)
TIBC SERPL-MCNC: 336 MCG/DL (ref 225–420)
TRIGL SERPL-MCNC: 83 MG/DL (ref 0–149)
TRIGL SERPL-MCNC: 95 MG/DL (ref 0–149)
TROPONIN I SERPL-MCNC: 0.04 NG/ML (ref 0–0.03)
TROPONIN I SERPL-MCNC: 0.05 NG/ML (ref 0–0.03)
TROPONIN I SERPL-MCNC: 0.06 NG/ML (ref 0–0.03)
TROPONIN I SERPL-MCNC: 0.07 NG/ML (ref 0–0.03)
TROPONIN I SERPL-MCNC: 0.08 NG/ML (ref 0–0.03)
TSH SERPL DL<=0.05 MIU/L-ACNC: 6.7 MIU/ML (ref 0.47–4.68)
TSH SERPL DL<=0.05 MIU/L-ACNC: 7.19 MIU/ML (ref 0.47–4.68)
URATE SERPL-MCNC: 9.5 MG/DL (ref 2.7–7.5)
UROBILINOGEN UR QL STRIP: ABNORMAL
UROBILINOGEN UR QL STRIP: ABNORMAL
UUN 24H UR-MCNC: 417 MG/DL
VANCOMYCIN TROUGH SERPL-MCNC: 8.64 MCG/ML (ref 10–20)
VENTILATOR MODE: ABNORMAL
VIT B12 BLD-MCNC: 185 PG/ML (ref 239–931)
WBC NRBC COR # BLD: 11.83 10*3/MM3 (ref 4.8–10.8)
WBC NRBC COR # BLD: 12.68 10*3/MM3 (ref 4.8–10.8)
WBC NRBC COR # BLD: 13.19 10*3/MM3 (ref 4.8–10.8)
WBC NRBC COR # BLD: 4.03 10*3/MM3 (ref 4.8–10.8)
WBC NRBC COR # BLD: 5.85 10*3/MM3 (ref 4.8–10.8)
WBC NRBC COR # BLD: 5.93 10*3/MM3 (ref 4.8–10.8)
WBC NRBC COR # BLD: 6.02 10*3/MM3 (ref 4.8–10.8)
WBC NRBC COR # BLD: 6.25 10*3/MM3 (ref 4.8–10.8)
WBC NRBC COR # BLD: 8.19 10*3/MM3 (ref 4.8–10.8)
WBC NRBC COR # BLD: 8.69 10*3/MM3 (ref 4.8–10.8)
WBC NRBC COR # BLD: 9.64 10*3/MM3 (ref 4.8–10.8)
WBC NRBC COR # BLD: 9.94 10*3/MM3 (ref 4.8–10.8)
WBC UR QL AUTO: ABNORMAL /HPF
WBC UR QL AUTO: ABNORMAL /HPF
WHOLE BLOOD HOLD SPECIMEN: NORMAL
WHOLE BLOOD HOLD SPECIMEN: NORMAL

## 2018-01-01 PROCEDURE — 93306 TTE W/DOPPLER COMPLETE: CPT | Performed by: INTERNAL MEDICINE

## 2018-01-01 PROCEDURE — 97165 OT EVAL LOW COMPLEX 30 MIN: CPT

## 2018-01-01 PROCEDURE — 80053 COMPREHEN METABOLIC PANEL: CPT | Performed by: NURSE PRACTITIONER

## 2018-01-01 PROCEDURE — 80053 COMPREHEN METABOLIC PANEL: CPT | Performed by: FAMILY MEDICINE

## 2018-01-01 PROCEDURE — 94669 MECHANICAL CHEST WALL OSCILL: CPT

## 2018-01-01 PROCEDURE — 25010000002 METHYLPREDNISOLONE PER 125 MG: Performed by: NURSE PRACTITIONER

## 2018-01-01 PROCEDURE — 94799 UNLISTED PULMONARY SVC/PX: CPT

## 2018-01-01 PROCEDURE — 97535 SELF CARE MNGMENT TRAINING: CPT

## 2018-01-01 PROCEDURE — 93325 DOPPLER ECHO COLOR FLOW MAPG: CPT

## 2018-01-01 PROCEDURE — 25010000002 CEFEPIME PER 500 MG: Performed by: FAMILY MEDICINE

## 2018-01-01 PROCEDURE — 85610 PROTHROMBIN TIME: CPT | Performed by: EMERGENCY MEDICINE

## 2018-01-01 PROCEDURE — 25010000002 ENOXAPARIN PER 10 MG: Performed by: FAMILY MEDICINE

## 2018-01-01 PROCEDURE — 82962 GLUCOSE BLOOD TEST: CPT

## 2018-01-01 PROCEDURE — 84484 ASSAY OF TROPONIN QUANT: CPT | Performed by: EMERGENCY MEDICINE

## 2018-01-01 PROCEDURE — 83880 ASSAY OF NATRIURETIC PEPTIDE: CPT | Performed by: EMERGENCY MEDICINE

## 2018-01-01 PROCEDURE — A9500 TC99M SESTAMIBI: HCPCS | Performed by: INTERNAL MEDICINE

## 2018-01-01 PROCEDURE — 84443 ASSAY THYROID STIM HORMONE: CPT | Performed by: FAMILY MEDICINE

## 2018-01-01 PROCEDURE — 80053 COMPREHEN METABOLIC PANEL: CPT | Performed by: EMERGENCY MEDICINE

## 2018-01-01 PROCEDURE — 84550 ASSAY OF BLOOD/URIC ACID: CPT | Performed by: INTERNAL MEDICINE

## 2018-01-01 PROCEDURE — 85025 COMPLETE CBC W/AUTO DIFF WBC: CPT | Performed by: FAMILY MEDICINE

## 2018-01-01 PROCEDURE — 99222 1ST HOSP IP/OBS MODERATE 55: CPT | Performed by: INTERNAL MEDICINE

## 2018-01-01 PROCEDURE — 80061 LIPID PANEL: CPT | Performed by: NURSE PRACTITIONER

## 2018-01-01 PROCEDURE — 85025 COMPLETE CBC W/AUTO DIFF WBC: CPT | Performed by: NURSE PRACTITIONER

## 2018-01-01 PROCEDURE — 80202 ASSAY OF VANCOMYCIN: CPT | Performed by: FAMILY MEDICINE

## 2018-01-01 PROCEDURE — 63710000001 INSULIN LISPRO (HUMAN) PER 5 UNITS: Performed by: FAMILY MEDICINE

## 2018-01-01 PROCEDURE — 82140 ASSAY OF AMMONIA: CPT | Performed by: EMERGENCY MEDICINE

## 2018-01-01 PROCEDURE — 25010000002 FUROSEMIDE PER 20 MG: Performed by: FAMILY MEDICINE

## 2018-01-01 PROCEDURE — 99232 SBSQ HOSP IP/OBS MODERATE 35: CPT | Performed by: INTERNAL MEDICINE

## 2018-01-01 PROCEDURE — 94760 N-INVAS EAR/PLS OXIMETRY 1: CPT

## 2018-01-01 PROCEDURE — 87804 INFLUENZA ASSAY W/OPTIC: CPT | Performed by: EMERGENCY MEDICINE

## 2018-01-01 PROCEDURE — 99214 OFFICE O/P EST MOD 30 MIN: CPT | Performed by: INTERNAL MEDICINE

## 2018-01-01 PROCEDURE — 71045 X-RAY EXAM CHEST 1 VIEW: CPT

## 2018-01-01 PROCEDURE — G8987 SELF CARE CURRENT STATUS: HCPCS

## 2018-01-01 PROCEDURE — 80061 LIPID PANEL: CPT | Performed by: FAMILY MEDICINE

## 2018-01-01 PROCEDURE — 0 TECHNETIUM SESTAMIBI: Performed by: INTERNAL MEDICINE

## 2018-01-01 PROCEDURE — 80048 BASIC METABOLIC PNL TOTAL CA: CPT | Performed by: INTERNAL MEDICINE

## 2018-01-01 PROCEDURE — 85027 COMPLETE CBC AUTOMATED: CPT | Performed by: FAMILY MEDICINE

## 2018-01-01 PROCEDURE — 25010000002 ENOXAPARIN PER 10 MG: Performed by: NURSE PRACTITIONER

## 2018-01-01 PROCEDURE — G0378 HOSPITAL OBSERVATION PER HR: HCPCS

## 2018-01-01 PROCEDURE — 93005 ELECTROCARDIOGRAM TRACING: CPT | Performed by: EMERGENCY MEDICINE

## 2018-01-01 PROCEDURE — 25010000002 ONDANSETRON PER 1 MG: Performed by: EMERGENCY MEDICINE

## 2018-01-01 PROCEDURE — 25010000002 VANCOMYCIN 1 G RECONSTITUTED SOLUTION: Performed by: FAMILY MEDICINE

## 2018-01-01 PROCEDURE — 85025 COMPLETE CBC W/AUTO DIFF WBC: CPT | Performed by: EMERGENCY MEDICINE

## 2018-01-01 PROCEDURE — 25010000002 PIPERACILLIN SOD-TAZOBACTAM PER 1 G: Performed by: NURSE PRACTITIONER

## 2018-01-01 PROCEDURE — 84484 ASSAY OF TROPONIN QUANT: CPT | Performed by: NURSE PRACTITIONER

## 2018-01-01 PROCEDURE — 63710000001 INSULIN LISPRO (HUMAN) PER 5 UNITS: Performed by: NURSE PRACTITIONER

## 2018-01-01 PROCEDURE — 93308 TTE F-UP OR LMTD: CPT

## 2018-01-01 PROCEDURE — 85730 THROMBOPLASTIN TIME PARTIAL: CPT | Performed by: EMERGENCY MEDICINE

## 2018-01-01 PROCEDURE — G8978 MOBILITY CURRENT STATUS: HCPCS | Performed by: PHYSICAL THERAPIST

## 2018-01-01 PROCEDURE — 71046 X-RAY EXAM CHEST 2 VIEWS: CPT

## 2018-01-01 PROCEDURE — 94668 MNPJ CHEST WALL SBSQ: CPT

## 2018-01-01 PROCEDURE — 70491 CT SOFT TISSUE NECK W/DYE: CPT

## 2018-01-01 PROCEDURE — 25010000002 ENOXAPARIN PER 10 MG: Performed by: INTERNAL MEDICINE

## 2018-01-01 PROCEDURE — 0 IOPAMIDOL PER 1 ML: Performed by: EMERGENCY MEDICINE

## 2018-01-01 PROCEDURE — 99285 EMERGENCY DEPT VISIT HI MDM: CPT

## 2018-01-01 PROCEDURE — 71275 CT ANGIOGRAPHY CHEST: CPT

## 2018-01-01 PROCEDURE — 25010000002 AMINOPHYLLINE PER 250 MG: Performed by: INTERNAL MEDICINE

## 2018-01-01 PROCEDURE — 85610 PROTHROMBIN TIME: CPT | Performed by: FAMILY MEDICINE

## 2018-01-01 PROCEDURE — 72125 CT NECK SPINE W/O DYE: CPT

## 2018-01-01 PROCEDURE — 25010000002 CEFTRIAXONE PER 250 MG: Performed by: EMERGENCY MEDICINE

## 2018-01-01 PROCEDURE — 93325 DOPPLER ECHO COLOR FLOW MAPG: CPT | Performed by: INTERNAL MEDICINE

## 2018-01-01 PROCEDURE — 82746 ASSAY OF FOLIC ACID SERUM: CPT | Performed by: FAMILY MEDICINE

## 2018-01-01 PROCEDURE — 81001 URINALYSIS AUTO W/SCOPE: CPT | Performed by: FAMILY MEDICINE

## 2018-01-01 PROCEDURE — 99213 OFFICE O/P EST LOW 20 MIN: CPT | Performed by: OTOLARYNGOLOGY

## 2018-01-01 PROCEDURE — 84156 ASSAY OF PROTEIN URINE: CPT | Performed by: INTERNAL MEDICINE

## 2018-01-01 PROCEDURE — 83735 ASSAY OF MAGNESIUM: CPT | Performed by: INTERNAL MEDICINE

## 2018-01-01 PROCEDURE — 80162 ASSAY OF DIGOXIN TOTAL: CPT | Performed by: NURSE PRACTITIONER

## 2018-01-01 PROCEDURE — 84540 ASSAY OF URINE/UREA-N: CPT | Performed by: INTERNAL MEDICINE

## 2018-01-01 PROCEDURE — 25010000002 FUROSEMIDE PER 20 MG: Performed by: EMERGENCY MEDICINE

## 2018-01-01 PROCEDURE — 93321 DOPPLER ECHO F-UP/LMTD STD: CPT

## 2018-01-01 PROCEDURE — 83036 HEMOGLOBIN GLYCOSYLATED A1C: CPT | Performed by: NURSE PRACTITIONER

## 2018-01-01 PROCEDURE — 80162 ASSAY OF DIGOXIN TOTAL: CPT | Performed by: INTERNAL MEDICINE

## 2018-01-01 PROCEDURE — 25010000002 REGADENOSON 0.4 MG/5ML SOLUTION: Performed by: INTERNAL MEDICINE

## 2018-01-01 PROCEDURE — 36600 WITHDRAWAL OF ARTERIAL BLOOD: CPT

## 2018-01-01 PROCEDURE — 97161 PT EVAL LOW COMPLEX 20 MIN: CPT | Performed by: PHYSICAL THERAPIST

## 2018-01-01 PROCEDURE — 93018 CV STRESS TEST I&R ONLY: CPT | Performed by: INTERNAL MEDICINE

## 2018-01-01 PROCEDURE — 25010000002 IOPAMIDOL 61 % SOLUTION: Performed by: INTERNAL MEDICINE

## 2018-01-01 PROCEDURE — 83550 IRON BINDING TEST: CPT | Performed by: FAMILY MEDICINE

## 2018-01-01 PROCEDURE — 83540 ASSAY OF IRON: CPT | Performed by: FAMILY MEDICINE

## 2018-01-01 PROCEDURE — 0399T ADULT TRANSTHORACIC ECHO COMPLETE W/ CONT IF NECESSARY PER PROTOCOL: CPT | Performed by: INTERNAL MEDICINE

## 2018-01-01 PROCEDURE — 93227 XTRNL ECG REC<48 HR R&I: CPT | Performed by: INTERNAL MEDICINE

## 2018-01-01 PROCEDURE — 99284 EMERGENCY DEPT VISIT MOD MDM: CPT

## 2018-01-01 PROCEDURE — 84484 ASSAY OF TROPONIN QUANT: CPT | Performed by: FAMILY MEDICINE

## 2018-01-01 PROCEDURE — 25010000002 METHYLPREDNISOLONE PER 40 MG: Performed by: FAMILY MEDICINE

## 2018-01-01 PROCEDURE — 93010 ELECTROCARDIOGRAM REPORT: CPT | Performed by: INTERNAL MEDICINE

## 2018-01-01 PROCEDURE — G8979 MOBILITY GOAL STATUS: HCPCS | Performed by: PHYSICAL THERAPIST

## 2018-01-01 PROCEDURE — 0399T HC MYOCARDL STRAIN IMAG QUAN ASSMT PER SESS: CPT

## 2018-01-01 PROCEDURE — 81001 URINALYSIS AUTO W/SCOPE: CPT | Performed by: NURSE PRACTITIONER

## 2018-01-01 PROCEDURE — G8988 SELF CARE GOAL STATUS: HCPCS

## 2018-01-01 PROCEDURE — 84300 ASSAY OF URINE SODIUM: CPT | Performed by: INTERNAL MEDICINE

## 2018-01-01 PROCEDURE — 82565 ASSAY OF CREATININE: CPT | Performed by: INTERNAL MEDICINE

## 2018-01-01 PROCEDURE — 83935 ASSAY OF URINE OSMOLALITY: CPT | Performed by: INTERNAL MEDICINE

## 2018-01-01 PROCEDURE — 97116 GAIT TRAINING THERAPY: CPT

## 2018-01-01 PROCEDURE — 94640 AIRWAY INHALATION TREATMENT: CPT

## 2018-01-01 PROCEDURE — 87040 BLOOD CULTURE FOR BACTERIA: CPT | Performed by: EMERGENCY MEDICINE

## 2018-01-01 PROCEDURE — 83970 ASSAY OF PARATHORMONE: CPT | Performed by: INTERNAL MEDICINE

## 2018-01-01 PROCEDURE — 25010000002 IRON SUCROSE PER 1 MG: Performed by: INTERNAL MEDICINE

## 2018-01-01 PROCEDURE — 93321 DOPPLER ECHO F-UP/LMTD STD: CPT | Performed by: INTERNAL MEDICINE

## 2018-01-01 PROCEDURE — 78452 HT MUSCLE IMAGE SPECT MULT: CPT

## 2018-01-01 PROCEDURE — G8989 SELF CARE D/C STATUS: HCPCS

## 2018-01-01 PROCEDURE — 93000 ELECTROCARDIOGRAM COMPLETE: CPT | Performed by: INTERNAL MEDICINE

## 2018-01-01 PROCEDURE — 25010000002 VANCOMYCIN 10 G RECONSTITUTED SOLUTION: Performed by: FAMILY MEDICINE

## 2018-01-01 PROCEDURE — 84439 ASSAY OF FREE THYROXINE: CPT | Performed by: FAMILY MEDICINE

## 2018-01-01 PROCEDURE — 83880 ASSAY OF NATRIURETIC PEPTIDE: CPT | Performed by: NURSE PRACTITIONER

## 2018-01-01 PROCEDURE — 84481 FREE ASSAY (FT-3): CPT | Performed by: FAMILY MEDICINE

## 2018-01-01 PROCEDURE — 82570 ASSAY OF URINE CREATININE: CPT | Performed by: INTERNAL MEDICINE

## 2018-01-01 PROCEDURE — 82803 BLOOD GASES ANY COMBINATION: CPT

## 2018-01-01 PROCEDURE — 85379 FIBRIN DEGRADATION QUANT: CPT | Performed by: EMERGENCY MEDICINE

## 2018-01-01 PROCEDURE — 84145 PROCALCITONIN (PCT): CPT | Performed by: EMERGENCY MEDICINE

## 2018-01-01 PROCEDURE — 80048 BASIC METABOLIC PNL TOTAL CA: CPT | Performed by: FAMILY MEDICINE

## 2018-01-01 PROCEDURE — 93226 XTRNL ECG REC<48 HR SCAN A/R: CPT

## 2018-01-01 PROCEDURE — 25010000002 VANCOMYCIN 10 G RECONSTITUTED SOLUTION: Performed by: NURSE PRACTITIONER

## 2018-01-01 PROCEDURE — 25010000002 IRON SUCROSE PER 1 MG: Performed by: FAMILY MEDICINE

## 2018-01-01 PROCEDURE — 84443 ASSAY THYROID STIM HORMONE: CPT | Performed by: NURSE PRACTITIONER

## 2018-01-01 PROCEDURE — 82306 VITAMIN D 25 HYDROXY: CPT | Performed by: INTERNAL MEDICINE

## 2018-01-01 PROCEDURE — 25010000002 METHYLPREDNISOLONE PER 125 MG: Performed by: FAMILY MEDICINE

## 2018-01-01 PROCEDURE — 87205 SMEAR GRAM STAIN: CPT | Performed by: INTERNAL MEDICINE

## 2018-01-01 PROCEDURE — 93225 XTRNL ECG REC<48 HRS REC: CPT

## 2018-01-01 PROCEDURE — 84100 ASSAY OF PHOSPHORUS: CPT | Performed by: INTERNAL MEDICINE

## 2018-01-01 PROCEDURE — 97110 THERAPEUTIC EXERCISES: CPT

## 2018-01-01 PROCEDURE — 82728 ASSAY OF FERRITIN: CPT | Performed by: FAMILY MEDICINE

## 2018-01-01 PROCEDURE — 99215 OFFICE O/P EST HI 40 MIN: CPT | Performed by: OTOLARYNGOLOGY

## 2018-01-01 PROCEDURE — 93017 CV STRESS TEST TRACING ONLY: CPT

## 2018-01-01 PROCEDURE — 85027 COMPLETE CBC AUTOMATED: CPT | Performed by: INTERNAL MEDICINE

## 2018-01-01 PROCEDURE — 83605 ASSAY OF LACTIC ACID: CPT | Performed by: EMERGENCY MEDICINE

## 2018-01-01 PROCEDURE — 93306 TTE W/DOPPLER COMPLETE: CPT

## 2018-01-01 PROCEDURE — 78452 HT MUSCLE IMAGE SPECT MULT: CPT | Performed by: INTERNAL MEDICINE

## 2018-01-01 PROCEDURE — 83036 HEMOGLOBIN GLYCOSYLATED A1C: CPT | Performed by: FAMILY MEDICINE

## 2018-01-01 PROCEDURE — 82565 ASSAY OF CREATININE: CPT

## 2018-01-01 PROCEDURE — 76775 US EXAM ABDO BACK WALL LIM: CPT

## 2018-01-01 PROCEDURE — 93308 TTE F-UP OR LMTD: CPT | Performed by: INTERNAL MEDICINE

## 2018-01-01 PROCEDURE — 82607 VITAMIN B-12: CPT | Performed by: FAMILY MEDICINE

## 2018-01-01 RX ORDER — ACETYLCYSTEINE 200 MG/ML
3 SOLUTION ORAL; RESPIRATORY (INHALATION)
Status: DISCONTINUED | OUTPATIENT
Start: 2018-01-01 | End: 2018-01-01

## 2018-01-01 RX ORDER — ASPIRIN 81 MG/1
81 TABLET ORAL DAILY
Status: DISCONTINUED | OUTPATIENT
Start: 2018-01-01 | End: 2018-01-01 | Stop reason: HOSPADM

## 2018-01-01 RX ORDER — IPRATROPIUM BROMIDE AND ALBUTEROL SULFATE 2.5; .5 MG/3ML; MG/3ML
3 SOLUTION RESPIRATORY (INHALATION)
Status: DISCONTINUED | OUTPATIENT
Start: 2018-01-01 | End: 2018-01-01 | Stop reason: HOSPADM

## 2018-01-01 RX ORDER — ISOSORBIDE DINITRATE 10 MG/1
10 TABLET ORAL
Qty: 30 TABLET | Refills: 0 | Status: SHIPPED | OUTPATIENT
Start: 2018-01-01

## 2018-01-01 RX ORDER — METHYLPREDNISOLONE SODIUM SUCCINATE 40 MG/ML
20 INJECTION, POWDER, LYOPHILIZED, FOR SOLUTION INTRAMUSCULAR; INTRAVENOUS EVERY 8 HOURS
Status: DISCONTINUED | OUTPATIENT
Start: 2018-01-01 | End: 2018-01-01 | Stop reason: HOSPADM

## 2018-01-01 RX ORDER — SODIUM CHLORIDE 0.9 % (FLUSH) 0.9 %
3-10 SYRINGE (ML) INJECTION AS NEEDED
Status: DISCONTINUED | OUTPATIENT
Start: 2018-01-01 | End: 2018-01-01 | Stop reason: HOSPADM

## 2018-01-01 RX ORDER — ATORVASTATIN CALCIUM 10 MG/1
10 TABLET, FILM COATED ORAL NIGHTLY
Status: DISCONTINUED | OUTPATIENT
Start: 2018-01-01 | End: 2018-01-01 | Stop reason: HOSPADM

## 2018-01-01 RX ORDER — ONDANSETRON 4 MG/1
4 TABLET, ORALLY DISINTEGRATING ORAL EVERY 6 HOURS PRN
Status: DISCONTINUED | OUTPATIENT
Start: 2018-01-01 | End: 2018-01-01 | Stop reason: HOSPADM

## 2018-01-01 RX ORDER — FERROUS SULFATE 325(65) MG
325 TABLET ORAL
Qty: 30 TABLET | Refills: 0 | Status: SHIPPED | OUTPATIENT
Start: 2018-01-01

## 2018-01-01 RX ORDER — BUMETANIDE 0.25 MG/ML
1 INJECTION INTRAMUSCULAR; INTRAVENOUS DAILY
Status: DISCONTINUED | OUTPATIENT
Start: 2018-01-01 | End: 2018-01-01

## 2018-01-01 RX ORDER — GUAIFENESIN 600 MG/1
1200 TABLET, EXTENDED RELEASE ORAL EVERY 12 HOURS SCHEDULED
Qty: 20 TABLET | Refills: 1 | Status: SHIPPED | OUTPATIENT
Start: 2018-01-01

## 2018-01-01 RX ORDER — ISOSORBIDE DINITRATE 10 MG/1
10 TABLET ORAL
Status: DISCONTINUED | OUTPATIENT
Start: 2018-01-01 | End: 2018-01-01 | Stop reason: HOSPADM

## 2018-01-01 RX ORDER — ONDANSETRON 2 MG/ML
4 INJECTION INTRAMUSCULAR; INTRAVENOUS ONCE
Status: COMPLETED | OUTPATIENT
Start: 2018-01-01 | End: 2018-01-01

## 2018-01-01 RX ORDER — AMIODARONE HYDROCHLORIDE 200 MG/1
100 TABLET ORAL DAILY
Status: DISCONTINUED | OUTPATIENT
Start: 2018-01-01 | End: 2018-01-01 | Stop reason: HOSPADM

## 2018-01-01 RX ORDER — HYDRALAZINE HYDROCHLORIDE 10 MG/1
10 TABLET, FILM COATED ORAL EVERY 12 HOURS SCHEDULED
Status: DISCONTINUED | OUTPATIENT
Start: 2018-01-01 | End: 2018-01-01 | Stop reason: HOSPADM

## 2018-01-01 RX ORDER — CALCITRIOL 0.25 UG/1
0.25 CAPSULE, LIQUID FILLED ORAL DAILY
Qty: 30 CAPSULE | Refills: 0 | Status: SHIPPED | OUTPATIENT
Start: 2018-01-01

## 2018-01-01 RX ORDER — HYDRALAZINE HYDROCHLORIDE 25 MG/1
25 TABLET, FILM COATED ORAL EVERY 8 HOURS SCHEDULED
Status: DISCONTINUED | OUTPATIENT
Start: 2018-01-01 | End: 2018-01-01

## 2018-01-01 RX ORDER — ACETAMINOPHEN 325 MG/1
650 TABLET ORAL EVERY 4 HOURS PRN
Status: DISCONTINUED | OUTPATIENT
Start: 2018-01-01 | End: 2018-01-01 | Stop reason: HOSPADM

## 2018-01-01 RX ORDER — CALCITRIOL 0.25 UG/1
0.25 CAPSULE, LIQUID FILLED ORAL DAILY
Status: DISCONTINUED | OUTPATIENT
Start: 2018-01-01 | End: 2018-01-01 | Stop reason: HOSPADM

## 2018-01-01 RX ORDER — DEXTROSE MONOHYDRATE 25 G/50ML
25 INJECTION, SOLUTION INTRAVENOUS
Status: DISCONTINUED | OUTPATIENT
Start: 2018-01-01 | End: 2018-01-01 | Stop reason: HOSPADM

## 2018-01-01 RX ORDER — ISOSORBIDE DINITRATE 20 MG/1
20 TABLET ORAL
Status: DISCONTINUED | OUTPATIENT
Start: 2018-01-01 | End: 2018-01-01

## 2018-01-01 RX ORDER — SODIUM CHLORIDE 0.9 % (FLUSH) 0.9 %
10 SYRINGE (ML) INJECTION AS NEEDED
Status: DISCONTINUED | OUTPATIENT
Start: 2018-01-01 | End: 2018-01-01 | Stop reason: HOSPADM

## 2018-01-01 RX ORDER — ONDANSETRON 4 MG/1
4 TABLET, FILM COATED ORAL EVERY 6 HOURS PRN
Status: DISCONTINUED | OUTPATIENT
Start: 2018-01-01 | End: 2018-01-01 | Stop reason: HOSPADM

## 2018-01-01 RX ORDER — BACLOFEN 10 MG/1
5 TABLET ORAL EVERY 8 HOURS PRN
Status: DISCONTINUED | OUTPATIENT
Start: 2018-01-01 | End: 2018-01-01 | Stop reason: HOSPADM

## 2018-01-01 RX ORDER — GUAIFENESIN 600 MG/1
1200 TABLET, EXTENDED RELEASE ORAL EVERY 12 HOURS SCHEDULED
Status: DISCONTINUED | OUTPATIENT
Start: 2018-01-01 | End: 2018-01-01 | Stop reason: HOSPADM

## 2018-01-01 RX ORDER — HYDRALAZINE HYDROCHLORIDE 10 MG/1
10 TABLET, FILM COATED ORAL EVERY 8 HOURS SCHEDULED
Status: DISCONTINUED | OUTPATIENT
Start: 2018-01-01 | End: 2018-01-01

## 2018-01-01 RX ORDER — NITROGLYCERIN 0.4 MG/1
0.4 TABLET SUBLINGUAL
Status: DISCONTINUED | OUTPATIENT
Start: 2018-01-01 | End: 2018-01-01 | Stop reason: HOSPADM

## 2018-01-01 RX ORDER — BUMETANIDE 1 MG/1
1 TABLET ORAL
Status: DISCONTINUED | OUTPATIENT
Start: 2018-01-01 | End: 2018-01-01 | Stop reason: HOSPADM

## 2018-01-01 RX ORDER — ONDANSETRON 2 MG/ML
4 INJECTION INTRAMUSCULAR; INTRAVENOUS EVERY 6 HOURS PRN
Status: DISCONTINUED | OUTPATIENT
Start: 2018-01-01 | End: 2018-01-01 | Stop reason: HOSPADM

## 2018-01-01 RX ORDER — METHYLPREDNISOLONE SODIUM SUCCINATE 40 MG/ML
40 INJECTION, POWDER, LYOPHILIZED, FOR SOLUTION INTRAMUSCULAR; INTRAVENOUS EVERY 8 HOURS
Status: DISCONTINUED | OUTPATIENT
Start: 2018-01-01 | End: 2018-01-01

## 2018-01-01 RX ORDER — ALLOPURINOL 100 MG/1
100 TABLET ORAL DAILY
Status: DISCONTINUED | OUTPATIENT
Start: 2018-01-01 | End: 2018-01-01 | Stop reason: HOSPADM

## 2018-01-01 RX ORDER — IPRATROPIUM BROMIDE AND ALBUTEROL SULFATE 2.5; .5 MG/3ML; MG/3ML
3 SOLUTION RESPIRATORY (INHALATION) EVERY 6 HOURS PRN
Status: DISCONTINUED | OUTPATIENT
Start: 2018-01-01 | End: 2018-01-01

## 2018-01-01 RX ORDER — ALLOPURINOL 100 MG/1
100 TABLET ORAL DAILY
Qty: 30 TABLET | Refills: 0 | Status: SHIPPED | OUTPATIENT
Start: 2018-01-01

## 2018-01-01 RX ORDER — SODIUM CHLORIDE 0.9 % (FLUSH) 0.9 %
3 SYRINGE (ML) INJECTION EVERY 12 HOURS SCHEDULED
Status: DISCONTINUED | OUTPATIENT
Start: 2018-01-01 | End: 2018-01-01 | Stop reason: HOSPADM

## 2018-01-01 RX ORDER — IPRATROPIUM BROMIDE AND ALBUTEROL SULFATE 2.5; .5 MG/3ML; MG/3ML
3 SOLUTION RESPIRATORY (INHALATION)
Status: DISCONTINUED | OUTPATIENT
Start: 2018-01-01 | End: 2018-01-01

## 2018-01-01 RX ORDER — FUROSEMIDE 40 MG/1
40 TABLET ORAL
Status: DISCONTINUED | OUTPATIENT
Start: 2018-01-01 | End: 2018-01-01

## 2018-01-01 RX ORDER — LISINOPRIL 5 MG/1
5 TABLET ORAL
Status: DISCONTINUED | OUTPATIENT
Start: 2018-01-01 | End: 2018-01-01

## 2018-01-01 RX ORDER — NITROGLYCERIN 0.4 MG/1
0.4 TABLET SUBLINGUAL
Qty: 25 TABLET | Refills: 12 | Status: SHIPPED | OUTPATIENT
Start: 2018-01-01

## 2018-01-01 RX ORDER — BENZONATATE 200 MG/1
200 CAPSULE ORAL 3 TIMES DAILY PRN
Qty: 30 CAPSULE | Refills: 1 | Status: SHIPPED | OUTPATIENT
Start: 2018-01-01

## 2018-01-01 RX ORDER — LISINOPRIL 2.5 MG/1
2.5 TABLET ORAL DAILY
Status: DISCONTINUED | OUTPATIENT
Start: 2018-01-01 | End: 2018-01-01 | Stop reason: HOSPADM

## 2018-01-01 RX ORDER — FERROUS SULFATE 325(65) MG
325 TABLET ORAL
Status: DISCONTINUED | OUTPATIENT
Start: 2018-01-01 | End: 2018-01-01 | Stop reason: HOSPADM

## 2018-01-01 RX ORDER — NICOTINE POLACRILEX 4 MG
15 LOZENGE BUCCAL
Status: DISCONTINUED | OUTPATIENT
Start: 2018-01-01 | End: 2018-01-01 | Stop reason: HOSPADM

## 2018-01-01 RX ORDER — HYDRALAZINE HYDROCHLORIDE 10 MG/1
10 TABLET, FILM COATED ORAL EVERY 12 HOURS SCHEDULED
Qty: 30 TABLET | Refills: 0 | Status: SHIPPED | OUTPATIENT
Start: 2018-01-01

## 2018-01-01 RX ORDER — PANTOPRAZOLE SODIUM 40 MG/1
40 TABLET, DELAYED RELEASE ORAL
Status: DISCONTINUED | OUTPATIENT
Start: 2018-01-01 | End: 2018-01-01 | Stop reason: HOSPADM

## 2018-01-01 RX ORDER — PREDNISONE 10 MG/1
TABLET ORAL
Qty: 30 TABLET | Refills: 0 | Status: SHIPPED | OUTPATIENT
Start: 2018-01-01

## 2018-01-01 RX ORDER — GABAPENTIN 300 MG/1
300 CAPSULE ORAL 3 TIMES DAILY
Status: DISCONTINUED | OUTPATIENT
Start: 2018-01-01 | End: 2018-01-01 | Stop reason: HOSPADM

## 2018-01-01 RX ORDER — GABAPENTIN 300 MG/1
300 CAPSULE ORAL EVERY 12 HOURS SCHEDULED
Status: DISCONTINUED | OUTPATIENT
Start: 2018-01-01 | End: 2018-01-01 | Stop reason: HOSPADM

## 2018-01-01 RX ORDER — BISACODYL 10 MG
10 SUPPOSITORY, RECTAL RECTAL ONCE
Status: COMPLETED | OUTPATIENT
Start: 2018-01-01 | End: 2018-01-01

## 2018-01-01 RX ORDER — AMIODARONE HYDROCHLORIDE 200 MG/1
100 TABLET ORAL DAILY
COMMUNITY
End: 2018-01-01 | Stop reason: HOSPADM

## 2018-01-01 RX ORDER — HYDROCODONE BITARTRATE AND ACETAMINOPHEN 5; 325 MG/1; MG/1
1 TABLET ORAL EVERY 4 HOURS PRN
Status: DISCONTINUED | OUTPATIENT
Start: 2018-01-01 | End: 2018-01-01 | Stop reason: HOSPADM

## 2018-01-01 RX ORDER — AMIODARONE HYDROCHLORIDE 100 MG/1
100 TABLET ORAL DAILY
Qty: 30 TABLET | Refills: 2 | Status: SHIPPED | OUTPATIENT
Start: 2018-01-01

## 2018-01-01 RX ORDER — AMOXICILLIN AND CLAVULANATE POTASSIUM 500; 125 MG/1; MG/1
1 TABLET, FILM COATED ORAL EVERY 12 HOURS SCHEDULED
Qty: 10 TABLET | Refills: 0 | Status: SHIPPED | OUTPATIENT
Start: 2018-01-01 | End: 2018-01-01

## 2018-01-01 RX ORDER — BENZONATATE 100 MG/1
200 CAPSULE ORAL 3 TIMES DAILY PRN
Status: DISCONTINUED | OUTPATIENT
Start: 2018-01-01 | End: 2018-01-01 | Stop reason: HOSPADM

## 2018-01-01 RX ORDER — METOPROLOL SUCCINATE 25 MG/1
25 TABLET, EXTENDED RELEASE ORAL DAILY
Status: DISCONTINUED | OUTPATIENT
Start: 2018-01-01 | End: 2018-01-01 | Stop reason: HOSPADM

## 2018-01-01 RX ORDER — MAGNESIUM OXIDE 400 MG/1
400 TABLET ORAL DAILY
Status: DISCONTINUED | OUTPATIENT
Start: 2018-01-01 | End: 2018-01-01 | Stop reason: HOSPADM

## 2018-01-01 RX ORDER — FUROSEMIDE 10 MG/ML
80 INJECTION INTRAMUSCULAR; INTRAVENOUS ONCE
Status: COMPLETED | OUTPATIENT
Start: 2018-01-01 | End: 2018-01-01

## 2018-01-01 RX ORDER — HYDRALAZINE HYDROCHLORIDE 25 MG/1
25 TABLET, FILM COATED ORAL EVERY 12 HOURS SCHEDULED
Status: DISCONTINUED | OUTPATIENT
Start: 2018-01-01 | End: 2018-01-01

## 2018-01-01 RX ORDER — ACETYLCYSTEINE 200 MG/ML
1.5 SOLUTION ORAL; RESPIRATORY (INHALATION)
Status: DISCONTINUED | OUTPATIENT
Start: 2018-01-01 | End: 2018-01-01

## 2018-01-01 RX ORDER — CODEINE PHOSPHATE AND GUAIFENESIN 10; 100 MG/5ML; MG/5ML
5 SOLUTION ORAL EVERY 4 HOURS PRN
Status: DISCONTINUED | OUTPATIENT
Start: 2018-01-01 | End: 2018-01-01 | Stop reason: HOSPADM

## 2018-01-01 RX ORDER — BUMETANIDE 1 MG/1
1 TABLET ORAL DAILY
Status: DISCONTINUED | OUTPATIENT
Start: 2018-01-01 | End: 2018-01-01 | Stop reason: HOSPADM

## 2018-01-01 RX ORDER — SODIUM CHLORIDE 0.9 % (FLUSH) 0.9 %
20 SYRINGE (ML) INJECTION AS NEEDED
Status: DISCONTINUED | OUTPATIENT
Start: 2018-01-01 | End: 2018-01-01 | Stop reason: HOSPADM

## 2018-01-01 RX ORDER — MECLIZINE HYDROCHLORIDE 25 MG/1
25 TABLET ORAL 3 TIMES DAILY PRN
Qty: 60 TABLET | Refills: 3 | Status: SHIPPED | OUTPATIENT
Start: 2018-01-01 | End: 2018-01-01

## 2018-01-01 RX ORDER — IPRATROPIUM BROMIDE AND ALBUTEROL SULFATE 2.5; .5 MG/3ML; MG/3ML
3 SOLUTION RESPIRATORY (INHALATION) EVERY 4 HOURS PRN
Status: DISCONTINUED | OUTPATIENT
Start: 2018-01-01 | End: 2018-01-01 | Stop reason: HOSPADM

## 2018-01-01 RX ORDER — DIGOXIN 125 MCG
125 TABLET ORAL
Status: DISCONTINUED | OUTPATIENT
Start: 2018-01-01 | End: 2018-01-01 | Stop reason: HOSPADM

## 2018-01-01 RX ORDER — FUROSEMIDE 10 MG/ML
20 INJECTION INTRAMUSCULAR; INTRAVENOUS ONCE
Status: COMPLETED | OUTPATIENT
Start: 2018-01-01 | End: 2018-01-01

## 2018-01-01 RX ORDER — LISINOPRIL 2.5 MG/1
2.5 TABLET ORAL
Status: DISCONTINUED | OUTPATIENT
Start: 2018-01-01 | End: 2018-01-01

## 2018-01-01 RX ORDER — FUROSEMIDE 10 MG/ML
40 INJECTION INTRAMUSCULAR; INTRAVENOUS EVERY 12 HOURS
Status: DISCONTINUED | OUTPATIENT
Start: 2018-01-01 | End: 2018-01-01

## 2018-01-01 RX ORDER — AMIODARONE HYDROCHLORIDE 100 MG/1
TABLET ORAL
Qty: 90 TABLET | Refills: 2 | Status: ON HOLD | OUTPATIENT
Start: 2018-01-01 | End: 2018-01-01

## 2018-01-01 RX ORDER — LISINOPRIL 2.5 MG/1
2.5 TABLET ORAL DAILY
Qty: 30 TABLET | Refills: 2 | Status: SHIPPED | OUTPATIENT
Start: 2018-01-01 | End: 2018-01-01 | Stop reason: HOSPADM

## 2018-01-01 RX ORDER — ATORVASTATIN CALCIUM 10 MG/1
10 TABLET, FILM COATED ORAL DAILY
Status: DISCONTINUED | OUTPATIENT
Start: 2018-01-01 | End: 2018-01-01

## 2018-01-01 RX ORDER — AMINOPHYLLINE DIHYDRATE 25 MG/ML
50 INJECTION, SOLUTION INTRAVENOUS ONCE
Status: COMPLETED | OUTPATIENT
Start: 2018-01-01 | End: 2018-01-01

## 2018-01-01 RX ORDER — BUMETANIDE 0.25 MG/ML
1 INJECTION INTRAMUSCULAR; INTRAVENOUS EVERY 12 HOURS
Status: DISCONTINUED | OUTPATIENT
Start: 2018-01-01 | End: 2018-01-01

## 2018-01-01 RX ORDER — METHYLPREDNISOLONE SODIUM SUCCINATE 125 MG/2ML
60 INJECTION, POWDER, LYOPHILIZED, FOR SOLUTION INTRAMUSCULAR; INTRAVENOUS EVERY 8 HOURS
Status: DISCONTINUED | OUTPATIENT
Start: 2018-01-01 | End: 2018-01-01

## 2018-01-01 RX ORDER — AMOXICILLIN AND CLAVULANATE POTASSIUM 500; 125 MG/1; MG/1
1 TABLET, FILM COATED ORAL EVERY 12 HOURS SCHEDULED
Status: DISCONTINUED | OUTPATIENT
Start: 2018-01-01 | End: 2018-01-01 | Stop reason: HOSPADM

## 2018-01-01 RX ORDER — BUMETANIDE 1 MG/1
1 TABLET ORAL DAILY
COMMUNITY
Start: 2018-01-01

## 2018-01-01 RX ORDER — BUMETANIDE 0.25 MG/ML
2 INJECTION INTRAMUSCULAR; INTRAVENOUS EVERY 12 HOURS
Status: DISCONTINUED | OUTPATIENT
Start: 2018-01-01 | End: 2018-01-01

## 2018-01-01 RX ORDER — BUMETANIDE 0.25 MG/ML
2 INJECTION INTRAMUSCULAR; INTRAVENOUS ONCE
Status: DISCONTINUED | OUTPATIENT
Start: 2018-01-01 | End: 2018-01-01 | Stop reason: CLARIF

## 2018-01-01 RX ORDER — LANOLIN ALCOHOL/MO/W.PET/CERES
6 CREAM (GRAM) TOPICAL NIGHTLY
Status: DISCONTINUED | OUTPATIENT
Start: 2018-01-01 | End: 2018-01-01 | Stop reason: HOSPADM

## 2018-01-01 RX ORDER — FERROUS SULFATE 325(65) MG
325 TABLET ORAL 2 TIMES DAILY WITH MEALS
Status: DISCONTINUED | OUTPATIENT
Start: 2018-01-01 | End: 2018-01-01 | Stop reason: HOSPADM

## 2018-01-01 RX ADMIN — SACUBITRIL AND VALSARTAN 1 TABLET: 24; 26 TABLET, FILM COATED ORAL at 08:44

## 2018-01-01 RX ADMIN — GABAPENTIN 300 MG: 300 CAPSULE ORAL at 17:51

## 2018-01-01 RX ADMIN — IPRATROPIUM BROMIDE AND ALBUTEROL SULFATE 3 ML: 2.5; .5 SOLUTION RESPIRATORY (INHALATION) at 08:21

## 2018-01-01 RX ADMIN — DIGOXIN 125 MCG: 0.12 TABLET ORAL at 13:32

## 2018-01-01 RX ADMIN — IOPAMIDOL 100 ML: 612 INJECTION, SOLUTION INTRAVENOUS at 16:30

## 2018-01-01 RX ADMIN — GUAIFENESIN 1200 MG: 600 TABLET, EXTENDED RELEASE ORAL at 07:11

## 2018-01-01 RX ADMIN — ASPIRIN 81 MG: 81 TABLET ORAL at 09:17

## 2018-01-01 RX ADMIN — PANTOPRAZOLE SODIUM 40 MG: 40 TABLET, DELAYED RELEASE ORAL at 06:44

## 2018-01-01 RX ADMIN — FERROUS SULFATE TAB 325 MG (65 MG ELEMENTAL FE) 325 MG: 325 (65 FE) TAB at 17:58

## 2018-01-01 RX ADMIN — IPRATROPIUM BROMIDE AND ALBUTEROL SULFATE 3 ML: 2.5; .5 SOLUTION RESPIRATORY (INHALATION) at 14:28

## 2018-01-01 RX ADMIN — Medication 3 ML: at 08:47

## 2018-01-01 RX ADMIN — IOPAMIDOL 200 ML: 755 INJECTION, SOLUTION INTRAVENOUS at 13:48

## 2018-01-01 RX ADMIN — Medication 3 ML: at 21:02

## 2018-01-01 RX ADMIN — IPRATROPIUM BROMIDE AND ALBUTEROL SULFATE 3 ML: 2.5; .5 SOLUTION RESPIRATORY (INHALATION) at 10:32

## 2018-01-01 RX ADMIN — TECHNETIUM TC 99M SESTAMIBI 1 DOSE: 1 INJECTION INTRAVENOUS at 08:13

## 2018-01-01 RX ADMIN — Medication 10 ML: at 16:15

## 2018-01-01 RX ADMIN — IPRATROPIUM BROMIDE AND ALBUTEROL SULFATE 3 ML: 2.5; .5 SOLUTION RESPIRATORY (INHALATION) at 20:35

## 2018-01-01 RX ADMIN — ISOSORBIDE DINITRATE 10 MG: 10 TABLET ORAL at 13:32

## 2018-01-01 RX ADMIN — SODIUM CHLORIDE, PRESERVATIVE FREE 3 ML: 5 INJECTION INTRAVENOUS at 21:06

## 2018-01-01 RX ADMIN — GUAIFENESIN AND CODEINE PHOSPHATE 5 ML: 100; 10 SOLUTION ORAL at 17:14

## 2018-01-01 RX ADMIN — IRON SUCROSE 300 MG: 20 INJECTION, SOLUTION INTRAVENOUS at 14:42

## 2018-01-01 RX ADMIN — METHYLPREDNISOLONE SODIUM SUCCINATE 40 MG: 125 INJECTION, POWDER, FOR SOLUTION INTRAMUSCULAR; INTRAVENOUS at 11:31

## 2018-01-01 RX ADMIN — GUAIFENESIN AND CODEINE PHOSPHATE 5 ML: 100; 10 SOLUTION ORAL at 21:29

## 2018-01-01 RX ADMIN — IPRATROPIUM BROMIDE AND ALBUTEROL SULFATE 3 ML: 2.5; .5 SOLUTION RESPIRATORY (INHALATION) at 04:14

## 2018-01-01 RX ADMIN — INSULIN LISPRO 2 UNITS: 100 INJECTION, SOLUTION INTRAVENOUS; SUBCUTANEOUS at 17:14

## 2018-01-01 RX ADMIN — FUROSEMIDE 80 MG: 10 INJECTION, SOLUTION INTRAMUSCULAR; INTRAVENOUS at 12:23

## 2018-01-01 RX ADMIN — ASPIRIN 81 MG: 81 TABLET ORAL at 08:44

## 2018-01-01 RX ADMIN — IPRATROPIUM BROMIDE AND ALBUTEROL SULFATE 3 ML: 2.5; .5 SOLUTION RESPIRATORY (INHALATION) at 10:48

## 2018-01-01 RX ADMIN — ISOSORBIDE DINITRATE 20 MG: 20 TABLET ORAL at 12:53

## 2018-01-01 RX ADMIN — FERROUS SULFATE TAB 325 MG (65 MG ELEMENTAL FE) 325 MG: 325 (65 FE) TAB at 08:45

## 2018-01-01 RX ADMIN — AMIODARONE HYDROCHLORIDE 100 MG: 200 TABLET ORAL at 10:03

## 2018-01-01 RX ADMIN — ENOXAPARIN SODIUM 60 MG: 60 INJECTION SUBCUTANEOUS at 08:58

## 2018-01-01 RX ADMIN — DIGOXIN 125 MCG: 0.12 TABLET ORAL at 12:30

## 2018-01-01 RX ADMIN — ALLOPURINOL 100 MG: 100 TABLET ORAL at 08:37

## 2018-01-01 RX ADMIN — REGADENOSON 0.4 MG: 0.08 INJECTION, SOLUTION INTRAVENOUS at 09:52

## 2018-01-01 RX ADMIN — IPRATROPIUM BROMIDE AND ALBUTEROL SULFATE 3 ML: 2.5; .5 SOLUTION RESPIRATORY (INHALATION) at 07:16

## 2018-01-01 RX ADMIN — ISOSORBIDE DINITRATE 10 MG: 10 TABLET ORAL at 19:06

## 2018-01-01 RX ADMIN — GUAIFENESIN 1200 MG: 600 TABLET, EXTENDED RELEASE ORAL at 08:36

## 2018-01-01 RX ADMIN — VANCOMYCIN HYDROCHLORIDE 500 MG: 10 INJECTION, POWDER, LYOPHILIZED, FOR SOLUTION INTRAVENOUS at 21:04

## 2018-01-01 RX ADMIN — BUMETANIDE 1 MG: 1 TABLET ORAL at 09:59

## 2018-01-01 RX ADMIN — BUMETANIDE 1 MG: 1 TABLET ORAL at 08:37

## 2018-01-01 RX ADMIN — ISOSORBIDE DINITRATE 10 MG: 10 TABLET ORAL at 08:45

## 2018-01-01 RX ADMIN — METHYLPREDNISOLONE SODIUM SUCCINATE 40 MG: 125 INJECTION, POWDER, FOR SOLUTION INTRAMUSCULAR; INTRAVENOUS at 12:35

## 2018-01-01 RX ADMIN — ATORVASTATIN CALCIUM 10 MG: 10 TABLET, FILM COATED ORAL at 20:54

## 2018-01-01 RX ADMIN — ASPIRIN 81 MG: 81 TABLET ORAL at 09:22

## 2018-01-01 RX ADMIN — IPRATROPIUM BROMIDE AND ALBUTEROL SULFATE 3 ML: 2.5; .5 SOLUTION RESPIRATORY (INHALATION) at 06:17

## 2018-01-01 RX ADMIN — AMOXICILLIN AND CLAVULANATE POTASSIUM 500 MG: 500; 125 TABLET, FILM COATED ORAL at 09:59

## 2018-01-01 RX ADMIN — GABAPENTIN 300 MG: 300 CAPSULE ORAL at 21:01

## 2018-01-01 RX ADMIN — Medication 6 MG: at 21:01

## 2018-01-01 RX ADMIN — SACUBITRIL AND VALSARTAN 1 TABLET: 24; 26 TABLET, FILM COATED ORAL at 20:34

## 2018-01-01 RX ADMIN — ENOXAPARIN SODIUM 30 MG: 30 INJECTION SUBCUTANEOUS at 17:14

## 2018-01-01 RX ADMIN — ENOXAPARIN SODIUM 30 MG: 30 INJECTION SUBCUTANEOUS at 20:56

## 2018-01-01 RX ADMIN — LISINOPRIL 2.5 MG: 2.5 TABLET ORAL at 08:53

## 2018-01-01 RX ADMIN — IPRATROPIUM BROMIDE AND ALBUTEROL SULFATE 3 ML: 2.5; .5 SOLUTION RESPIRATORY (INHALATION) at 07:39

## 2018-01-01 RX ADMIN — AMIODARONE HYDROCHLORIDE 100 MG: 200 TABLET ORAL at 09:22

## 2018-01-01 RX ADMIN — GABAPENTIN 300 MG: 300 CAPSULE ORAL at 21:03

## 2018-01-01 RX ADMIN — TAZOBACTAM SODIUM AND PIPERACILLIN SODIUM 4.5 G: 500; 4 INJECTION, SOLUTION INTRAVENOUS at 12:36

## 2018-01-01 RX ADMIN — GABAPENTIN 300 MG: 300 CAPSULE ORAL at 20:56

## 2018-01-01 RX ADMIN — IPRATROPIUM BROMIDE AND ALBUTEROL SULFATE 3 ML: 2.5; .5 SOLUTION RESPIRATORY (INHALATION) at 20:47

## 2018-01-01 RX ADMIN — ATORVASTATIN CALCIUM 10 MG: 10 TABLET, FILM COATED ORAL at 21:01

## 2018-01-01 RX ADMIN — FERROUS SULFATE TAB 325 MG (65 MG ELEMENTAL FE) 325 MG: 325 (65 FE) TAB at 18:45

## 2018-01-01 RX ADMIN — INSULIN LISPRO 2 UNITS: 100 INJECTION, SOLUTION INTRAVENOUS; SUBCUTANEOUS at 13:31

## 2018-01-01 RX ADMIN — BENZONATATE 200 MG: 100 CAPSULE, LIQUID FILLED ORAL at 07:10

## 2018-01-01 RX ADMIN — IPRATROPIUM BROMIDE AND ALBUTEROL SULFATE 3 ML: 2.5; .5 SOLUTION RESPIRATORY (INHALATION) at 11:02

## 2018-01-01 RX ADMIN — INSULIN LISPRO 3 UNITS: 100 INJECTION, SOLUTION INTRAVENOUS; SUBCUTANEOUS at 12:35

## 2018-01-01 RX ADMIN — BUMETANIDE 1 MG: 1 TABLET ORAL at 15:57

## 2018-01-01 RX ADMIN — GABAPENTIN 300 MG: 300 CAPSULE ORAL at 21:37

## 2018-01-01 RX ADMIN — TAZOBACTAM SODIUM AND PIPERACILLIN SODIUM 4.5 G: 500; 4 INJECTION, SOLUTION INTRAVENOUS at 06:43

## 2018-01-01 RX ADMIN — GABAPENTIN 300 MG: 300 CAPSULE ORAL at 20:35

## 2018-01-01 RX ADMIN — ISOSORBIDE DINITRATE 20 MG: 20 TABLET ORAL at 17:20

## 2018-01-01 RX ADMIN — IPRATROPIUM BROMIDE AND ALBUTEROL SULFATE 3 ML: 2.5; .5 SOLUTION RESPIRATORY (INHALATION) at 23:50

## 2018-01-01 RX ADMIN — ISOSORBIDE DINITRATE 10 MG: 10 TABLET ORAL at 09:17

## 2018-01-01 RX ADMIN — HYDRALAZINE HYDROCHLORIDE 25 MG: 25 TABLET, FILM COATED ORAL at 14:17

## 2018-01-01 RX ADMIN — METHYLPREDNISOLONE SODIUM SUCCINATE 20 MG: 40 INJECTION, POWDER, FOR SOLUTION INTRAMUSCULAR; INTRAVENOUS at 21:24

## 2018-01-01 RX ADMIN — FERROUS SULFATE TAB 325 MG (65 MG ELEMENTAL FE) 325 MG: 325 (65 FE) TAB at 07:10

## 2018-01-01 RX ADMIN — GUAIFENESIN 1200 MG: 600 TABLET, EXTENDED RELEASE ORAL at 20:56

## 2018-01-01 RX ADMIN — BUMETANIDE 1 MG: 1 TABLET ORAL at 09:22

## 2018-01-01 RX ADMIN — CEFEPIME HYDROCHLORIDE 2 G: 2 INJECTION, POWDER, FOR SOLUTION INTRAVENOUS at 23:00

## 2018-01-01 RX ADMIN — DIGOXIN 125 MCG: 0.12 TABLET ORAL at 11:31

## 2018-01-01 RX ADMIN — ISOSORBIDE DINITRATE 10 MG: 10 TABLET ORAL at 12:48

## 2018-01-01 RX ADMIN — METOPROLOL SUCCINATE 25 MG: 25 TABLET, FILM COATED, EXTENDED RELEASE ORAL at 17:32

## 2018-01-01 RX ADMIN — ISOSORBIDE DINITRATE 10 MG: 10 TABLET ORAL at 08:36

## 2018-01-01 RX ADMIN — GUAIFENESIN AND CODEINE PHOSPHATE 5 ML: 100; 10 SOLUTION ORAL at 08:15

## 2018-01-01 RX ADMIN — ISOSORBIDE DINITRATE 10 MG: 10 TABLET ORAL at 12:35

## 2018-01-01 RX ADMIN — GUAIFENESIN 1200 MG: 600 TABLET, EXTENDED RELEASE ORAL at 08:37

## 2018-01-01 RX ADMIN — METOPROLOL SUCCINATE 25 MG: 25 TABLET, FILM COATED, EXTENDED RELEASE ORAL at 09:19

## 2018-01-01 RX ADMIN — GABAPENTIN 300 MG: 300 CAPSULE ORAL at 08:35

## 2018-01-01 RX ADMIN — PANTOPRAZOLE SODIUM 40 MG: 40 TABLET, DELAYED RELEASE ORAL at 12:35

## 2018-01-01 RX ADMIN — METOPROLOL SUCCINATE 25 MG: 25 TABLET, FILM COATED, EXTENDED RELEASE ORAL at 09:58

## 2018-01-01 RX ADMIN — IPRATROPIUM BROMIDE AND ALBUTEROL SULFATE 3 ML: 2.5; .5 SOLUTION RESPIRATORY (INHALATION) at 20:22

## 2018-01-01 RX ADMIN — MAGNESIUM GLUCONATE 500 MG ORAL TABLET 400 MG: 500 TABLET ORAL at 09:20

## 2018-01-01 RX ADMIN — ENOXAPARIN SODIUM 30 MG: 30 INJECTION SUBCUTANEOUS at 22:59

## 2018-01-01 RX ADMIN — Medication 3 ML: at 20:54

## 2018-01-01 RX ADMIN — Medication 6 MG: at 20:52

## 2018-01-01 RX ADMIN — SODIUM CHLORIDE, PRESERVATIVE FREE 3 ML: 5 INJECTION INTRAVENOUS at 08:38

## 2018-01-01 RX ADMIN — ASPIRIN 81 MG: 81 TABLET, DELAYED RELEASE ORAL at 07:10

## 2018-01-01 RX ADMIN — Medication 3 ML: at 20:35

## 2018-01-01 RX ADMIN — BUMETANIDE 1 MG: 1 TABLET ORAL at 07:12

## 2018-01-01 RX ADMIN — IPRATROPIUM BROMIDE AND ALBUTEROL SULFATE 3 ML: 2.5; .5 SOLUTION RESPIRATORY (INHALATION) at 11:50

## 2018-01-01 RX ADMIN — SODIUM CHLORIDE, PRESERVATIVE FREE 3 ML: 5 INJECTION INTRAVENOUS at 21:51

## 2018-01-01 RX ADMIN — PANTOPRAZOLE SODIUM 40 MG: 40 TABLET, DELAYED RELEASE ORAL at 05:26

## 2018-01-01 RX ADMIN — ASPIRIN 81 MG: 81 TABLET ORAL at 09:20

## 2018-01-01 RX ADMIN — BUMETANIDE 2 MG: 0.25 INJECTION INTRAMUSCULAR; INTRAVENOUS at 17:32

## 2018-01-01 RX ADMIN — IPRATROPIUM BROMIDE AND ALBUTEROL SULFATE 3 ML: 2.5; .5 SOLUTION RESPIRATORY (INHALATION) at 14:59

## 2018-01-01 RX ADMIN — Medication 3 ML: at 08:53

## 2018-01-01 RX ADMIN — IPRATROPIUM BROMIDE AND ALBUTEROL SULFATE 3 ML: 2.5; .5 SOLUTION RESPIRATORY (INHALATION) at 20:34

## 2018-01-01 RX ADMIN — VANCOMYCIN HYDROCHLORIDE 500 MG: 10 INJECTION, POWDER, LYOPHILIZED, FOR SOLUTION INTRAVENOUS at 00:37

## 2018-01-01 RX ADMIN — ISOSORBIDE DINITRATE 20 MG: 20 TABLET ORAL at 09:19

## 2018-01-01 RX ADMIN — METHYLPREDNISOLONE SODIUM SUCCINATE 20 MG: 40 INJECTION, POWDER, FOR SOLUTION INTRAMUSCULAR; INTRAVENOUS at 13:32

## 2018-01-01 RX ADMIN — BUMETANIDE 1 MG: 1 TABLET ORAL at 06:35

## 2018-01-01 RX ADMIN — ISOSORBIDE DINITRATE 10 MG: 10 TABLET ORAL at 15:55

## 2018-01-01 RX ADMIN — BUMETANIDE 1 MG: 0.25 INJECTION INTRAMUSCULAR; INTRAVENOUS at 17:51

## 2018-01-01 RX ADMIN — GABAPENTIN 300 MG: 300 CAPSULE ORAL at 17:13

## 2018-01-01 RX ADMIN — GABAPENTIN 300 MG: 300 CAPSULE ORAL at 08:37

## 2018-01-01 RX ADMIN — IPRATROPIUM BROMIDE AND ALBUTEROL SULFATE 3 ML: 2.5; .5 SOLUTION RESPIRATORY (INHALATION) at 03:10

## 2018-01-01 RX ADMIN — LISINOPRIL 2.5 MG: 2.5 TABLET ORAL at 09:17

## 2018-01-01 RX ADMIN — SODIUM CHLORIDE, PRESERVATIVE FREE 3 ML: 5 INJECTION INTRAVENOUS at 21:34

## 2018-01-01 RX ADMIN — DIGOXIN 125 MCG: 0.12 TABLET ORAL at 12:53

## 2018-01-01 RX ADMIN — GABAPENTIN 300 MG: 300 CAPSULE ORAL at 21:24

## 2018-01-01 RX ADMIN — TAZOBACTAM SODIUM AND PIPERACILLIN SODIUM 4.5 G: 500; 4 INJECTION, SOLUTION INTRAVENOUS at 01:23

## 2018-01-01 RX ADMIN — ATORVASTATIN CALCIUM 10 MG: 10 TABLET, FILM COATED ORAL at 21:03

## 2018-01-01 RX ADMIN — TAZOBACTAM SODIUM AND PIPERACILLIN SODIUM 4.5 G: 500; 4 INJECTION, SOLUTION INTRAVENOUS at 17:23

## 2018-01-01 RX ADMIN — METHYLPREDNISOLONE SODIUM SUCCINATE 60 MG: 125 INJECTION, POWDER, FOR SOLUTION INTRAMUSCULAR; INTRAVENOUS at 21:20

## 2018-01-01 RX ADMIN — GUAIFENESIN AND CODEINE PHOSPHATE 5 ML: 100; 10 SOLUTION ORAL at 13:42

## 2018-01-01 RX ADMIN — Medication 3 ML: at 09:24

## 2018-01-01 RX ADMIN — METHYLPREDNISOLONE SODIUM SUCCINATE 20 MG: 40 INJECTION, POWDER, FOR SOLUTION INTRAMUSCULAR; INTRAVENOUS at 12:48

## 2018-01-01 RX ADMIN — AMOXICILLIN AND CLAVULANATE POTASSIUM 500 MG: 500; 125 TABLET, FILM COATED ORAL at 21:23

## 2018-01-01 RX ADMIN — INSULIN LISPRO 2 UNITS: 100 INJECTION, SOLUTION INTRAVENOUS; SUBCUTANEOUS at 12:03

## 2018-01-01 RX ADMIN — GABAPENTIN 300 MG: 300 CAPSULE ORAL at 22:59

## 2018-01-01 RX ADMIN — ENOXAPARIN SODIUM 40 MG: 40 INJECTION SUBCUTANEOUS at 17:32

## 2018-01-01 RX ADMIN — DIGOXIN 125 MCG: 0.12 TABLET ORAL at 12:49

## 2018-01-01 RX ADMIN — AMIODARONE HYDROCHLORIDE 100 MG: 200 TABLET ORAL at 08:45

## 2018-01-01 RX ADMIN — ATORVASTATIN CALCIUM 10 MG: 10 TABLET, FILM COATED ORAL at 22:59

## 2018-01-01 RX ADMIN — FUROSEMIDE 80 MG: 10 INJECTION, SOLUTION INTRAMUSCULAR; INTRAVENOUS at 18:10

## 2018-01-01 RX ADMIN — SODIUM CHLORIDE, PRESERVATIVE FREE 3 ML: 5 INJECTION INTRAVENOUS at 09:58

## 2018-01-01 RX ADMIN — BISACODYL 10 MG: 10 SUPPOSITORY RECTAL at 14:48

## 2018-01-01 RX ADMIN — GUAIFENESIN 1200 MG: 600 TABLET, EXTENDED RELEASE ORAL at 09:58

## 2018-01-01 RX ADMIN — INSULIN LISPRO 3 UNITS: 100 INJECTION, SOLUTION INTRAVENOUS; SUBCUTANEOUS at 08:39

## 2018-01-01 RX ADMIN — ASPIRIN 81 MG: 81 TABLET ORAL at 10:03

## 2018-01-01 RX ADMIN — ONDANSETRON HYDROCHLORIDE 4 MG: 2 INJECTION INTRAMUSCULAR; INTRAVENOUS at 12:23

## 2018-01-01 RX ADMIN — DIGOXIN 125 MCG: 0.12 TABLET ORAL at 12:38

## 2018-01-01 RX ADMIN — Medication 500 UNITS: at 16:40

## 2018-01-01 RX ADMIN — AMIODARONE HYDROCHLORIDE 100 MG: 200 TABLET ORAL at 08:43

## 2018-01-01 RX ADMIN — ISOSORBIDE DINITRATE 10 MG: 10 TABLET ORAL at 17:25

## 2018-01-01 RX ADMIN — ISOSORBIDE DINITRATE 10 MG: 10 TABLET ORAL at 21:03

## 2018-01-01 RX ADMIN — TAZOBACTAM SODIUM AND PIPERACILLIN SODIUM 4.5 G: 500; 4 INJECTION, SOLUTION INTRAVENOUS at 20:56

## 2018-01-01 RX ADMIN — Medication 3 ML: at 09:18

## 2018-01-01 RX ADMIN — IPRATROPIUM BROMIDE AND ALBUTEROL SULFATE 3 ML: 2.5; .5 SOLUTION RESPIRATORY (INHALATION) at 13:46

## 2018-01-01 RX ADMIN — FUROSEMIDE 40 MG: 40 TABLET ORAL at 17:58

## 2018-01-01 RX ADMIN — HYDRALAZINE HYDROCHLORIDE 10 MG: 10 TABLET ORAL at 21:23

## 2018-01-01 RX ADMIN — ATORVASTATIN CALCIUM 10 MG: 10 TABLET, FILM COATED ORAL at 21:23

## 2018-01-01 RX ADMIN — METOPROLOL SUCCINATE 25 MG: 25 TABLET, FILM COATED, EXTENDED RELEASE ORAL at 07:10

## 2018-01-01 RX ADMIN — TAZOBACTAM SODIUM AND PIPERACILLIN SODIUM 4.5 G: 500; 4 INJECTION, SOLUTION INTRAVENOUS at 06:07

## 2018-01-01 RX ADMIN — ASPIRIN 81 MG: 81 TABLET ORAL at 09:02

## 2018-01-01 RX ADMIN — AMIODARONE HYDROCHLORIDE 100 MG: 200 TABLET ORAL at 09:59

## 2018-01-01 RX ADMIN — GABAPENTIN 300 MG: 300 CAPSULE ORAL at 08:45

## 2018-01-01 RX ADMIN — IPRATROPIUM BROMIDE AND ALBUTEROL SULFATE 3 ML: 2.5; .5 SOLUTION RESPIRATORY (INHALATION) at 00:37

## 2018-01-01 RX ADMIN — METHYLPREDNISOLONE SODIUM SUCCINATE 20 MG: 40 INJECTION, POWDER, FOR SOLUTION INTRAMUSCULAR; INTRAVENOUS at 21:52

## 2018-01-01 RX ADMIN — ATORVASTATIN CALCIUM 10 MG: 10 TABLET, FILM COATED ORAL at 20:56

## 2018-01-01 RX ADMIN — ENOXAPARIN SODIUM 30 MG: 30 INJECTION SUBCUTANEOUS at 17:31

## 2018-01-01 RX ADMIN — METHYLPREDNISOLONE SODIUM SUCCINATE 20 MG: 40 INJECTION, POWDER, FOR SOLUTION INTRAMUSCULAR; INTRAVENOUS at 06:07

## 2018-01-01 RX ADMIN — GABAPENTIN 300 MG: 300 CAPSULE ORAL at 10:04

## 2018-01-01 RX ADMIN — ENOXAPARIN SODIUM 30 MG: 30 INJECTION SUBCUTANEOUS at 16:59

## 2018-01-01 RX ADMIN — GABAPENTIN 300 MG: 300 CAPSULE ORAL at 08:15

## 2018-01-01 RX ADMIN — CALCITRIOL 0.25 MCG: 0.25 CAPSULE ORAL at 07:11

## 2018-01-01 RX ADMIN — HYDRALAZINE HYDROCHLORIDE 10 MG: 10 TABLET ORAL at 21:01

## 2018-01-01 RX ADMIN — ENOXAPARIN SODIUM 30 MG: 30 INJECTION SUBCUTANEOUS at 10:31

## 2018-01-01 RX ADMIN — METHYLPREDNISOLONE SODIUM SUCCINATE 40 MG: 125 INJECTION, POWDER, FOR SOLUTION INTRAMUSCULAR; INTRAVENOUS at 06:44

## 2018-01-01 RX ADMIN — METOPROLOL SUCCINATE 25 MG: 25 TABLET, FILM COATED, EXTENDED RELEASE ORAL at 09:22

## 2018-01-01 RX ADMIN — AMINOPHYLLINE 50 MG: 25 INJECTION, SOLUTION INTRAVENOUS at 09:59

## 2018-01-01 RX ADMIN — IPRATROPIUM BROMIDE AND ALBUTEROL SULFATE 3 ML: 2.5; .5 SOLUTION RESPIRATORY (INHALATION) at 14:40

## 2018-01-01 RX ADMIN — INSULIN LISPRO 2 UNITS: 100 INJECTION, SOLUTION INTRAVENOUS; SUBCUTANEOUS at 12:53

## 2018-01-01 RX ADMIN — INSULIN LISPRO 3 UNITS: 100 INJECTION, SOLUTION INTRAVENOUS; SUBCUTANEOUS at 21:39

## 2018-01-01 RX ADMIN — GABAPENTIN 300 MG: 300 CAPSULE ORAL at 17:32

## 2018-01-01 RX ADMIN — CALCITRIOL 0.25 MCG: 0.25 CAPSULE ORAL at 16:19

## 2018-01-01 RX ADMIN — SACUBITRIL AND VALSARTAN 1 TABLET: 24; 26 TABLET, FILM COATED ORAL at 21:01

## 2018-01-01 RX ADMIN — CALCITRIOL 0.25 MCG: 0.25 CAPSULE ORAL at 09:21

## 2018-01-01 RX ADMIN — IPRATROPIUM BROMIDE AND ALBUTEROL SULFATE 3 ML: 2.5; .5 SOLUTION RESPIRATORY (INHALATION) at 06:52

## 2018-01-01 RX ADMIN — SACUBITRIL AND VALSARTAN 1 TABLET: 24; 26 TABLET, FILM COATED ORAL at 08:45

## 2018-01-01 RX ADMIN — ISOSORBIDE DINITRATE 10 MG: 10 TABLET ORAL at 09:21

## 2018-01-01 RX ADMIN — INSULIN LISPRO 2 UNITS: 100 INJECTION, SOLUTION INTRAVENOUS; SUBCUTANEOUS at 12:49

## 2018-01-01 RX ADMIN — METOPROLOL SUCCINATE 25 MG: 25 TABLET, FILM COATED, EXTENDED RELEASE ORAL at 10:04

## 2018-01-01 RX ADMIN — ALLOPURINOL 100 MG: 100 TABLET ORAL at 09:20

## 2018-01-01 RX ADMIN — METHYLPREDNISOLONE SODIUM SUCCINATE 60 MG: 125 INJECTION, POWDER, FOR SOLUTION INTRAMUSCULAR; INTRAVENOUS at 04:28

## 2018-01-01 RX ADMIN — AMOXICILLIN AND CLAVULANATE POTASSIUM 500 MG: 500; 125 TABLET, FILM COATED ORAL at 12:48

## 2018-01-01 RX ADMIN — GUAIFENESIN 1200 MG: 600 TABLET, EXTENDED RELEASE ORAL at 21:36

## 2018-01-01 RX ADMIN — Medication 500 UNITS: at 13:32

## 2018-01-01 RX ADMIN — MAGNESIUM GLUCONATE 500 MG ORAL TABLET 400 MG: 500 TABLET ORAL at 16:03

## 2018-01-01 RX ADMIN — IPRATROPIUM BROMIDE AND ALBUTEROL SULFATE 3 ML: 2.5; .5 SOLUTION RESPIRATORY (INHALATION) at 19:48

## 2018-01-01 RX ADMIN — INSULIN LISPRO 2 UNITS: 100 INJECTION, SOLUTION INTRAVENOUS; SUBCUTANEOUS at 09:20

## 2018-01-01 RX ADMIN — SACUBITRIL AND VALSARTAN 1 TABLET: 24; 26 TABLET, FILM COATED ORAL at 09:21

## 2018-01-01 RX ADMIN — ISOSORBIDE DINITRATE 10 MG: 10 TABLET ORAL at 18:45

## 2018-01-01 RX ADMIN — Medication 3 ML: at 10:04

## 2018-01-01 RX ADMIN — INSULIN LISPRO 2 UNITS: 100 INJECTION, SOLUTION INTRAVENOUS; SUBCUTANEOUS at 11:30

## 2018-01-01 RX ADMIN — CEFEPIME HYDROCHLORIDE 2 G: 2 INJECTION, POWDER, FOR SOLUTION INTRAVENOUS at 22:15

## 2018-01-01 RX ADMIN — ASPIRIN 81 MG: 81 TABLET, DELAYED RELEASE ORAL at 09:59

## 2018-01-01 RX ADMIN — FERROUS SULFATE TAB 325 MG (65 MG ELEMENTAL FE) 325 MG: 325 (65 FE) TAB at 09:22

## 2018-01-01 RX ADMIN — INSULIN LISPRO 3 UNITS: 100 INJECTION, SOLUTION INTRAVENOUS; SUBCUTANEOUS at 09:58

## 2018-01-01 RX ADMIN — GUAIFENESIN 1200 MG: 600 TABLET, EXTENDED RELEASE ORAL at 21:23

## 2018-01-01 RX ADMIN — BUMETANIDE 2 MG: 0.25 INJECTION INTRAMUSCULAR; INTRAVENOUS at 05:11

## 2018-01-01 RX ADMIN — LISINOPRIL 2.5 MG: 2.5 TABLET ORAL at 09:02

## 2018-01-01 RX ADMIN — FUROSEMIDE 40 MG: 10 INJECTION, SOLUTION INTRAMUSCULAR; INTRAVENOUS at 06:11

## 2018-01-01 RX ADMIN — ISOSORBIDE DINITRATE 10 MG: 10 TABLET ORAL at 08:37

## 2018-01-01 RX ADMIN — Medication 3 ML: at 21:03

## 2018-01-01 RX ADMIN — CALCITRIOL 0.25 MCG: 0.25 CAPSULE ORAL at 08:37

## 2018-01-01 RX ADMIN — IPRATROPIUM BROMIDE AND ALBUTEROL SULFATE 3 ML: 2.5; .5 SOLUTION RESPIRATORY (INHALATION) at 15:01

## 2018-01-01 RX ADMIN — Medication 6 MG: at 20:35

## 2018-01-01 RX ADMIN — INSULIN LISPRO 2 UNITS: 100 INJECTION, SOLUTION INTRAVENOUS; SUBCUTANEOUS at 17:24

## 2018-01-01 RX ADMIN — IRON SUCROSE 200 MG: 20 INJECTION, SOLUTION INTRAVENOUS at 16:18

## 2018-01-01 RX ADMIN — CALCITRIOL 0.25 MCG: 0.25 CAPSULE ORAL at 09:58

## 2018-01-01 RX ADMIN — Medication 3 ML: at 20:52

## 2018-01-01 RX ADMIN — SODIUM CHLORIDE 500 ML: 9 INJECTION, SOLUTION INTRAVENOUS at 12:23

## 2018-01-01 RX ADMIN — IPRATROPIUM BROMIDE AND ALBUTEROL SULFATE 3 ML: 2.5; .5 SOLUTION RESPIRATORY (INHALATION) at 19:53

## 2018-01-01 RX ADMIN — ATORVASTATIN CALCIUM 10 MG: 10 TABLET, FILM COATED ORAL at 21:52

## 2018-01-01 RX ADMIN — METHYLPREDNISOLONE SODIUM SUCCINATE 20 MG: 40 INJECTION, POWDER, FOR SOLUTION INTRAMUSCULAR; INTRAVENOUS at 05:26

## 2018-01-01 RX ADMIN — IPRATROPIUM BROMIDE AND ALBUTEROL SULFATE 3 ML: 2.5; .5 SOLUTION RESPIRATORY (INHALATION) at 06:43

## 2018-01-01 RX ADMIN — ISOSORBIDE DINITRATE 10 MG: 10 TABLET ORAL at 17:15

## 2018-01-01 RX ADMIN — GABAPENTIN 300 MG: 300 CAPSULE ORAL at 20:54

## 2018-01-01 RX ADMIN — IPRATROPIUM BROMIDE AND ALBUTEROL SULFATE 3 ML: 2.5; .5 SOLUTION RESPIRATORY (INHALATION) at 14:42

## 2018-01-01 RX ADMIN — FERROUS SULFATE TAB 325 MG (65 MG ELEMENTAL FE) 325 MG: 325 (65 FE) TAB at 11:30

## 2018-01-01 RX ADMIN — ISOSORBIDE DINITRATE 10 MG: 10 TABLET ORAL at 07:12

## 2018-01-01 RX ADMIN — IRON SUCROSE 300 MG: 20 INJECTION, SOLUTION INTRAVENOUS at 15:49

## 2018-01-01 RX ADMIN — FUROSEMIDE 20 MG: 10 INJECTION, SOLUTION INTRAVENOUS at 12:48

## 2018-01-01 RX ADMIN — ISOSORBIDE DINITRATE 10 MG: 10 TABLET ORAL at 09:58

## 2018-01-01 RX ADMIN — ENOXAPARIN SODIUM 30 MG: 30 INJECTION SUBCUTANEOUS at 09:19

## 2018-01-01 RX ADMIN — GABAPENTIN 300 MG: 300 CAPSULE ORAL at 21:52

## 2018-01-01 RX ADMIN — HYDRALAZINE HYDROCHLORIDE 10 MG: 10 TABLET ORAL at 20:56

## 2018-01-01 RX ADMIN — ATORVASTATIN CALCIUM 10 MG: 10 TABLET, FILM COATED ORAL at 20:52

## 2018-01-01 RX ADMIN — Medication 10 ML: at 16:40

## 2018-01-01 RX ADMIN — TAZOBACTAM SODIUM AND PIPERACILLIN SODIUM 4.5 G: 500; 4 INJECTION, SOLUTION INTRAVENOUS at 00:33

## 2018-01-01 RX ADMIN — ACETYLCYSTEINE 3 ML: 200 INHALANT RESPIRATORY (INHALATION) at 06:50

## 2018-01-01 RX ADMIN — DIGOXIN 125 MCG: 0.12 TABLET ORAL at 12:23

## 2018-01-01 RX ADMIN — IPRATROPIUM BROMIDE AND ALBUTEROL SULFATE 3 ML: 2.5; .5 SOLUTION RESPIRATORY (INHALATION) at 07:02

## 2018-01-01 RX ADMIN — GABAPENTIN 300 MG: 300 CAPSULE ORAL at 09:19

## 2018-01-01 RX ADMIN — LISINOPRIL 5 MG: 5 TABLET ORAL at 08:37

## 2018-01-01 RX ADMIN — ISOSORBIDE DINITRATE 10 MG: 10 TABLET ORAL at 12:23

## 2018-01-01 RX ADMIN — ALLOPURINOL 100 MG: 100 TABLET ORAL at 09:59

## 2018-01-01 RX ADMIN — ALLOPURINOL 100 MG: 100 TABLET ORAL at 20:34

## 2018-01-01 RX ADMIN — ASPIRIN 81 MG: 81 TABLET, DELAYED RELEASE ORAL at 08:36

## 2018-01-01 RX ADMIN — DIGOXIN 125 MCG: 0.12 TABLET ORAL at 12:03

## 2018-01-01 RX ADMIN — GABAPENTIN 300 MG: 300 CAPSULE ORAL at 20:52

## 2018-01-01 RX ADMIN — DIGOXIN 125 MCG: 0.12 TABLET ORAL at 12:36

## 2018-01-01 RX ADMIN — FUROSEMIDE 40 MG: 10 INJECTION, SOLUTION INTRAMUSCULAR; INTRAVENOUS at 17:20

## 2018-01-01 RX ADMIN — ISOSORBIDE DINITRATE 10 MG: 10 TABLET ORAL at 16:59

## 2018-01-01 RX ADMIN — GABAPENTIN 300 MG: 300 CAPSULE ORAL at 09:17

## 2018-01-01 RX ADMIN — SODIUM CHLORIDE, PRESERVATIVE FREE 3 ML: 5 INJECTION INTRAVENOUS at 21:24

## 2018-01-01 RX ADMIN — PANTOPRAZOLE SODIUM 40 MG: 40 TABLET, DELAYED RELEASE ORAL at 06:07

## 2018-01-01 RX ADMIN — GUAIFENESIN AND CODEINE PHOSPHATE 5 ML: 100; 10 SOLUTION ORAL at 15:09

## 2018-01-01 RX ADMIN — ENOXAPARIN SODIUM 60 MG: 60 INJECTION SUBCUTANEOUS at 09:20

## 2018-01-01 RX ADMIN — VANCOMYCIN HYDROCHLORIDE 750 MG: 10 INJECTION, POWDER, LYOPHILIZED, FOR SOLUTION INTRAVENOUS at 23:27

## 2018-01-01 RX ADMIN — HYDRALAZINE HYDROCHLORIDE 10 MG: 10 TABLET ORAL at 09:58

## 2018-01-01 RX ADMIN — FUROSEMIDE 40 MG: 40 TABLET ORAL at 08:45

## 2018-01-01 RX ADMIN — INSULIN LISPRO 2 UNITS: 100 INJECTION, SOLUTION INTRAVENOUS; SUBCUTANEOUS at 20:38

## 2018-01-01 RX ADMIN — HYDRALAZINE HYDROCHLORIDE 10 MG: 10 TABLET ORAL at 21:52

## 2018-01-01 RX ADMIN — ALLOPURINOL 100 MG: 100 TABLET ORAL at 08:44

## 2018-01-01 RX ADMIN — CALCITRIOL 0.25 MCG: 0.25 CAPSULE ORAL at 08:44

## 2018-01-01 RX ADMIN — FERROUS SULFATE TAB 325 MG (65 MG ELEMENTAL FE) 325 MG: 325 (65 FE) TAB at 08:37

## 2018-01-01 RX ADMIN — ISOSORBIDE DINITRATE 20 MG: 20 TABLET ORAL at 17:58

## 2018-01-01 RX ADMIN — ENOXAPARIN SODIUM 30 MG: 30 INJECTION SUBCUTANEOUS at 11:07

## 2018-01-01 RX ADMIN — ISOSORBIDE DINITRATE 20 MG: 20 TABLET ORAL at 14:24

## 2018-01-01 RX ADMIN — NITROGLYCERIN 1 INCH: 20 OINTMENT TOPICAL at 18:12

## 2018-01-01 RX ADMIN — TECHNETIUM TC 99M SESTAMIBI 1 DOSE: 1 INJECTION INTRAVENOUS at 10:15

## 2018-01-01 RX ADMIN — FUROSEMIDE 40 MG: 10 INJECTION, SOLUTION INTRAMUSCULAR; INTRAVENOUS at 06:38

## 2018-01-01 RX ADMIN — GABAPENTIN 300 MG: 300 CAPSULE ORAL at 10:05

## 2018-01-01 RX ADMIN — METOPROLOL SUCCINATE 25 MG: 25 TABLET, FILM COATED, EXTENDED RELEASE ORAL at 08:53

## 2018-01-01 RX ADMIN — ATORVASTATIN CALCIUM 10 MG: 10 TABLET, FILM COATED ORAL at 20:35

## 2018-01-01 RX ADMIN — METOPROLOL SUCCINATE 25 MG: 25 TABLET, FILM COATED, EXTENDED RELEASE ORAL at 08:45

## 2018-01-01 RX ADMIN — ACETYLCYSTEINE 3 ML: 200 INHALANT RESPIRATORY (INHALATION) at 20:35

## 2018-01-01 RX ADMIN — TAZOBACTAM SODIUM AND PIPERACILLIN SODIUM 4.5 G: 500; 4 INJECTION, SOLUTION INTRAVENOUS at 11:30

## 2018-01-01 RX ADMIN — FERROUS SULFATE TAB 325 MG (65 MG ELEMENTAL FE) 325 MG: 325 (65 FE) TAB at 09:58

## 2018-01-01 RX ADMIN — ENOXAPARIN SODIUM 30 MG: 30 INJECTION SUBCUTANEOUS at 17:51

## 2018-01-01 RX ADMIN — ASPIRIN 81 MG: 81 TABLET, DELAYED RELEASE ORAL at 08:37

## 2018-01-01 RX ADMIN — AMIODARONE HYDROCHLORIDE 100 MG: 200 TABLET ORAL at 08:36

## 2018-01-01 RX ADMIN — INSULIN LISPRO 4 UNITS: 100 INJECTION, SOLUTION INTRAVENOUS; SUBCUTANEOUS at 12:37

## 2018-01-01 RX ADMIN — IPRATROPIUM BROMIDE AND ALBUTEROL SULFATE 3 ML: 2.5; .5 SOLUTION RESPIRATORY (INHALATION) at 13:58

## 2018-01-01 RX ADMIN — GUAIFENESIN 1200 MG: 600 TABLET, EXTENDED RELEASE ORAL at 21:52

## 2018-01-01 RX ADMIN — HYDRALAZINE HYDROCHLORIDE 10 MG: 10 TABLET ORAL at 08:46

## 2018-01-01 RX ADMIN — AMIODARONE HYDROCHLORIDE 100 MG: 200 TABLET ORAL at 08:53

## 2018-01-01 RX ADMIN — FERROUS SULFATE TAB 325 MG (65 MG ELEMENTAL FE) 325 MG: 325 (65 FE) TAB at 19:06

## 2018-01-01 RX ADMIN — IRON SUCROSE 200 MG: 20 INJECTION, SOLUTION INTRAVENOUS at 15:56

## 2018-01-01 RX ADMIN — ISOSORBIDE DINITRATE 10 MG: 10 TABLET ORAL at 11:29

## 2018-01-01 RX ADMIN — ISOSORBIDE DINITRATE 10 MG: 10 TABLET ORAL at 21:20

## 2018-01-01 RX ADMIN — TAZOBACTAM SODIUM AND PIPERACILLIN SODIUM 4.5 G: 500; 4 INJECTION, SOLUTION INTRAVENOUS at 00:37

## 2018-01-01 RX ADMIN — Medication 500 UNITS: at 16:15

## 2018-01-01 RX ADMIN — INSULIN LISPRO 4 UNITS: 100 INJECTION, SOLUTION INTRAVENOUS; SUBCUTANEOUS at 08:35

## 2018-01-01 RX ADMIN — ATORVASTATIN CALCIUM 10 MG: 10 TABLET, FILM COATED ORAL at 21:40

## 2018-01-01 RX ADMIN — METOPROLOL SUCCINATE 25 MG: 25 TABLET, FILM COATED, EXTENDED RELEASE ORAL at 08:37

## 2018-01-01 RX ADMIN — TAZOBACTAM SODIUM AND PIPERACILLIN SODIUM 4.5 G: 500; 4 INJECTION, SOLUTION INTRAVENOUS at 06:44

## 2018-01-01 RX ADMIN — BUMETANIDE 1 MG: 1 TABLET ORAL at 16:03

## 2018-01-01 RX ADMIN — ASPIRIN 81 MG: 81 TABLET ORAL at 08:45

## 2018-01-01 RX ADMIN — IPRATROPIUM BROMIDE AND ALBUTEROL SULFATE 3 ML: 2.5; .5 SOLUTION RESPIRATORY (INHALATION) at 06:51

## 2018-01-01 RX ADMIN — AMIODARONE HYDROCHLORIDE 100 MG: 200 TABLET ORAL at 07:11

## 2018-01-01 RX ADMIN — AMIODARONE HYDROCHLORIDE 100 MG: 200 TABLET ORAL at 09:02

## 2018-01-01 RX ADMIN — ALLOPURINOL 100 MG: 100 TABLET ORAL at 07:11

## 2018-01-01 RX ADMIN — ACETYLCYSTEINE 3 ML: 200 INHALANT RESPIRATORY (INHALATION) at 00:37

## 2018-01-01 RX ADMIN — Medication 3 ML: at 09:32

## 2018-01-01 RX ADMIN — GABAPENTIN 300 MG: 300 CAPSULE ORAL at 20:38

## 2018-01-01 RX ADMIN — IPRATROPIUM BROMIDE AND ALBUTEROL SULFATE 3 ML: 2.5; .5 SOLUTION RESPIRATORY (INHALATION) at 21:09

## 2018-01-01 RX ADMIN — METOPROLOL SUCCINATE 25 MG: 25 TABLET, FILM COATED, EXTENDED RELEASE ORAL at 09:17

## 2018-01-01 RX ADMIN — GABAPENTIN 300 MG: 300 CAPSULE ORAL at 08:52

## 2018-01-01 RX ADMIN — GABAPENTIN 300 MG: 300 CAPSULE ORAL at 15:09

## 2018-01-01 RX ADMIN — GABAPENTIN 300 MG: 300 CAPSULE ORAL at 15:49

## 2018-01-01 RX ADMIN — Medication 3 ML: at 00:40

## 2018-01-01 RX ADMIN — METOPROLOL SUCCINATE 25 MG: 25 TABLET, FILM COATED, EXTENDED RELEASE ORAL at 09:03

## 2018-01-01 RX ADMIN — ENOXAPARIN SODIUM 30 MG: 30 INJECTION SUBCUTANEOUS at 17:24

## 2018-01-01 RX ADMIN — ATORVASTATIN CALCIUM 10 MG: 10 TABLET, FILM COATED ORAL at 20:38

## 2018-01-01 RX ADMIN — METHYLPREDNISOLONE SODIUM SUCCINATE 40 MG: 125 INJECTION, POWDER, FOR SOLUTION INTRAMUSCULAR; INTRAVENOUS at 21:35

## 2018-01-01 RX ADMIN — BENZONATATE 200 MG: 100 CAPSULE, LIQUID FILLED ORAL at 17:14

## 2018-01-01 RX ADMIN — GABAPENTIN 300 MG: 300 CAPSULE ORAL at 09:25

## 2018-01-01 RX ADMIN — LISINOPRIL 5 MG: 5 TABLET ORAL at 07:12

## 2018-01-01 RX ADMIN — ASPIRIN 81 MG: 81 TABLET ORAL at 08:53

## 2018-01-01 RX ADMIN — GABAPENTIN 300 MG: 300 CAPSULE ORAL at 17:24

## 2018-01-01 RX ADMIN — IRON SUCROSE 200 MG: 20 INJECTION, SOLUTION INTRAVENOUS at 16:04

## 2018-01-01 RX ADMIN — GABAPENTIN 300 MG: 300 CAPSULE ORAL at 09:02

## 2018-01-01 RX ADMIN — Medication 6 MG: at 23:00

## 2018-01-01 RX ADMIN — METOPROLOL SUCCINATE 25 MG: 25 TABLET, FILM COATED, EXTENDED RELEASE ORAL at 08:36

## 2018-01-01 RX ADMIN — CALCITRIOL 0.25 MCG: 0.25 CAPSULE ORAL at 08:45

## 2018-01-01 RX ADMIN — ACETYLCYSTEINE 3 ML: 200 INHALANT RESPIRATORY (INHALATION) at 08:00

## 2018-01-01 RX ADMIN — BUMETANIDE 1 MG: 1 TABLET ORAL at 08:38

## 2018-01-01 RX ADMIN — BUMETANIDE 1 MG: 0.25 INJECTION INTRAMUSCULAR; INTRAVENOUS at 09:01

## 2018-01-01 RX ADMIN — LISINOPRIL 5 MG: 5 TABLET ORAL at 08:36

## 2018-01-01 RX ADMIN — IPRATROPIUM BROMIDE AND ALBUTEROL SULFATE 3 ML: 2.5; .5 SOLUTION RESPIRATORY (INHALATION) at 08:00

## 2018-01-01 RX ADMIN — VANCOMYCIN HYDROCHLORIDE 750 MG: 10 INJECTION, POWDER, LYOPHILIZED, FOR SOLUTION INTRAVENOUS at 22:16

## 2018-01-01 RX ADMIN — HYDRALAZINE HYDROCHLORIDE 25 MG: 25 TABLET, FILM COATED ORAL at 14:24

## 2018-01-01 RX ADMIN — INSULIN LISPRO 2 UNITS: 100 INJECTION, SOLUTION INTRAVENOUS; SUBCUTANEOUS at 08:42

## 2018-01-01 RX ADMIN — CEFTRIAXONE SODIUM 1 G: 1 INJECTION, POWDER, FOR SOLUTION INTRAMUSCULAR; INTRAVENOUS at 12:37

## 2018-01-01 RX ADMIN — TAZOBACTAM SODIUM AND PIPERACILLIN SODIUM 4.5 G: 500; 4 INJECTION, SOLUTION INTRAVENOUS at 18:25

## 2018-01-01 RX ADMIN — INSULIN LISPRO 2 UNITS: 100 INJECTION, SOLUTION INTRAVENOUS; SUBCUTANEOUS at 12:48

## 2018-01-01 RX ADMIN — AMIODARONE HYDROCHLORIDE 100 MG: 200 TABLET ORAL at 09:17

## 2018-01-01 RX ADMIN — AMIODARONE HYDROCHLORIDE 100 MG: 200 TABLET ORAL at 09:20

## 2018-04-11 NOTE — PROGRESS NOTES
Candie Goss   Patient Intake Note    Review of Systems  Review of Systems   Constitutional: Negative for chills, fatigue and fever.   HENT:        See HPI   Respiratory: Negative for cough, choking, shortness of breath and wheezing.    Cardiovascular: Negative.    Gastrointestinal: Negative for constipation, diarrhea, nausea and vomiting.   Allergic/Immunologic: Negative for environmental allergies and food allergies.   Neurological: Positive for dizziness. Negative for light-headedness and headaches.   Hematological: Bruises/bleeds easily.   Psychiatric/Behavioral: Negative for sleep disturbance.       QUALITY MEASURES    Body Mass Index Screening and Follow-Up Plan  Body mass index is 20.67 kg/m².      Tobacco Use: Screening and Cessation Intervention  Smoking status: Never Smoker                                                              Smokeless tobacco: Never Used                            Candie Goss  4/11/2018  3:57 PM

## 2018-04-11 NOTE — PATIENT INSTRUCTIONS
ALL ABOUT HEARTBURN AND THE LIFESTYLE CHANGES THAT HELP    Lifestyle Changes Can Help Relieve The Burning Pain In Your Tummy    What is Heartburn?  Heartburn occurs when the lining of the esophagus (the tube that connects the mouth to the stomach) is exposed to and irritated by stomach acid.  Heartburn often feels like a burning pain in the middle of your chest that moves up your throat.  You may also have the sensation that food has come back into your mouth, leaving a sour or bitter taste.  Almost everyone has heartburn once in a while.  But if you have heartburn 2 or more times per week, it can be a sign of a more serious problem call gastroesophogeal reflux disease, or GERD.    What causes Heartburn?  Heartburn usually occurs when the valve at the selena of the stomach (the lower esophageal sphincter or LES) doesn’t close the way it should.  If the LES is weak or opens at the wrong time, stomach acid can reflux (flow back) into the esophagus and cause heartburn.  Factors that can affect the LES include:    • Eating or drinking certain foods and beverages such as chocolate, peppermint, fried or fatty foods, coffee, or drinks that contain alcohol  • Having a hiatal hernia - a common physical condition where part of the stomach protrudes up through the diaphragm  • Lying down  • Smoking cigarettes  • Taking certain medications (be sure to tell your doctor about all medications or supplements you take)    What foods can make heartburn worse?  All foods cause the stomach to produce acid, although foods can affect people in different ways.  Following is a list of foods and beverages that may aggravate your symptoms.  You may want to eat them less often.    • Fried or fatty foods  • Heavy seasoning and spicy foods  • Coffee  • Onions  • Orange juice, grapefruit juice, and tomato juice  • Alcoholic beverages  • Chocolate  • Peppermint and spearmint    What else can I do to help control my heartburn?  Try these lifestyle  changes:  • Stop Smoking  • Lose weight - if you’re overweight  • Exercise to help control your weight (talk to your doctor first before starting any exercise program).  • Eat small, well-balanced meals  • Reduce abdominal pressure-don’t wear tight belts or tight clothing  • Avoid eating within 2 hours before bedtime  • Elevate your bed so the head is 6 to 8 inches higher than the foot.  This can help reduce acid reflux at night  • Let your doctor know about all the drugs and supplements you are taking.  Some of them may contribute to your heartburn.    What if these things don’t work?  Talk to your doctor, who can discuss many treatments with you.  Although there are several possible causes of heartburn associated with GERD, they all involve stomach acids.  So no matter what the cause may be, the medicines to reduce stomach acid can prevent heartburn.

## 2018-04-11 NOTE — PROGRESS NOTES
Gilberto Hammer MD     Chief Complaint   Patient presents with   • Swollen Glands     left side of neck   • Difficulty Swallowing     most of the time, vomits during meals   • Dizziness     causing nausea/vomitting       HPI   Padmini Torres is a  77 y.o. female who complains of lymphadenopathy . The symptoms are localized to the left side. The patient has had improving symptoms. The symptoms have been decreasing in amount for the last several weeks. There have been no identified factors that aggravate the symptoms. There have been no factors that have improved the symptoms. 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 She had a CT neck ordered by Dr Yo with a left sided lymph node. FNA was recommended but has not been ordered. She reports it was large and tender when it first came out.  It has decreased in size which is now mobile and nontender.  She also has a long history of left ear roaring tinnitus that is intermittent in nature.  She has intermittent spells of vertigo.  She complains of esophageal dysphagia and has had several scopes and dilations over time.  She has a history of gastroesophageal reflux.    Review of Systems:  Reviewed per patient intake note    Past History:  Past Medical History:   Diagnosis Date   • Abdominal pain, left lower quadrant    • Bowel habit changes    • CAD (coronary artery disease)     LAD stent    • COPD (chronic obstructive pulmonary disease)    • Diabetes mellitus    • DVT (deep venous thrombosis)    • Dysphagia    • History of tachycardia    • Hypertension    • Lymphoma    • Pulmonary emboli    • Varicose veins of legs      Past Surgical History:   Procedure Laterality Date   • ANKLE SURGERY     • BACK SURGERY      lower   • CARDIAC CATHETERIZATION     • CHOLECYSTECTOMY     • COLONOSCOPY  04/23/2015    Last colon limted lower diverticulosis left colon, Internal Hemorrhoids   • COLONOSCOPY W/ BIOPSIES AND POLYPECTOMY  07/22/2014     Adenomatous tubular type, Diverticulosis sigmoid colon   • CORONARY STENT PLACEMENT      x 1   • ENDOSCOPY  04/14/2010    Distal ring dilated 48 Fr   • EXPLORATORY LAPAROTOMY  2005    relapse lymphoma   • HEMORRHOIDECTOMY     • HYSTERECTOMY      total abdominal   • LUMBAR SPINE SURGERY     • LUNG BIOPSY N/A 1991    open    • SMALL INTESTINE SURGERY     • TONSILLECTOMY     • UPPER GASTROINTESTINAL ENDOSCOPY  10/23/2015    normal exam     Family History   Problem Relation Age of Onset   • Diabetes Mother    • Cancer Father      prostate with bone metastasis   • Heart disease Maternal Grandmother    • Stroke Brother    • Colon cancer Neg Hx    • Colon polyps Neg Hx      Social History   Substance Use Topics   • Smoking status: Never Smoker   • Smokeless tobacco: Never Used   • Alcohol use No     Outpatient Prescriptions Marked as Taking for the 4/11/18 encounter (Office Visit) with Gilberto Hammer MD   Medication Sig Dispense Refill   • acetaminophen (TYLENOL ARTHRITIS PAIN) 650 MG 8 hr tablet Take 650 mg by mouth Every 12 (Twelve) Hours.     • amiodarone (PACERONE) 100 MG tablet TAKE 1 TABLET EVERY DAY 90 tablet 11   • aspirin 81 MG EC tablet Take 81 mg by mouth Daily.     • bumetanide (BUMEX) 1 MG tablet      • Calcium Carbonate-Vitamin D 600-200 MG-UNIT capsule Take 1 capsule by mouth Daily.     • digoxin (LANOXIN) 125 MCG tablet Take 125 mcg by mouth Daily.     • gabapentin (NEURONTIN) 300 MG capsule Take 300 mg by mouth 3 (three) times a day.     • isosorbide mononitrate (IMDUR) 30 MG 24 hr tablet Take 30 mg by mouth Daily.     • lisinopril (PRINIVIL,ZESTRIL) 5 MG tablet Take 1 tablet by mouth Daily. 30 tablet 0   • metFORMIN (GLUCOPHAGE) 1000 MG tablet      • metoprolol succinate XL (TOPROL-XL) 25 MG 24 hr tablet Take 25 mg by mouth Daily.     • Multiple Vitamins-Minerals (PRESERVISION AREDS) tablet Take 1 tablet by mouth Every 12 (Twelve) Hours.     • pravastatin (PRAVACHOL) 20 MG tablet Take 20 mg by  mouth daily.     • [DISCONTINUED] furosemide (LASIX) 20 MG tablet        Allergies:  Zantac [ranitidine hcl]    Vital Signs:   Temp:  [98.4 °F (36.9 °C)] 98.4 °F (36.9 °C)  BP: (118)/(62) 118/62    Physical Exam   CONSTITUTIONAL: well nourished, well-developed, alert, oriented, in no acute distress   COMMUNICATION AND VOICE: able to communicate normally, normal voice quality  HEAD: normocephalic, no lesions, atraumatic, no tenderness, no masses   FACE: appearance normal, no lesions, no tenderness, no deformities, facial motion symmetric  SALIVARY GLANDS: parotid glands with no tenderness, no swelling, no masses, submandibular glands with normal size, nontender  EYES: ocular motility normal, eyelids normal, orbits normal, no proptosis, conjunctiva normal , pupils equal, round  HEARING: response to conversational voice normal bilaterally   EXTERNAL EARS: auricles without lesions  EXTERNAL EAR CANALS: normal ear canals without stenosis or significant cerumen  TYMPANIC MEMBRANES: tympanic membrane appearance normal, no lesions, no perforation, normal mobility, no fluid  EXTERNAL NOSE: structure normal, no tenderness on palpation, no nasal discharge, no lesions, no evidence of trauma, nostrils patent  INTRANASAL EXAM: nasal mucosa normal, vestibule within normal limits, inferior turbinate normal,  nasal septum without overt anterior deviation  NASOPHARYNX: nasopharyngeal mucosa, adenoids within normal limits  LIPS: structure normal, no tenderness on palpation, no lesions, no evidence of trauma  TEETH: dentition within normal limits for age  GUMS: gingivae healthy  ORAL MUCOSA: oral mucosa normal, no mucosal lesions  FLOOR OF MOUTH: Warthin's duct patent, mucosa normal  TONGUE: lingual mucosa normal without lesions, normal tongue mobility  PALATE: soft and hard palates with normal mucosa and structure  OROPHARYNX: oropharyngeal mucosa normal, tonsil fossa normal in appearance  HYPOPHARYNX: hypopharyngeal mucosa  normal  LARYNX: epiglottis and arytenoid cartilage within normal limits, vocal cord mucosa normal with normal mobility   NECK: neck appearance normal, no masses or tenderness  THYROID: no overt thyromegaly, no tenderness, nodules or mass present on palpation, position midline   LYMPH NODES: 15 millimeter mobile and circumscribed left level V mass present  CHEST/RESPIRATORY: respiratory effort normal, normal breath sounds  CARDIOVASCULAR: rate and rhythm normal, extremities without cyanosis or edema, no overt jugulovenous distension present  NEUROLOGIC/PSYCHIATRIC: oriented appropriately for age, mood normal, affect appropriate, cranial nerves intact grossly unless specifically mentioned above     RESULTS REVIEW:    I have personally reviewed the patient's ct scan of the neck with contrast images.  Ct Soft Tissue Neck With Contrast  Result Date: 3/27/2018  Summary: 1. The posterior left neck palpable abnormality as a necrotic appearing lymph node which measures approximately 1.8 x 1.2 cm. Ultrasound-guided biopsy is recommended.            This report was finalized on 03/27/2018 16:25 by Dr. Dank Unger MD.        Assessment   1. Cervical lymphadenopathy    2. Vertigo    3. Meniere's disease of left ear    4. Tinnitus of left ear    5. Esophageal dysphagia    6. Gastroesophageal reflux disease, esophagitis presence not specified        Plan    Medical and surgical options were discussed including observation, medication modification and biopsy. Risks, benefits and alternatives were discussed and questions were answered. After considering the options, the patient decided to proceed with observation      New Medications Ordered This Visit   Medications   • meclizine (ANTIVERT) 25 MG tablet     Sig: Take 1 tablet by mouth 3 (Three) Times a Day As Needed for dizziness (vertigo) for up to 30 days.     Dispense:  60 tablet     Refill:  3     A low salt diet was stressed    Return in about 6 weeks (around 5/23/2018) for  follow up with audiogram.    Gilberto Hammer MD  04/11/18  4:20 PM

## 2018-05-01 NOTE — PROGRESS NOTES
Padmini Torres  [id number]  1940  77 y.o.  female    Referring Provider: Александр Yo DO    Reason for Follow-up Visit:  Routine follow up.  coronary artery disease  Stented coronary artery.   Severe LV dysfunction  She does not want ICD or Lifevest  Engorged left leg veins  congestive heart failure   Overall dramatically better    Subjective    Overall not feeling well   No chest pain   Moderate worsening exertional shortness of breath on exertion relieved with rest  No significant cough or wheezing  Going on for several months  Frequent palpitations  No associated chest pain  No significant pedal edema  No fever or chills  No significant expectoration  No hemoptysis  No presyncope or syncope No palpitations  No significant pedal edema  Compliant with medications and dietary advice  Poor effort tolerance  Effort tolerance limited more by orthopedic rather than cardiac related issues.    No presyncope or syncope  Compliant with medications   Orthostatic     History of present illness:  Padmini Torres is a 77 y.o. yo female with history of congestive heart failure who presents today for   Chief Complaint   Patient presents with   • Congestive Heart Failure     5 MO F/U   • Coronary Artery Disease   • Dizziness   • Shortness of Breath   .    History  Past Medical History:   Diagnosis Date   • Abdominal pain, left lower quadrant    • Bowel habit changes    • CAD (coronary artery disease)     LAD stent    • COPD (chronic obstructive pulmonary disease)    • Diabetes mellitus    • DVT (deep venous thrombosis)    • Dysphagia    • History of tachycardia    • Hypertension    • Lymphoma    • Pulmonary emboli    • Varicose veins of legs    ,   Past Surgical History:   Procedure Laterality Date   • ANKLE SURGERY     • BACK SURGERY      lower   • CARDIAC CATHETERIZATION     • CHOLECYSTECTOMY     • COLONOSCOPY  04/23/2015    Last colon limted lower diverticulosis left colon, Internal Hemorrhoids   • COLONOSCOPY W/  BIOPSIES AND POLYPECTOMY  07/22/2014    Adenomatous tubular type, Diverticulosis sigmoid colon   • CORONARY STENT PLACEMENT      x 1   • ENDOSCOPY  04/14/2010    Distal ring dilated 48 Fr   • EXPLORATORY LAPAROTOMY  2005    relapse lymphoma   • HEMORRHOIDECTOMY     • HYSTERECTOMY      total abdominal   • LUMBAR SPINE SURGERY     • LUNG BIOPSY N/A 1991    open    • SMALL INTESTINE SURGERY     • TONSILLECTOMY     • UPPER GASTROINTESTINAL ENDOSCOPY  10/23/2015    normal exam   ,   Family History   Problem Relation Age of Onset   • Diabetes Mother    • Cancer Father      prostate with bone metastasis   • Heart disease Maternal Grandmother    • Stroke Brother    • Colon cancer Neg Hx    • Colon polyps Neg Hx    ,   Social History   Substance Use Topics   • Smoking status: Never Smoker   • Smokeless tobacco: Never Used   • Alcohol use No   ,     Medications  Current Outpatient Prescriptions   Medication Sig Dispense Refill   • acetaminophen (TYLENOL ARTHRITIS PAIN) 650 MG 8 hr tablet Take 650 mg by mouth Every 12 (Twelve) Hours.     • amiodarone (PACERONE) 100 MG tablet TAKE 1 TABLET EVERY DAY 90 tablet 11   • aspirin 81 MG EC tablet Take 81 mg by mouth Daily.     • bumetanide (BUMEX) 1 MG tablet      • Calcium Carbonate-Vitamin D 600-200 MG-UNIT capsule Take 1 capsule by mouth Daily.     • digoxin (LANOXIN) 125 MCG tablet Take 125 mcg by mouth Daily.     • gabapentin (NEURONTIN) 300 MG capsule Take 300 mg by mouth 3 (three) times a day.     • isosorbide mononitrate (IMDUR) 30 MG 24 hr tablet Take 30 mg by mouth Daily.     • lisinopril (PRINIVIL,ZESTRIL) 5 MG tablet Take 1 tablet by mouth Daily. 30 tablet 0   • meclizine (ANTIVERT) 25 MG tablet Take 1 tablet by mouth 3 (Three) Times a Day As Needed for dizziness (vertigo) for up to 30 days. 60 tablet 3   • metFORMIN (GLUCOPHAGE) 1000 MG tablet      • metoclopramide (REGLAN) 5 MG tablet Take 5 mg by mouth Daily.     • metoprolol succinate XL (TOPROL-XL) 25 MG 24 hr tablet  "Take 25 mg by mouth Daily.     • Multiple Vitamins-Minerals (PRESERVISION AREDS) tablet Take 1 tablet by mouth Every 12 (Twelve) Hours.     • pravastatin (PRAVACHOL) 20 MG tablet Take 20 mg by mouth daily.     • nitroglycerin (NITROSTAT) 0.4 MG SL tablet Place 1 tablet under the tongue Every 5 (Five) Minutes As Needed for Chest Pain. Take no more than 3 doses in 15 minutes. 25 tablet 12     No current facility-administered medications for this visit.        Allergies:  Zantac [ranitidine hcl]    Review of Systems  Review of Systems   Constitution: Positive for weakness and malaise/fatigue.   HENT: Negative.    Eyes: Negative.    Cardiovascular: Positive for dyspnea on exertion and palpitations. Negative for chest pain, claudication, cyanosis, irregular heartbeat, leg swelling, near-syncope, orthopnea, paroxysmal nocturnal dyspnea and syncope.   Respiratory: Negative.    Endocrine: Negative.    Hematologic/Lymphatic: Negative.    Skin: Negative.    Musculoskeletal: Positive for arthritis and back pain.   Gastrointestinal: Negative for anorexia.   Genitourinary: Negative.    Neurological: Positive for dizziness and light-headedness.   Psychiatric/Behavioral: Negative.        Objective     Physical Exam:  /50   Pulse 80   Ht 170.2 cm (67\")   Wt 58.5 kg (129 lb)   SpO2 97%   BMI 20.20 kg/m²   Physical Exam   Constitutional: She appears well-developed.   HENT:   Head: Normocephalic.   Neck: Normal carotid pulses and no JVD present. No tracheal tenderness present. Carotid bruit is not present. No tracheal deviation and no edema present.   Cardiovascular: Regular rhythm and normal pulses.    Murmur heard.   Systolic murmur is present with a grade of 2/6   Pulmonary/Chest: Effort normal. No stridor.   Abdominal: Soft.   Neurological: She is alert. She has normal strength. No cranial nerve deficit or sensory deficit.   Skin: Skin is warm.   Psychiatric: She has a normal mood and affect. Her speech is normal and " behavior is normal.   Severely dilated left leg veins    Results Review:       ECG 12 Lead  Date/Time: 5/1/2018 12:52 PM  Performed by: ZAINAB LORA  Authorized by: ZAINAB LORA   Comparison: compared with previous ECG from 12/20/2017  Similar to previous ECG  Rhythm: sinus rhythm  Rate: normal  Conduction: 1st degree and non-specific intraventricular conduction delay  Clinical impression: abnormal ECG          Results for orders placed during the hospital encounter of 04/25/18   Adult Transthoracic Echo Limited W/ Cont if Necessary Per Protocol    Narrative · Left ventricular systolic function is normal. Estimated EF = 55%.  · No evidence of pulmonary hypertension is present.  · Left ventricular diastolic dysfunction.  · Mild aortic valve stenosis is present.  · No evidence of pulmonary hypertension is present.        Assessment/Plan   Patient Active Problem List   Diagnosis   • Chronic systolic congestive heart failure   • Congestive heart failure with LV diastolic dysfunction, NYHA class 2   • Coronary artery disease involving native coronary artery of native heart without angina pectoris   • Mixed hyperlipidemia   • Type 2 diabetes mellitus without complication, without long-term current use of insulin   • Palpitations   • SVT (supraventricular tachycardia)   • Congestive heart failure   • SOB (shortness of breath) on exertion   • Renal insufficiency   • 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   • Varicose veins of both lower extremities       Stable doing well. No chest pain or excessive dyspnea. No palpitations. No significant pedal edema. Compliant with medications and diet. Latest labs and medications reviewed.  Severe LV dysfunction       Plan:    Compression stockings, increase fluids so that there is clear urine every 2 hours, and use back brace with abdominal binder.   Elevate head end of bed 6 inches while sleeping    Lexiscan stress test, unable to walk or run on treadmill  with fall risk    Stop Imdur as very orthostatic   Diuretics PRN only    Orders Placed This Encounter   Procedures   • Stress Test With Myocardial Perfusion One Day     Standing Status:   Future     Standing Expiration Date:   5/1/2019     Order Specific Question:   What stress agent will be used?     Answer:   Regadenoson (Lexiscan)     Order Specific Question:   Difficulty walking criteria?     Answer:   Musculoskeletal (hips, knees, feet, back, amputee)     Order Specific Question:   Reason for exam?     Answer:   Known Coronary Artery Disease   • Holter Monitor - 24 Hour     Standing Status:   Future     Standing Expiration Date:   5/1/2019     Order Specific Question:   Reason for exam?     Answer:   Palpitations   • ECG 12 Lead     This order was created via procedure documentation          Return in about 3 weeks (around 5/22/2018).

## 2018-06-06 NOTE — PROGRESS NOTES
Gilberto Hammer MD     Chief Complaint   Patient presents with   • Dizziness     Patient is here for a 6 week follow-up.   • Hearing Loss     Follow- up for hearing test.       ZACKARY Torres is a  78 y.o. female who is here for follow up. She has not had any further dizziness since being adjusted with her cardiac medication.  She has had stable hearing.      Review of Systems:  Reviewed per patient intake note    Past History:  Past medical and surgical history, family history and social history reviewed and updated when appropriate.  Current medications and allergies reviewed and updated when appropriate.  Allergies:  Zantac [ranitidine hcl]    Vital Signs:   Temp:  [98.6 °F (37 °C)] 98.6 °F (37 °C)  BP: (94)/(50) 94/50    Physical Exam   CONSTITUTIONAL: well nourished, well-developed, alert, oriented, in no acute distress   COMMUNICATION AND VOICE: able to communicate normally, normal voice quality  HEAD: normocephalic, no lesions, atraumatic, no tenderness, no masses   FACE: appearance normal, no lesions, no tenderness, no deformities, facial motion symmetric  EYES: ocular motility normal, eyelids normal, orbits normal, no proptosis, conjunctiva normal , pupils equal, round  HEARING: response to conversational voice normal bilaterally   EXTERNAL EARS: auricles without lesions  EXTERNAL EAR CANALS: normal ear canals without stenosis or significant cerumen  TYMPANIC MEMBRANES: tympanic membrane appearance normal, no lesions, no perforation, normal mobility, no fluid  EXTERNAL NOSE: structure normal, no tenderness on palpation, no nasal discharge, no lesions, no evidence of trauma, nostrils patent  LIPS: structure normal, no tenderness on palpation, no lesions, no evidence of trauma  NECK: neck appearance normal  LYMPH NODES: no lymphadenopathy  CHEST/RESPIRATORY: respiratory effort normal  CARDIOVASCULAR: extremities without cyanosis or edema, no overt jugulovenous distension  present  NEUROLOGIC/PSYCHIATRIC: oriented appropriately for age, mood normal, affect appropriate, cranial nerves intact grossly unless specifically mentioned above     RESULTS REVIEW:    Audiologic testing reviewed.    Assessment   1. Meniere's disease of left ear    2. Intermittent vertigo Inactive   3. Tinnitus of left ear Inactive       Plan    Continue current management plan.    Return in about 6 months (around 12/8/2018).    Gilberto Hammer MD  06/08/18  11:59 AM

## 2018-06-08 NOTE — PROGRESS NOTES
Ofelia Meeks RN   Patient Intake Note    Review of Systems  Review of Systems   Constitutional: Negative for chills and fever.   HENT:        See HPI   Respiratory: Negative for cough and choking.    Musculoskeletal: Negative for neck pain and neck stiffness.   Neurological: Negative for dizziness and light-headedness.   Hematological: Bruises/bleeds easily.   Psychiatric/Behavioral: Negative for sleep disturbance.   All other systems reviewed and are negative.      QUALITY MEASURES    Body Mass Index Screening and Follow-Up Plan  Body mass index is 20.2 kg/m².  Patient's Body mass index is 20.2 kg/m². BMI is within normal parameters. No follow-up required.    Tobacco Use: Screening and Cessation Intervention  Smoking status: Never Smoker                                                              Smokeless tobacco: Never Used                            Ofelia Meeks RN  6/8/2018  11:37 AM

## 2018-06-08 NOTE — PATIENT INSTRUCTIONS
(1) See the medical provider as scheduled.  (2) Receive audiological testing as needed.  (3) Continue to monitor symptoms and audiological testing when symptoms reoccur.

## 2018-06-13 NOTE — PROGRESS NOTES
Padmini Torres  [id number]  1940  78 y.o.  female    Referring Provider: Александр Yo DO    Reason for Follow-up Visit:  Here for follow up after cardiac testing.    coronary artery disease  Stented coronary artery.   Severe LV dysfunction  She does not want ICD or Lifevest  Engorged left leg veins  congestive heart failure   Overall dramatically better    Subjective    Overall much better after stopping Imdur  Overall  feeling well   No chest pain   Mild exertional shortness of breath on exertion relieved with rest  No significant cough or wheezing  Going on for several months  Frequent palpitations  No associated chest pain  No significant pedal edema  No fever or chills  No significant expectoration  No hemoptysis  No presyncope or syncope No palpitations  No significant pedal edema  Compliant with medications and dietary advice  Poor effort tolerance  Effort tolerance limited more by orthopedic rather than cardiac related issues.    No presyncope or syncope  Compliant with medications  Orthostatic symptoms better    History of present illness:  Padmini Torres is a 78 y.o. yo female with history of congestive heart failure who presents today for   Chief Complaint   Patient presents with   • Coronary Artery Disease     1 mo results   .    History  Past Medical History:   Diagnosis Date   • Abdominal pain, left lower quadrant    • Bowel habit changes    • CAD (coronary artery disease)     LAD stent    • COPD (chronic obstructive pulmonary disease)    • Diabetes mellitus    • DVT (deep venous thrombosis)    • Dysphagia    • History of tachycardia    • Hypertension    • Lymphoma    • Pulmonary emboli    • Varicose veins of legs    ,   Past Surgical History:   Procedure Laterality Date   • ANKLE SURGERY     • BACK SURGERY      lower   • CARDIAC CATHETERIZATION     • CHOLECYSTECTOMY     • COLONOSCOPY  04/23/2015    Last colon limted lower diverticulosis left colon, Internal Hemorrhoids   • COLONOSCOPY W/  BIOPSIES AND POLYPECTOMY  07/22/2014    Adenomatous tubular type, Diverticulosis sigmoid colon   • CORONARY STENT PLACEMENT      x 1   • ENDOSCOPY  04/14/2010    Distal ring dilated 48 Fr   • EXPLORATORY LAPAROTOMY  2005    relapse lymphoma   • HEMORRHOIDECTOMY     • HYSTERECTOMY      total abdominal   • LUMBAR SPINE SURGERY     • LUNG BIOPSY N/A 1991    open    • SMALL INTESTINE SURGERY     • TONSILLECTOMY     • UPPER GASTROINTESTINAL ENDOSCOPY  10/23/2015    normal exam   ,   Family History   Problem Relation Age of Onset   • Diabetes Mother    • Cancer Father         prostate with bone metastasis   • Heart disease Maternal Grandmother    • Stroke Brother    • Colon cancer Neg Hx    • Colon polyps Neg Hx    ,   Social History   Substance Use Topics   • Smoking status: Never Smoker   • Smokeless tobacco: Never Used   • Alcohol use No   ,     Medications  Current Outpatient Prescriptions   Medication Sig Dispense Refill   • acetaminophen (TYLENOL ARTHRITIS PAIN) 650 MG 8 hr tablet Take 650 mg by mouth Every 12 (Twelve) Hours.     • amiodarone (PACERONE) 100 MG tablet TAKE 1 TABLET EVERY DAY 90 tablet 11   • aspirin 81 MG EC tablet Take 81 mg by mouth Daily.     • bumetanide (BUMEX) 1 MG tablet      • Calcium Carbonate-Vitamin D 600-200 MG-UNIT capsule Take 1 capsule by mouth Daily.     • digoxin (LANOXIN) 125 MCG tablet Take 125 mcg by mouth Daily.     • gabapentin (NEURONTIN) 300 MG capsule Take 300 mg by mouth 3 (three) times a day.     • lisinopril (PRINIVIL,ZESTRIL) 5 MG tablet Take 1 tablet by mouth Daily. (Patient taking differently: Take 5 mg by mouth As Needed.) 30 tablet 0   • metFORMIN (GLUCOPHAGE) 1000 MG tablet      • metoprolol succinate XL (TOPROL-XL) 25 MG 24 hr tablet Take 25 mg by mouth Daily.     • Multiple Vitamins-Minerals (PRESERVISION AREDS) tablet Take 1 tablet by mouth Every 12 (Twelve) Hours.     • nitroglycerin (NITROSTAT) 0.4 MG SL tablet Place 1 tablet under the tongue Every 5 (Five)  Minutes As Needed for Chest Pain. Take no more than 3 doses in 15 minutes. 25 tablet 12   • pravastatin (PRAVACHOL) 20 MG tablet Take 20 mg by mouth daily.       No current facility-administered medications for this visit.        Allergies:  Zantac [ranitidine hcl] and Fish-derived products    Review of Systems  Review of Systems   Constitution: Positive for weakness and malaise/fatigue.   HENT: Negative.    Eyes: Negative.    Cardiovascular: Positive for dyspnea on exertion. Negative for chest pain, claudication, cyanosis, irregular heartbeat, leg swelling, near-syncope, orthopnea, palpitations, paroxysmal nocturnal dyspnea and syncope.   Respiratory: Negative.    Endocrine: Negative.    Hematologic/Lymphatic: Negative.    Skin: Negative.    Musculoskeletal: Positive for arthritis and back pain.   Gastrointestinal: Negative for anorexia.   Genitourinary: Negative.    Neurological: Positive for dizziness and light-headedness.   Psychiatric/Behavioral: Negative.        Objective     Physical Exam:  /64   Pulse 86   Wt 58.1 kg (128 lb)   SpO2 96%   BMI 20.05 kg/m²   Physical Exam   Constitutional: She appears well-developed.   HENT:   Head: Normocephalic.   Neck: Normal carotid pulses and no JVD present. No tracheal tenderness present. Carotid bruit is not present. No tracheal deviation and no edema present.   Cardiovascular: Regular rhythm and normal pulses.    Murmur heard.   Systolic murmur is present with a grade of 2/6   Pulmonary/Chest: Effort normal. No stridor.   Abdominal: Soft.   Neurological: She is alert. She has normal strength. No cranial nerve deficit or sensory deficit.   Skin: Skin is warm.   Psychiatric: She has a normal mood and affect. Her speech is normal and behavior is normal.   Severely dilated left leg veins    Results Review:         Procedures  Results for orders placed during the hospital encounter of 04/25/18   Adult Transthoracic Echo Limited W/ Cont if Necessary Per Protocol     Narrative · Left ventricular systolic function is normal. Estimated EF = 55%.  · No evidence of pulmonary hypertension is present.  · Left ventricular diastolic dysfunction.  · Mild aortic valve stenosis is present.  · No evidence of pulmonary hypertension is present.      myocardial perfusion scan      · (Calculated EF = 42%).  · GI artifact is present.  · No ischemia or scar. Possible non ischemic cardiomyopathy versus multivessel distribution balanced ischemia        Assessment/Plan   Patient Active Problem List   Diagnosis   • Chronic systolic congestive heart failure   • Congestive heart failure with LV diastolic dysfunction, NYHA class 2   • Coronary artery disease involving native coronary artery of native heart without angina pectoris   • Mixed hyperlipidemia   • Type 2 diabetes mellitus without complication, without long-term current use of insulin   • Palpitations   • SVT (supraventricular tachycardia)   • Congestive heart failure   • SOB (shortness of breath) on exertion   • Renal insufficiency   • 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   • Varicose veins of both lower extremities       Stable doing well. No chest pain or excessive dyspnea. No palpitations. No significant pedal edema. Compliant with medications and diet. Latest labs and medications reviewed.  Severe LV dysfunction       Plan:    Compression stockings, increase fluids so that there is clear urine every 2 hours, and use back brace with abdominal binder.   Elevate head end of bed 6 inches while sleeping    Low salt/ HTN/ Heart healthy carbohydrate restricted cardiac diet as applicable to this patient's current diagnoses.   This handout has relevant information regarding shopping for food, preparing meals, what to eat at restaurants, tracking of food intake, information regarding sodium intake and salt content, how to read food labels, knowing what to eat, tips reagarding physical activity, calorie count and calorie  "expenditure. What foods to avoid. Information regarding alcoholic drinks along with \"good\" and \"bad\" fats.  Reiterated prior recommendations     The patient is advised to reduce or avoid caffeine or other cardiac stimulants.     The patient was advised that NSAID-type medications have three  very important potential side effects: cardiovascular complications, gastrointestinal irritation including hemorrhage and renal injuries.  Do not use anti-inflammatories such as Motrin/ibuprofen, Alleve/naprosyn, Mobic and like medications Use Tylenol instead.The patient expresses understanding of these issues and questions were answered.   Monitor for any signs of bleeding including red or dark stools. Fall precautions.       Monitor for any signs of bleeding including red or dark stools. Fall precautions.   Patient is asked to monitor BP at home or work, several times per month and return with written values at next office visit.     Advised staying uptodate with immunizations per established standard guidelines.      Keep A1c less than 7 Primary to monitor  Keep LDL below 70 mg/dl. Monitor liver and renal functions.   Monitor CBC, CMP and Lipid Panel by primary       S/L NTG PRN for chest pain, call me or go to ER if has to use S/L nitroglycerin     Offered to give patient  a copy of my notes and relevant tests/ prior ECG etc for the patient to review and follow specific advise and relevant findings if any, prognosis, along with my current and future plans.       Return in about 6 months (around 12/13/2018).                "

## 2018-10-18 NOTE — TELEPHONE ENCOUNTER
Pt called stating that her SOB has been increased today and she is wanting to know if she can take an extra fluid pill. She usually takes Bumex 1 mg PO daily. Please advise.

## 2018-11-01 NOTE — PLAN OF CARE
Problem: Patient Care Overview  Goal: Plan of Care Review  Outcome: Ongoing (interventions implemented as appropriate)   11/01/18 1206   Coping/Psychosocial   Plan of Care Reviewed With patient   Plan of Care Review   Progress improving   OTHER   Outcome Summary Pt reports a poor appetite d/t ongoing difficulty with swallowing foods. She has had her esophagus dilated in the past per pt report. If she eats too much, she vomits. She reports gradual weight loss since 1999 with CA dx. Her current weight has been stable for at least 6 months. She uses Glucerna at home. She follows a low salt diet at home r/t CHF. Reinforced diet guidelines. Will send Glucerna with breakfast daily. Cont to follow for nutrition needs.       Problem: Nutrition, Imbalanced: Inadequate Oral Intake (Adult)  Goal: Identify Related Risk Factors and Signs and Symptoms  Outcome: Outcome(s) achieved Date Met: 11/01/18    Goal: Improved Oral Intake  Outcome: Ongoing (interventions implemented as appropriate)    Goal: Prevent Further Weight Loss  Outcome: Ongoing (interventions implemented as appropriate)

## 2018-11-01 NOTE — PROGRESS NOTES
LOS: 2 days   Patient Care Team:  Александр Yo DO as PCP - General (Internal Medicine)  Александр Yo DO as PCP - Family Medicine  Александр Yo DO as PCP - [facility]  Freeman Kelsey MD as Cardiologist (Cardiology)  Александр Yo DO as Referring Physician (Internal Medicine)  Gilberto Hammer MD as Consulting Physician (Otolaryngology)    Chief Complaint:   Type 2 diabetes mellitus without complication, without long-term current use of insulin (CMS/[facility])    Congestive heart failure (CMS/[facility])    SOB (shortness of breath) on exertion    Renal insufficiency    Shortness of breath    Subjective  Feeling  better  No chest pain   No excessive shortness of breath  No new complaints  Anxious  Generalized weakness  Easy fatigability  Lower extremity edema  Labs reviewed  Left ventricular ejection fraction has dropped from 35-40%  Review results with patient      Interval History: Improved overall    Patient Complaints:   Chief Complaint   Patient presents with   • Shortness of Breath       Telemetry: no malignant arrhythmia. No significant pauses.    Review of Systems     Constitutional: No chills   Has fatigue   No fever.   HENT: Negative.    Eyes: Negative.      Respiratory: Mild cough,   No chest wall soreness,   Shortness of breath,   no wheezing, no stridor.      Cardiovascular: Negative for chest pain,   No palpitations   No significant  leg swelling.     Gastrointestinal: Negative for abdominal distention,  No abdominal pain,   No blood in stool,   No constipation,   No diarrhea,   No nausea   No vomiting.     Endocrine: Negative.    Genitourinary: Negative for difficulty urinating, dysuria, flank pain and hematuria.     Musculoskeletal: Negative.    Skin: Negative for rash and wound.   Allergic/Immunologic: Negative.      Neurological: Negative for dizziness, syncope, weakness,   No light-headedness  No  headaches.     Hematological: Does not bruise/bleed easily.     Psychiatric/Behavioral:  Negative for agitation or behavioral problems,   No confusion,   the patient is  nervous/anxious.       History:   Past Medical History:   Diagnosis Date   • Abdominal pain, left lower quadrant    • Bowel habit changes    • CAD (coronary artery disease)     LAD stent    • COPD (chronic obstructive pulmonary disease) (CMS/HCC)    • Diabetes mellitus (CMS/HCC)    • DVT (deep venous thrombosis) (CMS/HCC)    • Dysphagia    • History of tachycardia    • Hypertension    • Lymphoma (CMS/HCC)    • Pulmonary emboli (CMS/HCC)    • Varicose veins of legs      Past Surgical History:   Procedure Laterality Date   • ANKLE SURGERY     • BACK SURGERY      lower   • CARDIAC CATHETERIZATION     • CHOLECYSTECTOMY     • COLONOSCOPY  04/23/2015    Last colon limted lower diverticulosis left colon, Internal Hemorrhoids   • COLONOSCOPY W/ BIOPSIES AND POLYPECTOMY  07/22/2014    Adenomatous tubular type, Diverticulosis sigmoid colon   • CORONARY STENT PLACEMENT      x 1   • ENDOSCOPY  04/14/2010    Distal ring dilated 48 Fr   • EXPLORATORY LAPAROTOMY  2005    relapse lymphoma   • HEMORRHOIDECTOMY     • HYSTERECTOMY      total abdominal   • LUMBAR SPINE SURGERY     • LUNG BIOPSY N/A 1991    open    • SMALL INTESTINE SURGERY     • TONSILLECTOMY     • UPPER GASTROINTESTINAL ENDOSCOPY  10/23/2015    normal exam     Social History     Social History   • Marital status:      Spouse name: N/A   • Number of children: N/A   • Years of education: N/A     Occupational History   • Not on file.     Social History Main Topics   • Smoking status: Never Smoker   • Smokeless tobacco: Never Used   • Alcohol use No   • Drug use: No   • Sexual activity: Defer     Other Topics Concern   • Not on file     Social History Narrative   • No narrative on file     Family History   Problem Relation Age of Onset   • Diabetes Mother    • Cancer Father         prostate with bone metastasis   • Heart disease Maternal Grandmother    • Stroke Brother    • Colon  cancer Neg Hx    • Colon polyps Neg Hx        Labs:  WBC WBC   Date Value Ref Range Status   11/01/2018 9.64 4.80 - 10.80 10*3/mm3 Final   10/31/2018 12.68 (H) 4.80 - 10.80 10*3/mm3 Final   10/30/2018 13.19 (H) 4.80 - 10.80 10*3/mm3 Final      HGB Hemoglobin   Date Value Ref Range Status   11/01/2018 9.8 (L) 12.0 - 16.0 g/dL Final   10/31/2018 10.3 (L) 12.0 - 16.0 g/dL Final   10/30/2018 11.7 (L) 12.0 - 16.0 g/dL Final      HCT Hematocrit   Date Value Ref Range Status   11/01/2018 30.8 (L) 37.0 - 47.0 % Final   10/31/2018 32.9 (L) 37.0 - 47.0 % Final   10/30/2018 37.2 37.0 - 47.0 % Final      Platelets Platelets   Date Value Ref Range Status   11/01/2018 213 130 - 400 10*3/mm3 Final   10/31/2018 229 130 - 400 10*3/mm3 Final   10/30/2018 297 130 - 400 10*3/mm3 Final      MCV MCV   Date Value Ref Range Status   11/01/2018 99.0 (H) 82.0 - 98.0 fL Final   10/31/2018 101.2 (H) 82.0 - 98.0 fL Final   10/30/2018 100.8 (H) 82.0 - 98.0 fL Final          Results from last 7 days  Lab Units 11/01/18  0329 10/31/18  0414 10/30/18  1212   SODIUM mmol/L 140 141 140   POTASSIUM mmol/L 3.6 3.9 3.8   CHLORIDE mmol/L 91* 93* 93*   CO2 mmol/L 36.0* 33.0* 32.0*   BUN mg/dL 50* 43* 39*   CREATININE mg/dL 1.87* 1.61* 1.31   CALCIUM mg/dL 7.7* 8.0* 8.5   BILIRUBIN mg/dL 0.7 0.6 0.7   ALK PHOS U/L 63 64 68   ALT (SGPT) U/L 28 21 <15   AST (SGOT) U/L 49* 19 39   GLUCOSE mg/dL 108* 48* 118*     Lab Results   Component Value Date    CKTOTAL 40 02/20/2016    CKMB 1.17 06/26/2017    TROPONINI 0.070 (H) 10/31/2018     PT/INR:    Protime   Date Value Ref Range Status   10/30/2018 14.1 11.9 - 14.6 Seconds Final   /  INR   Date Value Ref Range Status   10/30/2018 1.06 0.91 - 1.09 Final       Imaging Results (last 72 hours)     Procedure Component Value Units Date/Time    XR Chest 1 View [[id number]] Collected:  10/30/18 1307     Updated:  10/30/18 1320    Narrative:       EXAMINATION: Chest 1 view 10/30/2018     HISTORY: Shortness of breath      FINDINGS: Upright frontal projection of the chest demonstrates bilateral  perihilar airspace disease for which I would favor pulmonary edema over  bilateral pneumonia. Small effusions are present blunting the  costophrenic angles. A tunneled infusion catheter is in place via  left-sided approach. There is evidence of remote granulomatous disease.       Impression:       1.. Bilateral perihilar and lower lobe airspace disease for which I  would favor pulmonary edema over pneumonia.  2. Small effusions present ( left greater than right).  This report was finalized on 10/30/2018 13:17 by Dr. Andrea Bhat MD.          Objective     Allergies   Allergen Reactions   • Zantac [Ranitidine Hcl] Shortness Of Breath   • Fish-Derived Products GI Intolerance       Medication Review: Performed  Current Facility-Administered Medications   Medication Dose Route Frequency Provider Last Rate Last Dose   • acetaminophen (TYLENOL) tablet 650 mg  650 mg Oral Q4H PRN Jessica Schumacher, APRN       • amiodarone (PACERONE) tablet 100 mg  100 mg Oral Daily Jessica Schumacher APRN   100 mg at 11/01/18 0902   • aspirin EC tablet 81 mg  81 mg Oral Daily Jessica Schumacher APRN   81 mg at 11/01/18 0902   • atorvastatin (LIPITOR) tablet 10 mg  10 mg Oral Nightly Jessica Schumacher APRN   10 mg at 10/31/18 2054   • [START ON 11/2/2018] bumetanide (BUMEX) injection 1 mg  1 mg Intravenous Daily Rashad Mir MD       • dextrose (D50W) 25 g/ 50mL Intravenous Solution 25 g  25 g Intravenous Q15 Min PRN Jessica Schumacher APRHERMES       • dextrose (GLUTOSE) oral gel 15 g  15 g Oral Q15 Min PRN Jessica Schumacher APRN       • digoxin (LANOXIN) tablet 125 mcg  125 mcg Oral Daily Jessica Schumacher APRN   125 mcg at 11/01/18 1230   • enoxaparin (LOVENOX) syringe 30 mg  30 mg Subcutaneous Q24H Jessica Schumacher APRN   30 mg at 11/01/18 1731   • gabapentin (NEURONTIN) capsule 300 mg  300 mg Oral TID Jessica Schumacher APRN   300 mg at 11/01/18 4159    • glucagon (human recombinant) (GLUCAGEN DIAGNOSTIC) injection 1 mg  1 mg Subcutaneous PRN Jessica Schumacher, APRN       • heparin flush (porcine) 100 UNIT/ML injection 500 Units  5 mL Intravenous PRN Rashad Mir MD       • insulin lispro (humaLOG) injection 2-7 Units  2-7 Units Subcutaneous 4x Daily With Meals & Nightly Jessica Schumacher, APRN   2 Units at 10/31/18 1249   • ipratropium-albuterol (DUO-NEB) nebulizer solution 3 mL  3 mL Nebulization TID - RT Rashad Mir MD       • ipratropium-albuterol (DUO-NEB) nebulizer solution 3 mL  3 mL Nebulization Q4H PRN Rashad Mir MD       • iron sucrose (VENOFER) 300 mg in sodium chloride 0.9 % 100 mL IVPB  300 mg Intravenous Q24H Rashad Mir MD   300 mg at 11/01/18 1549   • lisinopril (PRINIVIL,ZESTRIL) tablet 2.5 mg  2.5 mg Oral Daily Jessica Schumacher APRN   2.5 mg at 11/01/18 0902   • metoprolol succinate XL (TOPROL-XL) 24 hr tablet 25 mg  25 mg Oral Daily Jessica Schumacher APRN   25 mg at 11/01/18 0903   • nitroglycerin (NITROSTAT) SL tablet 0.4 mg  0.4 mg Sublingual Q5 Min PRN Jessica Schumacher APRHERMES       • ondansetron (ZOFRAN) tablet 4 mg  4 mg Oral Q6H PRN Jessica Schumacher APRN        Or   • ondansetron ODT (ZOFRAN-ODT) disintegrating tablet 4 mg  4 mg Oral Q6H PRN Jessica Schumacher APRN        Or   • ondansetron (ZOFRAN) injection 4 mg  4 mg Intravenous Q6H PRN Jessica Schumacher APRHERMES       • sodium chloride 0.9 % flush 20 mL  20 mL Intravenous PRN Rashad Mir MD       • sodium chloride 0.9 % flush 3 mL  3 mL Intravenous Q12H Jessica Schumacher APRN   3 mL at 10/31/18 2054   • sodium chloride 0.9 % flush 3-10 mL  3-10 mL Intravenous PRN Jessica Schumacher APRN           Vital Sign Min/Max for last 24 hours  Temp  Min: 97.4 °F (36.3 °C)  Max: 98.2 °F (36.8 °C)   BP  Min: 90/40  Max: 115/57   Pulse  Min: 87  Max: 103   Resp  Min: 16  Max: 18   SpO2  Min: 91 %  Max: 100 %   No Data Recorded   Weight  Min: 58.8 kg (129 lb 11.2 oz)  Max:  "58.8 kg (129 lb 11.2 oz)     Flowsheet Rows      First Filed Value   Admission Height  170.2 cm (67\") Documented at 10/30/2018 1154   Admission Weight  59 kg (130 lb) Documented at 10/30/2018 1154              Physical Exam:    General Appearance: Awake, alert, in no acute distress  Eyes: Pupils equal and reactive    Ears: Appear intact with no abnormalities noted  Nose: Nares normal, no drainage  Neck: supple, trachea midline, no carotid bruit and no JVD  Back: no kyphosis present,    Lungs: respirations regular, respirations even and respirations unlabored    Heart: normal S1, S2,  2/6 pansystolic murmur in the left sternal border,  no rub and no click    Abdomen: normal bowel sounds, no tenderness   Skin: no bleeding, bruising or rash  Extremities: no cyanosis  Psychiatric/Behavioral: Negative for agitation, behavioral problems, confusion, the patient does  appear to be nervous/anxious.          Results Review:   I reviewed the patient's new clinical results.  I reviewed the patient's new imaging results and agree with the interpretation.  I reviewed the patient's other test results and agree with the interpretation  I personally viewed and interpreted the patient's EKG/Telemetry data  Discussed with patient    Reviewed available prior notes, consults, prior visits, laboratory findings, radiology and cardiology relevant reports. Updated chart as applicable. I have reviewed the patient's medical history in detail and updated the computerized patient record as relevant.    Updated patient regarding any new or relevant abnormalities on review of records or any new findings on physical exam. Mentioned to patient about purpose of visit and desirable health short and long term goals and objectives.     Assessment/Plan   Congestive heart failure (CMS/HCC)  Mild aortic stenosis by echocardiogram  Acute on chronic diastolic heart failure  atypical chest pain  Nausea  Epigastric pain  insulin-requiring diabetes " mellitus  Essential hypertension  Mildly elevated troponin        Plan        Discontinue diuretic therapy as creatinine now increasing  Will give diuretics on a when necessary basis  Monitor kidney functions  Monitor digoxin level due to elevation of creatinine  Due to drop in left ventricular ejection fraction will add long-acting nitrates and hydralazine  Monitor blood pressures  Patient does not want any additional cardiac workup right now  We will defer any further ischemia evaluation.  Patient does not want life vest  Monitor input and output  Telemetry  Optimal medical therapy  Deep vein thrombosis prophylaxis/precautions  Appropriate diet, fluid, sodium, caffeine, stimulants intake   Compliance to diet and medications   Avoid NSAIDS    Freeman Kelsey MD  11/01/18  5:35 PM    EMR Dragon/Transcription was used to dictat and e part of this note

## 2018-11-01 NOTE — PLAN OF CARE
Problem: Patient Care Overview  Goal: Plan of Care Review  Outcome: Ongoing (interventions implemented as appropriate)   11/01/18 7198   Coping/Psychosocial   Plan of Care Reviewed With patient;daughter   Plan of Care Review   Progress improving   OTHER   Outcome Summary Nausea resolved. Patient ate cherrio's and felt better. VSS. IV bumex given. Patient decided to flush the other chamber on her port on d/c and save herself a stick in the event it wont work. Order remains in the computer. S 83-94. Cont. to monitor.

## 2018-11-01 NOTE — PROGRESS NOTES
[facility] Medicine Services  INPATIENT PROGRESS NOTE    Length of Stay: 2  Date of Admission: 10/30/2018  Primary Care Physician: Александр Yo DO    Subjective   Chief Complaint: Shortness of breath.    HPI   Shortness of breath mainly on exertion.  Patient's going on oxygen.  Patient denies any chest pain.  Renal insufficiency, slightly worse.  Continued to cut back on Bumex.  Iron deficiency anemia, start Venofer.    Review of Systems   Constitutional: Positive for activity change, appetite change and fatigue. Negative for chills and fever.   HENT: Negative for hearing loss, nosebleeds, tinnitus and trouble swallowing.    Eyes: Negative for visual disturbance.   Respiratory: Positive for shortness of breath and wheezing. Negative for cough and chest tightness.    Cardiovascular: Negative for chest pain, palpitations and leg swelling.   Gastrointestinal: Negative for abdominal distention, abdominal pain, blood in stool, constipation, diarrhea, nausea and vomiting.   Endocrine: Negative for cold intolerance, heat intolerance, polydipsia, polyphagia and polyuria.   Genitourinary: Negative for decreased urine volume, difficulty urinating, dysuria, flank pain, frequency and hematuria.   Musculoskeletal: Positive for arthralgias, joint swelling and myalgias.   Skin: Negative for rash.   Allergic/Immunologic: Negative for immunocompromised state.   Neurological: Positive for weakness. Negative for dizziness, syncope, light-headedness and headaches.   Hematological: Negative for adenopathy. Does not bruise/bleed easily.   Psychiatric/Behavioral: Positive for confusion. Negative for sleep disturbance. The patient is not nervous/anxious.      All pertinent negatives and positives are as above. All other systems have been reviewed and are negative unless otherwise stated.     Objective    Temp:  [97.4 °F (36.3 °C)-98.2 °F (36.8 °C)] 97.5 °F (36.4 °C)  Heart Rate:  [] 98  Resp:   [16-18] 18  BP: ()/(40-61) 99/52    Intake/Output Summary (Last 24 hours) at 11/01/18 1518  Last data filed at 11/01/18 1000   Gross per 24 hour   Intake              480 ml   Output              600 ml   Net             -120 ml     Physical Exam  Constitutional: She is oriented to person, place, and time. She appears well-developed.   HENT:   Head: Normocephalic and atraumatic.   Eyes: Pupils are equal, round, and reactive to light. Conjunctivae and EOM are normal.   Neck: Neck supple. No JVD present. No thyromegaly present.   Cardiovascular: Normal rate, regular rhythm, normal heart sounds and intact distal pulses.  Exam reveals no gallop and no friction rub.    No murmur heard.  Pulmonary/Chest: Effort normal and breath sounds normal. No respiratory distress. She has no wheezes. She has no rales. She exhibits no tenderness.   Diminished breath sound bilateral.   Abdominal: Soft. Bowel sounds are normal. She exhibits no distension. There is no tenderness. There is no rebound and no guarding.   Musculoskeletal: Normal range of motion. She exhibits no edema, tenderness or deformity.   Lymphadenopathy:     She has no cervical adenopathy.   Neurological: She is alert and oriented to person, place, and time. She displays normal reflexes. No cranial nerve deficit. She exhibits abnormal muscle tone. Coordination abnormal.   Skin: Skin is warm and dry. Capillary refill takes 2 to 3 seconds. No rash noted.   Psychiatric: She has a normal mood and affect. Her behavior is normal.   Nursing note and vitals reviewed.  Results Review:  Lab Results (last 24 hours)     Procedure Component Value Units Date/Time    POC Glucose Once [[id number]]  (Abnormal) Collected:  11/01/18 1225    Specimen:  Blood Updated:  11/01/18 1236     Glucose 163 (H) mg/dL      Comment: : [id number] Verenice PennyMeter ID: [id number]       POC Glucose Once [[id number]]  (Abnormal) Collected:  11/01/18 0819    Specimen:  Blood Updated:  11/01/18  0842     Glucose 139 (H) mg/dL      Comment: : [id number] Devyn Westter ID: [id number]       T4, Free [[id number]]  (Normal) Collected:  11/01/18 0329    Specimen:  Blood Updated:  11/01/18 0450     Free T4 1.07 ng/dL     Comprehensive Metabolic Panel [[id number]]  (Abnormal) Collected:  11/01/18 0329    Specimen:  Blood Updated:  11/01/18 0437     Glucose 108 (H) mg/dL      BUN 50 (H) mg/dL      Creatinine 1.87 (H) mg/dL      Sodium 140 mmol/L      Potassium 3.6 mmol/L      Chloride 91 (L) mmol/L      CO2 36.0 (H) mmol/L      Calcium 7.7 (L) mg/dL      Total Protein 6.1 (L) g/dL      Albumin 3.10 (L) g/dL      ALT (SGPT) 28 U/L      AST (SGOT) 49 (H) U/L      Alkaline Phosphatase 63 U/L      Total Bilirubin 0.7 mg/dL      eGFR Non African Amer 26 (L) mL/min/1.73      Globulin 3.0 gm/dL      A/G Ratio 1.0 (L) g/dL      BUN/Creatinine Ratio 26.7 (H)     Anion Gap 13.0 mmol/L     Narrative:       The MDRD GFR formula is only valid for adults with stable renal function between ages 18 and 70.    CBC & Differential [[id number]] Collected:  11/01/18 0329    Specimen:  Blood Updated:  11/01/18 0422    Narrative:       The following orders were created for panel order CBC & Differential.  Procedure                               Abnormality         Status                     ---------                               -----------         ------                     CBC Auto Differential[[id number]]        Abnormal            Final result                 Please view results for these tests on the individual orders.    CBC Auto Differential [[id number]]  (Abnormal) Collected:  11/01/18 0329    Specimen:  Blood Updated:  11/01/18 0422     WBC 9.64 10*3/mm3      RBC 3.11 (L) 10*6/mm3      Hemoglobin 9.8 (L) g/dL      Hematocrit 30.8 (L) %      MCV 99.0 (H) fL      MCH 31.5 pg      MCHC 31.8 (L) g/dL      RDW 14.9 %      RDW-SD 54.1 (H) fl      MPV 10.3 fL      Platelets 213 10*3/mm3      Neutrophil % 78.5 (H) %      Lymphocyte %  10.2 (L) %      Monocyte % 10.3 %      Eosinophil % 0.2 %      Basophil % 0.3 %      Immature Grans % 0.5 %      Neutrophils, Absolute 7.57 10*3/mm3      Lymphocytes, Absolute 0.98 10*3/mm3      Monocytes, Absolute 0.99 10*3/mm3      Eosinophils, Absolute 0.02 10*3/mm3      Basophils, Absolute 0.03 10*3/mm3      Immature Grans, Absolute 0.05 (H) 10*3/mm3      nRBC 0.0 /100 WBC     T3, Free [[id number]] Collected:  11/01/18 0329    Specimen:  Blood Updated:  11/01/18 0411    POC Glucose Once [[id number]]  (Abnormal) Collected:  10/31/18 1932    Specimen:  Blood Updated:  10/31/18 1945     Glucose 153 (H) mg/dL      Comment: : [id number] Zeusmarilyn BellMeter ID: [id number]       POC Glucose Once [[id number]]  (Normal) Collected:  10/31/18 1552    Specimen:  Blood Updated:  10/31/18 1641     Glucose 120 mg/dL      Comment: : [id number] Devyn Mcgraw ID: [id number]              Cultures:       Radiology Data:    Imaging Results (last 24 hours)     ** No results found for the last 24 hours. **          Allergies   Allergen Reactions   • Zantac [Ranitidine Hcl] Shortness Of Breath   • Fish-Derived Products GI Intolerance       Scheduled meds:     amiodarone 100 mg Oral Daily   aspirin 81 mg Oral Daily   atorvastatin 10 mg Oral Nightly   [START ON 11/2/2018] bumetanide 1 mg Intravenous Daily   digoxin 125 mcg Oral Daily   enoxaparin 30 mg Subcutaneous Q24H   gabapentin 300 mg Oral TID   insulin lispro 2-7 Units Subcutaneous 4x Daily With Meals & Nightly   ipratropium-albuterol 3 mL Nebulization TID - RT   iron sucrose (VENOFER) IVPB 300 mg Intravenous Q24H   lisinopril 2.5 mg Oral Daily   metoprolol succinate XL 25 mg Oral Daily   sodium chloride 3 mL Intravenous Q12H       PRN meds:  •  acetaminophen  •  dextrose  •  dextrose  •  glucagon (human recombinant)  •  heparin flush (porcine)  •  ipratropium-albuterol  •  nitroglycerin  •  ondansetron **OR** ondansetron ODT **OR** ondansetron  •  sodium chloride  •   sodium chloride    Assessment/Plan       Type 2 diabetes mellitus without complication, without long-term current use of insulin (CMS/[facility])    Congestive heart failure (CMS/[facility])    SOB (shortness of breath) on exertion    Renal insufficiency      Plan:  Acute on chronic diastolic heart failure/CAD elevated troponin/hyperlipidemia.  Echocardiogram pending.  BNP 34,000.  Troponin is stabilizing.  Consult cardiology.  Continue amiodarone, aspirin, Lipitor, digoxin, lisinopril, Toprol.  Decrease Bumex due to affect kidney function.     Diabetes.  Hemoglobin A1c is 5.3.  Sliding scale.     Neuropathy.  Continue Neurontin.     Anemia.  Hemoglobin was slightly decreased.  Iron def. Venofer x 3 days 18.     Renal insufficiency.  Cut back Bumex for now.       History of DVT.  Patient stated this is due to chemotherapy.  Dr. Blanco has stopped the Coumadin about year ago due to fall risk.     Elevated TSH.  Free T4- normal and T3-pending..     COPD.  Duo nebs when necessary     Nutrition.  Continue diet.     Deconditioning/fall.  PT and OT consult.       Discharge Plannin-2 days. Patient go home with home health.      Rashad Mir MD   18   3:18 PM

## 2018-11-01 NOTE — PLAN OF CARE
Problem: Patient Care Overview  Goal: Plan of Care Review  Outcome: Ongoing (interventions implemented as appropriate)   11/01/18 1000   Coping/Psychosocial   Plan of Care Reviewed With patient   Plan of Care Review   Progress no change   OTHER   Outcome Summary OT screening completed. Pt reports up this morning washing, toileting, and changing clothes in bathroom independently. Pt reports feeling at her baseline, fluid retention being her only concern for d/c. Full OT eval not warranted at this tme. Pt encouraged to continue activity to maintain strength, verbalized understanding.

## 2018-11-01 NOTE — PROGRESS NOTES
RT Nebulizer Protocol                        Assessment tool to be used for patients with existing breathing treatments                                                              ordered by hospitalists   0  1  2  3  4      Respiratory History   No Smoking      Smoking History      1 Pack/Day      Pulmonary Disease   X   Exacerbation        Respiratory Rate   Normal   X   20-25      Dyspneic      Accessory Muscles      Severe Dyspnea        Breath Sounds   Clear   X   Crackles      Crackles/ Rhonchi      Wheezing      Absent/ Severe Wheezing        Chest   X-ray   Clear      1 Lobe Infiltration/ Consolidation/ PE      2 Lobe Same Lung Infiltration/ Consolidation/ PE       2 Lobe Infiltration/ Both Lungs/ Consolidation/ PE   X   Both Lungs/ More Than 1 Lobe/ Atelectasis/ Consolidation/  PE        Cough   Strong Non- Productive   X   Excessive Secretions/ Strong Cough      Excessive Secretions/ Weak Cough      Thick Bronchial Secretions/ Weak Cough      Thick Bronchial Secretions/ No Cough        Total Patient Score =  6  0-4=Q4 PRN  5-9=TID and Q4 PRN  10-14=QID and Q3 PRN  15-19=Q4 and Q2 PRN  20=Q3 and Q2 PRN    Bronchopulmonary Hygiene (CPT)   Q4 ATC Copious secretions, dyspnea, unable to sleep, mucus plug    QID & Q4 PRN Moderate secretions    TID Small amounts of secretions w/ poor cough and history of secretions    BID Unable to deep breathe and cough spontaneously       Lung Expansion Therapy (PEP)   Q4 & PRN at night Severe atelectasis, poor oxygenation    QID  High risk for persistent atelectasis, existence of same    TID At risk for developing atelectasis    BID Unable to deep breathe and cough spontaneously     Instruct, 1 follow up Patients able to perform well on their own X

## 2018-11-01 NOTE — PLAN OF CARE
Problem: Fall Risk (Adult)  Goal: Identify Related Risk Factors and Signs and Symptoms  Outcome: Ongoing (interventions implemented as appropriate)    Goal: Absence of Fall  Outcome: Ongoing (interventions implemented as appropriate)      Problem: Nutrition, Imbalanced: Inadequate Oral Intake (Adult)  Goal: Improved Oral Intake  Outcome: Ongoing (interventions implemented as appropriate)

## 2018-11-02 NOTE — DISCHARGE SUMMARY
"    [facility] Medicine Services  DISCHARGE SUMMARY       Date of Admission: 10/30/2018  Date of Discharge:  11/2/2018  Primary Care Physician: Александр Yo DO    Presenting Problem/History of Present Illness:  Acute on chronic systolic congestive heart failure (CMS/HCC) [I50.23]     Final Discharge Diagnoses:  Active Hospital Problems    Diagnosis   • Congestive heart failure (CMS/HCC)   • Renal insufficiency   • SOB (shortness of breath) on exertion   • Type 2 diabetes mellitus without complication, without long-term current use of insulin (CMS/[facility])       Consults: Cardiology    Pertinent Test Results:   IMPRESSION: Chest x-ray  1.. Bilateral perihilar and lower lobe airspace disease for which I  would favor pulmonary edema over pneumonia.  2. Small effusions present ( left greater than right).    Interpretation Summary echocardiogram    · The left ventricular cavity is mildly dilated.  · Estimated EF = 30%.  · There is severe calcification of the aortic valve.  · Moderate aortic valve regurgitation is present.  · Mild aortic valve stenosis is present.  · No evidence of pulmonary hypertension is present.  · Compared to 4/25/18 LVEF has decreased from 55% to 30%     Chief Complaint on Day of Discharge: none    History of Present Illness on Day of Discharge:   Padmini Torres is a 78-year-old  female with a past Review of Systems followed by Dr. Kelsey (not on antiplatelet or anticoagulation) COPD, diabetes mellitus type II, history of PE and DVT-not on anticoagulation, lymphoma and upper tension.  Patient states she developed shortness of breathing last night and feels she has \"fluid buildup around her heart and lungs\".  She took an extra 1 mg Bumex orally, and then repeated it again this morning at 6:30 AM.  Due to ongoing symptoms she did present to [facility] emergency department.  She has had one episode nausea and vomiting since arrival.  She states Zofran " "has improved.  She denies fevers however she does her poor having chills through the night with coughing however no sputum production.  Patient is admitted for further evaluation and treatment.    Hospital Course:  The patient is a 78 y.o. female who presented to [facility] with acute on chronic CHF exacerbation.  .    Acute on chronic diastolic heart failure/CAD elevated troponin/hyperlipidemia. Echocardiogram- ejection fraction 30%, severe calcified aortic valve, moderate aortic valve regurgitation. Compared to 4/25/18 LVEF has decreased from 55% to 30%.  BNP 34,000.  Troponin is stabilizing.  Cardiology was consulted on the case. Pt will continue amiodarone, aspirin, Lipitor, digoxin, lisinopril, Toprol.  Patient was initially started on IV Bumex, this was then changed to by mouth Bumex.  Patient's going doing well.  Shortness breath is resolved.  Patient does not qualify for home oxygen.  Patient refused rehabilitation placement.  Patient refuses home health.  Patient refused LifeVest.     Diabetes.  Hemoglobin A1c is 5.3.  Patient to continue metformin at home.     Neuropathy.  Patient to continue Neurontin.     Anemia. Hemoglobin stable.  Patient got venofer for 2 days here.       Blood pressures has been on the low side since admission, but stable.  Afebrile.  Patient refused rehabilitation placement.  Patient refused home health.  Patient did not qualify for home oxygen.  Patient follow-up Dr. Kelsey in about 4-6 weeks.  Patient follow up PCP 1 week.    Condition on Discharge:  stable    Physical Exam on Discharge:  BP 97/52 (BP Location: Left arm, Patient Position: Sitting)   Pulse 99   Temp 97.3 °F (36.3 °C) (Temporal Artery )   Resp 20   Ht 170.2 cm (67.01\")   Wt 59.9 kg (132 lb)   SpO2 92%   BMI 20.67 kg/m²   Physical Exam   Constitutional: She is oriented to person, place, and time. She appears well-developed.   HENT:   Head: Normocephalic and atraumatic.   Eyes: Pupils are equal, " round, and reactive to light. Conjunctivae and EOM are normal.   Neck: Neck supple. No JVD present. No thyromegaly present.   Cardiovascular: Normal rate, regular rhythm, normal heart sounds and intact distal pulses.  Exam reveals no gallop and no friction rub.    No murmur heard.  Pulmonary/Chest: Effort normal and breath sounds normal. No respiratory distress. She has no wheezes. She has no rales. She exhibits no tenderness.   Diminished breath sound bilateral, clear   Abdominal: Soft. Bowel sounds are normal. She exhibits no distension. There is no tenderness. There is no rebound and no guarding.   Musculoskeletal: Normal range of motion. She exhibits no edema, tenderness or deformity.   Lymphadenopathy:     She has no cervical adenopathy.   Neurological: She is alert and oriented to person, place, and time. She displays normal reflexes. No cranial nerve deficit. Coordination abnormal.   Skin: Skin is warm and dry. No rash noted.   Psychiatric: She has a normal mood and affect. Her behavior is normal. Judgment and thought content normal.         Discharge Disposition:  Home or Self Care    Discharge Medications:     Discharge Medications      Changes to Medications      Instructions Start Date   amiodarone 100 MG tablet  Commonly known as:  PACERONE  What changed:  · See the new instructions.  · Another medication with the same name was removed. Continue taking this medication, and follow the directions you see here.   100 mg, Oral, Daily      lisinopril 2.5 MG tablet  Commonly known as:  PRINIVILZESTRIL  What changed:  · medication strength  · how much to take   2.5 mg, Oral, Daily      metFORMIN 1000 MG tablet  Commonly known as:  GLUCOPHAGE  What changed:  how much to take   500 mg, Oral, 2 Times Daily         Continue These Medications      Instructions Start Date   aspirin 81 MG EC tablet   81 mg, Oral, Daily      bumetanide 1 MG tablet  Commonly known as:  BUMEX   1 mg, Oral, Daily      Calcium  Carbonate-Vitamin D 600-200 MG-UNIT capsule   1 capsule, Oral, Daily      digoxin 125 MCG tablet  Commonly known as:  LANOXIN   125 mcg, Oral, Daily Digoxin      gabapentin 300 MG capsule  Commonly known as:  NEURONTIN   300 mg, Oral, 2 Times Daily      metoprolol succinate XL 25 MG 24 hr tablet  Commonly known as:  TOPROL-XL   25 mg, Oral, Daily      nitroglycerin 0.4 MG SL tablet  Commonly known as:  NITROSTAT   0.4 mg, Sublingual, Every 5 Minutes PRN, Take no more than 3 doses in 15 minutes.      pravastatin 20 MG tablet  Commonly known as:  PRAVACHOL   20 mg, Oral, Daily      TYLENOL ARTHRITIS PAIN 650 MG 8 hr tablet  Generic drug:  acetaminophen   650 mg, Oral, Every 12 Hours             Discharge Diet:   Diet Instructions     Advance Diet As Tolerated             Activity at Discharge:   Activity Instructions     Activity as Tolerated             Discharge Care Plan/Instructions:     Follow-up Appointments:   Future Appointments  Date Time Provider Department Center   12/12/2018 9:30 AM Gilberto Hammer MD [facility] None   12/14/2018 9:00 AM Freeman Kelsey MD [facility]   Follow-up PCP 1 week.  Follow the viral syndrome month.    Rashad Mir MD  11/02/18  1:27 PM    Time: Greater than 30 minutes

## 2018-11-02 NOTE — PLAN OF CARE
Problem: Patient Care Overview  Goal: Plan of Care Review  Outcome: Ongoing (interventions implemented as appropriate)   11/02/18 0642   Coping/Psychosocial   Plan of Care Reviewed With patient   Plan of Care Review   Progress improving   OTHER   Outcome Summary Pt had uneventful shift. No c/o pain or discomfort. Diuretics d/c'd yesterday d/t increase in Cr/BUN. Pt has EF 35% - states she does not want LifeVest. BP low all shift - held two doses of PO hydralazine. SR on tele. Pt anxious to be discharged.       Problem: Cardiac: Heart Failure (Adult)  Goal: Signs and Symptoms of Listed Potential Problems Will be Absent, Minimized or Managed (Cardiac: Heart Failure)  Outcome: Ongoing (interventions implemented as appropriate)      Problem: Fall Risk (Adult)  Goal: Absence of Fall  Outcome: Ongoing (interventions implemented as appropriate)

## 2018-11-02 NOTE — PROGRESS NOTES
LOS: 3 days   Patient Care Team:  Александр Yo DO as PCP - General (Internal Medicine)  Александр Yo DO as PCP - Family Medicine  Александр Yo DO as PCP - Claims [facility]  Freeman Kelsey MD as Cardiologist (Cardiology)  Александр Yo DO as Referring Physician (Internal Medicine)  Gilberto Hammer MD as Consulting Physician (Otolaryngology)    Chief Complaint:   Type 2 diabetes mellitus without complication, without long-term current use of insulin (CMS/[facility])    Congestive heart failure (CMS/[facility])    SOB (shortness of breath) on exertion    Renal insufficiency    Shortness of breath    Subjective  Feeling  better  No chest pain   No excessive shortness of breath  No new complaints  Anxious  Generalized weakness  Easy fatigability  Lower extremity edema now improved  Labs reviewed  Review results with patient      Interval History: Improved overall    Patient Complaints:   Chief Complaint   Patient presents with   • Shortness of Breath       Telemetry: no malignant arrhythmia. No significant pauses.    Review of Systems     Constitutional: No chills   Has fatigue   No fever.   HENT: Negative.    Eyes: Negative.      Respiratory: Mild cough,   No chest wall soreness,   Shortness of breath,   no wheezing, no stridor.      Cardiovascular: Negative for chest pain,   No palpitations   No significant  leg swelling.     Gastrointestinal: Negative for abdominal distention,  No abdominal pain,   No blood in stool,   No constipation,   No diarrhea,   No nausea   No vomiting.     Endocrine: Negative.    Genitourinary: Negative for difficulty urinating, dysuria, flank pain and hematuria.     Musculoskeletal: Negative.    Skin: Negative for rash and wound.   Allergic/Immunologic: Negative.      Neurological: Negative for dizziness, syncope, weakness,   No light-headedness  No  headaches.     Hematological: Does not bruise/bleed easily.     Psychiatric/Behavioral: Negative for agitation or behavioral problems,    No confusion,   the patient is  nervous/anxious.       History:   Past Medical History:   Diagnosis Date   • Abdominal pain, left lower quadrant    • Bowel habit changes    • CAD (coronary artery disease)     LAD stent    • COPD (chronic obstructive pulmonary disease) (CMS/HCC)    • Diabetes mellitus (CMS/HCC)    • DVT (deep venous thrombosis) (CMS/HCC)    • Dysphagia    • History of tachycardia    • Hypertension    • Lymphoma (CMS/HCC)    • Pulmonary emboli (CMS/HCC)    • Varicose veins of legs      Past Surgical History:   Procedure Laterality Date   • ANKLE SURGERY     • BACK SURGERY      lower   • CARDIAC CATHETERIZATION     • CHOLECYSTECTOMY     • COLONOSCOPY  04/23/2015    Last colon limted lower diverticulosis left colon, Internal Hemorrhoids   • COLONOSCOPY W/ BIOPSIES AND POLYPECTOMY  07/22/2014    Adenomatous tubular type, Diverticulosis sigmoid colon   • CORONARY STENT PLACEMENT      x 1   • ENDOSCOPY  04/14/2010    Distal ring dilated 48 Fr   • EXPLORATORY LAPAROTOMY  2005    relapse lymphoma   • HEMORRHOIDECTOMY     • HYSTERECTOMY      total abdominal   • LUMBAR SPINE SURGERY     • LUNG BIOPSY N/A 1991    open    • SMALL INTESTINE SURGERY     • TONSILLECTOMY     • UPPER GASTROINTESTINAL ENDOSCOPY  10/23/2015    normal exam     Social History     Social History   • Marital status:      Spouse name: N/A   • Number of children: N/A   • Years of education: N/A     Occupational History   • Not on file.     Social History Main Topics   • Smoking status: Never Smoker   • Smokeless tobacco: Never Used   • Alcohol use No   • Drug use: No   • Sexual activity: Defer     Other Topics Concern   • Not on file     Social History Narrative   • No narrative on file     Family History   Problem Relation Age of Onset   • Diabetes Mother    • Cancer Father         prostate with bone metastasis   • Heart disease Maternal Grandmother    • Stroke Brother    • Colon cancer Neg Hx    • Colon polyps Neg Hx         Labs:  WBC WBC   Date Value Ref Range Status   11/02/2018 9.94 4.80 - 10.80 10*3/mm3 Final   11/01/2018 9.64 4.80 - 10.80 10*3/mm3 Final   10/31/2018 12.68 (H) 4.80 - 10.80 10*3/mm3 Final      HGB Hemoglobin   Date Value Ref Range Status   11/02/2018 9.6 (L) 12.0 - 16.0 g/dL Final   11/01/2018 9.8 (L) 12.0 - 16.0 g/dL Final   10/31/2018 10.3 (L) 12.0 - 16.0 g/dL Final      HCT Hematocrit   Date Value Ref Range Status   11/02/2018 29.9 (L) 37.0 - 47.0 % Final   11/01/2018 30.8 (L) 37.0 - 47.0 % Final   10/31/2018 32.9 (L) 37.0 - 47.0 % Final      Platelets Platelets   Date Value Ref Range Status   11/02/2018 240 130 - 400 10*3/mm3 Final   11/01/2018 213 130 - 400 10*3/mm3 Final   10/31/2018 229 130 - 400 10*3/mm3 Final      MCV MCV   Date Value Ref Range Status   11/02/2018 97.7 82.0 - 98.0 fL Final   11/01/2018 99.0 (H) 82.0 - 98.0 fL Final   10/31/2018 101.2 (H) 82.0 - 98.0 fL Final          Results from last 7 days  Lab Units 11/02/18  0616 11/01/18  0329 10/31/18  0414 10/30/18  1212   SODIUM mmol/L 137 140 141 140   POTASSIUM mmol/L 3.9 3.6 3.9 3.8   CHLORIDE mmol/L 90* 91* 93* 93*   CO2 mmol/L 34.0* 36.0* 33.0* 32.0*   BUN mg/dL 49* 50* 43* 39*   CREATININE mg/dL 1.45* 1.87* 1.61* 1.31   CALCIUM mg/dL 7.3* 7.7* 8.0* 8.5   BILIRUBIN mg/dL  --  0.7 0.6 0.7   ALK PHOS U/L  --  63 64 68   ALT (SGPT) U/L  --  28 21 <15   AST (SGOT) U/L  --  49* 19 39   GLUCOSE mg/dL 84 108* 48* 118*     Lab Results   Component Value Date    CKTOTAL 40 02/20/2016    CKMB 1.17 06/26/2017    TROPONINI 0.070 (H) 10/31/2018     PT/INR:    No results found for: PROTIME/  No results found for: INR    Imaging Results (last 72 hours)     Procedure Component Value Units Date/Time    XR Chest 1 View [[id number]] Collected:  10/30/18 1307     Updated:  10/30/18 1320    Narrative:       EXAMINATION: Chest 1 view 10/30/2018     HISTORY: Shortness of breath     FINDINGS: Upright frontal projection of the chest demonstrates  bilateral  perihilar airspace disease for which I would favor pulmonary edema over  bilateral pneumonia. Small effusions are present blunting the  costophrenic angles. A tunneled infusion catheter is in place via  left-sided approach. There is evidence of remote granulomatous disease.       Impression:       1.. Bilateral perihilar and lower lobe airspace disease for which I  would favor pulmonary edema over pneumonia.  2. Small effusions present ( left greater than right).  This report was finalized on 10/30/2018 13:17 by Dr. Andrea Bhat MD.          Objective     Allergies   Allergen Reactions   • Zantac [Ranitidine Hcl] Shortness Of Breath   • Fish-Derived Products GI Intolerance       Medication Review: Performed  Current Facility-Administered Medications   Medication Dose Route Frequency Provider Last Rate Last Dose   • acetaminophen (TYLENOL) tablet 650 mg  650 mg Oral Q4H PRN Jessica Schumacher, APRN       • amiodarone (PACERONE) tablet 100 mg  100 mg Oral Daily Jessica Schumacher APRN   100 mg at 11/02/18 0917   • aspirin EC tablet 81 mg  81 mg Oral Daily Jessica Schumacher APRN   81 mg at 11/02/18 0917   • atorvastatin (LIPITOR) tablet 10 mg  10 mg Oral Nightly Jessica Schumacher APRN   10 mg at 11/01/18 2103   • dextrose (D50W) 25 g/ 50mL Intravenous Solution 25 g  25 g Intravenous Q15 Min PRN Jessica Schumacher APRN       • dextrose (GLUTOSE) oral gel 15 g  15 g Oral Q15 Min PRN Jessica Schumacher, APRN       • digoxin (LANOXIN) tablet 125 mcg  125 mcg Oral Daily Jessica Schumacher APRN   125 mcg at 11/02/18 1223   • enoxaparin (LOVENOX) syringe 30 mg  30 mg Subcutaneous Q24H Jessica Schumacher APRN   30 mg at 11/01/18 1731   • gabapentin (NEURONTIN) capsule 300 mg  300 mg Oral TID Jessica Schumacher APRN   300 mg at 11/02/18 0917   • glucagon (human recombinant) (GLUCAGEN DIAGNOSTIC) injection 1 mg  1 mg Subcutaneous PRN Jessica Schumacher, APRN       • heparin flush (porcine) 100 UNIT/ML injection  500 Units  5 mL Intravenous PRN Rashad Mir MD       • insulin lispro (humaLOG) injection 2-7 Units  2-7 Units Subcutaneous 4x Daily With Meals & Nightly Jessica Schumacher APRN   2 Units at 10/31/18 1249   • ipratropium-albuterol (DUO-NEB) nebulizer solution 3 mL  3 mL Nebulization TID - RT Rashad Mir MD   3 mL at 11/02/18 0821   • ipratropium-albuterol (DUO-NEB) nebulizer solution 3 mL  3 mL Nebulization Q4H PRN Rashad Mir MD       • iron sucrose (VENOFER) 300 mg in sodium chloride 0.9 % 100 mL IVPB  300 mg Intravenous Q24H Rashad Mir MD   300 mg at 11/01/18 1549   • isosorbide dinitrate (ISORDIL) tablet 10 mg  10 mg Oral TID - Nitrates Freeman Kelsey MD   10 mg at 11/02/18 1223   • lisinopril (PRINIVIL,ZESTRIL) tablet 2.5 mg  2.5 mg Oral Daily Jessica Schumacher APRN   2.5 mg at 11/02/18 0917   • metoprolol succinate XL (TOPROL-XL) 24 hr tablet 25 mg  25 mg Oral Daily Jessica Schumacher APRN   25 mg at 11/02/18 0917   • nitroglycerin (NITROSTAT) SL tablet 0.4 mg  0.4 mg Sublingual Q5 Min PRN Jessica Schumacher APRN       • ondansetron (ZOFRAN) tablet 4 mg  4 mg Oral Q6H PRN Jessica Schumacher APRN        Or   • ondansetron ODT (ZOFRAN-ODT) disintegrating tablet 4 mg  4 mg Oral Q6H PRN Jessica Schumacher APRN        Or   • ondansetron (ZOFRAN) injection 4 mg  4 mg Intravenous Q6H PRN Jessica Schumacher, APRHERMES       • sodium chloride 0.9 % flush 20 mL  20 mL Intravenous PRN Rashad Mir MD       • sodium chloride 0.9 % flush 3 mL  3 mL Intravenous Q12H Jessica Schumacher APRN   3 mL at 11/02/18 0918   • sodium chloride 0.9 % flush 3-10 mL  3-10 mL Intravenous PRN Jessica Schumacher APRN           Vital Sign Min/Max for last 24 hours  Temp  Min: 97.3 °F (36.3 °C)  Max: 99.3 °F (37.4 °C)   BP  Min: 90/41  Max: 101/44   Pulse  Min: 63  Max: 101   Resp  Min: 16  Max: 20   SpO2  Min: 90 %  Max: 100 %   No Data Recorded   Weight  Min: 59.9 kg (132 lb)  Max: 59.9 kg (132 lb)     Flowsheet Rows       "First Filed Value   Admission Height  170.2 cm (67\") Documented at 10/30/2018 1154   Admission Weight  59 kg (130 lb) Documented at 10/30/2018 1154              Physical Exam:    General Appearance: Awake, alert, in no acute distress  Eyes: Pupils equal and reactive    Ears: Appear intact with no abnormalities noted  Nose: Nares normal, no drainage  Neck: supple, trachea midline, no carotid bruit and no JVD  Back: no kyphosis present,    Lungs: respirations regular, respirations even and respirations unlabored    Heart: normal S1, S2,  2/6 pansystolic murmur in the left sternal border,  no rub and no click    Abdomen: normal bowel sounds, no tenderness   Skin: no bleeding, bruising or rash  Extremities: no cyanosis  Psychiatric/Behavioral: Negative for agitation, behavioral problems, confusion, the patient does  appear to be nervous/anxious.          Results Review:   I reviewed the patient's new clinical results.  I reviewed the patient's new imaging results and agree with the interpretation.  I reviewed the patient's other test results and agree with the interpretation  I personally viewed and interpreted the patient's EKG/Telemetry data  Discussed with patient    Reviewed available prior notes, consults, prior visits, laboratory findings, radiology and cardiology relevant reports. Updated chart as applicable. I have reviewed the patient's medical history in detail and updated the computerized patient record as relevant.    Updated patient regarding any new or relevant abnormalities on review of records or any new findings on physical exam. Mentioned to patient about purpose of visit and desirable health short and long term goals and objectives.     Assessment/Plan   Congestive heart failure (CMS/HCC)  Mild aortic stenosis by echocardiogram  Acute on chronic diastolic heart failure  atypical chest pain  Nausea  Epigastric pain  insulin-requiring diabetes mellitus  Essential hypertension  Mildly elevated " troponin        Plan        Continue hydralazine and Isordil  Okay to discharge from cardiology perspective  Continue Venofer and monitor hemoglobin  In past could not tolerate anticoagulation which was initially prescribed for deep vein thrombosis  See me in follow-up in 6 weeks  Discussed subsequently with Dr. Mir  Patient does not want any additional cardiac workup right now  We will defer any further ischemia evaluation.  Patient does not want life vest  Monitor input and output  Telemetry  Optimal medical therapy  Deep vein thrombosis prophylaxis/precautions  Appropriate diet, fluid, sodium, caffeine, stimulants intake   Compliance to diet and medications   Avoid NSAIDS    Freeman Kelsey MD  11/02/18  2:24 PM    EMR Dragon/Transcription was used to dictat and e part of this note

## 2018-11-02 NOTE — NURSING NOTE
Pt on 2L NC upon arrival on shift.  Pt o2 sat WNL, weaned to 1 L and sat 91, walked with pt on RA and sat remained 90-93 on RA.

## 2018-11-03 PROBLEM — I50.9 ACUTE ON CHRONIC CONGESTIVE HEART FAILURE (HCC): Status: ACTIVE | Noted: 2018-01-01

## 2018-11-03 NOTE — ED PROVIDER NOTES
Subjective   Patient complains of shortness of breath that is returned overnight.  She was in the hospital for a congestive heart failure episode and just went home yesterday afternoon.  Over the night she has had increasing fluids again.  She has gained another 2 pounds since going home she feels like this is related to fluid.  She cannot lay down at night to sleep because of the fluid.  She has returned trying to get some extra help.        History provided by:  Patient and relative   used: No    Shortness of Breath   Severity:  Severe  Onset quality:  Gradual  Duration:  1 day  Timing:  Constant  Progression:  Worsening  Chronicity:  Recurrent  Context: not activity, not animal exposure, not emotional upset, not fumes, not known allergens, not occupational exposure, not pollens, not smoke exposure, not strong odors, not URI and not weather changes    Relieved by:  Nothing  Worsened by:  Nothing  Ineffective treatments:  None tried  Associated symptoms: PND    Associated symptoms: no abdominal pain, no chest pain, no claudication, no cough, no diaphoresis, no ear pain, no fever, no headaches, no hemoptysis, no neck pain, no rash, no sore throat, no sputum production, no syncope, no swollen glands, no vomiting and no wheezing    Risk factors: no recent alcohol use, no family hx of DVT, no hx of cancer, no hx of PE/DVT, no obesity, no oral contraceptive use, no prolonged immobilization, no recent surgery and no tobacco use        Review of Systems   Constitutional: Negative.  Negative for diaphoresis and fever.   HENT: Negative.  Negative for ear pain and sore throat.    Respiratory: Positive for shortness of breath. Negative for cough, hemoptysis, sputum production and wheezing.    Cardiovascular: Positive for PND. Negative for chest pain, claudication and syncope.   Gastrointestinal: Negative.  Negative for abdominal pain and vomiting.   Genitourinary: Negative.    Musculoskeletal: Negative.   Negative for neck pain.   Skin: Negative.  Negative for rash.   Neurological: Negative.  Negative for headaches.   Hematological: Negative.    Psychiatric/Behavioral: Negative.    All other systems reviewed and are negative.      Past Medical History:   Diagnosis Date   • Abdominal pain, left lower quadrant    • Bowel habit changes    • CAD (coronary artery disease)     LAD stent    • COPD (chronic obstructive pulmonary disease) (CMS/HCC)    • Diabetes mellitus (CMS/HCC)    • DVT (deep venous thrombosis) (CMS/HCC)    • Dysphagia    • History of tachycardia    • Hypertension    • Lymphoma (CMS/HCC)    • Pulmonary emboli (CMS/HCC)    • Varicose veins of legs        Allergies   Allergen Reactions   • Zantac [Ranitidine Hcl] Shortness Of Breath   • Fish-Derived Products GI Intolerance       Past Surgical History:   Procedure Laterality Date   • ANKLE SURGERY     • BACK SURGERY      lower   • CARDIAC CATHETERIZATION     • CHOLECYSTECTOMY     • COLONOSCOPY  04/23/2015    Last colon limted lower diverticulosis left colon, Internal Hemorrhoids   • COLONOSCOPY W/ BIOPSIES AND POLYPECTOMY  07/22/2014    Adenomatous tubular type, Diverticulosis sigmoid colon   • CORONARY STENT PLACEMENT      x 1   • ENDOSCOPY  04/14/2010    Distal ring dilated 48 Fr   • EXPLORATORY LAPAROTOMY  2005    relapse lymphoma   • HEMORRHOIDECTOMY     • HYSTERECTOMY      total abdominal   • LUMBAR SPINE SURGERY     • LUNG BIOPSY N/A 1991    open    • SMALL INTESTINE SURGERY     • TONSILLECTOMY     • UPPER GASTROINTESTINAL ENDOSCOPY  10/23/2015    normal exam       Family History   Problem Relation Age of Onset   • Diabetes Mother    • Cancer Father         prostate with bone metastasis   • Heart disease Maternal Grandmother    • Stroke Brother    • Colon cancer Neg Hx    • Colon polyps Neg Hx        Social History     Social History   • Marital status:      Social History Main Topics   • Smoking status: Never Smoker   • Smokeless tobacco: Never  Used   • Alcohol use No   • Drug use: No   • Sexual activity: Defer     Other Topics Concern   • Not on file       Prior to Admission medications    Medication Sig Start Date End Date Taking? Authorizing Provider   acetaminophen (TYLENOL ARTHRITIS PAIN) 650 MG 8 hr tablet Take 650 mg by mouth Every 12 (Twelve) Hours.    Viktor Yeung MD   amiodarone (PACERONE) 100 MG tablet Take 1 tablet by mouth Daily. 11/3/18   Rashad Mir MD   aspirin 81 MG EC tablet Take 81 mg by mouth Daily.    Viktor Yeung MD   bumetanide (BUMEX) 1 MG tablet Take 1 mg by mouth Daily. 3/9/18   Viktor Yeung MD   Calcium Carbonate-Vitamin D 600-200 MG-UNIT capsule Take 1 capsule by mouth Daily.    Viktor Yeung MD   digoxin (LANOXIN) 125 MCG tablet Take 125 mcg by mouth Daily.    Viktor Yeung MD   gabapentin (NEURONTIN) 300 MG capsule Take 300 mg by mouth 2 (Two) Times a Day.    Viktor Yeung MD   lisinopril (PRINIVIL,ZESTRIL) 2.5 MG tablet Take 1 tablet by mouth Daily. 11/3/18   Rashad Mir MD   metFORMIN (GLUCOPHAGE) 1000 MG tablet Take 0.5 tablets by mouth 2 (Two) Times a Day. 11/2/18   Rashad Mir MD   metoprolol succinate XL (TOPROL-XL) 25 MG 24 hr tablet Take 25 mg by mouth Daily.    Viktor Yeung MD   nitroglycerin (NITROSTAT) 0.4 MG SL tablet Place 1 tablet under the tongue Every 5 (Five) Minutes As Needed for Chest Pain. Take no more than 3 doses in 15 minutes. 5/1/18   Freeman Kelsey MD   pravastatin (PRAVACHOL) 20 MG tablet Take 20 mg by mouth daily.    Viktor Yeung MD       Medications   sodium chloride 0.9 % flush 10 mL (not administered)   nitroglycerin (NITROSTAT) ointment 1 inch (1 inch Topical Given 11/3/18 1812)   furosemide (LASIX) injection 80 mg (80 mg Intravenous Given 11/3/18 1810)       Vitals:    11/03/18 1851   BP:    Pulse: 92   Resp:    Temp:    SpO2: 94%         Objective   Physical Exam   Constitutional: She is oriented to person, place,  and time. She appears well-developed and well-nourished.   HENT:   Head: Normocephalic and atraumatic.   Neck: Normal range of motion. Neck supple.   Cardiovascular: Normal rate and regular rhythm.    Murmur heard.  2/6 LUIS   Pulmonary/Chest: She has rales.   There is moderate tachypnea and some labored respirations with some retractions.   Abdominal: Soft. Bowel sounds are normal.   Musculoskeletal: Normal range of motion. She exhibits edema.   1+ pedal edema   Neurological: She is alert and oriented to person, place, and time.   Skin: Skin is warm and dry.   Psychiatric: She has a normal mood and affect. Her behavior is normal.   Nursing note and vitals reviewed.      Procedures         Lab Results (last 24 hours)     Procedure Component Value Units Date/Time    CBC & Differential [[id number]] Collected:  11/03/18 1735    Specimen:  Blood Updated:  11/03/18 1751    Narrative:       The following orders were created for panel order CBC & Differential.  Procedure                               Abnormality         Status                     ---------                               -----------         ------                     CBC Auto Differential[[id number]]        Abnormal            Final result                 Please view results for these tests on the individual orders.    Comprehensive Metabolic Panel [[id number]]  (Abnormal) Collected:  11/03/18 1735    Specimen:  Blood Updated:  11/03/18 1802     Glucose 88 mg/dL      BUN 57 (H) mg/dL      Creatinine 1.63 (H) mg/dL      Sodium 138 mmol/L      Potassium 3.8 mmol/L      Chloride 91 (L) mmol/L      CO2 32.0 (H) mmol/L      Calcium 8.4 mg/dL      Total Protein 6.9 g/dL      Albumin 3.60 g/dL      ALT (SGPT) 32 U/L      AST (SGOT) 48 (H) U/L      Alkaline Phosphatase 97 U/L      Total Bilirubin 0.9 mg/dL      eGFR Non African Amer 30 (L) mL/min/1.73      Globulin 3.3 gm/dL      A/G Ratio 1.1 g/dL      BUN/Creatinine Ratio 35.0 (H)     Anion Gap 15.0 (H) mmol/L      Narrative:       The MDRD GFR formula is only valid for adults with stable renal function between ages 18 and 70.    BNP [[id number]]  (Abnormal) Collected:  11/03/18 1735    Specimen:  Blood Updated:  11/03/18 1814     proBNP 24,300.0 (H) pg/mL     Troponin [[id number]]  (Abnormal) Collected:  11/03/18 1735    Specimen:  Blood Updated:  11/03/18 1814     Troponin I 0.071 (H) ng/mL     CBC Auto Differential [[id number]]  (Abnormal) Collected:  11/03/18 1735    Specimen:  Blood Updated:  11/03/18 1751     WBC 11.83 (H) 10*3/mm3      RBC 3.60 (L) 10*6/mm3      Hemoglobin 11.3 (L) g/dL      Hematocrit 34.5 (L) %      MCV 95.8 fL      MCH 31.4 pg      MCHC 32.8 (L) g/dL      RDW 14.9 %      RDW-SD 52.0 fl      MPV 9.8 fL      Platelets 328 10*3/mm3      Neutrophil % 81.3 (H) %      Lymphocyte % 8.5 (L) %      Monocyte % 9.0 %      Eosinophil % 0.4 %      Basophil % 0.2 %      Immature Grans % 0.6 %      Neutrophils, Absolute 9.61 (H) 10*3/mm3      Lymphocytes, Absolute 1.01 10*3/mm3      Monocytes, Absolute 1.07 10*3/mm3      Eosinophils, Absolute 0.05 10*3/mm3      Basophils, Absolute 0.02 10*3/mm3      Immature Grans, Absolute 0.07 (H) 10*3/mm3      nRBC 0.3 (H) /100 WBC           XR Chest 2 View   Final Result   1. Bilateral airspace opacities and LEFT pleural effusion are likely due   to pulmonary edema. Pneumonia could have a similar appearance.           This report was finalized on 11/03/2018 18:00 by Dr. Leighton Persaud MD.          ED Course  ED Course as of Nov 03 1926   Sat Nov 03, 2018 1925 Told patient of results.  BNP better but renal functions worse.  Will admit for further care.  [TR]      ED Course User Index  [TR] Ishaan Francisco Jr., MD          MDM  Number of Diagnoses or Management Options  Acute on chronic congestive heart failure, unspecified heart failure type (CMS/HCC): new and requires workup     Amount and/or Complexity of Data Reviewed  Clinical lab tests: ordered and reviewed  Tests in the  radiology section of CPT®: ordered and reviewed  Tests in the medicine section of CPT®: ordered and reviewed  Decide to obtain previous medical records or to obtain history from someone other than the patient: yes  Discuss the patient with other providers: yes    Risk of Complications, Morbidity, and/or Mortality  Presenting problems: moderate  Diagnostic procedures: moderate  Management options: moderate    Patient Progress  Patient progress: stable      Final diagnoses:   Acute on chronic congestive heart failure, unspecified heart failure type (CMS/[facility])          Ishaan Francisco Jr., MD  11/03/18 1925

## 2018-11-03 NOTE — OUTREACH NOTE
Prep Survey      Responses   Facility patient discharged from?  [facility]   Is patient eligible?  Yes   Discharge diagnosis  CHF   Does the patient have one of the following disease processes/diagnoses(primary or secondary)?  CHF   Does the patient have Home health ordered?  No   Is there a DME ordered?  No   Prep survey completed?  Yes          Sarah Gomes RN

## 2018-11-04 NOTE — CONSULTS
Nephrology ([facility]) Consult Note      Patient:  Padmini Torres  YOB: 1940  Date of Service: 11/4/2018  MRN: [id number]   Acct: [id number]   Primary Care Physician: Александр Yo DO  Advance Directive:   Code Status and Medical Interventions:   Ordered at: 11/03/18 2146     Limited Support to NOT Include:    Intubation    Cardioversion/Defibrillation    Antiarrhythmic Drugs     Level Of Support Discussed With:    Patient     Code Status:    No CPR     Medical Interventions (Level of Support Prior to Arrest):    Limited     Admit Date: 11/3/2018       Hospital Day: 0  Referring Provider: Kirk Pearson DO      Patient personally seen and examined.  Complete chart including Consults, Notes, Operative Reports, Labs, Cardiology, and Radiology studies reviewed as able.        Subjective:  Padmini Torres is a 78 y.o. female  whom we were consulted for FLAGUNI/CHF.  Was just discharged 11/2, returned on 11/3 with 2lb weight gain, orthopnea, soa.  No prior nephrology issues per pt.  No hematuria/dysuria.  No NSAIDs/rash/hemoptysis.      Allergies:  Zantac [ranitidine hcl] and Fish-derived products    Home Meds:  Prescriptions Prior to Admission   Medication Sig Dispense Refill Last Dose   • acetaminophen (TYLENOL ARTHRITIS PAIN) 650 MG 8 hr tablet Take 650 mg by mouth Every 12 (Twelve) Hours.   10/29/2018   • amiodarone (PACERONE) 100 MG tablet Take 1 tablet by mouth Daily. 30 tablet 2    • aspirin 81 MG EC tablet Take 81 mg by mouth Daily.   10/29/2018   • bumetanide (BUMEX) 1 MG tablet Take 1 mg by mouth Daily.   10/29/2018   • Calcium Carbonate-Vitamin D 600-200 MG-UNIT capsule Take 1 capsule by mouth Daily.   10/29/2018   • digoxin (LANOXIN) 125 MCG tablet Take 125 mcg by mouth Daily.   10/29/2018   • gabapentin (NEURONTIN) 300 MG capsule Take 300 mg by mouth 2 (Two) Times a Day.   10/29/2018   • lisinopril (PRINIVIL,ZESTRIL) 2.5 MG tablet Take 1 tablet by mouth Daily. 30  tablet 2    • metFORMIN (GLUCOPHAGE) 1000 MG tablet Take 0.5 tablets by mouth 2 (Two) Times a Day. 60 tablet 2    • metoprolol succinate XL (TOPROL-XL) 25 MG 24 hr tablet Take 25 mg by mouth Daily.   10/29/2018   • nitroglycerin (NITROSTAT) 0.4 MG SL tablet Place 1 tablet under the tongue Every 5 (Five) Minutes As Needed for Chest Pain. Take no more than 3 doses in 15 minutes. 25 tablet 12 Past Week at Unknown time   • pravastatin (PRAVACHOL) 20 MG tablet Take 20 mg by mouth daily.   10/29/2018       Medicines:  Current Facility-Administered Medications   Medication Dose Route Frequency Provider Last Rate Last Dose   • amiodarone (PACERONE) tablet 100 mg  100 mg Oral Daily Kirk Pearson DO   100 mg at 11/04/18 0853   • aspirin EC tablet 81 mg  81 mg Oral Daily Kirk Pearson DO   81 mg at 11/04/18 0853   • atorvastatin (LIPITOR) tablet 10 mg  10 mg Oral Nightly Kirk Pearson DO   10 mg at 11/03/18 2259   • cefepime (MAXIPIME) 2 g/100 mL 0.9% NS (mbp)  2 g Intravenous Q24H Kirk Pearson DO       • dextrose (D50W) 25 g/ 50mL Intravenous Solution 25 g  25 g Intravenous Q15 Min PRN Kirk Pearson DO       • dextrose (GLUTOSE) oral gel 15 g  15 g Oral Q15 Min PRN Kirk Pearson DO       • digoxin (LANOXIN) tablet 125 mcg  125 mcg Oral Daily Kirk Pearson DO   125 mcg at 11/04/18 1253   • enoxaparin (LOVENOX) syringe 60 mg  60 mg Subcutaneous Q24H Rashad Mir MD   60 mg at 11/04/18 0858   • furosemide (LASIX) injection 40 mg  40 mg Intravenous Q12H Kirk Pearson DO   40 mg at 11/04/18 0638   • gabapentin (NEURONTIN) capsule 300 mg  300 mg Oral Q12H Kirk Pearson DO   300 mg at 11/04/18 0852   • glucagon (human recombinant) (GLUCAGEN DIAGNOSTIC) injection 1 mg  1 mg Subcutaneous PRN Kirk Pearson DO       • hydrALAZINE (APRESOLINE) tablet 25 mg  25 mg Oral Q8H Candie Mccormick PA-C   25 mg at 11/04/18 1417   • insulin lispro  (humaLOG) injection 0-9 Units  0-9 Units Subcutaneous 4x Daily With Meals & Nightly Kirk Pearson DO   2 Units at 11/04/18 1253   • ipratropium-albuterol (DUO-NEB) nebulizer solution 3 mL  3 mL Nebulization 4x Daily - RT Kirk Pearson DO   3 mL at 11/04/18 1102   • isosorbide dinitrate (ISORDIL) tablet 20 mg  20 mg Oral TID - Nitrates Candie Mccormick PA-C   20 mg at 11/04/18 1253   • lisinopril (PRINIVIL,ZESTRIL) tablet 2.5 mg  2.5 mg Oral Q24H Kirk Pearson DO   2.5 mg at 11/04/18 0853   • melatonin tablet 6 mg  6 mg Oral Nightly Kirk Pearson DO   6 mg at 11/03/18 2300   • metoprolol succinate XL (TOPROL-XL) 24 hr tablet 25 mg  25 mg Oral Daily Kirk Pearson DO   25 mg at 11/04/18 0853   • nitroglycerin (NITROSTAT) SL tablet 0.4 mg  0.4 mg Sublingual Q5 Min PRN Kirk Pearson DO       • Pharmacy Consult - Pharmacy to dose   Does not apply Continuous PRN Kirk Pearson DO       • Pharmacy to Dose enoxaparin (LOVENOX)   Does not apply Continuous PRN Kirk Pearson DO       • sodium chloride 0.9 % flush 10 mL  10 mL Intravenous PRN Ishaan Francisco Jr., MD       • sodium chloride 0.9 % flush 3 mL  3 mL Intravenous Q12H Kirk Pearson DO   3 mL at 11/04/18 0853   • sodium chloride 0.9 % flush 3-10 mL  3-10 mL Intravenous PRN Kirk Pearson DO       • vancomycin 500 mg/100 mL 0.9% NS IVPB ([facility])  500 mg Intravenous Q24H Kirk Pearson DO   Stopped at 11/04/18 0137       Past Medical History:  Past Medical History:   Diagnosis Date   • Abdominal pain, left lower quadrant    • Bowel habit changes    • CAD (coronary artery disease)     LAD stent    • COPD (chronic obstructive pulmonary disease) (CMS/HCC)    • Diabetes mellitus (CMS/HCC)    • DVT (deep venous thrombosis) (CMS/HCC)    • Dysphagia    • History of tachycardia    • Hypertension    • Lymphoma (CMS/HCC)    • Pulmonary emboli (CMS/HCC)    • Varicose veins of legs         Past Surgical History:  Past Surgical History:   Procedure Laterality Date   • ANKLE SURGERY     • BACK SURGERY      lower   • CARDIAC CATHETERIZATION     • CHOLECYSTECTOMY     • COLONOSCOPY  04/23/2015    Last colon limted lower diverticulosis left colon, Internal Hemorrhoids   • COLONOSCOPY W/ BIOPSIES AND POLYPECTOMY  07/22/2014    Adenomatous tubular type, Diverticulosis sigmoid colon   • CORONARY STENT PLACEMENT      x 1   • ENDOSCOPY  04/14/2010    Distal ring dilated 48 Fr   • EXPLORATORY LAPAROTOMY  2005    relapse lymphoma   • HEMORRHOIDECTOMY     • HYSTERECTOMY      total abdominal   • LUMBAR SPINE SURGERY     • LUNG BIOPSY N/A 1991    open    • SMALL INTESTINE SURGERY     • TONSILLECTOMY     • UPPER GASTROINTESTINAL ENDOSCOPY  10/23/2015    normal exam       Family History  Family History   Problem Relation Age of Onset   • Diabetes Mother    • Cancer Father         prostate with bone metastasis   • Heart disease Maternal Grandmother    • Stroke Brother    • Colon cancer Neg Hx    • Colon polyps Neg Hx        Social History  Social History     Social History   • Marital status:      Spouse name: N/A   • Number of children: N/A   • Years of education: N/A     Occupational History   • Not on file.     Social History Main Topics   • Smoking status: Never Smoker   • Smokeless tobacco: Never Used   • Alcohol use No   • Drug use: No   • Sexual activity: Defer     Other Topics Concern   • Not on file     Social History Narrative   • No narrative on file         Review of Systems:  History obtained from chart review and the patient  General ROS: No fever or chills  Respiratory ROS: No cough, +shortness of breath  Cardiovascular ROS: No chest pain or palpitations  Gastrointestinal ROS: No abdominal pain or melena  Genito-Urinary ROS: No dysuria or hematuria  14 point ROS reviewed with the patient and negative except as noted above and in the HPI unless unable to obtain.    Objective:  /56 (BP  "Location: Right arm, Patient Position: Lying)   Pulse 92 Comment: post tx  Temp 97.1 °F (36.2 °C) (Temporal Artery )   Resp 19   Ht 167.6 cm (66\")   Wt 60 kg (132 lb 6 oz)   SpO2 96% Comment: post tx  BMI 21.37 kg/m²     Intake/Output Summary (Last 24 hours) at 11/04/18 1457  Last data filed at 11/04/18 1047   Gross per 24 hour   Intake              560 ml   Output              850 ml   Net             -290 ml     General: awake/alert   Chest:  clear to auscultation bilaterally without respiratory distress  CVS: regular rate and rhythm  Abdominal: soft, nontender, normal bowel sounds  Extremities: ble edema  Skin: warm and dry without rash      Labs:    Results from last 7 days  Lab Units 11/04/18 0414 11/03/18 1735 11/02/18  0616   WBC 10*3/mm3 8.19 11.83* 9.94   HEMOGLOBIN g/dL 9.1* 11.3* 9.6*   HEMATOCRIT % 28.0* 34.5* 29.9*   PLATELETS 10*3/mm3 260 328 240           Results from last 7 days  Lab Units 11/04/18  0414 11/03/18  1735 11/02/18  0616 11/01/18  0329 10/31/18  0414   SODIUM mmol/L 138 138 137 140 141   POTASSIUM mmol/L 3.6 3.8 3.9 3.6 3.9   CHLORIDE mmol/L 94* 91* 90* 91* 93*   CO2 mmol/L 34.0* 32.0* 34.0* 36.0* 33.0*   BUN mg/dL 52* 57* 49* 50* 43*   CREATININE mg/dL 1.46* 1.63* 1.45* 1.87* 1.61*   CALCIUM mg/dL 7.6* 8.4 7.3* 7.7* 8.0*   BILIRUBIN mg/dL  --  0.9  --  0.7 0.6   ALK PHOS U/L  --  97  --  63 64   ALT (SGPT) U/L  --  32  --  28 21   AST (SGOT) U/L  --  48*  --  49* 19   GLUCOSE mg/dL 71 88 84 108* 48*       Radiology:   Imaging Results (last 72 hours)     Procedure Component Value Units Date/Time    XR Chest PA & Lateral [[id number]] Collected:  11/04/18 1247     Updated:  11/04/18 1252    Narrative:       XR CHEST PA AND LATERAL- 11/4/2018 12:33 PM CST     HISTORY: pulmonary edema; I50.9-Heart failure, unspecified       COMPARISON: None.     FINDINGS:   Upright frontal and lateral radiographs of the chest were obtained.     Dense consolidation is noted in the middle lobe. The " changes of  interstitial pulmonary edema are slightly improved bilaterally. Small  pleural effusions are present bilaterally.. Cardiac silhouette is  normal. Left subclavian Port-A-Cath is present.. The osseous structures  and surrounding soft tissues demonstrate no acute abnormality.       Impression:       1. Dense consolidation right middle lobe most likely representing an  underlying inflammatory process.  2. The changes of interstitial pulmonary edema previously described are  improved however some residual does persist.  3. Small bilateral pleural effusions.        This report was finalized on 11/04/2018 12:49 by Dr. Dawit De Los Santos MD.    XR Chest 2 View [[id number]] Collected:  11/03/18 1800     Updated:  11/03/18 1804    Narrative:       XR CHEST 2 VW- 11/3/2018 5:53 PM CDT     HISTORY: SOA  triage protocol      COMPARISON: 10/30/2018     FINDINGS:   Upright frontal and lateral radiographs of the chest were obtained.     Airspace opacities are again seen in bilateral lungs. A LEFT pleural  effusion is again noted. The cardiomediastinal silhouette and pulmonary  vascularity are unchanged. The osseous structures and surrounding soft  tissues demonstrate no acute abnormality.       Impression:       1. Bilateral airspace opacities and LEFT pleural effusion are likely due  to pulmonary edema. Pneumonia could have a similar appearance.        This report was finalized on 11/03/2018 18:00 by Dr. Leighton Persaud MD.          Culture:         Assessment   CKD 3  Acute/chronic systolic/diastolic CHF  Anemia - iron deficiency - just given IV iron  DM2, well controlled, no on insulin  HTN    Plan:  IV diuretics  Monitor labs  Screen for proteinuria      Thank you for the consult, we appreciate the opportunity to provide care to your patients.  Feel free to contact me if I can be of any further assistance.      Joshua Barr MD  11/4/2018  2:57 PM

## 2018-11-04 NOTE — H&P
[facility] Medicine Admission      Date of Admission: 11/3/2018      Primary Care Physician: Александр Yo DO    Chief compliant: Shortness of breath    HPI: Patient 78-year-old white female that has a concurrent health of congestive heart failure with EF of 30% in the last month.  The patient also has a history of CAD status post stent, COPD, insulin dependent diabetes mellitus type 2, history of DVT, hypertension, history of lymphoma status post chemotherapy, history of pulmonary embolus in the past.  The patient was recently in our facility in the last 24-48 hours secondary to acute on chronic systolic CHF, and pulmonary edema.  The patient was discharged on  Amiodarone, aspirin, Lipitor, digoxin, lisinopril, and Toprol.  Patient also was on Bumex 1 mg daily.  It is of significant that the patient refused to have a consultation for placement of a defibrillator and patient has refused a LifeVest.  Also the patient did not qualify for home oxygen and the patient refused rehabilitation placement.  The patient returns with shortness of breath, fluid overload, a left pleural effusion pulmonary edema and report of remote chest pains in the last week.  It is of note that in the ER the patient had a troponin of 0.071, on EKG left bundle branch block, inverted T waves.  The patient will be admitted for further evaluation.    Past Medical History:   Past Medical History:   Diagnosis Date   • Abdominal pain, left lower quadrant    • Bowel habit changes    • CAD (coronary artery disease)     LAD stent    • COPD (chronic obstructive pulmonary disease) (CMS/HCC)    • Diabetes mellitus (CMS/HCC)    • DVT (deep venous thrombosis) (CMS/HCC)    • Dysphagia    • History of tachycardia    • Hypertension    • Lymphoma (CMS/HCC)    • Pulmonary emboli (CMS/HCC)    • Varicose veins of legs        Past Surgical History:   Past Surgical History:   Procedure Laterality Date   • ANKLE SURGERY     • BACK  SURGERY      lower   • CARDIAC CATHETERIZATION     • CHOLECYSTECTOMY     • COLONOSCOPY  04/23/2015    Last colon limted lower diverticulosis left colon, Internal Hemorrhoids   • COLONOSCOPY W/ BIOPSIES AND POLYPECTOMY  07/22/2014    Adenomatous tubular type, Diverticulosis sigmoid colon   • CORONARY STENT PLACEMENT      x 1   • ENDOSCOPY  04/14/2010    Distal ring dilated 48 Fr   • EXPLORATORY LAPAROTOMY  2005    relapse lymphoma   • HEMORRHOIDECTOMY     • HYSTERECTOMY      total abdominal   • LUMBAR SPINE SURGERY     • LUNG BIOPSY N/A 1991    open    • SMALL INTESTINE SURGERY     • TONSILLECTOMY     • UPPER GASTROINTESTINAL ENDOSCOPY  10/23/2015    normal exam       Family History:   Family History   Problem Relation Age of Onset   • Diabetes Mother    • Cancer Father         prostate with bone metastasis   • Heart disease Maternal Grandmother    • Stroke Brother    • Colon cancer Neg Hx    • Colon polyps Neg Hx        Social History:   Social History     Social History   • Marital status:      Social History Main Topics   • Smoking status: Never Smoker   • Smokeless tobacco: Never Used   • Alcohol use No   • Drug use: No   • Sexual activity: Defer     Other Topics Concern   • Not on file       Allergies:   Allergies   Allergen Reactions   • Zantac [Ranitidine Hcl] Shortness Of Breath   • Fish-Derived Products GI Intolerance       Medications:   Prior to Admission medications    Medication Sig Start Date End Date Taking? Authorizing Provider   acetaminophen (TYLENOL ARTHRITIS PAIN) 650 MG 8 hr tablet Take 650 mg by mouth Every 12 (Twelve) Hours.    ProviderViktor MD   amiodarone (PACERONE) 100 MG tablet Take 1 tablet by mouth Daily. 11/3/18   Rashad Mir MD   aspirin 81 MG EC tablet Take 81 mg by mouth Daily.    ProviderViktor MD   bumetanide (BUMEX) 1 MG tablet Take 1 mg by mouth Daily. 3/9/18   Viktor Yeung MD   Calcium Carbonate-Vitamin D 600-200 MG-UNIT capsule Take 1 capsule  by mouth Daily.    Viktor Yeung MD   digoxin (LANOXIN) 125 MCG tablet Take 125 mcg by mouth Daily.    Viktor Yeung MD   gabapentin (NEURONTIN) 300 MG capsule Take 300 mg by mouth 2 (Two) Times a Day.    Viktor Yeung MD   lisinopril (PRINIVIL,ZESTRIL) 2.5 MG tablet Take 1 tablet by mouth Daily. 11/3/18   Rashad Mir MD   metFORMIN (GLUCOPHAGE) 1000 MG tablet Take 0.5 tablets by mouth 2 (Two) Times a Day. 11/2/18   Rashad Mir MD   metoprolol succinate XL (TOPROL-XL) 25 MG 24 hr tablet Take 25 mg by mouth Daily.    Viktor Yeung MD   nitroglycerin (NITROSTAT) 0.4 MG SL tablet Place 1 tablet under the tongue Every 5 (Five) Minutes As Needed for Chest Pain. Take no more than 3 doses in 15 minutes. 5/1/18   Freeman Kelsey MD   pravastatin (PRAVACHOL) 20 MG tablet Take 20 mg by mouth daily.    Viktor Yeung MD       Review of Systems:    Constitutional: Positive for weakness and fatigue   HENT: Negative for congestion, ear discharge, ear pain, hearing loss, rhinorrhea, sneezing, sore throat and trouble swallowing.    Eyes: Negative for photophobia, pain, discharge and visual disturbance.   Respiratory: Positive for cough, chest tightness, shortness of breath and wheezing.    Cardiovascular: Positive for chest pain and palpitations.   Gastrointestinal: Negative for abdominal pain, blood in stool, diarrhea, nausea and vomiting.   Endocrine: Negative for polydipsia and polyuria.   Genitourinary: Negative for difficulty urinating, dysuria, frequency, hematuria and urgency.   Skin: Negative for rash.  negative for open sores or wounds.  Neurological: Negative for dizziness, syncope, positive for insomnia.    Physical Exam:    Temp:  [98.2 °F (36.8 °C)-98.6 °F (37 °C)] 98.2 °F (36.8 °C)  Heart Rate:  [92-95] 92  Resp:  [16-18] 16  BP: (112-116)/(49-65) 114/65    General: The patient appears comfortable and without acute distress.  HEENT: Head: Normocephalic and atraumatic. Right  Ear: External ear normal. Left Ear: External ear normal.   Eyes: Conjunctivae and EOM are normal. Right eye exhibits no discharge. Left eye exhibits no discharge.   Neck: Normal range of motion. Neck supple. No JVD present. No tracheal deviation present. No thyromegaly present.   heart: Normal rate, regular rhythm, normal heart sounds and intact distal pulses.  The patient has a 3/6 systolic murmur present at the left upper sternal border.  Pulmonary: The patient has crackles at the left lung base with transmitted upper airway sounds.    Abdominal: Soft, Bowel sounds are normal. No distension and no mass. There is no tenderness. There is no rebound and no guarding. No hernia.   Musculoskeletal: The patient has trace edema in the feet ankles extending up the mid calf bilaterally.    Neurological: Patient is alert and oriented to person, place, and time.   Skin: Skin is warm. No rash noted. Patient is not diaphoretic.      Nursing note and vitals reviewed.    Results Reviewed:  I have personally reviewed current lab, radiology, and data and agree with results.  Lab Results (last 24 hours)     Procedure Component Value Units Date/Time    [place] Draw [[id number]] Collected:  11/03/18 1735    Specimen:  Blood Updated:  11/03/18 1845    Narrative:       The following orders were created for panel order [place] Draw.  Procedure                               Abnormality         Status                     ---------                               -----------         ------                     Light Blue Top[[id number]]                                   Final result               Green Top (Gel)[[id number]]                                  Final result               Lavender Top[[id number]]                                     Final result               Red Top[[id number]]                                          Final result                 Please view results for these tests on the individual orders.    Light Blue Top [[id number]]  Collected:  11/03/18 1735    Specimen:  Blood Updated:  11/03/18 1845     Extra Tube hold for add-on     Comment: Auto resulted       Green Top (Gel) [[id number]] Collected:  11/03/18 1735    Specimen:  Blood Updated:  11/03/18 1845     Extra Tube Hold for add-ons.     Comment: Auto resulted.       Lavender Top [[id number]] Collected:  11/03/18 1735    Specimen:  Blood Updated:  11/03/18 1845     Extra Tube hold for add-on     Comment: Auto resulted       Red Top [[id number]] Collected:  11/03/18 1735    Specimen:  Blood Updated:  11/03/18 1845     Extra Tube Hold for add-ons.     Comment: Auto resulted.       BNP [[id number]]  (Abnormal) Collected:  11/03/18 1735    Specimen:  Blood Updated:  11/03/18 1814     proBNP 24,300.0 (H) pg/mL     Troponin [[id number]]  (Abnormal) Collected:  11/03/18 1735    Specimen:  Blood Updated:  11/03/18 1814     Troponin I 0.071 (H) ng/mL     Comprehensive Metabolic Panel [[id number]]  (Abnormal) Collected:  11/03/18 1735    Specimen:  Blood Updated:  11/03/18 1802     Glucose 88 mg/dL      BUN 57 (H) mg/dL      Creatinine 1.63 (H) mg/dL      Sodium 138 mmol/L      Potassium 3.8 mmol/L      Chloride 91 (L) mmol/L      CO2 32.0 (H) mmol/L      Calcium 8.4 mg/dL      Total Protein 6.9 g/dL      Albumin 3.60 g/dL      ALT (SGPT) 32 U/L      AST (SGOT) 48 (H) U/L      Alkaline Phosphatase 97 U/L      Total Bilirubin 0.9 mg/dL      eGFR Non African Amer 30 (L) mL/min/1.73      Globulin 3.3 gm/dL      A/G Ratio 1.1 g/dL      BUN/Creatinine Ratio 35.0 (H)     Anion Gap 15.0 (H) mmol/L     Narrative:       The MDRD GFR formula is only valid for adults with stable renal function between ages 18 and 70.    CBC & Differential [[id number]] Collected:  11/03/18 1735    Specimen:  Blood Updated:  11/03/18 1751    Narrative:       The following orders were created for panel order CBC & Differential.  Procedure                               Abnormality         Status                     ---------                                -----------         ------                     CBC Auto Differential[[id number]]        Abnormal            Final result                 Please view results for these tests on the individual orders.    CBC Auto Differential [[id number]]  (Abnormal) Collected:  11/03/18 1735    Specimen:  Blood Updated:  11/03/18 1751     WBC 11.83 (H) 10*3/mm3      RBC 3.60 (L) 10*6/mm3      Hemoglobin 11.3 (L) g/dL      Hematocrit 34.5 (L) %      MCV 95.8 fL      MCH 31.4 pg      MCHC 32.8 (L) g/dL      RDW 14.9 %      RDW-SD 52.0 fl      MPV 9.8 fL      Platelets 328 10*3/mm3      Neutrophil % 81.3 (H) %      Lymphocyte % 8.5 (L) %      Monocyte % 9.0 %      Eosinophil % 0.4 %      Basophil % 0.2 %      Immature Grans % 0.6 %      Neutrophils, Absolute 9.61 (H) 10*3/mm3      Lymphocytes, Absolute 1.01 10*3/mm3      Monocytes, Absolute 1.07 10*3/mm3      Eosinophils, Absolute 0.05 10*3/mm3      Basophils, Absolute 0.02 10*3/mm3      Immature Grans, Absolute 0.07 (H) 10*3/mm3      nRBC 0.3 (H) /100 WBC         Imaging Results (last 24 hours)     Procedure Component Value Units Date/Time    XR Chest 2 View [[id number]] Collected:  11/03/18 1800     Updated:  11/03/18 1804    Narrative:       XR CHEST 2 VW- 11/3/2018 5:53 PM CDT     HISTORY: SOA  triage protocol      COMPARISON: 10/30/2018     FINDINGS:   Upright frontal and lateral radiographs of the chest were obtained.     Airspace opacities are again seen in bilateral lungs. A LEFT pleural  effusion is again noted. The cardiomediastinal silhouette and pulmonary  vascularity are unchanged. The osseous structures and surrounding soft  tissues demonstrate no acute abnormality.       Impression:       1. Bilateral airspace opacities and LEFT pleural effusion are likely due  to pulmonary edema. Pneumonia could have a similar appearance.        This report was finalized on 11/03/2018 18:00 by Dr. Leihgton Persaud MD.          Assessment/Plan         Active Hospital  Problems    Diagnosis   • Acute on chronic congestive heart failure (CMS/[facility])       Assessment / Plan:  Acute on chronic systolic heart failure - from the prior documentation, the patient has a ejection fraction of 30%.  From the prior discharge summary the patient has declined a LifeVest and from her history has declined placement of a defibrillator.  We will maintain patient on statin, small ACE inhibitor, and beta blocker.  We'll have consultation to cardiology.  We will stop Bumex and switch to IV Lasix in the hospital.    Acute kidney injury  - bleed is secondary to use of diuretics.  I spoke to on call nephrology.  We'll hold her Bumex and switch IV Lasix.  Also the patient is on small ACE inhibitor.  Nephrology will restart and monitor blood pressure.    Paroxysmal atrial fibrillation - patient is in normal sinus rhythm, to the amiodarone and digoxin.    Acute NSTEMI unknown type - is likely secondary to acute hypoxic respiratory failure with acute on chronic systolic heart failure.  We'll trend troponins and consult cardiology.  The patient has a left bundle branch block and inverted T waves.  We'll have pharmacy dose Lovenox.    Ongoing pulmonary edema with left pleural effusion - IV diuretics.    Acute hypoxic respiratory failure- as above is likely secondary to acute on chronic systolic heart failure and pleural effusion.    Mild UTI with positive leukocyte esterase - will add antibiotics.    Anemia unspecified- is chronic and continue to monitor.    Possible pneumonia versus bronchitis - the patient has had a recent re-admitted to the hospital - on x-ray there is concern of a pneumonia.  She has a elevated white count.  We'll start vancomycin and cefepime empirically.    Ethics - full code    dvt prophylaxis - lovenox.    EMR Dragon/Transcription disclaimer:   Much of this encounter note is an electronic transcription/translation of spoken language to printed text. The electronic translation of spoken  language may permit erroneous, or at times, nonsensical words or phrases to be inadvertently transcribed; Although I have reviewed the note for such errors, some may still exist.              This document has been electronically signed by Kirk Pearson DO on November 3, 2018 10:01 PM

## 2018-11-04 NOTE — CONSULTS
Pharmacy Dosing Service  Antimicrobials  El Cefepime    Assessment/Action/Plan:  Initiated El Cefepime 2g once followed by Cefepime 2g Q24H over 4 hours.     Subjective:  Padmini Torres is a 78 y.o. female currently being treated for PNA. Day 1 of therapy.    Objective:  Estimated Creatinine Clearance: 26.5 mL/min (A) (by C-G formula based on SCr of 1.63 mg/dL (H)).   Lab Results   Component Value Date    CREATININE 1.63 (H) 11/03/2018    CREATININE 1.45 (H) 11/02/2018    CREATININE 1.87 (H) 11/01/2018    CREATININE 1.70 (H) 03/27/2018    CREATININE 1.40 (H) 09/14/2017     Lab Results   Component Value Date    WBC 11.83 (H) 11/03/2018     Temp Readings from Last 1 Encounters:   11/03/18 97.9 °F (36.6 °C) (Temporal Artery )       Culture Results:  Microbiology Results (last 10 days)       ** No results found for the last 240 hours. **          Current Antimicrobials:   Anti-Infectives       Ordered     Dose/Rate Route Frequency Start Stop    11/03/18 2234  cefepime (MAXIPIME) 2 g/100 mL 0.9% NS (mbp)     Ordering Provider:  Kirk Pearson DO    2 g  over 4 Hours Intravenous Every 24 Hours 11/04/18 2330 11/09/18 2329    11/03/18 2234  cefepime (MAXIPIME) 2 g/100 mL 0.9% NS (mbp)     Ordering Provider:  Kirk Pearson DO    2 g  200 mL/hr over 30 Minutes Intravenous Once 11/03/18 2330      11/03/18 2212  vancomycin 500 mg/100 mL 0.9% NS IVPB ([facility])     Ordering Provider:  Kirk Pearson DO    500 mg  over 60 Minutes Intravenous Every 24 Hours 11/03/18 2300 11/08/18 2259            Gail Lynch RPH  11/03/18 11:10 PM

## 2018-11-04 NOTE — PLAN OF CARE
Problem: Patient Care Overview  Goal: Plan of Care Review  Outcome: Ongoing (interventions implemented as appropriate)   11/04/18 1512   Coping/Psychosocial   Plan of Care Reviewed With patient   Plan of Care Review   Progress no change   OTHER   Outcome Summary No co pain. chest xray today. Renal us in am. cont IV abx. skin tear to right FA and left elbow. cont to monitor.      Goal: Individualization and Mutuality  Outcome: Ongoing (interventions implemented as appropriate)    Goal: Discharge Needs Assessment  Outcome: Ongoing (interventions implemented as appropriate)    Goal: Interprofessional Rounds/Family Conf  Outcome: Ongoing (interventions implemented as appropriate)      Problem: Breathing Pattern Ineffective (Adult)  Goal: Identify Related Risk Factors and Signs and Symptoms  Outcome: Ongoing (interventions implemented as appropriate)    Goal: Anxiety/Fear Reduction  Outcome: Ongoing (interventions implemented as appropriate)      Problem: Fall Risk (Adult)  Goal: Identify Related Risk Factors and Signs and Symptoms  Outcome: Ongoing (interventions implemented as appropriate)    Goal: Absence of Fall  Outcome: Ongoing (interventions implemented as appropriate)

## 2018-11-04 NOTE — CONSULTS
"Pharmacy Dosing Service  Pharmacokinetics  Vancomycin Initial Evaluation    Assessment/Action/Plan:  Initiated Vancomycin 500 mg IVPB every 24 hours. Vancomycin trough ordered prior to 4th dose on 11/6/18 before 2300 dose. Pharmacy will monitor renal function and adjust dose accordingly.     Subjective:  Padmini Torres is a 78 y.o. female with a Vancomycin \"Pharmacy to Dose\" consult for the treatment of PNA . Day 1 of therapy.    Objective:  Ht: 170.2 cm (67\"); Wt: 60.9 kg (134 lb 3.2 oz)  Estimated Creatinine Clearance: 26.9 mL/min (A) (by C-G formula based on SCr of 1.63 mg/dL (H)).   Lab Results   Component Value Date    CREATININE 1.63 (H) 11/03/2018    CREATININE 1.45 (H) 11/02/2018    CREATININE 1.87 (H) 11/01/2018    CREATININE 1.70 (H) 03/27/2018    CREATININE 1.40 (H) 09/14/2017      Lab Results   Component Value Date    WBC 11.83 (H) 11/03/2018    WBC 9.94 11/02/2018    WBC 9.64 11/01/2018      Baseline culture results:  Microbiology Results (last 10 days)       ** No results found for the last 240 hours. **            Gail Lynch RPH  11/03/18 10:14 PM    "

## 2018-11-04 NOTE — PROGRESS NOTES
[facility] Medicine Services  INPATIENT PROGRESS NOTE    Length of Stay: 0  Date of Admission: 11/3/2018  Primary Care Physician: Александр Yo DO    Subjective   Chief Complaint: CHF exacerbation.    HPI   Patient is breathing better.  Patient denies any chest pain or shortness of breath.    Review of Systems   Constitutional: Positive for activity change, appetite change and fatigue. Negative for chills and fever.   HENT: Negative for hearing loss, nosebleeds, tinnitus and trouble swallowing.    Eyes: Negative for visual disturbance.   Respiratory: Positive for shortness of breath. Negative for cough, chest tightness and wheezing.    Cardiovascular: Positive for leg swelling. Negative for chest pain and palpitations.   Gastrointestinal: Negative for abdominal distention, abdominal pain, blood in stool, constipation, diarrhea, nausea and vomiting.   Endocrine: Negative for cold intolerance, heat intolerance, polydipsia, polyphagia and polyuria.   Genitourinary: Negative for decreased urine volume, difficulty urinating, dysuria, flank pain, frequency and hematuria.   Musculoskeletal: Negative for arthralgias, joint swelling and myalgias.   Skin: Negative for rash.   Allergic/Immunologic: Negative for immunocompromised state.   Neurological: Positive for weakness. Negative for dizziness, syncope, light-headedness and headaches.   Hematological: Negative for adenopathy. Does not bruise/bleed easily.   Psychiatric/Behavioral: Negative for confusion and sleep disturbance. The patient is not nervous/anxious.           All pertinent negatives and positives are as above. All other systems have been reviewed and are negative unless otherwise stated.     Objective    Temp:  [97.1 °F (36.2 °C)-98.6 °F (37 °C)] 97.1 °F (36.2 °C)  Heart Rate:  [85-96] 92  Resp:  [16-20] 19  BP: ()/(46-65) 104/56    Intake/Output Summary (Last 24 hours) at 11/04/18 1220  Last data filed at 11/04/18  1047   Gross per 24 hour   Intake              560 ml   Output              850 ml   Net             -290 ml     Physical Exam   Constitutional: She is oriented to person, place, and time. She appears well-developed.   HENT:   Head: Normocephalic and atraumatic.   Eyes: Pupils are equal, round, and reactive to light. Conjunctivae and EOM are normal.   Neck: Neck supple. No JVD present. No thyromegaly present.   Cardiovascular: Normal rate, regular rhythm, normal heart sounds and intact distal pulses.  Exam reveals no gallop and no friction rub.    No murmur heard.  Pulmonary/Chest: Effort normal and breath sounds normal. No respiratory distress. She has no wheezes. She has no rales. She exhibits no tenderness.   Diminished breath sound bilateral   Abdominal: Soft. Bowel sounds are normal. She exhibits no distension. There is no tenderness. There is no rebound and no guarding.   Musculoskeletal: Normal range of motion. She exhibits edema. She exhibits no tenderness or deformity.   Lymphadenopathy:     She has no cervical adenopathy.   Neurological: She is alert and oriented to person, place, and time. She displays normal reflexes. No cranial nerve deficit. She exhibits abnormal muscle tone. Coordination abnormal.   Skin: Skin is warm and dry. Capillary refill takes 2 to 3 seconds. No rash noted.   Psychiatric: She has a normal mood and affect. Her behavior is normal. Judgment and thought content normal.   Nursing note and vitals reviewed.      Results Review:  Lab Results (last 24 hours)     Procedure Component Value Units Date/Time    POC Glucose Once [[id number]]  (Abnormal) Collected:  11/04/18 1101    Specimen:  Blood Updated:  11/04/18 1153     Glucose 187 (H) mg/dL      Comment: : [id number] Tejas LisaMeter ID: [id number]       Vitamin D 25 Hydroxy [[id number]]  (Normal) Collected:  11/04/18 0922    Specimen:  Blood Updated:  11/04/18 1043     25 Hydroxy, Vitamin D 38.1 ng/ml     Magnesium [[id number]]   (Normal) Collected:  11/04/18 0922    Specimen:  Blood Updated:  11/04/18 1027     Magnesium 1.7 mg/dL     Phosphorus [[id number]]  (Normal) Collected:  11/04/18 0922    Specimen:  Blood Updated:  11/04/18 1027     Phosphorus 2.7 mg/dL     Osmolality, Urine - Urine, Clean Catch [[id number]]  (Abnormal) Collected:  11/04/18 0910    Specimen:  Urine from Urine, Clean Catch Updated:  11/04/18 0954     Osmolality, Urine 329 (L) mOsm/kg     Sodium, Urine, Random - Urine, Clean Catch [[id number]]  (Normal) Collected:  11/04/18 0910    Specimen:  Urine from Urine, Clean Catch Updated:  11/04/18 0936     Sodium, Urine 66 mmol/L     Creatinine, Urine, Random - Urine, Clean Catch [[id number]] Collected:  11/04/18 0910    Specimen:  Urine from Urine, Clean Catch Updated:  11/04/18 0936     Creatinine, Urine 39.8 mg/dL     Eosinophil Smear - Urine, Urine, Clean Catch [[id number]] Collected:  11/04/18 0910    Specimen:  Urine from Urine, Clean Catch Updated:  11/04/18 0916    Protime-INR [[id number]]  (Abnormal) Collected:  11/04/18 0630    Specimen:  Blood Updated:  11/04/18 0816     Protime 15.2 (H) Seconds      INR 1.16 (H)    POC Glucose Once [[id number]]  (Abnormal) Collected:  11/04/18 0740    Specimen:  Blood Updated:  11/04/18 0814     Glucose 134 (H) mg/dL      Comment: : [id number] Tejas LisaMeter ID: [id number]       Troponin [[id number]]  (Abnormal) Collected:  11/04/18 0414    Specimen:  Blood Updated:  11/04/18 0459     Troponin I 0.065 (H) ng/mL     Basic Metabolic Panel [[id number]]  (Abnormal) Collected:  11/04/18 0414    Specimen:  Blood Updated:  11/04/18 0449     Glucose 71 mg/dL      BUN 52 (H) mg/dL      Creatinine 1.46 (H) mg/dL      Sodium 138 mmol/L      Potassium 3.6 mmol/L      Chloride 94 (L) mmol/L      CO2 34.0 (H) mmol/L      Calcium 7.6 (L) mg/dL      eGFR Non African Amer 35 (L) mL/min/1.73      BUN/Creatinine Ratio 35.6 (H)     Anion Gap 10.0 mmol/L     Narrative:       The MDRD GFR formula is only  valid for adults with stable renal function between ages 18 and 70.    CBC (No Diff) [[id number]]  (Abnormal) Collected:  11/04/18 0414    Specimen:  Blood Updated:  11/04/18 0437     WBC 8.19 10*3/mm3      RBC 2.85 (L) 10*6/mm3      Hemoglobin 9.1 (L) g/dL      Hematocrit 28.0 (L) %      MCV 98.2 (H) fL      MCH 31.9 pg      MCHC 32.5 (L) g/dL      RDW 15.1 (H) %      RDW-SD 54.7 (H) fl      MPV 9.8 fL      Platelets 260 10*3/mm3     Troponin [[id number]]  (Abnormal) Collected:  11/04/18 0127    Specimen:  Blood Updated:  11/04/18 0306     Troponin I 0.077 (H) ng/mL     POC Glucose Once [[id number]]  (Abnormal) Collected:  11/03/18 2302    Specimen:  Blood Updated:  11/03/18 2332     Glucose 183 (H) mg/dL      Comment: : [id number] Leigh Ann LisaMeter ID: [id number]       Troponin [[id number]]  (Abnormal) Collected:  11/03/18 2217    Specimen:  Blood Updated:  11/03/18 2252     Troponin I 0.073 (H) ng/mL     Urinalysis With Culture If Indicated - Urine, Clean Catch [[id number]]  (Abnormal) Collected:  11/03/18 2209    Specimen:  Urine from Urine, Clean Catch Updated:  11/03/18 2238     Color, UA Yellow     Appearance, UA Clear     pH, UA <=5.0     Specific Gravity, UA 1.012     Glucose, UA Negative     Ketones, UA Negative     Bilirubin, UA Negative     Blood, UA Negative     Protein, UA Negative     Leuk Esterase, UA Small (1+) (A)     Nitrite, UA Negative     Urobilinogen, UA 1.0 E.U./dL    Urinalysis, Microscopic Only - Urine, Clean Catch [[id number]]  (Abnormal) Collected:  11/03/18 2209    Specimen:  Urine from Urine, Clean Catch Updated:  11/03/18 2238     RBC, UA 0-2 (A) /HPF      WBC, UA 3-5 (A) /HPF      Bacteria, UA None Seen /HPF      Squamous Epithelial Cells, UA 0-2 /HPF      Hyaline Casts, UA 3-6 /LPF      Methodology Automated Microscopy    [place] Draw [[id number]] Collected:  11/03/18 1735    Specimen:  Blood Updated:  11/03/18 1845    Narrative:       The following orders were created for panel order  [place] Draw.  Procedure                               Abnormality         Status                     ---------                               -----------         ------                     Light Blue Top[[id number]]                                   Final result               Green Top (Gel)[[id number]]                                  Final result               Lavender Top[[id number]]                                     Final result               Red Top[[id number]]                                          Final result                 Please view results for these tests on the individual orders.    Light Blue Top [[id number]] Collected:  11/03/18 1735    Specimen:  Blood Updated:  11/03/18 1845     Extra Tube hold for add-on     Comment: Auto resulted       Green Top (Gel) [[id number]] Collected:  11/03/18 1735    Specimen:  Blood Updated:  11/03/18 1845     Extra Tube Hold for add-ons.     Comment: Auto resulted.       Lavender Top [[id number]] Collected:  11/03/18 1735    Specimen:  Blood Updated:  11/03/18 1845     Extra Tube hold for add-on     Comment: Auto resulted       Red Top [[id number]] Collected:  11/03/18 1735    Specimen:  Blood Updated:  11/03/18 1845     Extra Tube Hold for add-ons.     Comment: Auto resulted.       BNP [[id number]]  (Abnormal) Collected:  11/03/18 1735    Specimen:  Blood Updated:  11/03/18 1814     proBNP 24,300.0 (H) pg/mL     Troponin [[id number]]  (Abnormal) Collected:  11/03/18 1735    Specimen:  Blood Updated:  11/03/18 1814     Troponin I 0.071 (H) ng/mL     Comprehensive Metabolic Panel [[id number]]  (Abnormal) Collected:  11/03/18 1735    Specimen:  Blood Updated:  11/03/18 1802     Glucose 88 mg/dL      BUN 57 (H) mg/dL      Creatinine 1.63 (H) mg/dL      Sodium 138 mmol/L      Potassium 3.8 mmol/L      Chloride 91 (L) mmol/L      CO2 32.0 (H) mmol/L      Calcium 8.4 mg/dL      Total Protein 6.9 g/dL      Albumin 3.60 g/dL      ALT (SGPT) 32 U/L      AST (SGOT) 48 (H) U/L       Alkaline Phosphatase 97 U/L      Total Bilirubin 0.9 mg/dL      eGFR Non African Amer 30 (L) mL/min/1.73      Globulin 3.3 gm/dL      A/G Ratio 1.1 g/dL      BUN/Creatinine Ratio 35.0 (H)     Anion Gap 15.0 (H) mmol/L     Narrative:       The MDRD GFR formula is only valid for adults with stable renal function between ages 18 and 70.    CBC & Differential [[id number]] Collected:  11/03/18 1735    Specimen:  Blood Updated:  11/03/18 1751    Narrative:       The following orders were created for panel order CBC & Differential.  Procedure                               Abnormality         Status                     ---------                               -----------         ------                     CBC Auto Differential[[id number]]        Abnormal            Final result                 Please view results for these tests on the individual orders.    CBC Auto Differential [[id number]]  (Abnormal) Collected:  11/03/18 1735    Specimen:  Blood Updated:  11/03/18 1751     WBC 11.83 (H) 10*3/mm3      RBC 3.60 (L) 10*6/mm3      Hemoglobin 11.3 (L) g/dL      Hematocrit 34.5 (L) %      MCV 95.8 fL      MCH 31.4 pg      MCHC 32.8 (L) g/dL      RDW 14.9 %      RDW-SD 52.0 fl      MPV 9.8 fL      Platelets 328 10*3/mm3      Neutrophil % 81.3 (H) %      Lymphocyte % 8.5 (L) %      Monocyte % 9.0 %      Eosinophil % 0.4 %      Basophil % 0.2 %      Immature Grans % 0.6 %      Neutrophils, Absolute 9.61 (H) 10*3/mm3      Lymphocytes, Absolute 1.01 10*3/mm3      Monocytes, Absolute 1.07 10*3/mm3      Eosinophils, Absolute 0.05 10*3/mm3      Basophils, Absolute 0.02 10*3/mm3      Immature Grans, Absolute 0.07 (H) 10*3/mm3      nRBC 0.3 (H) /100 WBC            Cultures:       Radiology Data:    Imaging Results (last 24 hours)     Procedure Component Value Units Date/Time    XR Chest 2 View [[id number]] Collected:  11/03/18 1800     Updated:  11/03/18 1804    Narrative:       XR CHEST 2 VW- 11/3/2018 5:53 PM CDT     HISTORY: SOA  triage  protocol      COMPARISON: 10/30/2018     FINDINGS:   Upright frontal and lateral radiographs of the chest were obtained.     Airspace opacities are again seen in bilateral lungs. A LEFT pleural  effusion is again noted. The cardiomediastinal silhouette and pulmonary  vascularity are unchanged. The osseous structures and surrounding soft  tissues demonstrate no acute abnormality.       Impression:       1. Bilateral airspace opacities and LEFT pleural effusion are likely due  to pulmonary edema. Pneumonia could have a similar appearance.        This report was finalized on 11/03/2018 18:00 by Dr. Leighton Persaud MD.          Allergies   Allergen Reactions   • Zantac [Ranitidine Hcl] Shortness Of Breath   • Fish-Derived Products GI Intolerance       Scheduled meds:     amiodarone 100 mg Oral Daily   aspirin 81 mg Oral Daily   atorvastatin 10 mg Oral Nightly   cefepime 2 g Intravenous Q24H   digoxin 125 mcg Oral Daily   enoxaparin 60 mg Subcutaneous Q24H   furosemide 40 mg Intravenous Q12H   gabapentin 300 mg Oral Q12H   hydrALAZINE 25 mg Oral Q8H   insulin lispro 0-9 Units Subcutaneous 4x Daily With Meals & Nightly   ipratropium-albuterol 3 mL Nebulization 4x Daily - RT   isosorbide dinitrate 20 mg Oral TID - Nitrates   lisinopril 2.5 mg Oral Q24H   melatonin 6 mg Oral Nightly   metoprolol succinate XL 25 mg Oral Daily   sodium chloride 3 mL Intravenous Q12H   vancomycin 500 mg Intravenous Q24H       PRN meds:  dextrose  •  dextrose  •  glucagon (human recombinant)  •  nitroglycerin  •  Pharmacy Consult - Pharmacy to dose  •  Pharmacy to Dose enoxaparin (LOVENOX)  •  sodium chloride  •  sodium chloride    Assessment/Plan       Acute on chronic congestive heart failure (CMS/HCC)      Plan:  Acute on chronic diastolic heart failure/CAD elevated troponin/hyperlipidemia. Echocardiogram-  EF 30%.  BNP 34,000.  Troponin is stabilizing.  Consult cardiology. Continue amiodarone, aspirin, Lipitor, digoxin, lisinopril, Toprol,  hydralazine, Lipitor.  Cont lasix.     Pneumonia, COPD.  Cefepime and vancomycin.  Duo nebs     Diabetes.  Hemoglobin A1c is 5.3.  Sliding scale.     Neuropathy.  Continue Neurontin.     Anemia.  Hemoglobin decreased.  Iron def.Venofer x 2 days 18.     Renal insufficiency.  Will follow.     History of DVT.  Patient stated this is due to chemotherapy.  Dr. Blanco has stopped the Coumadin about year ago due to fall risk.     Nutrition.  Continue diet.     Deconditioning/fall.  PT and OT consult.       Discharge Plannin-2 days.     Rashad Mir MD   18   12:20 PM

## 2018-11-04 NOTE — CONSULTS
[facility], [facility] Consult Note    Referring Provider: Michel  Reason for Consultation: CHF    Patient Care Team:  Александр Yo DO as PCP - General (Internal Medicine)  Александр Yo DO as PCP - Family Medicine  Александр Yo DO as PCP - Claims Attributed  Freeman Kelsey MD as Cardiologist (Cardiology)  Александр Yo DO as Referring Physician (Internal Medicine)  Gilberto Hammer MD as Consulting Physician (Otolaryngology)    Chief complaint SOB    Subjective .     History of present illness:  This pt is a 77 y/o wf with known h/o CHF with EF of 30%.  She was recently d/c'd and presents again with worsening sob, leg swelling, orthopnea, and weight gain of 2 lbs overnight.  CXR reveals pulmonary edema.    Review of Systems  A 10-point review of systems is obtained and negative except for otherwise mentioned above.    History  Past Medical History:   Diagnosis Date   • Abdominal pain, left lower quadrant    • Bowel habit changes    • CAD (coronary artery disease)     LAD stent    • COPD (chronic obstructive pulmonary disease) (CMS/HCC)    • Diabetes mellitus (CMS/HCC)    • DVT (deep venous thrombosis) (CMS/HCC)    • Dysphagia    • History of tachycardia    • Hypertension    • Lymphoma (CMS/HCC)    • Pulmonary emboli (CMS/HCC)    • Varicose veins of legs    , Past Surgical History:   Procedure Laterality Date   • ANKLE SURGERY     • BACK SURGERY      lower   • CARDIAC CATHETERIZATION     • CHOLECYSTECTOMY     • COLONOSCOPY  04/23/2015    Last colon limted lower diverticulosis left colon, Internal Hemorrhoids   • COLONOSCOPY W/ BIOPSIES AND POLYPECTOMY  07/22/2014    Adenomatous tubular type, Diverticulosis sigmoid colon   • CORONARY STENT PLACEMENT      x 1   • ENDOSCOPY  04/14/2010    Distal ring dilated 48 Fr   • EXPLORATORY LAPAROTOMY  2005    relapse lymphoma   • HEMORRHOIDECTOMY     • HYSTERECTOMY      total abdominal   • LUMBAR SPINE SURGERY     • LUNG  BIOPSY N/A 1991    open    • SMALL INTESTINE SURGERY     • TONSILLECTOMY     • UPPER GASTROINTESTINAL ENDOSCOPY  10/23/2015    normal exam   , Family History   Problem Relation Age of Onset   • Diabetes Mother    • Cancer Father         prostate with bone metastasis   • Heart disease Maternal Grandmother    • Stroke Brother    • Colon cancer Neg Hx    • Colon polyps Neg Hx    , Social History   Substance Use Topics   • Smoking status: Never Smoker   • Smokeless tobacco: Never Used   • Alcohol use No   , Prescriptions Prior to Admission   Medication Sig Dispense Refill Last Dose   • acetaminophen (TYLENOL ARTHRITIS PAIN) 650 MG 8 hr tablet Take 650 mg by mouth Every 12 (Twelve) Hours.   10/29/2018   • amiodarone (PACERONE) 100 MG tablet Take 1 tablet by mouth Daily. 30 tablet 2    • aspirin 81 MG EC tablet Take 81 mg by mouth Daily.   10/29/2018   • bumetanide (BUMEX) 1 MG tablet Take 1 mg by mouth Daily.   10/29/2018   • Calcium Carbonate-Vitamin D 600-200 MG-UNIT capsule Take 1 capsule by mouth Daily.   10/29/2018   • digoxin (LANOXIN) 125 MCG tablet Take 125 mcg by mouth Daily.   10/29/2018   • gabapentin (NEURONTIN) 300 MG capsule Take 300 mg by mouth 2 (Two) Times a Day.   10/29/2018   • lisinopril (PRINIVIL,ZESTRIL) 2.5 MG tablet Take 1 tablet by mouth Daily. 30 tablet 2    • metFORMIN (GLUCOPHAGE) 1000 MG tablet Take 0.5 tablets by mouth 2 (Two) Times a Day. 60 tablet 2    • metoprolol succinate XL (TOPROL-XL) 25 MG 24 hr tablet Take 25 mg by mouth Daily.   10/29/2018   • nitroglycerin (NITROSTAT) 0.4 MG SL tablet Place 1 tablet under the tongue Every 5 (Five) Minutes As Needed for Chest Pain. Take no more than 3 doses in 15 minutes. 25 tablet 12 Past Week at Unknown time   • pravastatin (PRAVACHOL) 20 MG tablet Take 20 mg by mouth daily.   10/29/2018    and Allergies:  Zantac [ranitidine hcl] and Fish-derived products    Objective     Vital Sign Min/Max for last 24 hours  Temp  Min: 97.1 °F (36.2 °C)  Max:  "98.6 °F (37 °C)   BP  Min: 97/46  Max: 125/55   Pulse  Min: 85  Max: 96   Resp  Min: 16  Max: 20   SpO2  Min: 90 %  Max: 97 %   No Data Recorded   Weight  Min: 60 kg (132 lb 6 oz)  Max: 60.9 kg (134 lb 3.2 oz)     Flowsheet Rows      First Filed Value   Admission Height  170.2 cm (67\") Documented at 11/03/2018 1714   Admission Weight  60.9 kg (134 lb 3.2 oz) Documented at 11/03/2018 1714             Ejection Fraction  Lab Results   Component Value Date    EF 42 06/11/2018       Echo EF Estimated  Lab Results   Component Value Date    ECHOEFEST 30 10/31/2018       Physical Exam:     General Appearance:    Alert, cooperative, in no acute distress   Head:    Normocephalic, without obvious abnormality, atraumatic   Eyes:            Lids and lashes normal, conjunctivae and sclerae normal, no   icterus, PERRLA, EOMI   Throat:   Oral mucosa pink and moist   Neck:   No adenopathy, supple, trachea midline, no thyromegaly, no   carotid bruit, no JVD   Lungs:     Clear to auscultation bilaterally,respirations regular, even     and unlabored    Heart:    Regular rhythm and normal rate, normal S1 and S2, no            murmur, no gallop, no rub, no click   Chest Wall:    No abnormalities observed   Abdomen:     Normal bowel sounds present in all four quadrants, no       masses, no organomegaly, soft non-tender, non-distended    Extremities:   No edema, no cyanosis, no clubbing   Pulses:   Pulses palpable and equal bilaterally   Skin:   No bleeding, bruising or rash   Psychiatric:   Displays appropriate mood and affect           Results Review:    I reviewed the patient's new clinical results.      Results from last 7 days  Lab Units 11/04/18  0414   WBC 10*3/mm3 8.19   HEMOGLOBIN g/dL 9.1*   HEMATOCRIT % 28.0*   PLATELETS 10*3/mm3 260       Results from last 7 days  Lab Units 11/04/18 0414   SODIUM mmol/L 138   POTASSIUM mmol/L 3.6   CHLORIDE mmol/L 94*   CO2 mmol/L 34.0*   BUN mg/dL 52*   CREATININE mg/dL 1.46*   GLUCOSE mg/dL " 71   CALCIUM mg/dL 7.6*       Results from last 7 days  Lab Units 11/04/18  0414 11/03/18  1735   SODIUM mmol/L 138 138   POTASSIUM mmol/L 3.6 3.8   CHLORIDE mmol/L 94* 91*   CO2 mmol/L 34.0* 32.0*   BUN mg/dL 52* 57*   CREATININE mg/dL 1.46* 1.63*   CALCIUM mg/dL 7.6* 8.4   BILIRUBIN mg/dL  --  0.9   ALK PHOS U/L  --  97   ALT (SGPT) U/L  --  32   AST (SGOT) U/L  --  48*   GLUCOSE mg/dL 71 88       Results from last 7 days  Lab Units 11/04/18  0414 11/04/18  0127 11/03/18  2217   TROPONIN I ng/mL 0.065* 0.077* 0.073*       Results from last 7 days  Lab Units 11/01/18  0329 10/31/18  0414   TSH mIU/mL  --  7.190*   T3 FREE pg/mL 2.1  --    FREE T4 ng/dL 1.07  --        Results from last 7 days  Lab Units 10/31/18  0414   CHOLESTEROL mg/dL 102*   TRIGLYCERIDES mg/dL 83   HDL CHOL mg/dL 45*   LDL CHOL mg/dL 53     No results found for: BNP  No components found for: PPI        Assessment/Plan     A/c systolic HF with EF of 30%  FALGUNI  PAF  UTI  Anemia    Agree with diuresis and attention to renal function.  Hydralizine and Isordil were instituted by Dr. Kelsey on previous admission.  Will re-institute that with attention to BP.  Pt declines proceeding with life vest or ICD.  Further recommendations to follow from Dr. Liu.  Thank you for consult.  We will gladly follow with you.    I discussed the patient's findings and my recommendations with patient and family    Candie Mccormick PA-C  11/04/18  11:56 AM

## 2018-11-04 NOTE — PLAN OF CARE
Problem: Patient Care Overview  Goal: Plan of Care Review  Outcome: Ongoing (interventions implemented as appropriate)   11/04/18 0525   Coping/Psychosocial   Plan of Care Reviewed With patient   Plan of Care Review   Progress no change   OTHER   Outcome Summary VSS, no c/o of SOA on 2l/NC, skin tear to right FA and left elbow, IV ABX given per order, Sinus 87-96 on tele with a first degree, BBB, PVC, coup, NCPAC       Problem: Breathing Pattern Ineffective (Adult)  Goal: Identify Related Risk Factors and Signs and Symptoms  Outcome: Ongoing (interventions implemented as appropriate)    Goal: Effective Oxygenation/Ventilation  Outcome: Outcome(s) achieved Date Met: 11/04/18    Goal: Anxiety/Fear Reduction  Outcome: Ongoing (interventions implemented as appropriate)      Problem: Fall Risk (Adult)  Goal: Identify Related Risk Factors and Signs and Symptoms  Outcome: Ongoing (interventions implemented as appropriate)    Goal: Absence of Fall  Outcome: Ongoing (interventions implemented as appropriate)

## 2018-11-05 NOTE — PROGRESS NOTES
LOS: 0 days   Patient Care Team:  Александр Yo DO as PCP - General (Internal Medicine)  Александр Yo DO as PCP - Family Medicine  Александр Yo DO as PCP - Claims Formerly [facility], [facility]  Freeman Kelsey MD as Cardiologist (Cardiology)  Александр Yo DO as Referring Physician (Internal Medicine)  Gilberto Hammer MD as Consulting Physician (Otolaryngology)    Chief Complaint:   Acute on chronic congestive heart failure (CMS/HCC)    Shortness of breath    Subjective  Feeling  better  No chest pain   Moderate shortness of breath especially with exertion  No new complaints  Feels tired  Generalized weakness  Easy fatigability  Ambulating some  Labs reviewed  Review results with patient      Interval History: Improved overall    Patient Complaints:   Chief Complaint   Patient presents with   • Shortness of Breath       Telemetry: no malignant arrhythmia. No significant pauses.    Review of Systems     Constitutional: No chills   Has fatigue   No fever.   HENT: Negative.    Eyes: Negative.      Respiratory: Mild cough,   No chest wall soreness,   Shortness of breath,   no wheezing, no stridor.      Cardiovascular: Negative for chest pain,   No palpitations   No significant  leg swelling.     Gastrointestinal: Negative for abdominal distention,  No abdominal pain,   No blood in stool,   No constipation,   No diarrhea,   No nausea   No vomiting.     Endocrine: Negative.    Genitourinary: Negative for difficulty urinating, dysuria, flank pain and hematuria.     Musculoskeletal: Negative.    Skin: Negative for rash and wound.   Allergic/Immunologic: Negative.      Neurological: Negative for dizziness, syncope, weakness,   No light-headedness  No  headaches.     Hematological: Does not bruise/bleed easily.     Psychiatric/Behavioral: Negative for agitation or behavioral problems,   No confusion,   the patient is  nervous/anxious.       History:   Past Medical History:   Diagnosis Date   • Abdominal pain, left lower  quadrant    • Bowel habit changes    • CAD (coronary artery disease)     LAD stent    • COPD (chronic obstructive pulmonary disease) (CMS/HCC)    • Diabetes mellitus (CMS/HCC)    • DVT (deep venous thrombosis) (CMS/HCC)    • Dysphagia    • History of tachycardia    • Hypertension    • Lymphoma (CMS/HCC)    • Pulmonary emboli (CMS/HCC)    • Varicose veins of legs      Past Surgical History:   Procedure Laterality Date   • ANKLE SURGERY     • BACK SURGERY      lower   • CARDIAC CATHETERIZATION     • CHOLECYSTECTOMY     • COLONOSCOPY  04/23/2015    Last colon limted lower diverticulosis left colon, Internal Hemorrhoids   • COLONOSCOPY W/ BIOPSIES AND POLYPECTOMY  07/22/2014    Adenomatous tubular type, Diverticulosis sigmoid colon   • CORONARY STENT PLACEMENT      x 1   • ENDOSCOPY  04/14/2010    Distal ring dilated 48 Fr   • EXPLORATORY LAPAROTOMY  2005    relapse lymphoma   • HEMORRHOIDECTOMY     • HYSTERECTOMY      total abdominal   • LUMBAR SPINE SURGERY     • LUNG BIOPSY N/A 1991    open    • SMALL INTESTINE SURGERY     • TONSILLECTOMY     • UPPER GASTROINTESTINAL ENDOSCOPY  10/23/2015    normal exam     Social History     Social History   • Marital status:      Spouse name: N/A   • Number of children: N/A   • Years of education: N/A     Occupational History   • Not on file.     Social History Main Topics   • Smoking status: Never Smoker   • Smokeless tobacco: Never Used   • Alcohol use No   • Drug use: No   • Sexual activity: Defer     Other Topics Concern   • Not on file     Social History Narrative   • No narrative on file     Family History   Problem Relation Age of Onset   • Diabetes Mother    • Cancer Father         prostate with bone metastasis   • Heart disease Maternal Grandmother    • Stroke Brother    • Colon cancer Neg Hx    • Colon polyps Neg Hx        Labs:  WBC WBC   Date Value Ref Range Status   11/05/2018 8.69 4.80 - 10.80 10*3/mm3 Final   11/04/2018 8.19 4.80 - 10.80 10*3/mm3 Final    11/03/2018 11.83 (H) 4.80 - 10.80 10*3/mm3 Final      HGB Hemoglobin   Date Value Ref Range Status   11/05/2018 9.9 (L) 12.0 - 16.0 g/dL Final   11/04/2018 9.1 (L) 12.0 - 16.0 g/dL Final   11/03/2018 11.3 (L) 12.0 - 16.0 g/dL Final      HCT Hematocrit   Date Value Ref Range Status   11/05/2018 30.5 (L) 37.0 - 47.0 % Final   11/04/2018 28.0 (L) 37.0 - 47.0 % Final   11/03/2018 34.5 (L) 37.0 - 47.0 % Final      Platelets Platelets   Date Value Ref Range Status   11/05/2018 322 130 - 400 10*3/mm3 Final   11/04/2018 260 130 - 400 10*3/mm3 Final   11/03/2018 328 130 - 400 10*3/mm3 Final      MCV MCV   Date Value Ref Range Status   11/05/2018 98.1 (H) 82.0 - 98.0 fL Final   11/04/2018 98.2 (H) 82.0 - 98.0 fL Final   11/03/2018 95.8 82.0 - 98.0 fL Final          Results from last 7 days  Lab Units 11/05/18  0641 11/04/18  0414 11/03/18  1735  11/01/18  0329   SODIUM mmol/L 137 138 138  < > 140   POTASSIUM mmol/L 3.7 3.6 3.8  < > 3.6   CHLORIDE mmol/L 94* 94* 91*  < > 91*   CO2 mmol/L 33.0* 34.0* 32.0*  < > 36.0*   BUN mg/dL 44* 52* 57*  < > 50*   CREATININE mg/dL 1.44* 1.46* 1.63*  < > 1.87*   CALCIUM mg/dL 8.2* 7.6* 8.4  < > 7.7*   BILIRUBIN mg/dL 0.5  --  0.9  --  0.7   ALK PHOS U/L 83  --  97  --  63   ALT (SGPT) U/L 36  --  32  --  28   AST (SGOT) U/L 31  --  48*  --  49*   GLUCOSE mg/dL 98 71 88  < > 108*   < > = values in this interval not displayed.  Lab Results   Component Value Date    CKTOTAL 40 02/20/2016    CKMB 1.17 06/26/2017    TROPONINI 0.065 (H) 11/04/2018     PT/INR:    Protime   Date Value Ref Range Status   11/04/2018 15.2 (H) 11.9 - 14.6 Seconds Final   /  INR   Date Value Ref Range Status   11/04/2018 1.16 (H) 0.91 - 1.09 Final       Imaging Results (last 72 hours)     Procedure Component Value Units Date/Time    XR Chest 1 View [[id number]] Collected:  10/30/18 1307     Updated:  10/30/18 1320    Narrative:       EXAMINATION: Chest 1 view 10/30/2018     HISTORY: Shortness of breath     FINDINGS:  Upright frontal projection of the chest demonstrates bilateral  perihilar airspace disease for which I would favor pulmonary edema over  bilateral pneumonia. Small effusions are present blunting the  costophrenic angles. A tunneled infusion catheter is in place via  left-sided approach. There is evidence of remote granulomatous disease.       Impression:       1.. Bilateral perihilar and lower lobe airspace disease for which I  would favor pulmonary edema over pneumonia.  2. Small effusions present ( left greater than right).  This report was finalized on 10/30/2018 13:17 by Dr. Andrea Bhat MD.          Objective     Allergies   Allergen Reactions   • Zantac [Ranitidine Hcl] Shortness Of Breath   • Fish-Derived Products GI Intolerance       Medication Review: Performed  Current Facility-Administered Medications   Medication Dose Route Frequency Provider Last Rate Last Dose   • amiodarone (PACERONE) tablet 100 mg  100 mg Oral Daily Kirk Pearson DO   100 mg at 11/05/18 0920   • aspirin EC tablet 81 mg  81 mg Oral Daily Kirk Pearson DO   81 mg at 11/05/18 0920   • atorvastatin (LIPITOR) tablet 10 mg  10 mg Oral Nightly Kirk Pearson DO   10 mg at 11/04/18 2101   • calcitriol (ROCALTROL) capsule 0.25 mcg  0.25 mcg Oral Daily Raul Castellon MD   0.25 mcg at 11/05/18 1619   • dextrose (D50W) 25 g/ 50mL Intravenous Solution 25 g  25 g Intravenous Q15 Min PRN Kirk Pearson DO       • dextrose (GLUTOSE) oral gel 15 g  15 g Oral Q15 Min PRN Kirk Pearson DO       • digoxin (LANOXIN) tablet 125 mcg  125 mcg Oral Daily Kirk Pearson DO   125 mcg at 11/05/18 1203   • [START ON 11/6/2018] enoxaparin (LOVENOX) syringe 30 mg  30 mg Subcutaneous Q24H Gilberto Carrero MD       • ferrous sulfate tablet 325 mg  325 mg Oral BID With Meals Raul Castellon MD       • furosemide (LASIX) tablet 40 mg  40 mg Oral BID Jameel Cunningham, APRN       •  gabapentin (NEURONTIN) capsule 300 mg  300 mg Oral Q12H Kirk Pearson DO   300 mg at 11/05/18 0919   • glucagon (human recombinant) (GLUCAGEN DIAGNOSTIC) injection 1 mg  1 mg Subcutaneous PRN Kirk Pearson DO       • hydrALAZINE (APRESOLINE) tablet 25 mg  25 mg Oral Q12H Raul Castellon MD       • insulin lispro (humaLOG) injection 0-9 Units  0-9 Units Subcutaneous 4x Daily With Meals & Nightly Kirk Pearson DO   2 Units at 11/05/18 1203   • ipratropium-albuterol (DUO-NEB) nebulizer solution 3 mL  3 mL Nebulization 4x Daily - RT Kirk Pearson DO   3 mL at 11/05/18 1501   • iron sucrose (VENOFER) 200 mg in sodium chloride 0.9 % 100 mL IVPB  200 mg Intravenous Q24H Raul Castellon MD   200 mg at 11/05/18 1618   • isosorbide dinitrate (ISORDIL) tablet 20 mg  20 mg Oral TID - NitrateCandie Brock PA-C   20 mg at 11/05/18 1424   • melatonin tablet 6 mg  6 mg Oral Nightly Kirk Pearson DO   6 mg at 11/04/18 2101   • metoprolol succinate XL (TOPROL-XL) 24 hr tablet 25 mg  25 mg Oral Daily Kirk Pearson DO   25 mg at 11/05/18 0919   • nitroglycerin (NITROSTAT) SL tablet 0.4 mg  0.4 mg Sublingual Q5 Min PRN Kirk Pearson DO       • Pharmacy to Dose enoxaparin (LOVENOX)   Does not apply Continuous PRN Gilberto Carrero MD       • sodium chloride 0.9 % flush 10 mL  10 mL Intravenous PRN Ishaan Francisco Jr., MD       • sodium chloride 0.9 % flush 3 mL  3 mL Intravenous Q12H Kirk Pearson DO   3 mL at 11/05/18 0932   • sodium chloride 0.9 % flush 3-10 mL  3-10 mL Intravenous PRN Kirk Pearson DO           Vital Sign Min/Max for last 24 hours  Temp  Min: 96.8 °F (36 °C)  Max: 98.5 °F (36.9 °C)   BP  Min: 89/53  Max: 110/68   Pulse  Min: 83  Max: 109   Resp  Min: 16  Max: 20   SpO2  Min: 85 %  Max: 96 %   No Data Recorded   Weight  Min: 60.8 kg (134 lb 2 oz)  Max: 60.8 kg (134 lb 2 oz)     Flowsheet Rows      First  "Filed Value   Admission Height  170.2 cm (67\") Documented at 10/30/2018 1154   Admission Weight  59 kg (130 lb) Documented at 10/30/2018 1154              Physical Exam:    General Appearance: Awake, alert, in no acute distress  Eyes: Pupils equal and reactive    Ears: Appear intact with no abnormalities noted  Nose: Nares normal, no drainage  Neck: supple, trachea midline, no carotid bruit and no JVD  Back: no kyphosis present,    Lungs: respirations regular, respirations even and respirations unlabored    Heart: normal S1, S2,  2/6 pansystolic murmur in the left sternal border,  no rub and no click    Abdomen: normal bowel sounds, no tenderness   Skin: no bleeding, bruising or rash  Extremities: no cyanosis  Psychiatric/Behavioral: Negative for agitation, behavioral problems, confusion, the patient does  appear to be nervous/anxious.          Results Review:   I reviewed the patient's new clinical results.  I reviewed the patient's new imaging results and agree with the interpretation.  I reviewed the patient's other test results and agree with the interpretation  I personally viewed and interpreted the patient's EKG/Telemetry data  Discussed with patient    Reviewed available prior notes, consults, prior visits, laboratory findings, radiology and cardiology relevant reports. Updated chart as applicable. I have reviewed the patient's medical history in detail and updated the computerized patient record as relevant.    Updated patient regarding any new or relevant abnormalities on review of records or any new findings on physical exam. Mentioned to patient about purpose of visit and desirable health short and long term goals and objectives.     Assessment/Plan   Congestive heart failure (CMS/HCC)  Mild aortic stenosis by echocardiogram  Acute on chronic diastolic heart failure  atypical chest pain  Nausea  Epigastric pain  insulin-requiring diabetes mellitus  Essential hypertension  Mildly elevated " troponin        Plan        Continue hydralazine and Isordil  Okay to discharge from cardiology perspective once improved from a medical standpoint   kidney functions are recovering  Clinically improved  Keep follow-up in the office   Last admission she did not want any additional cardiac workup right now  We will defer any further ischemia evaluation.  Patient does not want life vest  Monitor input and output  Telemetry  Optimal medical therapy  Deep vein thrombosis prophylaxis/precautions  Appropriate diet, fluid, sodium, caffeine, stimulants intake   Compliance to diet and medications   Avoid NSAIDS    Freeman Kelsey MD  11/05/18  5:53 PM    EMR Dragon/Transcription was used to dictat and e part of this note

## 2018-11-05 NOTE — THERAPY EVALUATION
Acute Care - Occupational Therapy Initial Evaluation  [facility]     Patient Name: Padmini Torres  : 1940  MRN: [id number]  Today's Date: 2018  Onset of Illness/Injury or Date of Surgery: 18  Date of Referral to OT: 18  Referring Physician: Dr. Carrero    Admit Date: 11/3/2018       ICD-10-CM ICD-9-CM   1. Acute on chronic congestive heart failure, unspecified heart failure type (CMS/[facility]) I50.9 428.0   2. Chronic obstructive pulmonary disease, unspecified COPD type (CMS/HCC) J44.9 496   3. Impaired mobility and ADLs Z74.09 799.89     Patient Active Problem List   Diagnosis   • Chronic systolic congestive heart failure (CMS/HCC)   • Congestive heart failure with LV diastolic dysfunction, NYHA class 2 (CMS/[facility])   • Coronary artery disease involving native coronary artery of native heart without angina pectoris   • Mixed hyperlipidemia   • Type 2 diabetes mellitus without complication, without long-term current use of insulin (CMS/[facility])   • Palpitations   • SVT (supraventricular tachycardia) (CMS/HCC)   • Congestive heart failure (CMS/HCC)   • SOB (shortness of breath) on exertion   • Renal insufficiency   • 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   • Varicose veins of both lower extremities   • Acute on chronic congestive heart failure (CMS/HCC)     Past Medical History:   Diagnosis Date   • Abdominal pain, left lower quadrant    • Bowel habit changes    • CAD (coronary artery disease)     LAD stent    • COPD (chronic obstructive pulmonary disease) (CMS/[facility])    • Diabetes mellitus (CMS/[facility])    • DVT (deep venous thrombosis) (CMS/[facility])    • Dysphagia    • History of tachycardia    • Hypertension    • Lymphoma (CMS/HCC)    • Pulmonary emboli (CMS/HCC)    • Varicose veins of legs      Past Surgical History:   Procedure Laterality Date   • ANKLE SURGERY     • BACK SURGERY      lower   • CARDIAC CATHETERIZATION     • CHOLECYSTECTOMY     • COLONOSCOPY  2015    Last  "colon limted lower diverticulosis left colon, Internal Hemorrhoids   • COLONOSCOPY W/ BIOPSIES AND POLYPECTOMY  07/22/2014    Adenomatous tubular type, Diverticulosis sigmoid colon   • CORONARY STENT PLACEMENT      x 1   • ENDOSCOPY  04/14/2010    Distal ring dilated 48 Fr   • EXPLORATORY LAPAROTOMY  2005    relapse lymphoma   • HEMORRHOIDECTOMY     • HYSTERECTOMY      total abdominal   • LUMBAR SPINE SURGERY     • LUNG BIOPSY N/A 1991    open    • SMALL INTESTINE SURGERY     • TONSILLECTOMY     • UPPER GASTROINTESTINAL ENDOSCOPY  10/23/2015    normal exam          OT ASSESSMENT FLOWSHEET (last 72 hours)      Occupational Therapy Evaluation     Row Name 11/05/18 1350 11/05/18 0855                OT Evaluation Time/Intention    Subjective Information complains of;weakness;dizziness;dyspnea  -MW  --       Document Type evaluation  -MW evaluation  -MW       Mode of Treatment occupational therapy  -MW occupational therapy  -MW       Comment: Evaluation Not Performed  -- pt reports having a busy morning with many tests and would like to rest, will check back this afternoon  -MW          General Information    Patient Profile Reviewed? yes  -MW yes  -MW       Onset of Illness/Injury or Date of Surgery 11/03/18  -MW 11/03/18  -MW       Referring Physician Dr. Carrero  - Dr. Carrero  -       Patient Observations alert;cooperative;agree to therapy  -MW  --       Patient/Family Observations son present initially, leaves to give pt privacy  -MW  --       General Observations of Patient up in chair, O2 in room but pt on room air  -MW  --       Prior Level of Function independent:;all household mobility;community mobility;ADL's;dependent:;driving  -MW  --       Equipment Currently Used at Home cane, straight;walker, rolling   not using regularly, only on \"bad days\", uses can outside  -MW  --       Pertinent History of Current Functional Problem Pt d/c from [facility] on 11/2 with admit for CHF exacerbation, returned with increased " SOA and fluid overload. Dx: acute hypoxic resp failure, acute on chronic CHF, FALGUNI, L pleural effusion.  -MW Pt d/c from [facility] on 11/2 with admit for CHF exacerbation, returned with increased SOA and fluid overload. Dx: acute hypoxic resp failure, acute on chronic CHF, FALGUNI, L pleural effusion.  -MW       Existing Precautions/Restrictions fall  -MW  --       Limitations/Impairments visual  -MW  --       Risks Reviewed patient:;LOB;nausea/vomiting;dizziness;increased discomfort;change in vital signs;increased drainage;lines disloged  -MW  --       Benefits Reviewed patient:;improve function;increase independence;increase balance;increase strength;decrease risk of DVT;decrease pain;improve skin integrity;increase knowledge  -MW  --       Barriers to Rehab medically complex  -MW  --          Relationship/Environment    Primary Source of Support/Comfort child(amol)  -MW  --       Lives With alone  -MW  --          Resource/Environmental Concerns    Current Living Arrangements home/apartment/condo  -MW  --          Home Main Entrance    Number of Stairs, Main Entrance five  -MW  --       Stair Railings, Main Entrance railings on both sides of stairs  -MW  --          Cognitive Assessment/Interventions    Additional Documentation Cognitive Assessment/Intervention (Group)  -MW  --          Cognitive Assessment/Intervention- PT/OT    Affect/Mental Status (Cognitive) WFL  -MW  --       Orientation Status (Cognition) oriented x 4  -MW  --       Follows Commands (Cognition) WFL  -MW  --       Cognitive Function (Cognitive) WFL  -MW  --       Personal Safety Interventions fall prevention program maintained;gait belt;nonskid shoes/slippers when out of bed;supervised activity  -MW  --          Safety Issues, Functional Mobility    Impairments Affecting Function (Mobility) balance;endurance/activity tolerance;shortness of breath  -MW  --          Bed Mobility Assessment/Treatment    Comment (Bed Mobility) up in chair  -MW  --           Functional Mobility    Functional Mobility- Ind. Level contact guard assist  -MW  --       Functional Mobility- Comment amb in [place] to nurses station and back requires standing rest break at nurses station, uses hand rail in [place] for stability  -MW  --          Transfer Assessment/Treatment    Transfer Assessment/Treatment sit-stand transfer;stand-sit transfer  -MW  --          Sit-Stand Transfer    Sit-Stand [place] (Transfers) supervision;verbal cues  -MW  --          Stand-Sit Transfer    Stand-Sit [place] (Transfers) supervision;verbal cues  -MW  --          ADL Assessment/Intervention    BADL Assessment/Intervention lower body dressing  -MW  --          Lower Body Dressing Assessment/Training    Lower Body Dressing [place] Level don;doff;shoes/slippers;supervision  -MW  --       Lower Body Dressing Position supported sitting  -MW  --          BADL Safety/Performance    Impairments, BADL Safety/Performance balance;endurance/activity tolerance;shortness of breath  -MW  --          General ROM    GENERAL ROM COMMENTS AROM WFL BUE  -MW  --          MMT (Manual Muscle Testing)    General MMT Comments grossly 5/5 BUE  -MW  --          Motor Assessment/Interventions    Additional Documentation Balance (Group)  -MW  --          Balance    Balance dynamic sitting balance;dynamic standing balance  -MW  --          Dynamic Sitting Balance    Level of [place], Reaches Outside Midline (Sitting, Dynamic Balance) supervision  -MW  --       Sitting Position, Reaches Outside Midline (Sitting, Dynamic Balance) sitting in chair  -MW  --          Dynamic Standing Balance    Level of [place], Reaches Outside Midline (Standing, Dynamic Balance) contact guard assist  -MW  --          Sensory Assessment/Intervention    Sensory General Assessment no sensation deficits identified  -MW  --          Positioning and Restraints    Pre-Treatment Position sitting in chair/recliner  -MW  --       Post Treatment  Position chair  -MW  --       In Chair sitting;call light within reach;encouraged to call for assist  -MW  --          Pain Assessment    Additional Documentation Pain Scale: FACES Pre/Post-Treatment (Group)  -MW  --          Pain Scale: FACES Pre/Post-Treatment    Pain: FACES Scale, Pretreatment 0-->no hurt  -MW  --       Pain: FACES Scale, Post-Treatment 0-->no hurt  -MW  --          Wound 11/04/18 2000 Right anterior arm skin tear    Wound - Properties Group Date first assessed: 11/04/18  -WS Time first assessed: 2000  -WS Present On Admission : yes;picture taken  -WS Side: Right  -WS Orientation: anterior  -WS Location: arm  -WS Type: skin tear  -WS Additional Comments: present on admit, dressing changed, mepilex added   -WS       Wound 11/04/18 2000 Left midline elbow skin tear    Wound - Properties Group Date first assessed: 11/04/18  -WS Time first assessed: 2000  -WS Present On Admission : yes;picture taken  -WS Side: Left  -WS Orientation: midline  -WS Location: elbow  -WS Type: skin tear  -WS Additional Comments: present on admit, dressing changed, mepilex added   -WS       Plan of Care Review    Plan of Care Reviewed With patient  -MW  --          Clinical Impression (OT)    Date of Referral to OT 11/05/18  -MW  --       OT Diagnosis decline in ADL  -MW  --       Prognosis (OT Eval) good  -MW  --       Patient/Family Goals Statement (OT Eval) return home  -MW  --       Criteria for Skilled Therapeutic Interventions Met (OT Eval) yes;treatment indicated  -MW  --       Rehab Potential (OT Eval) good, to achieve stated therapy goals  -MW  --       Therapy Frequency (OT Eval) 3 times/wk  -MW  --       Predicted Duration of Therapy Intervention (Therapy Eval) until d/c  -MW  --       Care Plan Review (OT) evaluation/treatment results reviewed;care plan/treatment goals reviewed;risks/benefits reviewed;current/potential barriers reviewed;patient/other agree to care plan  -MW  --       Anticipated Discharge  Disposition (OT) home with assist  -MW  --          Planned OT Interventions    Planned Therapy Interventions (OT Eval) activity tolerance training;BADL retraining;functional balance retraining;occupation/activity based interventions;patient/caregiver education/training;strengthening exercise;transfer/mobility retraining  -MW  --          OT Goals    Bathing Goal Selection (OT) bathing, OT goal 1  -MW  --       Endurance Goal Selection (OT) endurance, OT goal 1  -MW  --       Additional Documentation Endurance Goal Selection (OT) (Row)  -MW  --          Bathing Goal 1 (OT)    Activity/Assistive Device (Bathing Goal 1, OT) bathing skills, all  -MW  --       [place] Level/Cues Needed (Bathing Goal 1, OT) supervision required  -MW  --       Time Frame (Bathing Goal 1, OT) long term goal (LTG);by discharge  -  --       Progress/Outcomes (Bathing Goal 1, OT) goal ongoing  -  --           Endurance Goal 1 (OT)    Endurance Goal 1 (OT) pt will perform 10 min ADL/functional activity demo good endurance as needed for safety with ADL at home  -  --       Activity Level (Endurance Goal 1, OT) endurance 2 good  -MW  --       Time Frame (Endurance Goal 1, OT) long term goal (LTG);by discharge  -  --       Progress/Outcome (Endurance Goal 1, OT) goal ongoing  -  --          Living Environment    Home Accessibility stairs to enter home   walk in shower   -  --         User Key  (r) = Recorded By, (t) = Taken By, (c) = Cosigned By    Initials Name Effective Dates     Valerie Gomes RN 08/02/16 -      Elise Robles OTR/CIELO 08/28/18 -            Occupational Therapy Education     Title: PT OT SLP Therapies (Done)     Topic: Occupational Therapy (Done)     Point: ADL training (Done)     Description: Instruct learner(s) on proper safety adaptation and remediation techniques during self care or transfers.   Instruct in proper use of assistive devices.   Learning Progress Summary     Learner Status Readiness  Method Response Comment Documented by    Patient Done Acceptance E VU transfer, ADL, OT POC  11/05/18 1507                      User Key     Initials Effective Dates Name Provider Type Discipline     08/28/18 -  Elise Robles, OTR/L Occupational Therapist OT                  OT Recommendation and Plan  Outcome Summary/Treatment Plan (OT)  Anticipated Discharge Disposition (OT): home with assist  Planned Therapy Interventions (OT Eval): activity tolerance training, BADL retraining, functional balance retraining, occupation/activity based interventions, patient/caregiver education/training, strengthening exercise, transfer/mobility retraining  Therapy Frequency (OT Eval): 3 times/wk  Plan of Care Review  Plan of Care Reviewed With: patient  Plan of Care Reviewed With: patient  Outcome Summary: OT eval completed. Pt reports feeling SOA with minimal activity. At baseline pt independent with all ADL/functional mobility. Pt demos decreased endurance with SOA and increased WOB with short walk and LB dressing task. O2 sat remained above 90%. Skilled OT required to address endurance limiting safety and independence with ADL and functional mobility. OT recommends d/c home w assist.          Outcome Measures     Row Name 11/05/18 1500             How much help from another is currently needed...    Putting on and taking off regular lower body clothing? 3  -MW      Bathing (including washing, rinsing, and drying) 3  -MW      Toileting (which includes using toilet bed pan or urinal) 3  -MW      Putting on and taking off regular upper body clothing 4  -MW      Taking care of personal grooming (such as brushing teeth) 4  -MW      Eating meals 4  -MW      Score 21  -MW         Functional Assessment    Outcome Measure Options AM-PAC 6 Clicks Daily Activity (OT)  -        User Key  (r) = Recorded By, (t) = Taken By, (c) = Cosigned By    Initials Name Provider Type     Elise Robles, OTR/L Occupational Therapist           Time Calculation:         Time Calculation- OT     Row Name 11/05/18 1456             Time Calculation- OT    OT Start Time 1310  -MW      OT Stop Time 1400  -MW      OT Time Calculation (min) 50 min  -MW      OT Received On 11/05/18  -MW      OT Goal Re-Cert Due Date 11/15/18  -MW        User Key  (r) = Recorded By, (t) = Taken By, (c) = Cosigned By    Initials Name Provider Type    MW Elise Robles, OTR/L Occupational Therapist        Therapy Suggested Charges     Code   Minutes Charges    None           Therapy Charges for Today     Code Description Service Date Service Provider Modifiers Qty    61984712820 HC OT EVAL LOW COMPLEXITY 3 11/5/2018 Elise Robles, OTR/L GO 1    96797210666 HC OT SELFCARE CURRENT 11/5/2018 Elise Robles OTR/L GO, CJ 1    76142116420 HC OT SELFCARE PROJECTED 11/5/2018 Elise Robles OTR/L GO, CI 1          OT G-codes  OT Professional Judgement Used?: Yes  OT Functional Scales Options: AM-PAC 6 Clicks Daily Activity (OT)  Score: 21  Functional Limitation: Self care  Self Care Current Status (): At least 20 percent but less than 40 percent impaired, limited or restricted  Self Care Goal Status (): At least 1 percent but less than 20 percent impaired, limited or restricted    Elise Robles OTR/L  11/5/2018

## 2018-11-05 NOTE — PLAN OF CARE
Problem: Patient Care Overview  Goal: Plan of Care Review  Outcome: Ongoing (interventions implemented as appropriate)   11/05/18 0354   Coping/Psychosocial   Plan of Care Reviewed With patient   Plan of Care Review   Progress improving   OTHER   Outcome Summary O2 dropped without O2. Reapplied 2l/nc. up ad jorge. No c/o pain or discomfort. NPO after Midnight for renal US. Iv abx continue. Removed Mepilex from both skin tears. Took pictures. cont. to monitor and call MD if needed.      Goal: Individualization and Mutuality  Outcome: Ongoing (interventions implemented as appropriate)    Goal: Discharge Needs Assessment  Outcome: Ongoing (interventions implemented as appropriate)    Goal: Interprofessional Rounds/Family Conf  Outcome: Ongoing (interventions implemented as appropriate)      Problem: Breathing Pattern Ineffective (Adult)  Goal: Anxiety/Fear Reduction  Outcome: Ongoing (interventions implemented as appropriate)      Problem: Fall Risk (Adult)  Goal: Identify Related Risk Factors and Signs and Symptoms  Outcome: Ongoing (interventions implemented as appropriate)    Goal: Absence of Fall  Outcome: Ongoing (interventions implemented as appropriate)

## 2018-11-05 NOTE — PLAN OF CARE
Problem: Patient Care Overview  Goal: Plan of Care Review  Outcome: Ongoing (interventions implemented as appropriate)   11/05/18 1502   Coping/Psychosocial   Plan of Care Reviewed With patient   Plan of Care Review   Progress improving   OTHER   Outcome Summary Pt does report that her appetite has improved some this admission. She still reports that foods feel like they stick in her throat. She uses supplements at home. Will start chocolate Boost glucose control with meals. Will cont to follow for nutrition needs and encourage intake.       Problem: Nutrition, Imbalanced: Inadequate Oral Intake (Adult)  Goal: Identify Related Risk Factors and Signs and Symptoms  Outcome: Outcome(s) achieved Date Met: 11/05/18    Goal: Improved Oral Intake  Outcome: Ongoing (interventions implemented as appropriate)    Goal: Prevent Further Weight Loss  Outcome: Ongoing (interventions implemented as appropriate)

## 2018-11-05 NOTE — PROGRESS NOTES
"Pharmacy Dosing Service  Pharmacokinetics  Vancomycin Follow-up Evaluation    Assessment/Action/Plan:  Vancomycin initiated for possible pneumonia. SCr=1.44 (see below). Ordered trough prior to tonight's dose (3rd). Pharmacy will continue to monitor renal function and adjust dose accordingly.     Subjective:  Padmini Torres is a 78 y.o. female currently on Vancomycin 500 mg IV every 24 hours for the treatment of pneumonia, second full day of therapy.     Objective:  Ht: 167.6 cm (66\"); Wt: 60.8 kg (134 lb 2 oz)  Estimated Creatinine Clearance: 30.4 mL/min (A) (by C-G formula based on SCr of 1.44 mg/dL (H)).   Lab Results   Component Value Date    CREATININE 1.44 (H) 11/05/2018    CREATININE 1.46 (H) 11/04/2018    CREATININE 1.63 (H) 11/03/2018    CREATININE 1.70 (H) 03/27/2018    CREATININE 1.40 (H) 09/14/2017      Lab Results   Component Value Date    WBC 8.69 11/05/2018    WBC 8.19 11/04/2018    WBC 11.83 (H) 11/03/2018        Culture Results:  Microbiology Results (last 10 days)       ** No results found for the last 240 hours. **          Manjit, Pharm D  9:32 AM  11/5/2018    "

## 2018-11-05 NOTE — OUTREACH NOTE
CHF Week 1 Survey      Responses   Facility patient discharged from?  [facility]   Does the patient have one of the following disease processes/diagnoses(primary or secondary)?  CHF   Is there a successful TCM telephone encounter documented?  No   CHF Week 1 attempt successful?  No   Revoke  Readmitted          Katia Gomes RN

## 2018-11-05 NOTE — PROGRESS NOTES
[facility] Medicine Services  INPATIENT PROGRESS NOTE    Patient Name: Padmini Torres  Date of Admission: 11/3/2018  Today's Date: 11/05/18  Length of Stay: 0  Primary Care Physician: Александр Yo DO    Subjective   Chief Complaint: shortness of breath  HPI     Patient seen and examined at bedside.  Patient dropped to 84% with minimal exertion.  Requiring O2.  Patient feels significantly shortness of breath with minimal exertion.  Denies any fever or chills.  Feeling improved from yesterday.  Having good urine output.  Still feels very weak.          Review of Systems   Constitutional: Negative for chills and fever.   Respiratory: Positive for cough and shortness of breath.    Cardiovascular: Negative for chest pain and palpitations.   Gastrointestinal: Positive for constipation. Negative for abdominal distention, abdominal pain, diarrhea, nausea and vomiting.   Genitourinary: Negative for dysuria and frequency.        All pertinent negatives and positives are as above. All other systems have been reviewed and are negative unless otherwise stated.     Objective    Temp:  [96.8 °F (36 °C)-98.5 °F (36.9 °C)] 98.1 °F (36.7 °C)  Heart Rate:  [] 89  Resp:  [16-20] 18  BP: ()/(42-68) 98/42  Physical Exam   Constitutional: She is oriented to person, place, and time. No distress.   malnourished   HENT:   Head: Normocephalic and atraumatic.   Eyes: Conjunctivae are normal. No scleral icterus.   Cardiovascular: Normal rate, regular rhythm and intact distal pulses.  Exam reveals no friction rub.    No murmur heard.  1+ pitting edema bilateral lower extremities    Pulmonary/Chest: Effort normal. She has rales (bilateral bases ).   Abdominal: Soft. Bowel sounds are normal. She exhibits no distension. There is no tenderness. There is no guarding.   Musculoskeletal: She exhibits edema.   Neurological: She is alert and oriented to person, place, and time. No cranial nerve deficit.    Skin: Skin is warm and dry. She is not diaphoretic.   Prominent varicosities bilateral lower extremities L>>R   Psychiatric: She has a normal mood and affect. Her behavior is normal.   Vitals reviewed.        Results Review:  I have reviewed the labs, radiology results, and diagnostic studies.    Laboratory Data:     Results from last 7 days  Lab Units 11/05/18 0641 11/04/18 0414 11/03/18  1735   WBC 10*3/mm3 8.69 8.19 11.83*   HEMOGLOBIN g/dL 9.9* 9.1* 11.3*   HEMATOCRIT % 30.5* 28.0* 34.5*   PLATELETS 10*3/mm3 322 260 328          Results from last 7 days  Lab Units 11/05/18 0641 11/04/18 0414 11/03/18 1735 11/01/18  0329   SODIUM mmol/L 137 138 138  < > 140   POTASSIUM mmol/L 3.7 3.6 3.8  < > 3.6   CHLORIDE mmol/L 94* 94* 91*  < > 91*   CO2 mmol/L 33.0* 34.0* 32.0*  < > 36.0*   BUN mg/dL 44* 52* 57*  < > 50*   CREATININE mg/dL 1.44* 1.46* 1.63*  < > 1.87*   CALCIUM mg/dL 8.2* 7.6* 8.4  < > 7.7*   BILIRUBIN mg/dL 0.5  --  0.9  --  0.7   ALK PHOS U/L 83  --  97  --  63   ALT (SGPT) U/L 36  --  32  --  28   AST (SGOT) U/L 31  --  48*  --  49*   GLUCOSE mg/dL 98 71 88  < > 108*   < > = values in this interval not displayed.    Culture Data:   @[facility]@    Radiology Data:   Imaging Results (last 24 hours)     Procedure Component Value Units Date/Time    US Renal Bilateral [[id number]] Collected:  11/05/18 0942     Updated:  11/05/18 0946    Narrative:       EXAM:  US RENAL BILATERAL-     INDICATION:  Kidney function     COMPARISON:  None     TECHNIQUE:  Routine grayscale and color Doppler images were obtained  through the bilateral renal fossae.     FINDINGS:       Right Kidney: The right kidney measures 10.3 cm in longitudinal  dimension. There is good corticomedullary differentiation. There is no  evidence of hydronephrosis. There are no cystic lesions or masses. No  echogenic stone.     Left Kidney: The left kidney measures 10.1 cm in longitudinal dimension.  There is good corticomedullary  differentiation. There is no evidence of  hydronephrosis. There are no cystic lesions or masses. No echogenic  stone.     Bladder: The bladder is unremarkable without evidence of mass or  internal debris.       Impression:       Unremarkable renal ultrasound.      This report was finalized on 11/05/2018 09:43 by Dr. Shanique Eller MD.          I have reviewed the patient's current medications.     Assessment/Plan     Active Hospital Problems    Diagnosis   • Acute on chronic congestive heart failure (CMS/HCC)       Assessment:  1) Acute on chronic systolic heart failure (EF 30%) with aortic stenosis with aortic regurgitation  2) COPD without exacerbation   3) Acute hypoxic respiratory failure - Drops to 84% with minimal activity   4) Type 2 DM, complicated by hyperglycemia (Overall well-controlled with A1c 5.3)  5) Peripheral neuropathy   6) Chronic kidney disease stage 3   7) Chronic macrocytic anemia   8) Previous DVT, recently had warfarin stopped  9) Moderate protein calorie malnutrition, suspected  10) Constipation     Plan:  1) Low suspicion for pneumonia will d/c all antibiotics   2) D/C tele   3) Continue PO diuretics furosemide 40 mg BID   4) Strict I/O and Daily weights   5) Titrate O2 for 92%  6) Continue home medications as appropriate   7) PT/OT  8) Switched from obs to full admit  9) Continue SSI and accuchecks   10) Nutrition consult   11) Verbal order to nurse for suppository   12) Ordered PFTs as outpatient given reported history of supposed COPD    Discussed with bedside nurse Carolina.      Tele reviewed.  Max HR was 106, sinus rhythm.              Discharge Planning: I expect the patient to be discharged to home in 1-2 days    Gilberto Carrero MD   11/05/18   5:34 PM

## 2018-11-05 NOTE — PLAN OF CARE
Problem: Patient Care Overview  Goal: Plan of Care Review  Outcome: Ongoing (interventions implemented as appropriate)   11/05/18 5305   Coping/Psychosocial   Plan of Care Reviewed With patient   Plan of Care Review   Progress no change   OTHER   Outcome Summary OT eval completed. Pt reports feeling SOA with minimal activity. At baseline pt independent with all ADL/functional mobility. Pt demos decreased endurance with SOA and increased WOB with short walk and LB dressing task. O2 sat remained above 90%. Skilled OT required to address endurance limiting safety and independence with ADL and functional mobility. OT recommends d/c home w assist.

## 2018-11-05 NOTE — PROGRESS NOTES
Nephrology ([facility]) Progress Note      Patient:  Padmini Torres  YOB: 1940  Date of Service: 11/5/2018  MRN: [id number]   Acct: [id number]   Primary Care Physician: Александр Yo DO  Advance Directive:   Code Status and Medical Interventions:   Ordered at: 11/03/18 2146     Limited Support to NOT Include:    Intubation    Cardioversion/Defibrillation    Antiarrhythmic Drugs     Level Of Support Discussed With:    Patient     Code Status:    No CPR     Medical Interventions (Level of Support Prior to Arrest):    Limited     Admit Date: 11/3/2018       Hospital Day: 0  Referring Provider: Kirk Pearson DO      Patient personally seen and examined.  Complete chart including Consults, Notes, Operative Reports, Labs, Cardiology, and Radiology studies reviewed as able.        Subjective:  Padmini Torres is a 78 y.o. female  whom we were consulted for acute kidney injury.  Baseline CKD stage 3; does not follow with nephrology.  Recently admitted for CHF exacerbation. was discharged on 11/02 ; returned on 11/03 with weight gain, dyspnea.  Admitted with CHF exacerbation and cute kidney injury. Hospital course remarkable for improving symptoms and renal function.    Today is feeling better; dyspnea is resolving.  Urine output nonoliguric.    Allergies:  Zantac [ranitidine hcl] and Fish-derived products    Home Meds:  Prescriptions Prior to Admission   Medication Sig Dispense Refill Last Dose   • acetaminophen (TYLENOL ARTHRITIS PAIN) 650 MG 8 hr tablet Take 650 mg by mouth Every 12 (Twelve) Hours.   Past Week at Unknown time   • amiodarone (PACERONE) 100 MG tablet Take 1 tablet by mouth Daily. 30 tablet 2 Past Week at Unknown time   • aspirin 81 MG EC tablet Take 81 mg by mouth Daily.   Past Week at Unknown time   • bumetanide (BUMEX) 1 MG tablet Take 1 mg by mouth Daily.   Past Week at Unknown time   • Calcium Carbonate-Vitamin D 600-200 MG-UNIT capsule Take 1 capsule by  mouth Daily.   Past Week at Unknown time   • digoxin (LANOXIN) 125 MCG tablet Take 125 mcg by mouth Daily.   Past Week at Unknown time   • gabapentin (NEURONTIN) 300 MG capsule Take 300 mg by mouth 3 (Three) Times a Day.   Past Week at Unknown time   • lisinopril (PRINIVIL,ZESTRIL) 2.5 MG tablet Take 1 tablet by mouth Daily. 30 tablet 2 Past Week at Unknown time   • metFORMIN (GLUCOPHAGE) 1000 MG tablet Take 0.5 tablets by mouth 2 (Two) Times a Day. 60 tablet 2 Past Week at Unknown time   • metoprolol succinate XL (TOPROL-XL) 25 MG 24 hr tablet Take 25 mg by mouth Daily.   Past Week at Unknown time   • nitroglycerin (NITROSTAT) 0.4 MG SL tablet Place 1 tablet under the tongue Every 5 (Five) Minutes As Needed for Chest Pain. Take no more than 3 doses in 15 minutes. 25 tablet 12 Past Week at Unknown time   • pravastatin (PRAVACHOL) 20 MG tablet Take 20 mg by mouth daily.   Past Week at Unknown time       Medicines:  Current Facility-Administered Medications   Medication Dose Route Frequency Provider Last Rate Last Dose   • amiodarone (PACERONE) tablet 100 mg  100 mg Oral Daily Kirk Pearson, DO   100 mg at 11/05/18 0920   • aspirin EC tablet 81 mg  81 mg Oral Daily Kirk Pearson DO   81 mg at 11/05/18 0920   • atorvastatin (LIPITOR) tablet 10 mg  10 mg Oral Nightly Kirk Pearson DO   10 mg at 11/04/18 2101   • dextrose (D50W) 25 g/ 50mL Intravenous Solution 25 g  25 g Intravenous Q15 Min PRN Kirk Pearson DO       • dextrose (GLUTOSE) oral gel 15 g  15 g Oral Q15 Min PRN Kirk Pearson DO       • digoxin (LANOXIN) tablet 125 mcg  125 mcg Oral Daily Kirk Pearson DO   125 mcg at 11/04/18 1253   • [START ON 11/6/2018] enoxaparin (LOVENOX) syringe 30 mg  30 mg Subcutaneous Q24H Gilberto Carrero MD       • furosemide (LASIX) injection 40 mg  40 mg Intravenous Q12H Kirk Pearson DO   40 mg at 11/05/18 0611   • gabapentin (NEURONTIN) capsule 300 mg  300 mg Oral  Q12H Kirk Pearson DO   300 mg at 11/05/18 0919   • glucagon (human recombinant) (GLUCAGEN DIAGNOSTIC) injection 1 mg  1 mg Subcutaneous PRN Kirk Pearson DO       • hydrALAZINE (APRESOLINE) tablet 25 mg  25 mg Oral Q8H Candie Mccormick PA-C   25 mg at 11/04/18 1417   • insulin lispro (humaLOG) injection 0-9 Units  0-9 Units Subcutaneous 4x Daily With Meals & Nightly Kirk Pearson DO   2 Units at 11/04/18 1253   • ipratropium-albuterol (DUO-NEB) nebulizer solution 3 mL  3 mL Nebulization 4x Daily - RT Kirk Pearson DO   3 mL at 11/05/18 1032   • isosorbide dinitrate (ISORDIL) tablet 20 mg  20 mg Oral TID - Nitrates Candie Mccormick PA-C   20 mg at 11/05/18 0919   • melatonin tablet 6 mg  6 mg Oral Nightly Kirk Pearson DO   6 mg at 11/04/18 2101   • metoprolol succinate XL (TOPROL-XL) 24 hr tablet 25 mg  25 mg Oral Daily Kirk Pearson DO   25 mg at 11/05/18 0919   • nitroglycerin (NITROSTAT) SL tablet 0.4 mg  0.4 mg Sublingual Q5 Min PRN Kirk Pearson DO       • Pharmacy to Dose enoxaparin (LOVENOX)   Does not apply Continuous PRN Gilberto Carrero MD       • sodium chloride 0.9 % flush 10 mL  10 mL Intravenous PRN Ishaan Francisco Jr., MD       • sodium chloride 0.9 % flush 3 mL  3 mL Intravenous Q12H Kirk Pearson DO   3 mL at 11/05/18 0932   • sodium chloride 0.9 % flush 3-10 mL  3-10 mL Intravenous PRN Kirk Pearson DO           Past Medical History:  Past Medical History:   Diagnosis Date   • Abdominal pain, left lower quadrant    • Bowel habit changes    • CAD (coronary artery disease)     LAD stent    • COPD (chronic obstructive pulmonary disease) (CMS/HCC)    • Diabetes mellitus (CMS/HCC)    • DVT (deep venous thrombosis) (CMS/HCC)    • Dysphagia    • History of tachycardia    • Hypertension    • Lymphoma (CMS/HCC)    • Pulmonary emboli (CMS/HCC)    • Varicose veins of legs        Past Surgical History:  Past  Surgical History:   Procedure Laterality Date   • ANKLE SURGERY     • BACK SURGERY      lower   • CARDIAC CATHETERIZATION     • CHOLECYSTECTOMY     • COLONOSCOPY  04/23/2015    Last colon limted lower diverticulosis left colon, Internal Hemorrhoids   • COLONOSCOPY W/ BIOPSIES AND POLYPECTOMY  07/22/2014    Adenomatous tubular type, Diverticulosis sigmoid colon   • CORONARY STENT PLACEMENT      x 1   • ENDOSCOPY  04/14/2010    Distal ring dilated 48 Fr   • EXPLORATORY LAPAROTOMY  2005    relapse lymphoma   • HEMORRHOIDECTOMY     • HYSTERECTOMY      total abdominal   • LUMBAR SPINE SURGERY     • LUNG BIOPSY N/A 1991    open    • SMALL INTESTINE SURGERY     • TONSILLECTOMY     • UPPER GASTROINTESTINAL ENDOSCOPY  10/23/2015    normal exam       Family History  Family History   Problem Relation Age of Onset   • Diabetes Mother    • Cancer Father         prostate with bone metastasis   • Heart disease Maternal Grandmother    • Stroke Brother    • Colon cancer Neg Hx    • Colon polyps Neg Hx        Social History  Social History     Social History   • Marital status:      Spouse name: N/A   • Number of children: N/A   • Years of education: N/A     Occupational History   • Not on file.     Social History Main Topics   • Smoking status: Never Smoker   • Smokeless tobacco: Never Used   • Alcohol use No   • Drug use: No   • Sexual activity: Defer     Other Topics Concern   • Not on file     Social History Narrative   • No narrative on file         Review of Systems:  History obtained from chart review and the patient  General ROS: No fever or chills  Respiratory ROS: No cough, shortness of breath, wheezing  Cardiovascular ROS: No chest pain or palpitations  Gastrointestinal ROS: No abdominal pain or melena  Genito-Urinary ROS: No dysuria or hematuria  Neurological ROS: no headache or dizziness    Objective:  BP 90/50 (BP Location: Right arm, Patient Position: Lying)   Pulse 92   Temp 98.4 °F (36.9 °C) (Temporal Artery  ")   Resp 20   Ht 167.6 cm (66\")   Wt 60.8 kg (134 lb 2 oz)   SpO2 93%   BMI 21.65 kg/m²     Intake/Output Summary (Last 24 hours) at 11/05/18 1154  Last data filed at 11/05/18 0423   Gross per 24 hour   Intake              580 ml   Output              750 ml   Net             -170 ml     General: awake/alert    Neck: supple, no JVD  Chest:  clear to auscultation bilaterally without respiratory distress  CVS: regular rate and rhythm  Abdominal: soft, nontender, normal bowel sounds  Extremities: no cyanosis or edema  Skin: warm and dry without rash   Neuro: no focal motor deficits      Labs:    Results from last 7 days  Lab Units 11/05/18  0641 11/04/18 0414 11/03/18  1735   WBC 10*3/mm3 8.69 8.19 11.83*   HEMOGLOBIN g/dL 9.9* 9.1* 11.3*   HEMATOCRIT % 30.5* 28.0* 34.5*   PLATELETS 10*3/mm3 322 260 328           Results from last 7 days  Lab Units 11/05/18  0641 11/04/18 0414 11/03/18 1735 11/01/18  0329   SODIUM mmol/L 137 138 138  < > 140   POTASSIUM mmol/L 3.7 3.6 3.8  < > 3.6   CHLORIDE mmol/L 94* 94* 91*  < > 91*   CO2 mmol/L 33.0* 34.0* 32.0*  < > 36.0*   BUN mg/dL 44* 52* 57*  < > 50*   CREATININE mg/dL 1.44* 1.46* 1.63*  < > 1.87*   CALCIUM mg/dL 8.2* 7.6* 8.4  < > 7.7*   BILIRUBIN mg/dL 0.5  --  0.9  --  0.7   ALK PHOS U/L 83  --  97  --  63   ALT (SGPT) U/L 36  --  32  --  28   AST (SGOT) U/L 31  --  48*  --  49*   GLUCOSE mg/dL 98 71 88  < > 108*   < > = values in this interval not displayed.    Radiology:   Imaging Results (last 72 hours)     Procedure Component Value Units Date/Time    US Renal Bilateral [[id number]] Collected:  11/05/18 0942     Updated:  11/05/18 0946    Narrative:       EXAM:  US RENAL BILATERAL-     INDICATION:  Kidney function     COMPARISON:  None     TECHNIQUE:  Routine grayscale and color Doppler images were obtained  through the bilateral renal fossae.     FINDINGS:       Right Kidney: The right kidney measures 10.3 cm in longitudinal  dimension. There is good " corticomedullary differentiation. There is no  evidence of hydronephrosis. There are no cystic lesions or masses. No  echogenic stone.     Left Kidney: The left kidney measures 10.1 cm in longitudinal dimension.  There is good corticomedullary differentiation. There is no evidence of  hydronephrosis. There are no cystic lesions or masses. No echogenic  stone.     Bladder: The bladder is unremarkable without evidence of mass or  internal debris.       Impression:       Unremarkable renal ultrasound.      This report was finalized on 11/05/2018 09:43 by Dr. Shanique Eller MD.    XR Chest PA & Lateral [[id number]] Collected:  11/04/18 1247     Updated:  11/04/18 1252    Narrative:       XR CHEST PA AND LATERAL- 11/4/2018 12:33 PM CST     HISTORY: pulmonary edema; I50.9-Heart failure, unspecified       COMPARISON: None.     FINDINGS:   Upright frontal and lateral radiographs of the chest were obtained.     Dense consolidation is noted in the middle lobe. The changes of  interstitial pulmonary edema are slightly improved bilaterally. Small  pleural effusions are present bilaterally.. Cardiac silhouette is  normal. Left subclavian Port-A-Cath is present.. The osseous structures  and surrounding soft tissues demonstrate no acute abnormality.       Impression:       1. Dense consolidation right middle lobe most likely representing an  underlying inflammatory process.  2. The changes of interstitial pulmonary edema previously described are  improved however some residual does persist.  3. Small bilateral pleural effusions.        This report was finalized on 11/04/2018 12:49 by Dr. Dawit De Los Santos MD.    XR Chest 2 View [[id number]] Collected:  11/03/18 1800     Updated:  11/03/18 1804    Narrative:       XR CHEST 2 VW- 11/3/2018 5:53 PM CDT     HISTORY: SOA  triage protocol      COMPARISON: 10/30/2018     FINDINGS:   Upright frontal and lateral radiographs of the chest were obtained.     Airspace opacities are again seen in  bilateral lungs. A LEFT pleural  effusion is again noted. The cardiomediastinal silhouette and pulmonary  vascularity are unchanged. The osseous structures and surrounding soft  tissues demonstrate no acute abnormality.       Impression:       1. Bilateral airspace opacities and LEFT pleural effusion are likely due  to pulmonary edema. Pneumonia could have a similar appearance.        This report was finalized on 11/03/2018 18:00 by Dr. Leighton Persaud MD.          Culture:         Assessment   1.  Acute kidney injury--resolving  2.  Baseline of CKD stage 3  3.  Acute exacerbation of chronic systolic/diastolic congestive heart failure  4.  Type 2 diabetes  5.  hypertension    Plan:  1.  Transition to oral diuretics  2.  Monitor labs      CARL Valdivia  11/5/2018  11:54 AM

## 2018-11-06 NOTE — PROGRESS NOTES
[facility] Medicine Services  INPATIENT PROGRESS NOTE    Patient Name: Padmini Torres  Date of Admission: 11/3/2018  Today's Date: 11/06/18  Length of Stay: 1  Primary Care Physician: Александр Yo DO    Subjective   Chief Complaint: shortness of breath  HPI     Patient doing well this morning.  Had an episode last night where she woke up suddenly and felt short of breath.  O2 sat remained 96%, but patient was very anxious and subjectively felt short of air.  Suspect patient had PND.  Nurse came to bedside and helped her with some deep breathing exercises and shortness of breath resolved and she was able to go back to sleep.  Patient denies any shortness of breath this morning, but did have a little bit of a shortness of breath while walking but it quickly resolved on short breaks on her walk.          Review of Systems   Constitutional: Negative for appetite change, chills, fatigue and fever.   Respiratory: Negative for cough and shortness of breath.    Cardiovascular: Negative for chest pain and palpitations.   Gastrointestinal: Negative for abdominal distention, abdominal pain, constipation, diarrhea and vomiting.        All pertinent negatives and positives are as above. All other systems have been reviewed and are negative unless otherwise stated.     Objective    Temp:  [97.9 °F (36.6 °C)-98.4 °F (36.9 °C)] 97.9 °F (36.6 °C)  Heart Rate:  [75-95] 90  Resp:  [16-20] 18  BP: ()/(42-61) 113/51  Physical Exam  Constitutional: She is oriented to person, place, and time. No distress.   malnourished   HENT:   Head: Normocephalic and atraumatic.   Eyes: Conjunctivae are normal. No scleral icterus.   Cardiovascular: Normal rate, regular rhythm and intact distal pulses.  Exam reveals no friction rub.    No murmur heard.  1+ pitting edema bilateral lower extremities    Pulmonary/Chest: Effort normal. She has rales (bilateral bases ).   Abdominal: Soft. Bowel sounds are normal.  She exhibits no distension. There is no tenderness. There is no guarding.   Musculoskeletal: She exhibits edema.   Neurological: She is alert and oriented to person, place, and time. No cranial nerve deficit.   Skin: Skin is warm and dry. She is not diaphoretic.   Prominent varicosities bilateral lower extremities L>>R   Psychiatric: She has a normal mood and affect. Her behavior is normal.   Vitals reviewed.         Results Review:  I have reviewed the labs, radiology results, and diagnostic studies.    Laboratory Data:     Results from last 7 days  Lab Units 11/05/18  0641 11/04/18 0414 11/03/18  1735   WBC 10*3/mm3 8.69 8.19 11.83*   HEMOGLOBIN g/dL 9.9* 9.1* 11.3*   HEMATOCRIT % 30.5* 28.0* 34.5*   PLATELETS 10*3/mm3 322 260 328          Results from last 7 days  Lab Units 11/06/18  0521 11/05/18  0641 11/04/18 0414 11/03/18  1735  11/01/18  0329   SODIUM mmol/L 138 137 138 138  < > 140   POTASSIUM mmol/L 4.3 3.7 3.6 3.8  < > 3.6   CHLORIDE mmol/L 95* 94* 94* 91*  < > 91*   CO2 mmol/L 33.0* 33.0* 34.0* 32.0*  < > 36.0*   BUN mg/dL 40* 44* 52* 57*  < > 50*   CREATININE mg/dL 1.17 1.44* 1.46* 1.63*  < > 1.87*   CALCIUM mg/dL 8.7 8.2* 7.6* 8.4  < > 7.7*   BILIRUBIN mg/dL  --  0.5  --  0.9  --  0.7   ALK PHOS U/L  --  83  --  97  --  63   ALT (SGPT) U/L  --  36  --  32  --  28   AST (SGOT) U/L  --  31  --  48*  --  49*   GLUCOSE mg/dL 90 98 71 88  < > 108*   < > = values in this interval not displayed.    Culture Data:   @[facility]@    Radiology Data:   Imaging Results (last 24 hours)     ** No results found for the last 24 hours. **          I have reviewed the patient's current medications.     Assessment/Plan     Active Hospital Problems    Diagnosis   • Acute on chronic congestive heart failure (CMS/HCC)       Assessment:  1) Acute on chronic systolic heart failure (EF 30%) with aortic stenosis with aortic regurgitation  2) COPD without exacerbation   3) Acute hypoxic respiratory failure - Drops to 84% with  minimal activity   4) Type 2 DM, complicated by hyperglycemia (Overall well-controlled with A1c 5.3)  5) Peripheral neuropathy   6) Chronic kidney disease stage 3   7) Chronic macrocytic anemia   8) Previous DVT, recently had warfarin stopped  9) Moderate protein calorie malnutrition  10) Constipation, resolved       Plan:  1) Cardiology evaluated, note reviewed.  Nephrology evaluated, note reviewed.  2) Switch from furosemide 40 mg BID to bumex 1 mg BID (better bioavailability)  3) Daily weights on standing scale, Strict I/O  4) Titrate O2 for 92%, she may need home O2   5) Nutritional supplements per nutrition  6) Outpatient PFTs to confirm history of COPD  7) Nutrition note reviewed  8) Per cardiology patient's anti-HTN were lowered and started on entresto   9) S/P 3 doses of venofer  10) Accuchecks and continue SSI      Discussed with bedside nurse Figueroa.              Discharge Planning: I expect the patient to be discharged to home in 1-2 days.     Gilberto Carrero MD   11/06/18   12:15 PM

## 2018-11-06 NOTE — PROGRESS NOTES
Nephrology ([facility]) Progress Note      Patient:  Padmini Torres  YOB: 1940  Date of Service: 11/6/2018  MRN: [id number]   Acct: [id number]   Primary Care Physician: Александр Yo DO  Advance Directive:   Code Status and Medical Interventions:   Ordered at: 11/03/18 2146     Limited Support to NOT Include:    Intubation    Cardioversion/Defibrillation    Antiarrhythmic Drugs     Level Of Support Discussed With:    Patient     Code Status:    No CPR     Medical Interventions (Level of Support Prior to Arrest):    Limited     Admit Date: 11/3/2018       Hospital Day: 1  Referring Provider: Kirk Pearson DO      Patient personally seen and examined.  Complete chart including Consults, Notes, Operative Reports, Labs, Cardiology, and Radiology studies reviewed as able.        Subjective:  Padmini Torres is a 78 y.o. female  whom we were consulted for acute kidney injury.  Baseline CKD stage 3; does not follow with nephrology.  Recently admitted for CHF exacerbation. was discharged on 11/02 ; returned on 11/03 with weight gain, dyspnea.  Admitted with CHF exacerbation and cute kidney injury. Hospital course remarkable for improving symptoms and renal function.    Today is feeling better; dyspnea is resolving.  Had episode of acute dyspnea overnight (?anxiety).  Urine output nonoliguric.    Allergies:  Zantac [ranitidine hcl] and Fish-derived products    Home Meds:  Prescriptions Prior to Admission   Medication Sig Dispense Refill Last Dose   • acetaminophen (TYLENOL ARTHRITIS PAIN) 650 MG 8 hr tablet Take 650 mg by mouth Every 12 (Twelve) Hours.   Past Week at Unknown time   • amiodarone (PACERONE) 100 MG tablet Take 1 tablet by mouth Daily. 30 tablet 2 Past Week at Unknown time   • aspirin 81 MG EC tablet Take 81 mg by mouth Daily.   Past Week at Unknown time   • bumetanide (BUMEX) 1 MG tablet Take 1 mg by mouth Daily.   Past Week at Unknown time   • Calcium  Carbonate-Vitamin D 600-200 MG-UNIT capsule Take 1 capsule by mouth Daily.   Past Week at Unknown time   • digoxin (LANOXIN) 125 MCG tablet Take 125 mcg by mouth Daily.   Past Week at Unknown time   • gabapentin (NEURONTIN) 300 MG capsule Take 300 mg by mouth 3 (Three) Times a Day.   Past Week at Unknown time   • lisinopril (PRINIVIL,ZESTRIL) 2.5 MG tablet Take 1 tablet by mouth Daily. 30 tablet 2 Past Week at Unknown time   • metFORMIN (GLUCOPHAGE) 1000 MG tablet Take 0.5 tablets by mouth 2 (Two) Times a Day. 60 tablet 2 Past Week at Unknown time   • metoprolol succinate XL (TOPROL-XL) 25 MG 24 hr tablet Take 25 mg by mouth Daily.   Past Week at Unknown time   • nitroglycerin (NITROSTAT) 0.4 MG SL tablet Place 1 tablet under the tongue Every 5 (Five) Minutes As Needed for Chest Pain. Take no more than 3 doses in 15 minutes. 25 tablet 12 Past Week at Unknown time   • pravastatin (PRAVACHOL) 20 MG tablet Take 20 mg by mouth daily.   Past Week at Unknown time       Medicines:  Current Facility-Administered Medications   Medication Dose Route Frequency Provider Last Rate Last Dose   • amiodarone (PACERONE) tablet 100 mg  100 mg Oral Daily Kirk Pearson DO   100 mg at 11/06/18 0845   • aspirin EC tablet 81 mg  81 mg Oral Daily Kirk Pearson DO   81 mg at 11/06/18 0845   • atorvastatin (LIPITOR) tablet 10 mg  10 mg Oral Nightly Kirk Pearson DO   10 mg at 11/05/18 2052   • calcitriol (ROCALTROL) capsule 0.25 mcg  0.25 mcg Oral Daily Raul Castellon MD   0.25 mcg at 11/06/18 0845   • dextrose (D50W) 25 g/ 50mL Intravenous Solution 25 g  25 g Intravenous Q15 Min PRN Kirk Pearson DO       • dextrose (GLUTOSE) oral gel 15 g  15 g Oral Q15 Min PRN Kirk Pearson DO       • digoxin (LANOXIN) tablet 125 mcg  125 mcg Oral Daily Kirk Pearson DO   125 mcg at 11/05/18 1203   • enoxaparin (LOVENOX) syringe 30 mg  30 mg Subcutaneous Q24H Gilberto Carrero MD       •  ferrous sulfate tablet 325 mg  325 mg Oral BID With Meals Raul Castellon MD   325 mg at 11/06/18 0845   • furosemide (LASIX) tablet 40 mg  40 mg Oral BID Jameel Cunningham APRN   40 mg at 11/06/18 0845   • gabapentin (NEURONTIN) capsule 300 mg  300 mg Oral Q12H Kirk Pearson DO   300 mg at 11/06/18 0845   • glucagon (human recombinant) (GLUCAGEN DIAGNOSTIC) injection 1 mg  1 mg Subcutaneous PRN Kirk Pearson DO       • hydrALAZINE (APRESOLINE) tablet 10 mg  10 mg Oral Q12H Freeman Kelsey MD   10 mg at 11/06/18 0846   • insulin lispro (humaLOG) injection 0-9 Units  0-9 Units Subcutaneous 4x Daily With Meals & Nightly Kirk Pearson DO   2 Units at 11/05/18 1203   • ipratropium-albuterol (DUO-NEB) nebulizer solution 3 mL  3 mL Nebulization TID - RT Gilberto Carrero MD       • ipratropium-albuterol (DUO-NEB) nebulizer solution 3 mL  3 mL Nebulization Q4H PRN Gilberto Carrero MD       • iron sucrose (VENOFER) 200 mg in sodium chloride 0.9 % 100 mL IVPB  200 mg Intravenous Q24H Raul Castellon MD   200 mg at 11/05/18 1618   • isosorbide dinitrate (ISORDIL) tablet 10 mg  10 mg Oral TID - Nitrates Freeman Kelsey MD   10 mg at 11/06/18 0845   • melatonin tablet 6 mg  6 mg Oral Nightly Kirk Pearson DO   6 mg at 11/05/18 2052   • metoprolol succinate XL (TOPROL-XL) 24 hr tablet 25 mg  25 mg Oral Daily Kirk Pearson DO   25 mg at 11/06/18 0845   • nitroglycerin (NITROSTAT) SL tablet 0.4 mg  0.4 mg Sublingual Q5 Min PRN Kirk Pearson DO       • Pharmacy to Dose enoxaparin (LOVENOX)   Does not apply Continuous PRN Gilberto Carrero MD       • sacubitril-valsartan (ENTRESTO) 24-26 MG tablet 1 tablet  1 tablet Oral Q12H Freeman Kelsey MD   1 tablet at 11/06/18 0845   • sodium chloride 0.9 % flush 10 mL  10 mL Intravenous PRN Ishaan Francisco Jr., MD       • sodium chloride 0.9 % flush 3 mL  3 mL Intravenous Q12H Kirk Pearson,    3 mL at  11/06/18 0847   • sodium chloride 0.9 % flush 3-10 mL  3-10 mL Intravenous PRN Kirk Pearson DO           Past Medical History:  Past Medical History:   Diagnosis Date   • Abdominal pain, left lower quadrant    • Bowel habit changes    • CAD (coronary artery disease)     LAD stent    • COPD (chronic obstructive pulmonary disease) (CMS/HCC)    • Diabetes mellitus (CMS/HCC)    • DVT (deep venous thrombosis) (CMS/HCC)    • Dysphagia    • History of tachycardia    • Hypertension    • Lymphoma (CMS/HCC)    • Pulmonary emboli (CMS/HCC)    • Varicose veins of legs        Past Surgical History:  Past Surgical History:   Procedure Laterality Date   • ANKLE SURGERY     • BACK SURGERY      lower   • CARDIAC CATHETERIZATION     • CHOLECYSTECTOMY     • COLONOSCOPY  04/23/2015    Last colon limted lower diverticulosis left colon, Internal Hemorrhoids   • COLONOSCOPY W/ BIOPSIES AND POLYPECTOMY  07/22/2014    Adenomatous tubular type, Diverticulosis sigmoid colon   • CORONARY STENT PLACEMENT      x 1   • ENDOSCOPY  04/14/2010    Distal ring dilated 48 Fr   • EXPLORATORY LAPAROTOMY  2005    relapse lymphoma   • HEMORRHOIDECTOMY     • HYSTERECTOMY      total abdominal   • LUMBAR SPINE SURGERY     • LUNG BIOPSY N/A 1991    open    • SMALL INTESTINE SURGERY     • TONSILLECTOMY     • UPPER GASTROINTESTINAL ENDOSCOPY  10/23/2015    normal exam       Family History  Family History   Problem Relation Age of Onset   • Diabetes Mother    • Cancer Father         prostate with bone metastasis   • Heart disease Maternal Grandmother    • Stroke Brother    • Colon cancer Neg Hx    • Colon polyps Neg Hx        Social History  Social History     Social History   • Marital status:      Spouse name: N/A   • Number of children: N/A   • Years of education: N/A     Occupational History   • Not on file.     Social History Main Topics   • Smoking status: Never Smoker   • Smokeless tobacco: Never Used   • Alcohol use No   • Drug use: No  "  • Sexual activity: Defer     Other Topics Concern   • Not on file     Social History Narrative   • No narrative on file         Review of Systems:  History obtained from chart review and the patient  General ROS: No fever or chills  Respiratory ROS: No cough, shortness of breath, wheezing  Cardiovascular ROS: No chest pain or palpitations  Gastrointestinal ROS: No abdominal pain or melena  Genito-Urinary ROS: No dysuria or hematuria  Neurological ROS: no headache or dizziness    Objective:  /48 (BP Location: Left arm, Patient Position: Sitting)   Pulse 86   Temp 98.4 °F (36.9 °C) (Temporal Artery )   Resp 18   Ht 167.6 cm (66\")   Wt 60.8 kg (134 lb 1 oz)   SpO2 98%   BMI 21.64 kg/m²     Intake/Output Summary (Last 24 hours) at 11/06/18 1059  Last data filed at 11/06/18 0000   Gross per 24 hour   Intake              840 ml   Output             1350 ml   Net             -510 ml     General: awake/alert    Neck: supple, no JVD  Chest:  clear to auscultation bilaterally without respiratory distress  CVS: regular rate and rhythm  Abdominal: soft, nontender, normal bowel sounds  Extremities: no cyanosis or edema  Skin: warm and dry without rash   Neuro: no focal motor deficits      Labs:    Results from last 7 days  Lab Units 11/05/18  0641 11/04/18 0414 11/03/18  1735   WBC 10*3/mm3 8.69 8.19 11.83*   HEMOGLOBIN g/dL 9.9* 9.1* 11.3*   HEMATOCRIT % 30.5* 28.0* 34.5*   PLATELETS 10*3/mm3 322 260 328           Results from last 7 days  Lab Units 11/06/18  0521 11/05/18  0641 11/04/18  0414 11/03/18  1735  11/01/18  0329   SODIUM mmol/L 138 137 138 138  < > 140   POTASSIUM mmol/L 4.3 3.7 3.6 3.8  < > 3.6   CHLORIDE mmol/L 95* 94* 94* 91*  < > 91*   CO2 mmol/L 33.0* 33.0* 34.0* 32.0*  < > 36.0*   BUN mg/dL 40* 44* 52* 57*  < > 50*   CREATININE mg/dL 1.17 1.44* 1.46* 1.63*  < > 1.87*   CALCIUM mg/dL 8.7 8.2* 7.6* 8.4  < > 7.7*   BILIRUBIN mg/dL  --  0.5  --  0.9  --  0.7   ALK PHOS U/L  --  83  --  97  --  63 "   ALT (SGPT) U/L  --  36  --  32  --  28   AST (SGOT) U/L  --  31  --  48*  --  49*   GLUCOSE mg/dL 90 98 71 88  < > 108*   < > = values in this interval not displayed.    Radiology:   Imaging Results (last 72 hours)     Procedure Component Value Units Date/Time    US Renal Bilateral [[id number]] Collected:  11/05/18 0942     Updated:  11/05/18 0946    Narrative:       EXAM:  US RENAL BILATERAL-     INDICATION:  Kidney function     COMPARISON:  None     TECHNIQUE:  Routine grayscale and color Doppler images were obtained  through the bilateral renal fossae.     FINDINGS:       Right Kidney: The right kidney measures 10.3 cm in longitudinal  dimension. There is good corticomedullary differentiation. There is no  evidence of hydronephrosis. There are no cystic lesions or masses. No  echogenic stone.     Left Kidney: The left kidney measures 10.1 cm in longitudinal dimension.  There is good corticomedullary differentiation. There is no evidence of  hydronephrosis. There are no cystic lesions or masses. No echogenic  stone.     Bladder: The bladder is unremarkable without evidence of mass or  internal debris.       Impression:       Unremarkable renal ultrasound.      This report was finalized on 11/05/2018 09:43 by Dr. Shanique Eller MD.    XR Chest PA & Lateral [[id number]] Collected:  11/04/18 1247     Updated:  11/04/18 1252    Narrative:       XR CHEST PA AND LATERAL- 11/4/2018 12:33 PM CST     HISTORY: pulmonary edema; I50.9-Heart failure, unspecified       COMPARISON: None.     FINDINGS:   Upright frontal and lateral radiographs of the chest were obtained.     Dense consolidation is noted in the middle lobe. The changes of  interstitial pulmonary edema are slightly improved bilaterally. Small  pleural effusions are present bilaterally.. Cardiac silhouette is  normal. Left subclavian Port-A-Cath is present.. The osseous structures  and surrounding soft tissues demonstrate no acute abnormality.       Impression:        1. Dense consolidation right middle lobe most likely representing an  underlying inflammatory process.  2. The changes of interstitial pulmonary edema previously described are  improved however some residual does persist.  3. Small bilateral pleural effusions.        This report was finalized on 11/04/2018 12:49 by Dr. Dawit De Los Santos MD.    XR Chest 2 View [[id number]] Collected:  11/03/18 1800     Updated:  11/03/18 1804    Narrative:       XR CHEST 2 VW- 11/3/2018 5:53 PM CDT     HISTORY: SOA  triage protocol      COMPARISON: 10/30/2018     FINDINGS:   Upright frontal and lateral radiographs of the chest were obtained.     Airspace opacities are again seen in bilateral lungs. A LEFT pleural  effusion is again noted. The cardiomediastinal silhouette and pulmonary  vascularity are unchanged. The osseous structures and surrounding soft  tissues demonstrate no acute abnormality.       Impression:       1. Bilateral airspace opacities and LEFT pleural effusion are likely due  to pulmonary edema. Pneumonia could have a similar appearance.        This report was finalized on 11/03/2018 18:00 by Dr. Leighton Persaud MD.          Culture:         Assessment   1.  Acute kidney injury--resolving  2.  Baseline of CKD stage 3  3.  Acute exacerbation of chronic systolic/diastolic congestive heart failure  4.  Type 2 diabetes  5.  Hypertension  6.  Anemia of iron deficiency    Plan:  1.  Continue current diuretics  2.  Monitor labs      Jameel Cunningham, CARL  11/6/2018  10:59 AM

## 2018-11-06 NOTE — PLAN OF CARE
Problem: Patient Care Overview  Goal: Plan of Care Review  Outcome: Ongoing (interventions implemented as appropriate)   11/06/18 6629   Coping/Psychosocial   Plan of Care Reviewed With patient   Plan of Care Review   Progress no change   OTHER   Outcome Summary Patient O2 sat while awake on room air 92-94%. Patient fell asleep and woke short of breath. O2 sat was 96%. I put her back on 2 liters, coached on effective breathing and calming effects, patients sat remained the same but the SOA improved. 2+ edema to left foot worsening. Patient sat up in chair all day with legs down. Put patient to bed and raised the knee gatch to keep feet elevated. No tele ordered. Patient anxious to go home but afraid of experiencing these episodes again. Continue to monior.

## 2018-11-06 NOTE — PROGRESS NOTES
Continued Stay Note   Candi     Patient Name: Padmini Torres  MRN: [id number]  Today's Date: 11/6/2018    Admit Date: 11/3/2018          Discharge Plan     Row Name 11/06/18 2377       Plan    Plan HOME    Patient/Family in Agreement with Plan yes    Plan Comments PT. REFUSING HOME HEALTH STATING SHE IS NOT HOME BOUND AND DOES NOT FEEL THEY CAN DO ANYTHING FOR HER THAT SHE CAN'T DO HERSELF.  IF SITUATION CHANGES, PLEASE ADVISE.  THANKS.               Discharge Codes    No documentation.           RAZA Isaacs

## 2018-11-06 NOTE — PROGRESS NOTES
"Malnutrition Severity Assessment    Patient Name:  Padmini Torres  YOB: 1940  MRN: [id number]  Admit Date:  11/3/2018    Patient meets criteria for : Moderate malnutrition    Comments:  Please attest this note if you agree with this assessment for moderate malnutrition.    Pt reports poor appetite. She reports that she often feels as though food is \"sticking\" in her throat. If she cont to eat, she will vomit. Encouraged small, frequent meals to avoid this. She reports that she has had her esophagus dilated twice in the past. She uses khushboo Glucerna at home. Sending khushboo Boost Glucose Control with breakfast daily. Encouraged to cont supplement use at home. Pt's weight has been relatively stable at home for at least the last 6 months to a year. She does have muscle and fat wasting observed, see below for details. Obtained specific food choices/preferences from pt and relayed to . Advised of alternate meal/food choices available. Encouraged pt to let staff know if we could get an alternate choice for her at any time. Pt verbalized understanding. Will cont to follow and encourage food and supplement intake.    Malnutrition Type: Chronic Illness Malnutrition     Malnutrition Type (last 8 hours)      Malnutrition Severity Assessment     Row Name 11/06/18 1102       Malnutrition Severity Assessment    Malnutrition Type Chronic Illness Malnutrition    Row Name 11/06/18 1102       Physical Signs of Malnutrition (Chronic)    Muscle Wasting Mild   moderate wasting observed to temples, hands, clavicle/acromion    Fat Loss Mild   moderate wasting noted to orbital and buccal fat pads (puffy around eyes), upper arms    Fluid Accumulation Mild   trace dependent edema charted per NN's and to L foot    Secondary Physical Signs Present (comment)   generalized weakness, dry, flaky skin, easily fatigued, SOB, skin tears    Row Name 11/06/18 1102       Energy Intake Status (Chronic)    Energy Intake Mod " (<75% / > or equal to 1 mo)   per pt report and observation of intake this stay and previous hospital stay.    Row Name 11/06/18 1102       Criteria Met (Must meet criteria for severity in at least 2 of these categories: M Wasting, Fat Loss, Fluid, Secondary Signs, Wt. Status, Intake)    Patient meets criteria for  Moderate malnutrition          Electronically signed by:  Irene Jones RDN, LD  11/06/18 11:07 AM

## 2018-11-06 NOTE — PROGRESS NOTES
RT Nebulizer Protocol                        Assessment tool to be used for patients with existing breathing treatments                                                              ordered by hospitalists   0  1  2  3  4      Respiratory History   No Smoking      Smoking History      1 Pack/Day      Pulmonary Disease   X   Exacerbation        Respiratory Rate   Normal   X   20-25      Dyspneic      Accessory Muscles      Severe Dyspnea        Breath Sounds   Clear   X   Crackles      Crackles/ Rhonchi      Wheezing      Absent/ Severe Wheezing        Chest   X-ray   Clear      1 Lobe Infiltration/ Consolidation/ PE      2 Lobe Same Lung Infiltration/ Consolidation/ PE       2 Lobe Infiltration/ Both Lungs/ Consolidation/ PE   X   Both Lungs/ More Than 1 Lobe/ Atelectasis/ Consolidation/  PE        Cough   Strong Non- Productive   X   Excessive Secretions/ Strong Cough      Excessive Secretions/ Weak Cough      Thick Bronchial Secretions/ Weak Cough      Thick Bronchial Secretions/ No Cough        Total Patient Score =  6  0-4=Q4 PRN  5-9=TID and Q4 PRN  10-14=QID and Q3 PRN  15-19=Q4 and Q2 PRN  20=Q3 and Q2 PRN    Bronchopulmonary Hygiene (CPT)   Q4 ATC Copious secretions, dyspnea, unable to sleep, mucus plug    QID & Q4 PRN Moderate secretions    TID Small amounts of secretions w/ poor cough and history of secretions    BID Unable to deep breathe and cough spontaneously       Lung Expansion Therapy (PEP)   Q4 & PRN at night Severe atelectasis, poor oxygenation    QID  High risk for persistent atelectasis, existence of same    TID At risk for developing atelectasis    BID Unable to deep breathe and cough spontaneously     Instruct, 1 follow up Patients able to perform well on their own

## 2018-11-06 NOTE — PLAN OF CARE
"Problem: Patient Care Overview  Goal: Plan of Care Review  Outcome: Ongoing (interventions implemented as appropriate)   11/06/18 1140   Coping/Psychosocial   Plan of Care Reviewed With patient   Plan of Care Review   Progress no change   OTHER   Outcome Summary OT txt completed. Pt provided handouts for energy conservation and breathing exercises, reviewed handouts and answered questions to pt understanding. Pt amb to end of opposite [place] with 2 standing rest breaks w CGA. Pt reports feeling \"unsteady\" and \"wobbly\" on her feet though no LOB. Increased WOB and SOA with activity. Continue skilled OT to continue education and activity tolerance. OT recommends d/c home w assist.         "

## 2018-11-06 NOTE — THERAPY TREATMENT NOTE
Acute Care - Occupational Therapy Treatment Note  [facility]     Patient Name: Padmini Torres  : 1940  MRN: [id number]  Today's Date: 2018  Onset of Illness/Injury or Date of Surgery: 18  Date of Referral to OT: 18  Referring Physician: Dr. Carrero    Admit Date: 11/3/2018       ICD-10-CM ICD-9-CM   1. Acute on chronic congestive heart failure, unspecified heart failure type (CMS/Formerly [facility]) I50.9 428.0   2. Chronic obstructive pulmonary disease, unspecified COPD type (CMS/Formerly [facility]) J44.9 496   3. Impaired mobility and ADLs Z74.09 799.89     Patient Active Problem List   Diagnosis   • Chronic systolic congestive heart failure (CMS/Formerly [facility])   • Congestive heart failure with LV diastolic dysfunction, NYHA class 2 (CMS/Formerly [facility])   • Coronary artery disease involving native coronary artery of native heart without angina pectoris   • Mixed hyperlipidemia   • Type 2 diabetes mellitus without complication, without long-term current use of insulin (CMS/Formerly [facility])   • Palpitations   • SVT (supraventricular tachycardia) (CMS/Formerly [facility])   • Congestive heart failure (CMS/Formerly [facility])   • SOB (shortness of breath) on exertion   • Renal insufficiency   • 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   • Varicose veins of both lower extremities   • Acute on chronic congestive heart failure (CMS/HCC)     Past Medical History:   Diagnosis Date   • Abdominal pain, left lower quadrant    • Bowel habit changes    • CAD (coronary artery disease)     LAD stent    • COPD (chronic obstructive pulmonary disease) (CMS/Formerly [facility])    • Diabetes mellitus (CMS/[facility]ly [facility])    • DVT (deep venous thrombosis) (CMS/[facility])    • Dysphagia    • History of tachycardia    • Hypertension    • Lymphoma (CMS/HCC)    • Pulmonary emboli (CMS/HCC)    • Varicose veins of legs      Past Surgical History:   Procedure Laterality Date   • ANKLE SURGERY     • BACK SURGERY      lower   • CARDIAC CATHETERIZATION     • CHOLECYSTECTOMY     • COLONOSCOPY  2015    Last colon  "limted lower diverticulosis left colon, Internal Hemorrhoids   • COLONOSCOPY W/ BIOPSIES AND POLYPECTOMY  07/22/2014    Adenomatous tubular type, Diverticulosis sigmoid colon   • CORONARY STENT PLACEMENT      x 1   • ENDOSCOPY  04/14/2010    Distal ring dilated 48 Fr   • EXPLORATORY LAPAROTOMY  2005    relapse lymphoma   • HEMORRHOIDECTOMY     • HYSTERECTOMY      total abdominal   • LUMBAR SPINE SURGERY     • LUNG BIOPSY N/A 1991    open    • SMALL INTESTINE SURGERY     • TONSILLECTOMY     • UPPER GASTROINTESTINAL ENDOSCOPY  10/23/2015    normal exam       Therapy Treatment          Rehabilitation Treatment Summary     Row Name 11/06/18 0954             Treatment Time/Intention    Discipline occupational therapist  -MW      Document Type therapy note (daily note)  -MW      Mode of Treatment occupational therapy  -MW      Patient/Family Observations no family present  -MW2      Existing Precautions/Restrictions fall;oxygen therapy device and L/min  -MW2      Recorded by [MW] Elise Robles, OTR/L 11/06/18 0954  [MW2] Elise Robles, OTR/L 11/06/18 1140      Row Name 11/06/18 0954             Cognitive Assessment/Intervention- PT/OT    Affect/Mental Status (Cognitive) WFL  -MW      Recorded by [MW] Elise Robles, OTR/L 11/06/18 1140      Row Name 11/06/18 0954             Bed Mobility Assessment/Treatment    Bed Mobility Assessment/Treatment supine-sit  -MW      Supine-Sit [place] (Bed Mobility) supervision  -MW      Recorded by [MW] Elise Robles, OTR/L 11/06/18 1140      Row Name 11/06/18 0954             Functional Mobility    Functional Mobility- Ind. Level contact guard assist  -MW      Functional Mobility- Safety Issues supplemental O2  -MW      Functional Mobility- Comment amb in [place] with use of handrails, pt continues to report feeling \"unsteady\" on her feet, however no LOB or significant unsteadiness obs  -MW      Recorded by [MW] Elise Robles, OTR/L 11/06/18 1140      Row Name 11/06/18 " 0954             Transfer Assessment/Treatment    Transfer Assessment/Treatment sit-stand transfer;stand-sit transfer  -MW      Recorded by [MW] Elise Robles OTR/L 11/06/18 1140      Row Name 11/06/18 0954             Sit-Stand Transfer    Sit-Stand [place] (Transfers) supervision  -MW      Recorded by [MW] Elise Robles OTR/L 11/06/18 1140      Row Name 11/06/18 0954             Stand-Sit Transfer    Stand-Sit [place] (Transfers) supervision  -MW      Recorded by [MW] Elise Robles OTR/L 11/06/18 1140      Row Name 11/06/18 0954             ADL Assessment/Intervention    BADL Assessment/Intervention lower body dressing;toileting  -MW      Recorded by [MW] Elise Robles OTR/L 11/06/18 1140      Row Name 11/06/18 0954             Lower Body Dressing Assessment/Training    Lower Body Dressing [place] Level don;doff;supervision  -MW      Lower Body Dressing Position edge of bed sitting  -MW      Recorded by [MW] Elise Robles OTR/L 11/06/18 1140      Row Name 11/06/18 0954             Toileting Assessment/Training    [place] Level (Toileting) toileting skills;independent  -MW      Assistive Devices (Toileting) commode  -MW      Toileting Position unsupported sitting  -MW      Recorded by [MW] Elise Robles, OTR/L 11/06/18 1140      Row Name 11/06/18 0954             BADL Safety/Performance    Impairments, BADL Safety/Performance balance;endurance/activity tolerance;shortness of breath  -MW      Recorded by [MW] Elise Robles OTR/L 11/06/18 1140      Row Name 11/06/18 0954             Balance    Balance dynamic standing balance  -MW      Recorded by [MW] Elise Robles, OTR/L 11/06/18 1140      Row Name 11/06/18 0954             Dynamic Standing Balance    Level of [place], Reaches Outside Midline (Standing, Dynamic Balance) contact guard assist  -MW      Recorded by [MW] Elise Robles OTR/L 11/06/18 1140      Row Name 11/06/18 0954             Pain Assessment     Additional Documentation Pain Scale: FACES Pre/Post-Treatment (Group)  -MW      Recorded by [MW] Elise Robles OTR/L 11/06/18 1140      Row Name 11/06/18 0954             Pain Scale: FACES Pre/Post-Treatment    Pain: FACES Scale, Pretreatment 0-->no hurt  -MW      Pain: FACES Scale, Post-Treatment 0-->no hurt  -MW      Recorded by [MW] Elise Robles OTR/L 11/06/18 1140      Row Name                Wound 11/04/18 2000 Right anterior arm skin tear    Wound - Properties Group Date first assessed: 11/04/18 [WS] Time first assessed: 2000 [WS] Present On Admission : yes;picture taken [WS] Side: Right [WS] Orientation: anterior [WS] Location: arm [WS] Type: skin tear [WS] Additional Comments: present on admit, dressing changed, mepilex added  [WS] Recorded by:  [WS] Valerie Gomes RN 11/04/18 2150    Row Name                Wound 11/04/18 2000 Left midline elbow skin tear    Wound - Properties Group Date first assessed: 11/04/18 [WS] Time first assessed: 2000 [WS] Present On Admission : yes;picture taken [WS] Side: Left [WS] Orientation: midline [WS] Location: elbow [WS] Type: skin tear [WS] Additional Comments: present on admit, dressing changed, mepilex added  [WS] Recorded by:  [WS] Valerie Gomes RN 11/04/18 2152    Row Name 11/06/18 0954             Plan of Care Review    Plan of Care Reviewed With patient  -MW      Recorded by [MW] Elise Robles OTR/L 11/06/18 1140      Row Name 11/06/18 0954             Outcome Summary/Treatment Plan (OT)    Daily Summary of Progress (OT) progress toward functional goals is good  -MW      Anticipated Discharge Disposition (OT) home with assist  -MW      Recorded by [MW] Elise Robles OTR/L 11/06/18 1140        User Key  (r) = Recorded By, (t) = Taken By, (c) = Cosigned By    Initials Name Effective Dates Discipline    WS Valerie Gomes RN 08/02/16 -  Nurse    Elise Mathur OTR/L 08/28/18 -  OT        Wound 11/04/18 2000 Right anterior arm skin tear  (Active)   Dressing Appearance dry;intact 11/6/2018  7:50 AM   Closure [place] 11/6/2018  7:50 AM   Base dressing in place, unable to visualize 11/6/2018  7:50 AM   Periwound intact;ecchymotic 11/5/2018  8:00 PM   Periwound Temperature warm 11/5/2018  8:00 PM   Periwound Skin Turgor soft 11/5/2018  8:00 PM   Drainage Characteristics/Odor bleeding controlled;serosanguineous 11/5/2018  8:00 PM   Drainage Amount small 11/5/2018  8:00 PM   Dressing Care, Wound foam 11/6/2018  7:50 AM       Wound 11/04/18 2000 Left midline elbow skin tear (Active)   Dressing Appearance dry;intact 11/6/2018  7:50 AM   Closure [place] 11/6/2018  7:50 AM   Base dressing in place, unable to visualize 11/6/2018  7:50 AM   Periwound intact;ecchymotic 11/5/2018  8:00 PM   Periwound Temperature warm 11/5/2018  8:00 PM   Periwound Skin Turgor soft 11/5/2018  8:00 PM   Drainage Amount none 11/5/2018  8:00 PM   Dressing Care, Wound foam 11/6/2018  7:50 AM         Occupational Therapy Education     Title: PT OT SLP Therapies (Done)     Topic: Occupational Therapy (Done)     Point: ADL training (Done)     Description: Instruct learner(s) on proper safety adaptation and remediation techniques during self care or transfers.   Instruct in proper use of assistive devices.   Learning Progress Summary     Learner Status Readiness Method Response Comment Documented by    Patient Done Acceptance E,H VU handouts provided regarding breathing exercises, breathing techniques with activity, and energy conservation, pt educated regarding handouts and verbalized understanding  11/06/18 1143     Done Acceptance E VU transfer, ADL, OT POC  11/05/18 1507                      User Key     Initials Effective Dates Name Provider Type Discipline     08/28/18 -  Elise Robles, OTR/L Occupational Therapist OT                OT Recommendation and Plan  Outcome Summary/Treatment Plan (OT)  Daily Summary of Progress (OT): progress toward functional goals is good  Anticipated  "Discharge Disposition (OT): home with assist  Planned Therapy Interventions (OT Eval): activity tolerance training, BADL retraining, functional balance retraining, occupation/activity based interventions, patient/caregiver education/training, strengthening exercise, transfer/mobility retraining  Therapy Frequency (OT Eval): 3 times/wk  Daily Summary of Progress (OT): progress toward functional goals is good  Plan of Care Review  Plan of Care Reviewed With: patient  Plan of Care Reviewed With: patient  Outcome Summary: OT txt completed. Pt provided handouts for energy conservation and breathing exercises, reviewed handouts and answered questions to pt understanding. Pt amb to end of opposite [place] with 2 standing rest breaks w CGA. Pt reports feeling \"unsteady\" and \"wobbly\" on her feet though no LOB. Increased WOB and SOA with activity. Continue skilled OT to continue education and activity tolerance. OT recommends d/c home w assist.        Outcome Measures     Row Name 11/06/18 1100 11/05/18 1500          How much help from another is currently needed...    Putting on and taking off regular lower body clothing? 4  -MW 3  -MW     Bathing (including washing, rinsing, and drying) 3  -MW 3  -MW     Toileting (which includes using toilet bed pan or urinal) 4  -MW 3  -MW     Putting on and taking off regular upper body clothing 4  -MW 4  -MW     Taking care of personal grooming (such as brushing teeth) 4  -MW 4  -MW     Eating meals 4  -MW 4  -MW     Score 23  -MW 21  -MW        Functional Assessment    Outcome Measure Options AM-PAC 6 Clicks Daily Activity (OT)  -MW AM-PAC 6 Clicks Daily Activity (OT)  -MW       User Key  (r) = Recorded By, (t) = Taken By, (c) = Cosigned By    Initials Name Provider Type    Elise Mathur, OTR/L Occupational Therapist           Time Calculation:         Time Calculation- OT     Row Name 11/06/18 1144 11/06/18 0954          Time Calculation- OT    OT Start Time 0954  -MW  --     OT " Stop Time 1035  -MW  --     OT Time Calculation (min) 41 min  -MW  --     OT Received On 11/06/18  -MW  --        Timed Charges    61182 - OT Therapeutic Exercise Minutes 11  -MW  --     46064 - OT Self Care/Mgmt Minutes 30  -MW --  -MW       User Key  (r) = Recorded By, (t) = Taken By, (c) = Cosigned By    Initials Name Provider Type     Elise Robles, OTR/L Occupational Therapist           Therapy Suggested Charges     Code   Minutes Charges    87321 (CPT®) Hc Ot Neuromusc Re Education Ea 15 Min      12125 (CPT®) Hc Ot Ther Proc Ea 15 Min 11 1    11706 (CPT®) Hc Ot Therapeutic Act Ea 15 Min      45703 (CPT®) Hc Ot Manual Therapy Ea 15 Min      21231 (CPT®) Hc Ot Iontophoresis Ea 15 Min      68088 (CPT®) Hc Ot Elec Stim Ea-Per 15 Min      39365 (CPT®) Hc Ot Ultrasound Ea 15 Min      00937 (CPT®) Hc Ot Self Care/Mgmt/Train Ea 15 Min 30 2    Total  41 3        Therapy Charges for Today     Code Description Service Date Service Provider Modifiers Qty    62204697930 HC OT EVAL LOW COMPLEXITY 3 11/5/2018 Elise Robles, OTR/L GO 1    65929279792 HC OT SELFCARE CURRENT 11/5/2018 Elise Robles OTR/L [place], CJ 1    61006295855 HC OT SELFCARE PROJECTED 11/5/2018 Elise Robles OTR/L GO, CI 1    63228537265 HC OT SELF CARE/MGMT/TRAIN EA 15 MIN 11/6/2018 Elise Robles OTR/L [place], KX 2    06063276826 HC OT THER PROC EA 15 MIN 11/6/2018 Elise Robles, OTR/L GO, KX 1          OT G-codes  OT Professional Judgement Used?: Yes  OT Functional Scales Options: AM-PAC 6 Clicks Daily Activity (OT)  Score: 21  Functional Limitation: Self care  Self Care Current Status (): At least 20 percent but less than 40 percent impaired, limited or restricted  Self Care Goal Status (): At least 1 percent but less than 20 percent impaired, limited or restricted    RUBIA Nguyễn/CIELO  11/6/2018

## 2018-11-06 NOTE — PLAN OF CARE
Problem: Patient Care Overview  Goal: Plan of Care Review  Outcome: Ongoing (interventions implemented as appropriate)   11/05/18 2910   Coping/Psychosocial   Plan of Care Reviewed With patient   Plan of Care Review   Progress improving   OTHER   Outcome Summary Patient had no new complaints today. Ambulated in [place], O2 sats dropped into 80s on room air, patient quickly recovered to 93% once back in room and sat down. Patient has remained on room air, sats WNL. BM today. NSR/ST  on tele, tele now d/c'd per order. Continue to monitor.        Problem: Breathing Pattern Ineffective (Adult)  Goal: Identify Related Risk Factors and Signs and Symptoms  Outcome: Ongoing (interventions implemented as appropriate)      Problem: Fall Risk (Adult)  Goal: Identify Related Risk Factors and Signs and Symptoms  Outcome: Ongoing (interventions implemented as appropriate)    Goal: Absence of Fall  Outcome: Ongoing (interventions implemented as appropriate)

## 2018-11-06 NOTE — PROGRESS NOTES
LOS: 1 day   Patient Care Team:  Александр Yo DO as PCP - General (Internal Medicine)  Александр Yo DO as PCP - Family Medicine  Александр Yo DO as PCP - Claims [facility]  Freeman Kelsey MD as Cardiologist (Cardiology)  Александр Yo DO as Referring Physician (Internal Medicine)  Gilberto Hammer MD as Consulting Physician (Otolaryngology)    Chief Complaint:   Acute on chronic congestive heart failure (CMS/HCC)    Shortness of breath    Subjective  Feeling  Better this morning  Glass that had more shortness of breath  Bipedal edema persists but improved  No chest pain   Moderate shortness of breath especially with exertion  No other new complaints  Feels tired  Generalized weakness  Easy fatigability  Has increased mobility some  Labs reviewed  Reviewed results with patient      Interval History: Improved overall    Patient Complaints:   Chief Complaint   Patient presents with   • Shortness of Breath       Telemetry: no malignant arrhythmia. No significant pauses.    Review of Systems     Constitutional: No chills   Has fatigue   No fever.   HENT: Negative.    Eyes: Negative.      Respiratory: Mild cough,   No chest wall soreness,   Shortness of breath,   no wheezing, no stridor.      Cardiovascular: Negative for chest pain,   No palpitations   No significant  leg swelling.     Gastrointestinal: Negative for abdominal distention,  No abdominal pain,   No blood in stool,   No constipation,   No diarrhea,   No nausea   No vomiting.     Endocrine: Negative.    Genitourinary: Negative for difficulty urinating, dysuria, flank pain and hematuria.     Musculoskeletal: Negative.    Skin: Negative for rash and wound.   Allergic/Immunologic: Negative.      Neurological: Negative for dizziness, syncope, weakness,   No light-headedness  No  headaches.     Hematological: Does not bruise/bleed easily.     Psychiatric/Behavioral: Negative for agitation or behavioral problems,   No confusion,   the patient is   nervous/anxious.       History:     Possible.    Past Medical History:   Diagnosis Date   • Abdominal pain, left lower quadrant    • Bowel habit changes    • CAD (coronary artery disease)     LAD stent    • COPD (chronic obstructive pulmonary disease) (CMS/HCC)    • Diabetes mellitus (CMS/HCC)    • DVT (deep venous thrombosis) (CMS/HCC)    • Dysphagia    • History of tachycardia    • Hypertension    • Lymphoma (CMS/HCC)    • Pulmonary emboli (CMS/HCC)    • Varicose veins of legs      Past Surgical History:   Procedure Laterality Date   • ANKLE SURGERY     • BACK SURGERY      lower   • CARDIAC CATHETERIZATION     • CHOLECYSTECTOMY     • COLONOSCOPY  04/23/2015    Last colon limted lower diverticulosis left colon, Internal Hemorrhoids   • COLONOSCOPY W/ BIOPSIES AND POLYPECTOMY  07/22/2014    Adenomatous tubular type, Diverticulosis sigmoid colon   • CORONARY STENT PLACEMENT      x 1   • ENDOSCOPY  04/14/2010    Distal ring dilated 48 Fr   • EXPLORATORY LAPAROTOMY  2005    relapse lymphoma   • HEMORRHOIDECTOMY     • HYSTERECTOMY      total abdominal   • LUMBAR SPINE SURGERY     • LUNG BIOPSY N/A 1991    open    • SMALL INTESTINE SURGERY     • TONSILLECTOMY     • UPPER GASTROINTESTINAL ENDOSCOPY  10/23/2015    normal exam     Social History     Social History   • Marital status:      Spouse name: N/A   • Number of children: N/A   • Years of education: N/A     Occupational History   • Not on file.     Social History Main Topics   • Smoking status: Never Smoker   • Smokeless tobacco: Never Used   • Alcohol use No   • Drug use: No   • Sexual activity: Defer     Other Topics Concern   • Not on file     Social History Narrative   • No narrative on file     Family History   Problem Relation Age of Onset   • Diabetes Mother    • Cancer Father         prostate with bone metastasis   • Heart disease Maternal Grandmother    • Stroke Brother    • Colon cancer Neg Hx    • Colon polyps Neg Hx        Labs:  WBC WBC   Date  Value Ref Range Status   11/05/2018 8.69 4.80 - 10.80 10*3/mm3 Final   11/04/2018 8.19 4.80 - 10.80 10*3/mm3 Final   11/03/2018 11.83 (H) 4.80 - 10.80 10*3/mm3 Final      HGB Hemoglobin   Date Value Ref Range Status   11/05/2018 9.9 (L) 12.0 - 16.0 g/dL Final   11/04/2018 9.1 (L) 12.0 - 16.0 g/dL Final   11/03/2018 11.3 (L) 12.0 - 16.0 g/dL Final      HCT Hematocrit   Date Value Ref Range Status   11/05/2018 30.5 (L) 37.0 - 47.0 % Final   11/04/2018 28.0 (L) 37.0 - 47.0 % Final   11/03/2018 34.5 (L) 37.0 - 47.0 % Final      Platelets Platelets   Date Value Ref Range Status   11/05/2018 322 130 - 400 10*3/mm3 Final   11/04/2018 260 130 - 400 10*3/mm3 Final   11/03/2018 328 130 - 400 10*3/mm3 Final      MCV MCV   Date Value Ref Range Status   11/05/2018 98.1 (H) 82.0 - 98.0 fL Final   11/04/2018 98.2 (H) 82.0 - 98.0 fL Final   11/03/2018 95.8 82.0 - 98.0 fL Final          Results from last 7 days  Lab Units 11/06/18  0521 11/05/18  0641 11/04/18  0414 11/03/18  1735  11/01/18  0329   SODIUM mmol/L 138 137 138 138  < > 140   POTASSIUM mmol/L 4.3 3.7 3.6 3.8  < > 3.6   CHLORIDE mmol/L 95* 94* 94* 91*  < > 91*   CO2 mmol/L 33.0* 33.0* 34.0* 32.0*  < > 36.0*   BUN mg/dL 40* 44* 52* 57*  < > 50*   CREATININE mg/dL 1.17 1.44* 1.46* 1.63*  < > 1.87*   CALCIUM mg/dL 8.7 8.2* 7.6* 8.4  < > 7.7*   BILIRUBIN mg/dL  --  0.5  --  0.9  --  0.7   ALK PHOS U/L  --  83  --  97  --  63   ALT (SGPT) U/L  --  36  --  32  --  28   AST (SGOT) U/L  --  31  --  48*  --  49*   GLUCOSE mg/dL 90 98 71 88  < > 108*   < > = values in this interval not displayed.  Lab Results   Component Value Date    CKTOTAL 40 02/20/2016    CKMB 1.17 06/26/2017    TROPONINI 0.065 (H) 11/04/2018     PT/INR:    Protime   Date Value Ref Range Status   11/04/2018 15.2 (H) 11.9 - 14.6 Seconds Final   /  INR   Date Value Ref Range Status   11/04/2018 1.16 (H) 0.91 - 1.09 Final       Imaging Results (last 72 hours)     Procedure Component Value Units Date/Time    XR  Chest 1 View [[id number]] Collected:  10/30/18 1307     Updated:  10/30/18 1320    Narrative:       EXAMINATION: Chest 1 view 10/30/2018     HISTORY: Shortness of breath     FINDINGS: Upright frontal projection of the chest demonstrates bilateral  perihilar airspace disease for which I would favor pulmonary edema over  bilateral pneumonia. Small effusions are present blunting the  costophrenic angles. A tunneled infusion catheter is in place via  left-sided approach. There is evidence of remote granulomatous disease.       Impression:       1.. Bilateral perihilar and lower lobe airspace disease for which I  would favor pulmonary edema over pneumonia.  2. Small effusions present ( left greater than right).  This report was finalized on 10/30/2018 13:17 by Dr. Andrea Bhat MD.          Objective     Allergies   Allergen Reactions   • Zantac [Ranitidine Hcl] Shortness Of Breath   • Fish-Derived Products GI Intolerance       Medication Review: Performed  Current Facility-Administered Medications   Medication Dose Route Frequency Provider Last Rate Last Dose   • amiodarone (PACERONE) tablet 100 mg  100 mg Oral Daily Kirk Pearson DO   100 mg at 11/05/18 0920   • aspirin EC tablet 81 mg  81 mg Oral Daily Kirk Pearson DO   81 mg at 11/05/18 0920   • atorvastatin (LIPITOR) tablet 10 mg  10 mg Oral Nightly Kirk Pearson DO   10 mg at 11/05/18 2052   • calcitriol (ROCALTROL) capsule 0.25 mcg  0.25 mcg Oral Daily Raul Castellon MD   0.25 mcg at 11/05/18 1619   • dextrose (D50W) 25 g/ 50mL Intravenous Solution 25 g  25 g Intravenous Q15 Min PRN Kirk Pearson DO       • dextrose (GLUTOSE) oral gel 15 g  15 g Oral Q15 Min PRN Kirk Pearson DO       • digoxin (LANOXIN) tablet 125 mcg  125 mcg Oral Daily Kirk Pearson DO   125 mcg at 11/05/18 1203   • enoxaparin (LOVENOX) syringe 30 mg  30 mg Subcutaneous Q24H Gilberto Carrero MD       • ferrous sulfate tablet  325 mg  325 mg Oral BID With Meals Raul Castellon MD   325 mg at 11/05/18 1758   • furosemide (LASIX) tablet 40 mg  40 mg Oral BID Jameel Cunningham APRN   40 mg at 11/05/18 1758   • gabapentin (NEURONTIN) capsule 300 mg  300 mg Oral Q12H Kirk Pearson DO   300 mg at 11/05/18 2052   • glucagon (human recombinant) (GLUCAGEN DIAGNOSTIC) injection 1 mg  1 mg Subcutaneous PRN Kirk Pearson DO       • hydrALAZINE (APRESOLINE) tablet 10 mg  10 mg Oral Q12H Freeman Klesey MD       • insulin lispro (humaLOG) injection 0-9 Units  0-9 Units Subcutaneous 4x Daily With Meals & Nightly Kirk Pearson DO   2 Units at 11/05/18 1203   • ipratropium-albuterol (DUO-NEB) nebulizer solution 3 mL  3 mL Nebulization 4x Daily - RT Kirk Pearson DO   3 mL at 11/06/18 0702   • iron sucrose (VENOFER) 200 mg in sodium chloride 0.9 % 100 mL IVPB  200 mg Intravenous Q24H Raul Castellon MD   200 mg at 11/05/18 1618   • isosorbide dinitrate (ISORDIL) tablet 10 mg  10 mg Oral TID - Nitrates Freeman Kelsey MD       • melatonin tablet 6 mg  6 mg Oral Nightly Kirk Pearson DO   6 mg at 11/05/18 2052   • metoprolol succinate XL (TOPROL-XL) 24 hr tablet 25 mg  25 mg Oral Daily Kirk Pearson DO   25 mg at 11/05/18 0919   • nitroglycerin (NITROSTAT) SL tablet 0.4 mg  0.4 mg Sublingual Q5 Min PRN Kirk Pearson DO       • Pharmacy to Dose enoxaparin (LOVENOX)   Does not apply Continuous PRN Gilberto Carrero MD       • sacubitril-valsartan (ENTRESTO) 24-26 MG tablet 1 tablet  1 tablet Oral Q12H Freeman Kelsey MD       • sodium chloride 0.9 % flush 10 mL  10 mL Intravenous PRN Ishaan Francisco Jr., MD       • sodium chloride 0.9 % flush 3 mL  3 mL Intravenous Q12H Kirk Pearson DO   3 mL at 11/05/18 2052   • sodium chloride 0.9 % flush 3-10 mL  3-10 mL Intravenous PRN Kirk Pearson DO           Vital Sign Min/Max for last 24 hours  Temp  Min: 97.9 °F (36.6  "°C)  Max: 98.3 °F (36.8 °C)   BP  Min: 98/42  Max: 118/42   Pulse  Min: 75  Max: 95   Resp  Min: 16  Max: 20   SpO2  Min: 85 %  Max: 100 %   No Data Recorded   Weight  Min: 60.8 kg (134 lb 1 oz)  Max: 60.8 kg (134 lb 1 oz)     Flowsheet Rows      First Filed Value   Admission Height  170.2 cm (67\") Documented at 10/30/2018 1154   Admission Weight  59 kg (130 lb) Documented at 10/30/2018 1154              Physical Exam:    General Appearance: Awake, alert, in no acute distress  Eyes: Pupils equal and reactive    Ears: Appear intact with no abnormalities noted  Nose: Nares normal, no drainage  Neck: supple, trachea midline, no carotid bruit and no JVD  Back: no kyphosis present,    Lungs: respirations regular, respirations even and respirations unlabored    Heart: normal S1, S2,  2/6 pansystolic murmur in the left sternal border,  no rub and no click    Abdomen: normal bowel sounds, no tenderness   Skin: no bleeding, bruising or rash  Extremities: no cyanosis  Psychiatric/Behavioral: Negative for agitation, behavioral problems, confusion, the patient does  appear to be nervous/anxious.          Results Review:   I reviewed the patient's new clinical results.  I reviewed the patient's new imaging results and agree with the interpretation.  I reviewed the patient's other test results and agree with the interpretation  I personally viewed and interpreted the patient's EKG/Telemetry data  Discussed with patient    Reviewed available prior notes, consults, prior visits, laboratory findings, radiology and cardiology relevant reports. Updated chart as applicable. I have reviewed the patient's medical history in detail and updated the computerized patient record as relevant.    Updated patient regarding any new or relevant abnormalities on review of records or any new findings on physical exam. Mentioned to patient about purpose of visit and desirable health short and long term goals and objectives.     Assessment/Plan "   Congestive heart failure (CMS/HCC)  Mild aortic stenosis by echocardiogram  Acute on chronic diastolic heart failure  atypical chest pain  Nausea  Epigastric pain  insulin-requiring diabetes mellitus  Essential hypertension  Mildly elevated troponin        Plan        Continue hydralazine and Isordil, but will decrease the dose by 50%  Now that creatinine has improved and not taking lisinopril will try low-dose Entresto and monitor kidney functions  Will keep in hospital for approximately 48 hours  Monitor for signs of volume overload last night had increasing shortness of breath  Keep follow-up in the office   Last admission she did not want any additional cardiac workup right now  We will defer any further ischemia evaluation.  Patient does not want life vest  Monitor input and output  Telemetry  Optimal medical therapy  Deep vein thrombosis prophylaxis/precautions  Appropriate diet, fluid, sodium, caffeine, stimulants intake   Compliance to diet and medications   Avoid NSAIDS    Freeman Kelsey MD  11/06/18  8:05 AM    EMR Dragon/Transcription was used to dictat and e part of this note

## 2018-11-07 NOTE — THERAPY DISCHARGE NOTE
Acute Care - Occupational Therapy Treatment Note/Discharge  [facility]     Patient Name: Padmini Torres  : 1940  MRN: [id number]  Today's Date: 2018  Onset of Illness/Injury or Date of Surgery: 18  Date of Referral to OT: 18  Referring Physician: Dr. Carrero      Admit Date: 11/3/2018    Visit Dx:     ICD-10-CM ICD-9-CM   1. Acute on chronic congestive heart failure, unspecified heart failure type (CMS/[facility]) I50.9 428.0   2. Chronic obstructive pulmonary disease, unspecified COPD type (CMS/[facility]) J44.9 496   3. Impaired mobility and ADLs Z74.09 799.89     Patient Active Problem List   Diagnosis   • Chronic systolic congestive heart failure (CMS/[facility])   • Congestive heart failure with LV diastolic dysfunction, NYHA class 2 (CMS/[facility])   • Coronary artery disease involving native coronary artery of native heart without angina pectoris   • Mixed hyperlipidemia   • Type 2 diabetes mellitus without complication, without long-term current use of insulin (CMS/[facility])   • Palpitations   • SVT (supraventricular tachycardia) (CMS/[facility])   • Congestive heart failure (CMS/[facility])   • SOB (shortness of breath) on exertion   • Renal insufficiency   • 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   • Varicose veins of both lower extremities   • Acute on chronic congestive heart failure (CMS/[facility])       Therapy Treatment          Rehabilitation Treatment Summary     Row Name 18 1010             Treatment Time/Intention    Discipline occupational therapist  -MW      Document Type therapy note (daily note)  -MW      Subjective Information no complaints  -MW2      Mode of Treatment occupational therapy  -MW2      Patient/Family Observations son present  -MW2      Care Plan Review evaluation/treatment results reviewed;care plan/treatment goals reviewed;risks/benefits reviewed;current/potential barriers reviewed;patient/other agree to care plan  -MW2      Existing Precautions/Restrictions fall;oxygen  therapy device and L/min  -MW2      Recorded by [MW] Elise Robles, OTR/L 11/07/18 1340  [MW2] Elise Robles, OTR/L 11/07/18 1343      Row Name 11/07/18 1010             ADL Assessment/Intervention    91935 - OT Self Care/Mgmt Minutes 8  -MW      Comment, IADL Assessment/Training pt educated regarding ADL/IADL energy conservation handouts. All questions answered to pt understanding, no further concerns/needs requiring skilled OT services.  -MW      Additional Documentation Comment, IADL Assessment/Training (Row)  -MW      Recorded by [MW] Elise Robles OTR/L 11/07/18 1343      Row Name 11/07/18 1010             Positioning and Restraints    Pre-Treatment Position sitting in chair/recliner  -MW      Post Treatment Position chair  -MW      In Chair sitting;call light within reach;encouraged to call for assist;with PT  -MW      Recorded by [MW] Elise Robles OTR/L 11/07/18 1344      Row Name 11/07/18 1010             Pain Assessment    Additional Documentation Pain Scale: FACES Pre/Post-Treatment (Group)  -MW      Recorded by [MW] Elise Robles OTR/L 11/07/18 1344      Row Name 11/07/18 1010             Pain Scale: FACES Pre/Post-Treatment    Pain: FACES Scale, Pretreatment 0-->no hurt  -MW      Pain: FACES Scale, Post-Treatment 0-->no hurt  -MW      Recorded by [MW] Elise Robles OTR/L 11/07/18 1344      Row Name                Wound 11/04/18 2000 Right anterior arm skin tear    Wound - Properties Group Date first assessed: 11/04/18 [WS] Time first assessed: 2000 [WS] Present On Admission : yes;picture taken [WS] Side: Right [WS] Orientation: anterior [WS] Location: arm [WS] Type: skin tear [WS] Additional Comments: present on admit, dressing changed, mepilex added  [WS] Recorded by:  [WS] Valerie Gomes RN 11/04/18 2150    Row Name                Wound 11/04/18 2000 Left midline elbow skin tear    Wound - Properties Group Date first assessed: 11/04/18 [WS] Time first assessed: 2000 [WS] Present  On Admission : yes;picture taken [WS] Side: Left [WS] Orientation: midline [WS] Location: elbow [WS] Type: skin tear [WS] Additional Comments: present on admit, dressing changed, mepilex added  [WS] Recorded by:  [WS] Valerie Gomes RN 11/04/18 2152    Row Name 11/07/18 1010             Plan of Care Review    Plan of Care Reviewed With patient  -MW      Recorded by [MW] Elise Robles OTR/L 11/07/18 1344      Row Name 11/07/18 1010             Outcome Summary/Treatment Plan (OT)    Daily Summary of Progress (OT) progress toward functional goals is good  -MW      Anticipated Discharge Disposition (OT) home with assist  -MW      Reason for Discharge (OT Discharge Summary) no further needs identified  -MW      Recorded by [MW] Elise Robles OTR/L 11/07/18 1344        User Key  (r) = Recorded By, (t) = Taken By, (c) = Cosigned By    Initials Name Effective Dates Discipline     Valerie Gomes RN 08/02/16 -  Nurse    Elise Mathur OTR/L 08/28/18 -  OT        Wound 11/04/18 2000 Right anterior arm skin tear (Active)   Dressing Appearance dry;intact 11/7/2018  8:48 AM   Closure [place] 11/7/2018  8:48 AM   Base dressing in place, unable to visualize 11/7/2018  8:48 AM   Periwound intact;ecchymotic 11/6/2018  7:41 PM   Periwound Temperature warm 11/6/2018  7:41 PM   Periwound Skin Turgor soft 11/6/2018  7:41 PM   Drainage Characteristics/Odor bleeding controlled;serosanguineous 11/6/2018  7:41 PM   Drainage Amount small 11/6/2018  7:41 PM   Dressing Care, Wound foam 11/6/2018  7:41 PM       Wound 11/04/18 2000 Left midline elbow skin tear (Active)   Dressing Appearance dry;intact 11/7/2018  8:48 AM   Closure [place] 11/7/2018  8:48 AM   Base dressing in place, unable to visualize 11/7/2018  8:48 AM   Periwound intact;ecchymotic 11/6/2018  7:41 PM   Periwound Temperature warm 11/6/2018  7:41 PM   Periwound Skin Turgor soft 11/6/2018  7:41 PM   Drainage Amount none 11/7/2018  8:48 AM   Dressing Care, Wound foam  11/6/2018  7:41 PM             OT Rehab Goals     Row Name 11/07/18 1300             Bathing Goal 1 (OT)    Activity/Assistive Device (Bathing Goal 1, OT) bathing skills, all  -MW      [place] Level/Cues Needed (Bathing Goal 1, OT) supervision required  -MW      Time Frame (Bathing Goal 1, OT) long term goal (LTG);by discharge  -MW      Progress/Outcomes (Bathing Goal 1, OT) goal met  -MW        User Key  (r) = Recorded By, (t) = Taken By, (c) = Cosigned By    Initials Name Provider Type Discipline     Elise Robles, OTR/L Occupational Therapist OT          Occupational Therapy Education     Title: PT OT SLP Therapies (Done)     Topic: Occupational Therapy (Done)     Point: ADL training (Done)     Description: Instruct learner(s) on proper safety adaptation and remediation techniques during self care or transfers.   Instruct in proper use of assistive devices.   Learning Progress Summary     Learner Status Readiness Method Response Comment Documented by    Patient Done Acceptance E VU ADL/IADL energy conservation  11/07/18 1346     Done Acceptance E,H VU handouts provided regarding breathing exercises, breathing techniques with activity, and energy conservation, pt educated regarding handouts and verbalized understanding  11/06/18 1143     Done Acceptance E VU transfer, ADL, OT POC  11/05/18 1507                      User Key     Initials Effective Dates Name Provider Type Discipline     08/28/18 -  Elise Robles, OTR/L Occupational Therapist OT                OT Recommendation and Plan  Outcome Summary/Treatment Plan (OT)  Daily Summary of Progress (OT): progress toward functional goals is good  Anticipated Discharge Disposition (OT): home with assist  Reason for Discharge (OT Discharge Summary): no further needs identified  Planned Therapy Interventions (OT Eval): activity tolerance training, BADL retraining, functional balance retraining, occupation/activity based interventions,  patient/caregiver education/training, strengthening exercise, transfer/mobility retraining  Therapy Frequency (OT Eval): 3 times/wk  Daily Summary of Progress (OT): progress toward functional goals is good  Plan of Care Review  Plan of Care Reviewed With: patient  Plan of Care Reviewed With: patient  Outcome Summary: OT txt completed. OT finalized education regarding ADL/IADL energy conservation handouts and all pt questions answered to pt understanding. Pt reports up independently this morning completing all washing and dressing with no difficulty or SOA. If any further questions please re-consult. No further needs identified requiring skilled OT, OT to sign off.          Outcome Measures     Row Name 11/07/18 1300 11/06/18 1100 11/05/18 1500       How much help from another is currently needed...    Putting on and taking off regular lower body clothing? 4  -MW 4  -MW 3  -MW    Bathing (including washing, rinsing, and drying) 4  -MW 3  -MW 3  -MW    Toileting (which includes using toilet bed pan or urinal) 4  -MW 4  -MW 3  -MW    Putting on and taking off regular upper body clothing 4  -MW 4  -MW 4  -MW    Taking care of personal grooming (such as brushing teeth) 4  -MW 4  -MW 4  -MW    Eating meals 4  -MW 4  -MW 4  -MW    Score 24  -MW 23  -MW 21  -MW       Functional Assessment    Outcome Measure Options AM-PAC 6 Clicks Daily Activity (OT)  -MW AM-PAC 6 Clicks Daily Activity (OT)  -MW AM-PAC 6 Clicks Daily Activity (OT)  -MW      User Key  (r) = Recorded By, (t) = Taken By, (c) = Cosigned By    Initials Name Provider Type    Elise Mathur, OTR/L Occupational Therapist           Time Calculation:          Time Calculation- OT     Row Name 11/07/18 1346 11/07/18 1010          Time Calculation- OT    OT Start Time 1310  -MW  --     OT Stop Time 1318  -MW  --     OT Time Calculation (min) 8 min  -MW  --     Total Timed Code Minutes- OT 8 minute(s)  -MW  --     OT Received On 11/07/18  -MW  --        Timed  Charges    25846 - OT Self Care/Mgmt Minutes  -- 8  -MW       User Key  (r) = Recorded By, (t) = Taken By, (c) = Cosigned By    Initials Name Provider Type     Elise Robles OTR/L Occupational Therapist        Therapy Suggested Charges     Code   Minutes Charges    32970 (CPT®) Hc Ot Neuromusc Re Education Ea 15 Min      97021 (CPT®) Hc Ot Ther Proc Ea 15 Min      03608 (CPT®) Hc Ot Therapeutic Act Ea 15 Min      79627 (CPT®) Hc Ot Manual Therapy Ea 15 Min      72689 (CPT®) Hc Ot Iontophoresis Ea 15 Min      26255 (CPT®) Hc Ot Elec Stim Ea-Per 15 Min      69986 (CPT®) Hc Ot Ultrasound Ea 15 Min      01941 (CPT®) Hc Ot Self Care/Mgmt/Train Ea 15 Min 8 1    Total  8 1        Therapy Charges for Today     Code Description Service Date Service Provider Modifiers Qty    09393557350 HC OT SELF CARE/MGMT/TRAIN EA 15 MIN 11/6/2018 Elise Robles, OTR/L GO, KX 2    23347896242 HC OT THER PROC EA 15 MIN 11/6/2018 Elise Robles, OTR/L GO, KX 1    51507153199 HC OT SELFCARE CURRENT 11/7/2018 Elise Robles, OTR/L GO, CH 1    44403078034 HC OT SELFCARE PROJECTED 11/7/2018 Elise Robles, OTR/L GO, CH 1    90695726836 HC OT SELFCARE DISCHARGE 11/7/2018 Elise Robles, OTR/L GO, CH 1    61274973766 HC OT SELF CARE/MGMT/TRAIN EA 15 MIN 11/7/2018 Elise Robles, OTR/L GO, KX 1          OT G-codes  OT Professional Judgement Used?: Yes  OT Functional Scales Options: AM-PAC 6 Clicks Daily Activity (OT)  Score: 24  Functional Limitation: Self care  Self Care Current Status (): 0 percent impaired, limited or restricted  Self Care Goal Status (): 0 percent impaired, limited or restricted  Self Care Discharge Status (): 0 percent impaired, limited or restricted    OT Discharge Summary  Anticipated Discharge Disposition (OT): home with assist  Reason for Discharge: At baseline function  Outcomes Achieved: Able to achieve all goals within established timeline  Discharge Destination: Home with assist    Elise  KRISTIN Robles, OTR/CIELO  11/7/2018

## 2018-11-07 NOTE — PROGRESS NOTES
LOS: 2 days   Patient Care Team:  Александр Yo DO as PCP - General (Internal Medicine)  Александр Yo DO as PCP - Family Medicine  Александр Yo DO as PCP - Claims [facility]  Freeman Kelsey MD as Cardiologist (Cardiology)  Александр Yo DO as Referring Physician (Internal Medicine)  Gilberto Hammer MD as Consulting Physician (Otolaryngology)    Chief Complaint:   Acute on chronic congestive heart failure (CMS/HCC)    Shortness of breath    Subjective  Feeling  Better this morning  Much less shortness of breath  Less pedal edema  Has increase ambulation  Feels less tired  Sitting up in chair  Labs reviewed with    Interval History: Improved overall    Patient Complaints:   Chief Complaint   Patient presents with   • Shortness of Breath       Telemetry: no malignant arrhythmia. No significant pauses.    Review of Systems     Constitutional: No chills   Has fatigue   No fever.   HENT: Negative.    Eyes: Negative.      Respiratory: Mild cough,   No chest wall soreness,   Shortness of breath,   no wheezing, no stridor.      Cardiovascular: Negative for chest pain,   No palpitations   No significant  leg swelling.     Gastrointestinal: Negative for abdominal distention,  No abdominal pain,   No blood in stool,   No constipation,   No diarrhea,   No nausea   No vomiting.     Endocrine: Negative.    Genitourinary: Negative for difficulty urinating, dysuria, flank pain and hematuria.     Musculoskeletal: Negative.    Skin: Negative for rash and wound.   Allergic/Immunologic: Negative.      Neurological: Negative for dizziness, syncope, weakness,   No light-headedness  No  headaches.     Hematological: Does not bruise/bleed easily.     Psychiatric/Behavioral: Negative for agitation or behavioral problems,   No confusion,   the patient is  nervous/anxious.       History:     Possible.    Past Medical History:   Diagnosis Date   • Abdominal pain, left lower quadrant    • Bowel habit changes    • CAD (coronary  artery disease)     LAD stent    • COPD (chronic obstructive pulmonary disease) (CMS/HCC)    • Diabetes mellitus (CMS/HCC)    • DVT (deep venous thrombosis) (CMS/HCC)    • Dysphagia    • History of tachycardia    • Hypertension    • Lymphoma (CMS/HCC)    • Pulmonary emboli (CMS/HCC)    • Varicose veins of legs      Past Surgical History:   Procedure Laterality Date   • ANKLE SURGERY     • BACK SURGERY      lower   • CARDIAC CATHETERIZATION     • CHOLECYSTECTOMY     • COLONOSCOPY  04/23/2015    Last colon limted lower diverticulosis left colon, Internal Hemorrhoids   • COLONOSCOPY W/ BIOPSIES AND POLYPECTOMY  07/22/2014    Adenomatous tubular type, Diverticulosis sigmoid colon   • CORONARY STENT PLACEMENT      x 1   • ENDOSCOPY  04/14/2010    Distal ring dilated 48 Fr   • EXPLORATORY LAPAROTOMY  2005    relapse lymphoma   • HEMORRHOIDECTOMY     • HYSTERECTOMY      total abdominal   • LUMBAR SPINE SURGERY     • LUNG BIOPSY N/A 1991    open    • SMALL INTESTINE SURGERY     • TONSILLECTOMY     • UPPER GASTROINTESTINAL ENDOSCOPY  10/23/2015    normal exam     Social History     Social History   • Marital status:      Spouse name: N/A   • Number of children: N/A   • Years of education: N/A     Occupational History   • Not on file.     Social History Main Topics   • Smoking status: Never Smoker   • Smokeless tobacco: Never Used   • Alcohol use No   • Drug use: No   • Sexual activity: Defer     Other Topics Concern   • Not on file     Social History Narrative   • No narrative on file     Family History   Problem Relation Age of Onset   • Diabetes Mother    • Cancer Father         prostate with bone metastasis   • Heart disease Maternal Grandmother    • Stroke Brother    • Colon cancer Neg Hx    • Colon polyps Neg Hx        Labs:  WBC WBC   Date Value Ref Range Status   11/05/2018 8.69 4.80 - 10.80 10*3/mm3 Final      HGB Hemoglobin   Date Value Ref Range Status   11/05/2018 9.9 (L) 12.0 - 16.0 g/dL Final      HCT  Hematocrit   Date Value Ref Range Status   11/05/2018 30.5 (L) 37.0 - 47.0 % Final      Platelets Platelets   Date Value Ref Range Status   11/05/2018 322 130 - 400 10*3/mm3 Final      MCV MCV   Date Value Ref Range Status   11/05/2018 98.1 (H) 82.0 - 98.0 fL Final          Results from last 7 days  Lab Units 11/07/18  0638 11/06/18  0521 11/05/18  0641  11/03/18  1735  11/01/18  0329   SODIUM mmol/L 139 138 137  < > 138  < > 140   POTASSIUM mmol/L 4.5 4.3 3.7  < > 3.8  < > 3.6   CHLORIDE mmol/L 94* 95* 94*  < > 91*  < > 91*   CO2 mmol/L 34.0* 33.0* 33.0*  < > 32.0*  < > 36.0*   BUN mg/dL 32* 40* 44*  < > 57*  < > 50*   CREATININE mg/dL 1.13 1.17 1.44*  < > 1.63*  < > 1.87*   CALCIUM mg/dL 9.1 8.7 8.2*  < > 8.4  < > 7.7*   BILIRUBIN mg/dL  --   --  0.5  --  0.9  --  0.7   ALK PHOS U/L  --   --  83  --  97  --  63   ALT (SGPT) U/L  --   --  36  --  32  --  28   AST (SGOT) U/L  --   --  31  --  48*  --  49*   GLUCOSE mg/dL 121* 90 98  < > 88  < > 108*   < > = values in this interval not displayed.  Lab Results   Component Value Date    CKTOTAL 40 02/20/2016    CKMB 1.17 06/26/2017    TROPONINI 0.065 (H) 11/04/2018     PT/INR:    No results found for: PROTIME/  No results found for: INR    Imaging Results (last 72 hours)     Procedure Component Value Units Date/Time    XR Chest 1 View [[id number]] Collected:  10/30/18 1307     Updated:  10/30/18 1320    Narrative:       EXAMINATION: Chest 1 view 10/30/2018     HISTORY: Shortness of breath     FINDINGS: Upright frontal projection of the chest demonstrates bilateral  perihilar airspace disease for which I would favor pulmonary edema over  bilateral pneumonia. Small effusions are present blunting the  costophrenic angles. A tunneled infusion catheter is in place via  left-sided approach. There is evidence of remote granulomatous disease.       Impression:       1.. Bilateral perihilar and lower lobe airspace disease for which I  would favor pulmonary edema over  pneumonia.  2. Small effusions present ( left greater than right).  This report was finalized on 10/30/2018 13:17 by Dr. Andrea Bhat MD.          Objective     Allergies   Allergen Reactions   • Zantac [Ranitidine Hcl] Shortness Of Breath   • Fish-Derived Products GI Intolerance       Medication Review: Performed  Current Facility-Administered Medications   Medication Dose Route Frequency Provider Last Rate Last Dose   • allopurinol (ZYLOPRIM) tablet 100 mg  100 mg Oral Daily Raul Castellon MD   100 mg at 11/06/18 2034   • amiodarone (PACERONE) tablet 100 mg  100 mg Oral Daily Kirk Pearson DO   100 mg at 11/06/18 0845   • aspirin EC tablet 81 mg  81 mg Oral Daily Kirk Pearson DO   81 mg at 11/06/18 0845   • atorvastatin (LIPITOR) tablet 10 mg  10 mg Oral Nightly Kirk Pearson DO   10 mg at 11/06/18 2035   • bumetanide (BUMEX) tablet 1 mg  1 mg Oral BID Gilberto Carrero MD   1 mg at 11/07/18 0635   • calcitriol (ROCALTROL) capsule 0.25 mcg  0.25 mcg Oral Daily Raul Castellon MD   0.25 mcg at 11/06/18 0845   • dextrose (D50W) 25 g/ 50mL Intravenous Solution 25 g  25 g Intravenous Q15 Min PRN Kirk Pearson DO       • dextrose (GLUTOSE) oral gel 15 g  15 g Oral Q15 Min PRN Kirk Pearson DO       • digoxin (LANOXIN) tablet 125 mcg  125 mcg Oral Daily Kirk Pearson DO   125 mcg at 11/06/18 1238   • enoxaparin (LOVENOX) syringe 30 mg  30 mg Subcutaneous Q24H Gilberto Carrero MD   30 mg at 11/06/18 1107   • ferrous sulfate tablet 325 mg  325 mg Oral BID With Meals Raul Castellon MD   325 mg at 11/06/18 1906   • gabapentin (NEURONTIN) capsule 300 mg  300 mg Oral Q12H Kirk Pearson DO   300 mg at 11/06/18 2035   • glucagon (human recombinant) (GLUCAGEN DIAGNOSTIC) injection 1 mg  1 mg Subcutaneous PRN Kirk Pearson DO       • hydrALAZINE (APRESOLINE) tablet 10 mg  10 mg Oral Q12H Laure, Freeman, MD   10 mg at  "11/06/18 0846   • insulin lispro (humaLOG) injection 0-9 Units  0-9 Units Subcutaneous 4x Daily With Meals & Nightly Kirk Pearson DO   4 Units at 11/06/18 1237   • ipratropium-albuterol (DUO-NEB) nebulizer solution 3 mL  3 mL Nebulization TID - RT Gilberto Carrero MD   3 mL at 11/07/18 0739   • ipratropium-albuterol (DUO-NEB) nebulizer solution 3 mL  3 mL Nebulization Q4H PRN Gilberto Carrero MD       • iron sucrose (VENOFER) 200 mg in sodium chloride 0.9 % 100 mL IVPB  200 mg Intravenous Q24H CandeRaul stahl MD   200 mg at 11/06/18 1556   • isosorbide dinitrate (ISORDIL) tablet 10 mg  10 mg Oral TID - Nitrates Freeman Kelsey MD   10 mg at 11/06/18 1906   • melatonin tablet 6 mg  6 mg Oral Nightly Kirk Pearson DO   6 mg at 11/06/18 2035   • metoprolol succinate XL (TOPROL-XL) 24 hr tablet 25 mg  25 mg Oral Daily Kirk Pearson DO   25 mg at 11/06/18 0845   • nitroglycerin (NITROSTAT) SL tablet 0.4 mg  0.4 mg Sublingual Q5 Min PRN Kirk Pearson DO       • Pharmacy to Dose enoxaparin (LOVENOX)   Does not apply Continuous PRN Gilberto Carrero MD       • sacubitril-valsartan (ENTRESTO) 24-26 MG tablet 1 tablet  1 tablet Oral Q12H Freeman Kelsey MD   1 tablet at 11/06/18 2034   • sodium chloride 0.9 % flush 10 mL  10 mL Intravenous PRN Ishaan Francisco Jr., MD       • sodium chloride 0.9 % flush 3 mL  3 mL Intravenous Q12H Kirk Pearson DO   3 mL at 11/06/18 2035   • sodium chloride 0.9 % flush 3-10 mL  3-10 mL Intravenous PRN Kirk Pearson DO           Vital Sign Min/Max for last 24 hours  Temp  Min: 96.9 °F (36.1 °C)  Max: 97.9 °F (36.6 °C)   BP  Min: 92/41  Max: 113/51   Pulse  Min: 63  Max: 99   Resp  Min: 16  Max: 20   SpO2  Min: 86 %  Max: 98 %   No Data Recorded   Weight  Min: 60.8 kg (134 lb)  Max: 61 kg (134 lb 6.4 oz)     Flowsheet Rows      First Filed Value   Admission Height  170.2 cm (67\") Documented at 10/30/2018 1154   Admission " Weight  59 kg (130 lb) Documented at 10/30/2018 1154              Physical Exam:    General Appearance: Awake, alert, in no acute distress  Eyes: Pupils equal and reactive    Ears: Appear intact with no abnormalities noted  Nose: Nares normal, no drainage  Neck: supple, trachea midline, no carotid bruit and no JVD  Back: no kyphosis present,    Lungs: respirations regular, respirations even and respirations unlabored    Heart: normal S1, S2,  2/6 pansystolic murmur in the left sternal border,  no rub and no click    Abdomen: normal bowel sounds, no tenderness   Skin: no bleeding, bruising or rash  Extremities: no cyanosis  Psychiatric/Behavioral: Negative for agitation, behavioral problems, confusion, the patient does  appear to be nervous/anxious.          Results Review:   I reviewed the patient's new clinical results.  I reviewed the patient's new imaging results and agree with the interpretation.  I reviewed the patient's other test results and agree with the interpretation  I personally viewed and interpreted the patient's EKG/Telemetry data  Discussed with patient    Reviewed available prior notes, consults, prior visits, laboratory findings, radiology and cardiology relevant reports. Updated chart as applicable. I have reviewed the patient's medical history in detail and updated the computerized patient record as relevant.    Updated patient regarding any new or relevant abnormalities on review of records or any new findings on physical exam. Mentioned to patient about purpose of visit and desirable health short and long term goals and objectives.     Assessment/Plan   Congestive heart failure (CMS/HCC)  Mild aortic stenosis by echocardiogram  Acute on chronic diastolic heart failure  atypical chest pain  Nausea  Epigastric pain  insulin-requiring diabetes mellitus  Essential hypertension  Mildly elevated troponin        Plan        Continue hydralazine and Isordil, at current dose  Tolerating Entresto  was  Monitor kidney functions  Creatinine is stable  Recheck BMP tomorrow no worsening of creatinine can be discharged home  See me in follow-up in 4 weeks  Overall significantly improved with marked improvement in shortness of breath and no evidence of paroxysmal nocturnal dyspnea.  Will ambulate patient  Last admission she did not want any additional cardiac workup right now  We will defer any further ischemia evaluation.  Patient does not want life vest  Monitor input and output  Telemetry  Optimal medical therapy  Deep vein thrombosis prophylaxis/precautions  Appropriate diet, fluid, sodium, caffeine, stimulants intake   Compliance to diet and medications   Avoid NSAIDS    Freeman Kelsey MD  11/07/18  8:14 AM    EMR Dragon/Transcription was used to dictat and e part of this note

## 2018-11-07 NOTE — THERAPY EVALUATION
Acute Care - Physical Therapy Initial Evaluation  [facility]     Patient Name: Padmini Torres  : 1940  MRN: [id number]  Today's Date: 2018   Onset of Illness/Injury or Date of Surgery: 18  Date of Referral to PT: 18  Referring Physician: Dr. Carrero      Admit Date: 11/3/2018    Visit Dx:     ICD-10-CM ICD-9-CM   1. Acute on chronic congestive heart failure, unspecified heart failure type (CMS/HCC) I50.9 428.0   2. Chronic obstructive pulmonary disease, unspecified COPD type (CMS/HCC) J44.9 496   3. Impaired mobility and ADLs Z74.09 799.89   4. Impaired functional mobility, balance, gait, and endurance Z74.09 V49.89     Patient Active Problem List   Diagnosis   • Chronic systolic congestive heart failure (CMS/HCC)   • Congestive heart failure with LV diastolic dysfunction, NYHA class 2 (CMS/HCC)   • Coronary artery disease involving native coronary artery of native heart without angina pectoris   • Mixed hyperlipidemia   • Type 2 diabetes mellitus without complication, without long-term current use of insulin (CMS/HCC)   • Palpitations   • SVT (supraventricular tachycardia) (CMS/HCC)   • Congestive heart failure (CMS/HCC)   • SOB (shortness of breath) on exertion   • Renal insufficiency   • 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   • Varicose veins of both lower extremities   • Acute on chronic congestive heart failure (CMS/HCC)     Past Medical History:   Diagnosis Date   • Abdominal pain, left lower quadrant    • Bowel habit changes    • CAD (coronary artery disease)     LAD stent    • COPD (chronic obstructive pulmonary disease) (CMS/HCC)    • Diabetes mellitus (CMS/HCC)    • DVT (deep venous thrombosis) (CMS/HCC)    • Dysphagia    • History of tachycardia    • Hypertension    • Lymphoma (CMS/HCC)    • Pulmonary emboli (CMS/HCC)    • Varicose veins of legs      Past Surgical History:   Procedure Laterality Date   • ANKLE SURGERY     • BACK SURGERY      lower   •  CARDIAC CATHETERIZATION     • CHOLECYSTECTOMY     • COLONOSCOPY  04/23/2015    Last colon limted lower diverticulosis left colon, Internal Hemorrhoids   • COLONOSCOPY W/ BIOPSIES AND POLYPECTOMY  07/22/2014    Adenomatous tubular type, Diverticulosis sigmoid colon   • CORONARY STENT PLACEMENT      x 1   • ENDOSCOPY  04/14/2010    Distal ring dilated 48 Fr   • EXPLORATORY LAPAROTOMY  2005    relapse lymphoma   • HEMORRHOIDECTOMY     • HYSTERECTOMY      total abdominal   • LUMBAR SPINE SURGERY     • LUNG BIOPSY N/A 1991    open    • SMALL INTESTINE SURGERY     • TONSILLECTOMY     • UPPER GASTROINTESTINAL ENDOSCOPY  10/23/2015    normal exam        PT ASSESSMENT (last 12 hours)      Physical Therapy Evaluation     Row Name 11/07/18 1313          PT Evaluation Time/Intention    Subjective Information no complaints  -SD     Document Type evaluation  -SD     Mode of Treatment physical therapy  -SD     Patient Effort good  -SD     Symptoms Noted During/After Treatment none  -SD     Row Name 11/07/18 1313          General Information    Patient Profile Reviewed? yes  -SD     Onset of Illness/Injury or Date of Surgery 11/03/18  -SD     Referring Physician Dr. Carrero  -SD     Patient Observations alert;cooperative;agree to therapy  -SD     Patient/Family Observations son present  -SD     General Observations of Patient up in chair, pt on 2L O2  -SD     Prior Level of Function independent:;all household mobility;community mobility;ADL's;dependent:;driving  -SD     Equipment Currently Used at Home cane, straight;walker, rolling  -SD     Pertinent History of Current Functional Problem Pt d/c from [facility] on 11/[facility] with admit for CHF exacerbation, returned with increased SOA and fluid overload. Dx: acute hypoxic resp failure, acute on chronic CHF, FALGUNI, L pleural effusion.  -SD     Existing Precautions/Restrictions fall;oxygen therapy device and L/min  -SD     Limitations/Impairments visual  -SD     Risks Reviewed  patient:;LOB;nausea/vomiting;dizziness;increased discomfort;change in vital signs;increased drainage;lines disloged  -SD     Benefits Reviewed patient:;improve function;increase independence;increase strength;increase balance;decrease pain;decrease risk of DVT;improve skin integrity;increase knowledge  -SD     Barriers to Rehab medically complex  -SD     Row Name 11/07/18 1313          Relationship/Environment    Primary Source of Support/Comfort child(amol)  -SD     Name of Support/Comfort Primary Source Denys  -SD     Lives With alone  -SD     Family Caregiver if Needed child(amol), adult  -SD     Family Caregiver Names Denys  -SD     Row Name 11/07/18 1313          Resource/Environmental Concerns    Current Living Arrangements home/apartment/condo  -SD     Resource/Environmental Concerns none  -SD     Transportation Concerns car, none  -SD     Row Name 11/07/18 1313          Home Main Entrance    Number of Stairs, Main Entrance five  -SD     Stair Railings, Main Entrance railings on both sides of stairs  -SD     Row Name 11/07/18 1313          Cognitive Assessment/Interventions    Additional Documentation Cognitive Assessment/Intervention (Group)  -SD     Row Name 11/07/18 1313          Cognitive Assessment/Intervention- PT/OT    Affect/Mental Status (Cognitive) WFL  -SD     Orientation Status (Cognition) oriented x 4  -SD     Follows Commands (Cognition) WFL  -SD     Cognitive Function (Cognitive) WFL  -SD     Personal Safety Interventions fall prevention program maintained;gait belt;muscle strengthening facilitated;nonskid shoes/slippers when out of bed;supervised activity  -SD     Row Name 11/07/18 1313          Bed Mobility Assessment/Treatment    Bed Mobility Assessment/Treatment bed mobility (all) activities  -SD     New Site Level (Bed Mobility) conditional independence  -SD     Supine-Sit New Site (Bed Mobility) conditional independence  -SD     Assistive Device (Bed Mobility) head of bed elevated  -SD      Row Name 11/07/18 1313          Transfer Assessment/Treatment    Transfer Assessment/Treatment sit-stand transfer;stand-sit transfer  -SD     Sit-Stand [place] (Transfers) supervision  -SD     Stand-Sit [place] (Transfers) supervision  -SD     Row Name 11/07/18 1313          Gait/Stairs Assessment/Training    Gait/Stairs Assessment/Training gait/ambulation independence  -SD     [place] Level (Gait) contact guard  -SD     Assistive Device (Gait) walker, front-wheeled  -SD     Distance in Feet (Gait) 100  -SD     Pattern (Gait) step-through  -SD     Left Sided Gait Deviations --   sways left   -SD     Row Name 11/07/18 1313          General ROM    GENERAL ROM COMMENTS BUE and BLE WFL  -SD     Row Name 11/07/18 1313          MMT (Manual Muscle Testing)    General MMT Comments BUE and BLE WFL  -SD     Row Name 11/07/18 1313          Sensory Assessment/Intervention    Sensory General Assessment light touch sensation deficits identified   in B feet  -SD     Row Name 11/07/18 1313          Vision Assessment/Intervention    Visual Impairment/Limitations --   blind in R eye  -SD     Row Name 11/07/18 1313          Light Touch Sensation Assessment    Left Lower Extremity: Light Touch Sensation Assessment moderate impairment, 50 to 74% correct responses  -SD     Right Lower Extremity: Light Touch Sensation Assessment moderate impairment, 50 to 74% correct responses  -SD     Row Name 11/07/18 1313          Pain Assessment    Additional Documentation Pain Scale: Numbers Pre/Post-Treatment (Group)  -SD     Row Name 11/07/18 1313          Pain Scale: Numbers Pre/Post-Treatment    Pain Scale: Numbers, Pretreatment 0/10 - no pain  -SD     Row Name             Wound 11/04/18 2000 Right anterior arm skin tear    Wound - Properties Group Date first assessed: 11/04/18  -WS Time first assessed: 2000  -WS Present On Admission : yes;picture taken  -WS Side: Right  -WS Orientation: anterior  -WS Location: arm  -WS Type:  skin tear  -WS Additional Comments: present on admit, dressing changed, mepilex added   -WS    Row Name             Wound 11/04/18 2000 Left midline elbow skin tear    Wound - Properties Group Date first assessed: 11/04/18  -WS Time first assessed: 2000  -WS Present On Admission : yes;picture taken  -WS Side: Left  -WS Orientation: midline  -WS Location: elbow  -WS Type: skin tear  -WS Additional Comments: present on admit, dressing changed, mepilex added   -WS    Row Name 11/07/18 1313          Plan of Care Review    Plan of Care Reviewed With patient  -SD     Row Name 11/07/18 1313          Physical Therapy Clinical Impression    Date of Referral to PT 11/05/18  -SD     Patient/Family Goals Statement (PT Clinical Impression) go home  -SD     Criteria for Skilled Interventions Met (PT Clinical Impression) yes  -SD     Impairments Found (describe specific impairments) aerobic capacity/endurance;gait, locomotion, and balance;sensory Integrity  -SD     Rehab Potential (PT Clinical Summary) good, to achieve stated therapy goals  -SD     Predicted Duration of Therapy (PT) until d/c  -SD     Care Plan Review (PT) evaluation/treatment results reviewed;care plan/treatment goals reviewed;current/potential barriers reviewed;risks/benefits reviewed;patient/other agree to care plan  -SD     Patient/Family Concerns, Anticipated Discharge Disposition (PT) pt reluctant to participate in OP therapy  -SD     Row Name 11/07/18 1313          Physical Therapy Goals    Bed Mobility Goal Selection (PT) bed mobility, PT goal 1  -SD     Transfer Goal Selection (PT) transfer, PT goal 1  -SD     Gait Training Goal Selection (PT) gait training, PT goal 1  -SD     Row Name 11/07/18 1313          Bed Mobility Goal 1 (PT)    Activity/Assistive Device (Bed Mobility Goal 1, PT) bed mobility activities, all  -SD     [place] Level/Cues Needed (Bed Mobility Goal 1, PT) independent  -SD     Time Frame (Bed Mobility Goal 1, PT) by discharge  -SD      Progress/Outcomes (Bed Mobility Goal 1, PT) goal ongoing  -SD     Row Name 11/07/18 1313          Transfer Goal 1 (PT)    Activity/Assistive Device (Transfer Goal 1, PT) sit-to-stand/stand-to-sit  -SD     [place] Level/Cues Needed (Transfer Goal 1, PT) independent  -SD     Time Frame (Transfer Goal 1, PT) by discharge  -SD     Progress/Outcome (Transfer Goal 1, PT) goal ongoing  -SD     Row Name 11/07/18 1313          Gait Training Goal 1 (PT)    Activity/Assistive Device (Gait Training Goal 1, PT) gait (walking locomotion);increase endurance/gait distance;increase energy conservation  -SD     [place] Level (Gait Training Goal 1, PT) independent  -SD     Distance (Gait Goal 1, PT) 200  -SD     Time Frame (Gait Training Goal 1, PT) by discharge  -SD     Progress/Outcome (Gait Training Goal 1, PT) goal ongoing  -SD     Row Name 11/07/18 1313          Positioning and Restraints    Pre-Treatment Position sitting in chair/recliner  -SD     Post Treatment Position chair  -SD     In Chair reclined;sitting;call light within reach;encouraged to call for assist;legs elevated  -SD     Row Name 11/07/18 1313          Living Environment    Home Accessibility stairs to enter home  -SD       User Key  (r) = Recorded By, (t) = Taken By, (c) = Cosigned By    Initials Name Provider Type    Valerie Mi, RN Registered Nurse    Jami Holden, PT Physical Therapist          Physical Therapy Education     Title: PT OT SLP Therapies (Done)     Topic: Physical Therapy (Done)     Point: Mobility training (Done)    Learning Progress Summary     Learner Status Readiness Method Response Comment Documented by    Patient Done Acceptance E VU pt educated on energy conservation, having legs elevated and performing ankle pumps to decrease edema, POC and d/c disposition SD 11/07/18 2011          Point: Home exercise program (Done)    Learning Progress Summary     Learner Status Readiness Method Response Comment Documented by     Patient Done Acceptance E VU pt educated on energy conservation, having legs elevated and performing ankle pumps to decrease edema, POC and d/c disposition SD 11/07/18 1404          Point: Body mechanics (Done)    Learning Progress Summary     Learner Status Readiness Method Response Comment Documented by    Patient Done Acceptance E VU pt educated on energy conservation, having legs elevated and performing ankle pumps to decrease edema, POC and d/c disposition SD 11/07/18 1404          Point: Precautions (Done)    Learning Progress Summary     Learner Status Readiness Method Response Comment Documented by    Patient Done Acceptance E VU pt educated on energy conservation, having legs elevated and performing ankle pumps to decrease edema, POC and d/c disposition SD 11/07/18 1404                      User Key     Initials Effective Dates Name Provider Type Discipline    SD 08/31/18 -  Jami Rodarte, PT Physical Therapist PT                PT Recommendation and Plan  Anticipated Discharge Disposition (PT): home with assist, home with OP services  Planned Therapy Interventions (PT Eval): balance training, gait training, bed mobility training, home exercise program, patient/family education, stair training, strengthening, transfer training  Therapy Frequency (PT Clinical Impression): 2 times/day  Outcome Summary/Treatment Plan (PT)  Anticipated Discharge Disposition (PT): home with assist, home with OP services  Patient/Family Concerns, Anticipated Discharge Disposition (PT): pt reluctant to participate in OP therapy  Plan of Care Reviewed With: patient  Progress: improving  Outcome Summary: PT evaluation completed. Pt alert and oriented x 4. Pt has been up ad jorge in her room, and is using the restroom independently. Pt with good strength in LE, and only needed sup for transfers. Pt did well with gait in the room, but when asked to walk a longer distance, pt was swaying and losing balance in the hallway. Pt needed CGAx1  "to regain balance. Pt also SOA at end of gait training. Pt had 2L O2 on throughout treatment. Pt would benefit from skilled physical therapy to increase safety, balance and endurance. Recommend home with assist from son and OP services, although pt is reluctant to participate in therapy once at home due to scheduling and because she \"doesn't have problems at home\". Pt would benefit from further skilled interventions to challenge dynamic balance and gait to promote safety within the home.          Outcome Measures     Row Name 11/07/18 1400 11/07/18 1300 11/06/18 1100       How much help from another person do you currently need...    Turning from your back to your side while in flat bed without using bedrails? 4  -SD  --  --    Moving from lying on back to sitting on the side of a flat bed without bedrails? 4  -SD  --  --    Moving to and from a bed to a chair (including a wheelchair)? 4  -SD  --  --    Standing up from a chair using your arms (e.g., wheelchair, bedside chair)? 4  -SD  --  --    Climbing 3-5 steps with a railing? 3  -SD  --  --    To walk in hospital room? 3  -SD  --  --    AM-PAC 6 Clicks Score 22  -SD  --  --       How much help from another is currently needed...    Putting on and taking off regular lower body clothing?  -- 4  -MW 4  -MW    Bathing (including washing, rinsing, and drying)  -- 4  -MW 3  -MW    Toileting (which includes using toilet bed pan or urinal)  -- 4  -MW 4  -MW    Putting on and taking off regular upper body clothing  -- 4  -MW 4  -MW    Taking care of personal grooming (such as brushing teeth)  -- 4  -MW 4  -MW    Eating meals  -- 4  -MW 4  -MW    Score  -- 24  -MW 23  -MW       Functional Assessment    Outcome Measure Options AM-PAC 6 Clicks Basic Mobility (PT)  -SD AM-PAC 6 Clicks Daily Activity (OT)  -MW AM-PAC 6 Clicks Daily Activity (OT)  -MW    Row Name 11/05/18 1500             How much help from another is currently needed...    Putting on and taking off regular " lower body clothing? 3  -MW      Bathing (including washing, rinsing, and drying) 3  -MW      Toileting (which includes using toilet bed pan or urinal) 3  -MW      Putting on and taking off regular upper body clothing 4  -MW      Taking care of personal grooming (such as brushing teeth) 4  -MW      Eating meals 4  -MW      Score 21  -MW         Functional Assessment    Outcome Measure Options AM-PAC 6 Clicks Daily Activity (OT)  -MW        User Key  (r) = Recorded By, (t) = Taken By, (c) = Cosigned By    Initials Name Provider Type    MW Elise Robles, OTR/L Occupational Therapist    Jami Holden PT Physical Therapist           Time Calculation:         PT Charges     Row Name 11/07/18 1409             Time Calculation    Start Time 1313  -SD      Stop Time 1348  -SD      Time Calculation (min) 35 min  -SD      PT Received On 11/07/18  -SD      PT Goal Re-Cert Due Date 11/17/18  -SD        User Key  (r) = Recorded By, (t) = Taken By, (c) = Cosigned By    Initials Name Provider Type    Jami Holden PT Physical Therapist        Therapy Suggested Charges     Code   Minutes Charges    None           Therapy Charges for Today     Code Description Service Date Service Provider Modifiers Qty    67013703924 HC PT MOBILITY CURRENT 11/7/2018 Jami Rodarte, PT GP, CJ 1    72833704916 HC PT MOBILITY PROJECTED 11/7/2018 Jami Rodarte, PT GP, CI 1    56672273994 HC PT EVAL LOW COMPLEXITY 2 11/7/2018 Jami Rodarte PT GP, KX 1          PT G-Codes  PT Professional Judgement Used?: Yes  Outcome Measure Options: AM-PAC 6 Clicks Basic Mobility (PT)  AM-PAC 6 Clicks Score: 22  Score: 24  Functional Limitation: Mobility: Walking and moving around  Mobility: Walking and Moving Around Current Status (): At least 20 percent but less than 40 percent impaired, limited or restricted  Mobility: Walking and Moving Around Goal Status (): At least 1 percent but less than 20 percent impaired, limited or  gayathri Rodarte, PT  11/7/2018

## 2018-11-07 NOTE — PLAN OF CARE
Problem: Patient Care Overview  Goal: Plan of Care Review  Outcome: Ongoing (interventions implemented as appropriate)   11/07/18 1344   Coping/Psychosocial   Plan of Care Reviewed With patient   Plan of Care Review   Progress no change   OTHER   Outcome Summary OT txt completed. OT finalized education regarding ADL/IADL energy conservation handouts and all pt questions answered to pt understanding. Pt reports up independently this morning completing all washing and dressing with no difficulty or SOA. If any further questions please re-consult. No further needs identified requiring skilled OT, OT to sign off.

## 2018-11-07 NOTE — PROGRESS NOTES
Nephrology ([facility]) Progress Note      Patient:  Padmini Torres  YOB: 1940  Date of Service: 11/7/2018  MRN: [id number]   Acct: [id number]   Primary Care Physician: Александр Yo DO  Advance Directive:   Code Status and Medical Interventions:   Ordered at: 11/03/18 2146     Limited Support to NOT Include:    Intubation    Cardioversion/Defibrillation    Antiarrhythmic Drugs     Level Of Support Discussed With:    Patient     Code Status:    No CPR     Medical Interventions (Level of Support Prior to Arrest):    Limited     Admit Date: 11/3/2018       Hospital Day: 2  Referring Provider: Kirk Pearson DO      Patient personally seen and examined.  Complete chart including Consults, Notes, Operative Reports, Labs, Cardiology, and Radiology studies reviewed as able.        Subjective:  Padmini Torres is a 78 y.o. female  whom we were consulted for acute kidney injury.  Baseline CKD stage 3; does not follow with nephrology.  Recently admitted for CHF exacerbation. was discharged on 11/02 ; returned on 11/03 with weight gain, dyspnea.  Admitted with CHF exacerbation and acute kidney injury. Hospital course remarkable for improvement of symptoms and of renal function with diuretics    Today is feeling better; minimal dyspnea.  No new overnight issues. Urine output nonoliguric.    Allergies:  Zantac [ranitidine hcl] and Fish-derived products    Home Meds:  Prescriptions Prior to Admission   Medication Sig Dispense Refill Last Dose   • acetaminophen (TYLENOL ARTHRITIS PAIN) 650 MG 8 hr tablet Take 650 mg by mouth Every 12 (Twelve) Hours.   Past Week at Unknown time   • amiodarone (PACERONE) 100 MG tablet Take 1 tablet by mouth Daily. 30 tablet 2 Past Week at Unknown time   • aspirin 81 MG EC tablet Take 81 mg by mouth Daily.   Past Week at Unknown time   • bumetanide (BUMEX) 1 MG tablet Take 1 mg by mouth Daily.   Past Week at Unknown time   • Calcium Carbonate-Vitamin  D 600-200 MG-UNIT capsule Take 1 capsule by mouth Daily.   Past Week at Unknown time   • digoxin (LANOXIN) 125 MCG tablet Take 125 mcg by mouth Daily.   Past Week at Unknown time   • gabapentin (NEURONTIN) 300 MG capsule Take 300 mg by mouth 3 (Three) Times a Day.   Past Week at Unknown time   • lisinopril (PRINIVIL,ZESTRIL) 2.5 MG tablet Take 1 tablet by mouth Daily. 30 tablet 2 Past Week at Unknown time   • metFORMIN (GLUCOPHAGE) 1000 MG tablet Take 0.5 tablets by mouth 2 (Two) Times a Day. 60 tablet 2 Past Week at Unknown time   • metoprolol succinate XL (TOPROL-XL) 25 MG 24 hr tablet Take 25 mg by mouth Daily.   Past Week at Unknown time   • nitroglycerin (NITROSTAT) 0.4 MG SL tablet Place 1 tablet under the tongue Every 5 (Five) Minutes As Needed for Chest Pain. Take no more than 3 doses in 15 minutes. 25 tablet 12 Past Week at Unknown time   • pravastatin (PRAVACHOL) 20 MG tablet Take 20 mg by mouth daily.   Past Week at Unknown time       Medicines:  Current Facility-Administered Medications   Medication Dose Route Frequency Provider Last Rate Last Dose   • allopurinol (ZYLOPRIM) tablet 100 mg  100 mg Oral Daily Raul Castellon MD   100 mg at 11/07/18 0844   • amiodarone (PACERONE) tablet 100 mg  100 mg Oral Daily Kirk Pearson DO   100 mg at 11/07/18 0843   • aspirin EC tablet 81 mg  81 mg Oral Daily Kirk Pearson DO   81 mg at 11/07/18 0844   • atorvastatin (LIPITOR) tablet 10 mg  10 mg Oral Nightly Kirk Pearson DO   10 mg at 11/06/18 2035   • bumetanide (BUMEX) tablet 1 mg  1 mg Oral BID Gilberto Carrero MD   1 mg at 11/07/18 0635   • calcitriol (ROCALTROL) capsule 0.25 mcg  0.25 mcg Oral Daily Raul Castellon MD   0.25 mcg at 11/07/18 0844   • dextrose (D50W) 25 g/ 50mL Intravenous Solution 25 g  25 g Intravenous Q15 Min PRN Kirk Pearson DO       • dextrose (GLUTOSE) oral gel 15 g  15 g Oral Q15 Min PRN Kirk Pearson DO       • digoxin  (LANOXIN) tablet 125 mcg  125 mcg Oral Daily Kirk Pearson DO   125 mcg at 11/06/18 1238   • enoxaparin (LOVENOX) syringe 30 mg  30 mg Subcutaneous Q24H Gilberto Carrero MD   30 mg at 11/07/18 1031   • ferrous sulfate tablet 325 mg  325 mg Oral BID With Meals Raul Castellon MD   325 mg at 11/07/18 0845   • gabapentin (NEURONTIN) capsule 300 mg  300 mg Oral Q12H Kirk Pearson DO   300 mg at 11/07/18 0845   • glucagon (human recombinant) (GLUCAGEN DIAGNOSTIC) injection 1 mg  1 mg Subcutaneous PRN Kirk Pearson DO       • hydrALAZINE (APRESOLINE) tablet 10 mg  10 mg Oral Q12H Freeman Kelsey MD   10 mg at 11/06/18 0846   • insulin lispro (humaLOG) injection 0-9 Units  0-9 Units Subcutaneous 4x Daily With Meals & Nightly Kirk Pearson DO   2 Units at 11/07/18 0842   • ipratropium-albuterol (DUO-NEB) nebulizer solution 3 mL  3 mL Nebulization TID - RT Gilberto Carrero MD   3 mL at 11/07/18 0739   • ipratropium-albuterol (DUO-NEB) nebulizer solution 3 mL  3 mL Nebulization Q4H PRN Gilberto Carrero MD       • iron sucrose (VENOFER) 200 mg in sodium chloride 0.9 % 100 mL IVPB  200 mg Intravenous Q24H Raul Castellon MD   200 mg at 11/06/18 1556   • isosorbide dinitrate (ISORDIL) tablet 10 mg  10 mg Oral TID - Nitrates Freeman Kelsey MD   10 mg at 11/07/18 0845   • melatonin tablet 6 mg  6 mg Oral Nightly Kirk Pearson DO   6 mg at 11/06/18 2035   • metoprolol succinate XL (TOPROL-XL) 24 hr tablet 25 mg  25 mg Oral Daily Kirk Pearson DO   25 mg at 11/07/18 0845   • nitroglycerin (NITROSTAT) SL tablet 0.4 mg  0.4 mg Sublingual Q5 Min PRN Kirk Pearson DO       • Pharmacy to Dose enoxaparin (LOVENOX)   Does not apply Continuous PRN Gilberto Carrero MD       • sacubitril-valsartan (ENTRESTO) 24-26 MG tablet 1 tablet  1 tablet Oral Q12H Freeman Kelsey MD   1 tablet at 11/07/18 0802   • sodium chloride 0.9 % flush 10 mL  10 mL Intravenous  PRIshaan Suggs Jr., MD       • sodium chloride 0.9 % flush 3 mL  3 mL Intravenous Q12H Kirk Pearson DO   3 mL at 11/07/18 0847   • sodium chloride 0.9 % flush 3-10 mL  3-10 mL Intravenous PRN Kirk Pearson DO           Past Medical History:  Past Medical History:   Diagnosis Date   • Abdominal pain, left lower quadrant    • Bowel habit changes    • CAD (coronary artery disease)     LAD stent    • COPD (chronic obstructive pulmonary disease) (CMS/HCC)    • Diabetes mellitus (CMS/HCC)    • DVT (deep venous thrombosis) (CMS/HCC)    • Dysphagia    • History of tachycardia    • Hypertension    • Lymphoma (CMS/HCC)    • Pulmonary emboli (CMS/HCC)    • Varicose veins of legs        Past Surgical History:  Past Surgical History:   Procedure Laterality Date   • ANKLE SURGERY     • BACK SURGERY      lower   • CARDIAC CATHETERIZATION     • CHOLECYSTECTOMY     • COLONOSCOPY  04/23/2015    Last colon limted lower diverticulosis left colon, Internal Hemorrhoids   • COLONOSCOPY W/ BIOPSIES AND POLYPECTOMY  07/22/2014    Adenomatous tubular type, Diverticulosis sigmoid colon   • CORONARY STENT PLACEMENT      x 1   • ENDOSCOPY  04/14/2010    Distal ring dilated 48 Fr   • EXPLORATORY LAPAROTOMY  2005    relapse lymphoma   • HEMORRHOIDECTOMY     • HYSTERECTOMY      total abdominal   • LUMBAR SPINE SURGERY     • LUNG BIOPSY N/A 1991    open    • SMALL INTESTINE SURGERY     • TONSILLECTOMY     • UPPER GASTROINTESTINAL ENDOSCOPY  10/23/2015    normal exam       Family History  Family History   Problem Relation Age of Onset   • Diabetes Mother    • Cancer Father         prostate with bone metastasis   • Heart disease Maternal Grandmother    • Stroke Brother    • Colon cancer Neg Hx    • Colon polyps Neg Hx        Social History  Social History     Social History   • Marital status:      Spouse name: N/A   • Number of children: N/A   • Years of education: N/A     Occupational History   • Not on file.  "    Social History Main Topics   • Smoking status: Never Smoker   • Smokeless tobacco: Never Used   • Alcohol use No   • Drug use: No   • Sexual activity: Defer     Other Topics Concern   • Not on file     Social History Narrative   • No narrative on file         Review of Systems:  History obtained from chart review and the patient  General ROS: No fever or chills  Respiratory ROS: No cough, shortness of breath, wheezing  Cardiovascular ROS: No chest pain or palpitations  Gastrointestinal ROS: No abdominal pain or melena  Genito-Urinary ROS: No dysuria or hematuria  Neurological ROS: no headache or dizziness    Objective:  /59 (BP Location: Right arm, Patient Position: Sitting)   Pulse 93   Temp 97.1 °F (36.2 °C) (Temporal Artery )   Resp 18   Ht 167.6 cm (66\")   Wt 59.9 kg (132 lb 0.7 oz)   SpO2 (!) 86% Comment: patient put back on 2 liters  BMI 21.31 kg/m²     Intake/Output Summary (Last 24 hours) at 11/07/18 1033  Last data filed at 11/07/18 1000   Gross per 24 hour   Intake                0 ml   Output             2800 ml   Net            -2800 ml     General: awake/alert    Neck: supple, no JVD  Chest:  clear to auscultation bilaterally without respiratory distress  CVS: regular rate and rhythm  Abdominal: soft, nontender, normal bowel sounds  Extremities: no cyanosis or edema  Skin: warm and dry without rash   Neuro: no focal motor deficits      Labs:    Results from last 7 days  Lab Units 11/05/18  0641 11/04/18  0414 11/03/18  1735   WBC 10*3/mm3 8.69 8.19 11.83*   HEMOGLOBIN g/dL 9.9* 9.1* 11.3*   HEMATOCRIT % 30.5* 28.0* 34.5*   PLATELETS 10*3/mm3 322 260 328           Results from last 7 days  Lab Units 11/07/18  0638 11/06/18  0521 11/05/18  0641  11/03/18  1735  11/01/18  0329   SODIUM mmol/L 139 138 137  < > 138  < > 140   POTASSIUM mmol/L 4.5 4.3 3.7  < > 3.8  < > 3.6   CHLORIDE mmol/L 94* 95* 94*  < > 91*  < > 91*   CO2 mmol/L 34.0* 33.0* 33.0*  < > 32.0*  < > 36.0*   BUN mg/dL 32* 40* " 44*  < > 57*  < > 50*   CREATININE mg/dL 1.13 1.17 1.44*  < > 1.63*  < > 1.87*   CALCIUM mg/dL 9.1 8.7 8.2*  < > 8.4  < > 7.7*   BILIRUBIN mg/dL  --   --  0.5  --  0.9  --  0.7   ALK PHOS U/L  --   --  83  --  97  --  63   ALT (SGPT) U/L  --   --  36  --  32  --  28   AST (SGOT) U/L  --   --  31  --  48*  --  49*   GLUCOSE mg/dL 121* 90 98  < > 88  < > 108*   < > = values in this interval not displayed.    Radiology:   Imaging Results (last 72 hours)     Procedure Component Value Units Date/Time    US Renal Bilateral [[id number]] Collected:  11/05/18 0942     Updated:  11/05/18 0946    Narrative:       EXAM:  US RENAL BILATERAL-     INDICATION:  Kidney function     COMPARISON:  None     TECHNIQUE:  Routine grayscale and color Doppler images were obtained  through the bilateral renal fossae.     FINDINGS:       Right Kidney: The right kidney measures 10.3 cm in longitudinal  dimension. There is good corticomedullary differentiation. There is no  evidence of hydronephrosis. There are no cystic lesions or masses. No  echogenic stone.     Left Kidney: The left kidney measures 10.1 cm in longitudinal dimension.  There is good corticomedullary differentiation. There is no evidence of  hydronephrosis. There are no cystic lesions or masses. No echogenic  stone.     Bladder: The bladder is unremarkable without evidence of mass or  internal debris.       Impression:       Unremarkable renal ultrasound.      This report was finalized on 11/05/2018 09:43 by Dr. Shanique Eller MD.    XR Chest PA & Lateral [[id number]] Collected:  11/04/18 1247     Updated:  11/04/18 1252    Narrative:       XR CHEST PA AND LATERAL- 11/4/2018 12:33 PM CST     HISTORY: pulmonary edema; I50.9-Heart failure, unspecified       COMPARISON: None.     FINDINGS:   Upright frontal and lateral radiographs of the chest were obtained.     Dense consolidation is noted in the middle lobe. The changes of  interstitial pulmonary edema are slightly improved  bilaterally. Small  pleural effusions are present bilaterally.. Cardiac silhouette is  normal. Left subclavian Port-A-Cath is present.. The osseous structures  and surrounding soft tissues demonstrate no acute abnormality.       Impression:       1. Dense consolidation right middle lobe most likely representing an  underlying inflammatory process.  2. The changes of interstitial pulmonary edema previously described are  improved however some residual does persist.  3. Small bilateral pleural effusions.        This report was finalized on 11/04/2018 12:49 by Dr. Dawit De Los Santos MD.    XR Chest 2 View [[id number]] Collected:  11/03/18 1800     Updated:  11/03/18 1804    Narrative:       XR CHEST 2 VW- 11/3/2018 5:53 PM CDT     HISTORY: SOA  triage protocol      COMPARISON: 10/30/2018     FINDINGS:   Upright frontal and lateral radiographs of the chest were obtained.     Airspace opacities are again seen in bilateral lungs. A LEFT pleural  effusion is again noted. The cardiomediastinal silhouette and pulmonary  vascularity are unchanged. The osseous structures and surrounding soft  tissues demonstrate no acute abnormality.       Impression:       1. Bilateral airspace opacities and LEFT pleural effusion are likely due  to pulmonary edema. Pneumonia could have a similar appearance.        This report was finalized on 11/03/2018 18:00 by Dr. Leighton Persaud MD.          Culture:         Assessment   1.  Acute kidney injury--resolving  2.  Baseline of CKD stage 3  3.  Acute exacerbation of chronic systolic/diastolic congestive heart failure  4.  Type 2 diabetes  5.  Hypertension  6.  Anemia of iron deficiency    Plan:  1.  Continue current diuretics  2.  Monitor labs  3.  Ok for discharge from renal standpoint; follow up in office in 1-2 weeks      Jameel Cunningham, CARL  11/7/2018  10:33 AM

## 2018-11-07 NOTE — PROGRESS NOTES
[facility] Medicine Services  INPATIENT PROGRESS NOTE    Patient Name: Padmini Torres  Date of Admission: 11/3/2018  Today's Date: 11/07/18  Length of Stay: 2  Primary Care Physician: Александр Yo DO    Subjective   Chief Complaint: shortness of breath  HPI     Patient seen and examined at bedside.  Patient had no episodes of shortness of breath last night or today.  Feels as though she is doing better.  Patient ambulated around the room today without shortness of breath.          Review of Systems   Constitutional: Negative for chills and fever.   Respiratory: Negative for cough and shortness of breath.    Cardiovascular: Negative for chest pain and palpitations.   Gastrointestinal: Negative for abdominal distention, constipation, diarrhea and nausea.        All pertinent negatives and positives are as above. All other systems have been reviewed and are negative unless otherwise stated.     Objective    Temp:  [96.9 °F (36.1 °C)-97.7 °F (36.5 °C)] 97.4 °F (36.3 °C)  Heart Rate:  [63-93] 92  Resp:  [16-18] 18  BP: ()/(41-82) 124/64  Physical Exam   Constitutional: She is oriented to person, place, and time. No distress.   HENT:   Head: Normocephalic and atraumatic.   Eyes: Conjunctivae are normal. No scleral icterus.   Cardiovascular: Normal rate, regular rhythm and intact distal pulses.  Exam reveals no gallop and no friction rub.    Murmur heard.  Bilateral lower extremity edema     Pulmonary/Chest: Effort normal and breath sounds normal. No respiratory distress. She has no wheezes. She has no rales. She exhibits no tenderness.   Abdominal: Soft. Bowel sounds are normal. She exhibits no distension. There is no tenderness. There is no guarding.   Musculoskeletal: She exhibits edema.   Neurological: She is alert and oriented to person, place, and time.   Skin: Skin is warm and dry. She is not diaphoretic.   varicoities on bilateral lower extremities    Psychiatric: She  has a normal mood and affect. Her behavior is normal.   Nursing note and vitals reviewed.          Results Review:  I have reviewed the labs, radiology results, and diagnostic studies.    Laboratory Data:     Results from last 7 days  Lab Units 11/05/18  0641 11/04/18  0414 11/03/18  1735   WBC 10*3/mm3 8.69 8.19 11.83*   HEMOGLOBIN g/dL 9.9* 9.1* 11.3*   HEMATOCRIT % 30.5* 28.0* 34.5*   PLATELETS 10*3/mm3 322 260 328          Results from last 7 days  Lab Units 11/07/18  0638 11/06/18  0521 11/05/18  0641  11/03/18  1735  11/01/18  0329   SODIUM mmol/L 139 138 137  < > 138  < > 140   POTASSIUM mmol/L 4.5 4.3 3.7  < > 3.8  < > 3.6   CHLORIDE mmol/L 94* 95* 94*  < > 91*  < > 91*   CO2 mmol/L 34.0* 33.0* 33.0*  < > 32.0*  < > 36.0*   BUN mg/dL 32* 40* 44*  < > 57*  < > 50*   CREATININE mg/dL 1.13 1.17 1.44*  < > 1.63*  < > 1.87*   CALCIUM mg/dL 9.1 8.7 8.2*  < > 8.4  < > 7.7*   BILIRUBIN mg/dL  --   --  0.5  --  0.9  --  0.7   ALK PHOS U/L  --   --  83  --  97  --  63   ALT (SGPT) U/L  --   --  36  --  32  --  28   AST (SGOT) U/L  --   --  31  --  48*  --  49*   GLUCOSE mg/dL 121* 90 98  < > 88  < > 108*   < > = values in this interval not displayed.    Culture Data:   @[facility]@    Radiology Data:   Imaging Results (last 24 hours)     ** No results found for the last 24 hours. **          I have reviewed the patient's current medications.     Assessment/Plan     Active Hospital Problems    Diagnosis   • Acute on chronic congestive heart failure (CMS/HCC)       Assessment:  1) Acute on chronic systolic heart failure (EF 30%) with aortic stenosis with aortic regurgitation  2) COPD without exacerbation   3) Acute hypoxic respiratory failure - Drops to 84% with minimal activity   4) Type 2 DM, complicated by hyperglycemia (Overall well-controlled with A1c 5.3)  5) Peripheral neuropathy   6) Chronic kidney disease stage 3   7) Chronic macrocytic anemia   8) Previous DVT, recently had warfarin stopped  9) Moderate  protein calorie malnutrition  10) Constipation, resolved        Plan:  1) Cardiology evaluated, note reviewed.  Nephrology evaluated, note reviewed.  2) Switch from furosemide 40 mg BID to bumex 1 mg BID (better bioavailability)  3) Daily weights on standing scale, Strict I/O  4) Titrate O2 for 92%, she may need home O2   5) Nutritional supplements per nutrition  6) Outpatient PFTs to confirm history of COPD  7) Nutrition note reviewed  8) Per cardiology patient's anti-HTN were lowered and started on entresto   9) S/P 3 doses of venofer  10) Accuchecks and continue SSI   11) Will need follow-up with nephrology in 1-2 weeks  12) Will need follow-up with Dr. Kelsey in 4 weeks            Discharge Planning: I expect the patient to be discharged to home in 1-2 days    Gilberto Carrero MD   11/07/18   5:50 PM

## 2018-11-07 NOTE — PROGRESS NOTES
Continued Stay Note  [facility]     Patient Name: Padmini Torres  MRN: [id number]  Today's Date: 11/7/2018    Admit Date: 11/3/2018          Discharge Plan     Row Name 11/07/18 1315       Plan    Plan Comments STARTED PRIOR AUTH ON MEDICATION ENTRESTO. CALLED [employer] [phone] AND SPOKE TO APOORVA. PROVIDED ALL INFO REQUESTED. [facility] STATES IT WILL TAKE 24-72 HOURS BEFORE MED WILL BE APPROVED OR DENIED. A FAX WILL BE SENT TO DR LORA OFFICE AND  OFFICE. REF ID NUMBER IS [id number]    Row Name 11/07/18 1150       Plan    Plan Patient was offered assistance from the [facility] but she does not want to sign up.               Discharge Codes    No documentation.           KELLY Devine

## 2018-11-07 NOTE — PLAN OF CARE
"Problem: Patient Care Overview  Goal: Plan of Care Review  Outcome: Ongoing (interventions implemented as appropriate)   11/07/18 5574   Coping/Psychosocial   Plan of Care Reviewed With patient   Plan of Care Review   Progress improving   OTHER   Outcome Summary PT evaluation completed. Pt alert and oriented x 4. Pt has been up ad jorge in her room, and is using the restroom independently. Pt with good strength in LE, and only needed sup for transfers. Pt did well with gait in the room, but when asked to walk a longer distance, pt was swaying and losing balance in the hallway. Pt needed CGAx1 to regain balance. Pt also SOA at end of gait training. Pt had 2L O2 on throughout treatment. Pt would benefit from skilled physical therapy to increase safety, balance and endurance. Recommend home with assist from son and OP services, although pt is reluctant to participate in therapy once at home due to scheduling and because she \"doesn't have problems at home\". Pt would benefit from further skilled interventions to challenge dynamic balance and gait to promote safety within the home.         "

## 2018-11-07 NOTE — NURSING NOTE
Discharge Planning Assessment  [facility]     Patient Name: Padmini Torres  MRN: [id number]  Today's Date: 11/7/2018    Admit Date: 11/3/2018          Discharge Needs Assessment    No documentation.             Discharge Plan     Row Name 11/07/18 3780       Plan    Plan Patient was offered assistance from the [facility] but she does not want to sign up.         Destination     No service coordination in this encounter.      Durable Medical Equipment     No service coordination in this encounter.      Dialysis/Infusion     No service coordination in this encounter.      Home Medical Care     No service coordination in this encounter.      Social Care     No service coordination in this encounter.                Demographic Summary    No documentation.           Functional Status    No documentation.           Psychosocial    No documentation.           Abuse/Neglect    No documentation.           Legal    No documentation.           Substance Abuse    No documentation.           Patient Forms    No documentation.         Merlina A Fletcher, RN

## 2018-11-07 NOTE — PLAN OF CARE
Problem: Patient Care Overview  Goal: Plan of Care Review  Outcome: Ongoing (interventions implemented as appropriate)   11/07/18 8300   Coping/Psychosocial   Plan of Care Reviewed With patient   Plan of Care Review   Progress improving   OTHER   Outcome Summary pt has tolerated being up in the chair today ambulate in room and to bathroom still with some shortness of breath but improved states she feels much better urine output has been good tolerating diet        Problem: Breathing Pattern Ineffective (Adult)  Goal: Identify Related Risk Factors and Signs and Symptoms  Outcome: Ongoing (interventions implemented as appropriate)      Problem: Fall Risk (Adult)  Goal: Identify Related Risk Factors and Signs and Symptoms  Outcome: Ongoing (interventions implemented as appropriate)      Problem: Nutrition, Imbalanced: Inadequate Oral Intake (Adult)  Goal: Improved Oral Intake  Outcome: Ongoing (interventions implemented as appropriate)

## 2018-11-08 NOTE — PROGRESS NOTES
LOS: 3 days   Patient Care Team:  Александр Yo DO as PCP - General (Internal Medicine)  Александр Yo DO as PCP - Family Medicine  Александр Yo DO as PCP - Claims [facility]  Freeman Kelsey MD as Cardiologist (Cardiology)  Александр Yo DO as Referring Physician (Internal Medicine)  Gilberto Hammer MD as Consulting Physician (Otolaryngology)    Chief Complaint:   Acute on chronic congestive heart failure (CMS/HCC)    Shortness of breath    Subjective  Feeling  Better this morning  Much less shortness of breath  Less pedal edema  Has increased ambulation  Feels less tired  Sitting up in chair  Labs reviewed with her      Interval History: Improved overall    Patient Complaints:   Chief Complaint   Patient presents with   • Shortness of Breath       Telemetry: no malignant arrhythmia. No significant pauses.    Review of Systems     Constitutional: No chills   Has fatigue   No fever.   HENT: Negative.    Eyes: Negative.      Respiratory: Mild cough,   No chest wall soreness,   Shortness of breath,   no wheezing, no stridor.      Cardiovascular: Negative for chest pain,   No palpitations   No significant  leg swelling.     Gastrointestinal: Negative for abdominal distention,  No abdominal pain,   No blood in stool,   No constipation,   No diarrhea,   No nausea   No vomiting.     Endocrine: Negative.    Genitourinary: Negative for difficulty urinating, dysuria, flank pain and hematuria.     Musculoskeletal: Negative.    Skin: Negative for rash and wound.   Allergic/Immunologic: Negative.      Neurological: Negative for dizziness, syncope, weakness,   No light-headedness  No  headaches.     Hematological: Does not bruise/bleed easily.     Psychiatric/Behavioral: Negative for agitation or behavioral problems,   No confusion,   the patient is  nervous/anxious.       History:     Possible.    Past Medical History:   Diagnosis Date   • Abdominal pain, left lower quadrant    • Bowel habit changes    • CAD  (coronary artery disease)     LAD stent    • COPD (chronic obstructive pulmonary disease) (CMS/HCC)    • Diabetes mellitus (CMS/HCC)    • DVT (deep venous thrombosis) (CMS/HCC)    • Dysphagia    • History of tachycardia    • Hypertension    • Lymphoma (CMS/HCC)    • Pulmonary emboli (CMS/HCC)    • Varicose veins of legs      Past Surgical History:   Procedure Laterality Date   • ANKLE SURGERY     • BACK SURGERY      lower   • CARDIAC CATHETERIZATION     • CHOLECYSTECTOMY     • COLONOSCOPY  04/23/2015    Last colon limted lower diverticulosis left colon, Internal Hemorrhoids   • COLONOSCOPY W/ BIOPSIES AND POLYPECTOMY  07/22/2014    Adenomatous tubular type, Diverticulosis sigmoid colon   • CORONARY STENT PLACEMENT      x 1   • ENDOSCOPY  04/14/2010    Distal ring dilated 48 Fr   • EXPLORATORY LAPAROTOMY  2005    relapse lymphoma   • HEMORRHOIDECTOMY     • HYSTERECTOMY      total abdominal   • LUMBAR SPINE SURGERY     • LUNG BIOPSY N/A 1991    open    • SMALL INTESTINE SURGERY     • TONSILLECTOMY     • UPPER GASTROINTESTINAL ENDOSCOPY  10/23/2015    normal exam     Social History     Social History   • Marital status:      Spouse name: N/A   • Number of children: N/A   • Years of education: N/A     Occupational History   • Not on file.     Social History Main Topics   • Smoking status: Never Smoker   • Smokeless tobacco: Never Used   • Alcohol use No   • Drug use: No   • Sexual activity: Defer     Other Topics Concern   • Not on file     Social History Narrative   • No narrative on file     Family History   Problem Relation Age of Onset   • Diabetes Mother    • Cancer Father         prostate with bone metastasis   • Heart disease Maternal Grandmother    • Stroke Brother    • Colon cancer Neg Hx    • Colon polyps Neg Hx        Labs:  WBC No results found for: WBC   HGB No results found for: HGB   HCT No results found for: HCT   Platelets No results found for: PLT   MCV No results found for: MCV       Results  from last 7 days  Lab Units 11/08/18  0414 11/07/18  0638 11/06/18  0521 11/05/18  0641  11/03/18  1735   SODIUM mmol/L 138 139 138 137  < > 138   POTASSIUM mmol/L 4.1 4.5 4.3 3.7  < > 3.8   CHLORIDE mmol/L 94* 94* 95* 94*  < > 91*   CO2 mmol/L 37.0* 34.0* 33.0* 33.0*  < > 32.0*   BUN mg/dL 33* 32* 40* 44*  < > 57*   CREATININE mg/dL 1.14 1.13 1.17 1.44*  < > 1.63*   CALCIUM mg/dL 8.9 9.1 8.7 8.2*  < > 8.4   BILIRUBIN mg/dL  --   --   --  0.5  --  0.9   ALK PHOS U/L  --   --   --  83  --  97   ALT (SGPT) U/L  --   --   --  36  --  32   AST (SGOT) U/L  --   --   --  31  --  48*   GLUCOSE mg/dL 103* 121* 90 98  < > 88   < > = values in this interval not displayed.  Lab Results   Component Value Date    CKTOTAL 40 02/20/2016    CKMB 1.17 06/26/2017    TROPONINI 0.065 (H) 11/04/2018     PT/INR:    No results found for: PROTIME/  No results found for: INR    Imaging Results (last 72 hours)     Procedure Component Value Units Date/Time    XR Chest 1 View [[id number]] Collected:  10/30/18 1307     Updated:  10/30/18 1320    Narrative:       EXAMINATION: Chest 1 view 10/30/2018     HISTORY: Shortness of breath     FINDINGS: Upright frontal projection of the chest demonstrates bilateral  perihilar airspace disease for which I would favor pulmonary edema over  bilateral pneumonia. Small effusions are present blunting the  costophrenic angles. A tunneled infusion catheter is in place via  left-sided approach. There is evidence of remote granulomatous disease.       Impression:       1.. Bilateral perihilar and lower lobe airspace disease for which I  would favor pulmonary edema over pneumonia.  2. Small effusions present ( left greater than right).  This report was finalized on 10/30/2018 13:17 by Dr. Andrea Bhat MD.          Objective     Allergies   Allergen Reactions   • Zantac [Ranitidine Hcl] Shortness Of Breath   • Fish-Derived Products GI Intolerance       Medication Review: Performed  Current Facility-Administered  Medications   Medication Dose Route Frequency Provider Last Rate Last Dose   • allopurinol (ZYLOPRIM) tablet 100 mg  100 mg Oral Daily Raul Castellon MD   100 mg at 11/07/18 0844   • amiodarone (PACERONE) tablet 100 mg  100 mg Oral Daily Kirk Pearson DO   100 mg at 11/07/18 0843   • aspirin EC tablet 81 mg  81 mg Oral Daily Kirk Pearson DO   81 mg at 11/07/18 0844   • atorvastatin (LIPITOR) tablet 10 mg  10 mg Oral Nightly Kirk Pearson DO   10 mg at 11/07/18 2101   • bumetanide (BUMEX) tablet 1 mg  1 mg Oral BID Gilberto Carrero MD   1 mg at 11/07/18 1603   • calcitriol (ROCALTROL) capsule 0.25 mcg  0.25 mcg Oral Daily Raul Castellon MD   0.25 mcg at 11/07/18 0844   • dextrose (D50W) 25 g/ 50mL Intravenous Solution 25 g  25 g Intravenous Q15 Min PRN Kirk Pearson DO       • dextrose (GLUTOSE) oral gel 15 g  15 g Oral Q15 Min PRN Kirk Pearson DO       • digoxin (LANOXIN) tablet 125 mcg  125 mcg Oral Daily Kirk Pearson DO   125 mcg at 11/07/18 1332   • enoxaparin (LOVENOX) syringe 30 mg  30 mg Subcutaneous Q24H Gilberto Carrero MD   30 mg at 11/07/18 1031   • ferrous sulfate tablet 325 mg  325 mg Oral BID With Meals Raul Castellon MD   325 mg at 11/07/18 1845   • gabapentin (NEURONTIN) capsule 300 mg  300 mg Oral Q12H Kirk Pearson DO   300 mg at 11/07/18 2101   • glucagon (human recombinant) (GLUCAGEN DIAGNOSTIC) injection 1 mg  1 mg Subcutaneous PRN Kirk Pearson DO       • hydrALAZINE (APRESOLINE) tablet 10 mg  10 mg Oral Q12H Freeman Kelsey MD   10 mg at 11/07/18 2101   • insulin lispro (humaLOG) injection 0-9 Units  0-9 Units Subcutaneous 4x Daily With Meals & Nightly Kirk Pearson DO   2 Units at 11/07/18 1331   • ipratropium-albuterol (DUO-NEB) nebulizer solution 3 mL  3 mL Nebulization TID - RT Gilberto Carrero MD   3 mL at 11/08/18 0643   • ipratropium-albuterol (DUO-NEB) nebulizer  "solution 3 mL  3 mL Nebulization Q4H PRN Gilberto Carrero MD       • isosorbide dinitrate (ISORDIL) tablet 10 mg  10 mg Oral TID - Nitrates Freeman Kelsey MD   10 mg at 11/07/18 1845   • magnesium oxide (MAG-OX) tablet 400 mg  400 mg Oral Daily Raul Castellon MD   400 mg at 11/07/18 1603   • melatonin tablet 6 mg  6 mg Oral Nightly Kirk Pearson DO   6 mg at 11/07/18 2101   • metoprolol succinate XL (TOPROL-XL) 24 hr tablet 25 mg  25 mg Oral Daily Kirk Pearson DO   25 mg at 11/07/18 0845   • nitroglycerin (NITROSTAT) SL tablet 0.4 mg  0.4 mg Sublingual Q5 Min PRN Kirk Pearson DO       • Pharmacy to Dose enoxaparin (LOVENOX)   Does not apply Continuous PRN Gilberto Carrero MD       • sacubitril-valsartan (ENTRESTO) 24-26 MG tablet 1 tablet  1 tablet Oral Q12H Freeman Kelsey MD   1 tablet at 11/07/18 2101   • sodium chloride 0.9 % flush 10 mL  10 mL Intravenous PRN Ishaan Francisco Jr., MD       • sodium chloride 0.9 % flush 3 mL  3 mL Intravenous Q12H Kirk Peasron DO   3 mL at 11/07/18 2102   • sodium chloride 0.9 % flush 3-10 mL  3-10 mL Intravenous PRN Kirk Pearson DO           Vital Sign Min/Max for last 24 hours  Temp  Min: 97 °F (36.1 °C)  Max: 98.3 °F (36.8 °C)   BP  Min: 96/45  Max: 124/64   Pulse  Min: 84  Max: 95   Resp  Min: 16  Max: 19   SpO2  Min: 86 %  Max: 99 %   No Data Recorded   Weight  Min: 56.7 kg (125 lb)  Max: 59.9 kg (132 lb 0.7 oz)     Flowsheet Rows      First Filed Value   Admission Height  170.2 cm (67\") Documented at 10/30/2018 1154   Admission Weight  59 kg (130 lb) Documented at 10/30/2018 1154              Physical Exam:    General Appearance: Awake, alert, in no acute distress  Eyes: Pupils equal and reactive    Ears: Appear intact with no abnormalities noted  Nose: Nares normal, no drainage  Neck: supple, trachea midline, no carotid bruit and no JVD  Back: no kyphosis present,    Lungs: respirations regular, respirations " even and respirations unlabored    Heart: normal S1, S2,  2/6 pansystolic murmur in the left sternal border,  no rub and no click    Abdomen: normal bowel sounds, no tenderness   Skin: no bleeding, bruising or rash  Extremities: no cyanosis  Psychiatric/Behavioral: Negative for agitation, behavioral problems, confusion, the patient does  appear to be nervous/anxious.          Results Review:   I reviewed the patient's new clinical results.  I reviewed the patient's new imaging results and agree with the interpretation.  I reviewed the patient's other test results and agree with the interpretation  I personally viewed and interpreted the patient's EKG/Telemetry data  Discussed with patient    Reviewed available prior notes, consults, prior visits, laboratory findings, radiology and cardiology relevant reports. Updated chart as applicable. I have reviewed the patient's medical history in detail and updated the computerized patient record as relevant.    Updated patient regarding any new or relevant abnormalities on review of records or any new findings on physical exam. Mentioned to patient about purpose of visit and desirable health short and long term goals and objectives.     Assessment/Plan   Congestive heart failure (CMS/HCC)  Mild aortic stenosis by echocardiogram  Acute on chronic diastolic heart failure  atypical chest pain  Nausea  Epigastric pain  insulin-requiring diabetes mellitus  Essential hypertension  Mildly elevated troponin        Plan        Continue current medication next and overall significantly improved  Keep follow-up with me on December 14 as already scheduled  Monitor outpatient creatinine and the electrolytes  Okay to discharge from my perspective  Low salt diet  Flexible diuretic dosing  Monitor weight at home  If signs of volume overload to call me  Discussed with patient as well as nurse taking care of this patient  She does not want any invasive testing for evaluation of ischemia  Will  monitor left ventricular ejection fraction as outpatient  She declined LifeVest    Freeman Kelsey MD  11/08/18  7:59 AM    EMR Dragon/Transcription was used to dictat and e part of this note

## 2018-11-08 NOTE — PLAN OF CARE
Problem: Breathing Pattern Ineffective (Adult)  Goal: Anxiety/Fear Reduction  Outcome: Resolved for upgrade, new template will be applied

## 2018-11-08 NOTE — PROGRESS NOTES
Continued Stay Note  [facility]     Patient Name: Padmini Torres  MRN: [id number]  Today's Date: 11/8/2018    Admit Date: 11/3/2018          Discharge Plan     Row Name 11/08/18 0947       Plan    Plan Comments PT [employer] DENIED PRIOR AUTH ON ENTRESTO. DRUG REQUIRES DIAGNOSIS OF NYHA CLASS II, 111, OR IV SYSTOLIC HEART FAILURE.               Discharge Codes    No documentation.       Expected Discharge Date and Time     Expected Discharge Date Expected Discharge Time    Nov 8, 2018             KELLY Devine

## 2018-11-08 NOTE — DISCHARGE SUMMARY
[facility]  DISCHARGE SUMMARY       Date of Admission: 11/3/2018  Date of Discharge:  11/8/2018  Primary Care Physician: Александр Yo DO    Presenting Problem/History of Present Illness:  Shortness of breath    Final Discharge Diagnoses:  1) Acute on chronic systolic heart failure (EF 30%) with aortic stenosis with aortic regurgitation  2) COPD without exacerbation   3) Acute hypoxic respiratory failure - Drops to 84% with minimal activity   4) Type 2 DM, complicated by hyperglycemia (Overall well-controlled with A1c 5.3)  5) Peripheral neuropathy   6) Chronic kidney disease stage 3   7) Chronic macrocytic anemia   8) Previous DVT, recently had warfarin stopped  9) Moderate protein calorie malnutrition  10) Constipation, resolved    Consults:   Nephrology  Cardiology     Procedures Performed: None    Pertinent Test Results:   Imaging Results (last 7 days)     Procedure Component Value Units Date/Time    US Renal Bilateral [[id number]] Collected:  11/05/18 0942     Updated:  11/05/18 0946    Narrative:       EXAM:  US RENAL BILATERAL-     INDICATION:  Kidney function     COMPARISON:  None     TECHNIQUE:  Routine grayscale and color Doppler images were obtained  through the bilateral renal fossae.     FINDINGS:       Right Kidney: The right kidney measures 10.3 cm in longitudinal  dimension. There is good corticomedullary differentiation. There is no  evidence of hydronephrosis. There are no cystic lesions or masses. No  echogenic stone.     Left Kidney: The left kidney measures 10.1 cm in longitudinal dimension.  There is good corticomedullary differentiation. There is no evidence of  hydronephrosis. There are no cystic lesions or masses. No echogenic  stone.     Bladder: The bladder is unremarkable without evidence of mass or  internal debris.       Impression:       Unremarkable renal ultrasound.      This report was finalized on 11/05/2018 09:43 by   Shanique Eller MD.    XR Chest PA & Lateral [[id number]] Collected:  11/04/18 1247     Updated:  11/04/18 1252    Narrative:       XR CHEST PA AND LATERAL- 11/4/2018 12:33 PM CST     HISTORY: pulmonary edema; I50.9-Heart failure, unspecified       COMPARISON: None.     FINDINGS:   Upright frontal and lateral radiographs of the chest were obtained.     Dense consolidation is noted in the middle lobe. The changes of  interstitial pulmonary edema are slightly improved bilaterally. Small  pleural effusions are present bilaterally.. Cardiac silhouette is  normal. Left subclavian Port-A-Cath is present.. The osseous structures  and surrounding soft tissues demonstrate no acute abnormality.       Impression:       1. Dense consolidation right middle lobe most likely representing an  underlying inflammatory process.  2. The changes of interstitial pulmonary edema previously described are  improved however some residual does persist.  3. Small bilateral pleural effusions.        This report was finalized on 11/04/2018 12:49 by Dr. Dawit De Los Santos MD.    XR Chest 2 View [[id number]] Collected:  11/03/18 1800     Updated:  11/03/18 1804    Narrative:       XR CHEST 2 VW- 11/3/2018 5:53 PM CDT     HISTORY: SOA  triage protocol      COMPARISON: 10/30/2018     FINDINGS:   Upright frontal and lateral radiographs of the chest were obtained.     Airspace opacities are again seen in bilateral lungs. A LEFT pleural  effusion is again noted. The cardiomediastinal silhouette and pulmonary  vascularity are unchanged. The osseous structures and surrounding soft  tissues demonstrate no acute abnormality.       Impression:       1. Bilateral airspace opacities and LEFT pleural effusion are likely due  to pulmonary edema. Pneumonia could have a similar appearance.        This report was finalized on 11/03/2018 18:00 by Dr. Leighton Persaud MD.        Lab Results (last 7 days)     Procedure Component Value Units Date/Time    POC Glucose Once  [[id number]]  (Abnormal) Collected:  11/08/18 0741    Specimen:  Blood Updated:  11/08/18 0834     Glucose 164 (H) mg/dL      Comment: : [id number] [facility] [id number]       Basic Metabolic Panel [[id number]]  (Abnormal) Collected:  11/08/18 0414    Specimen:  Blood Updated:  11/08/18 0515     Glucose 103 (H) mg/dL      BUN 33 (H) mg/dL      Creatinine 1.14 mg/dL      Sodium 138 mmol/L      Potassium 4.1 mmol/L      Chloride 94 (L) mmol/L      CO2 37.0 (H) mmol/L      Calcium 8.9 mg/dL      eGFR Non African Amer 46 (L) mL/min/1.73      BUN/Creatinine Ratio 28.9 (H)     Anion Gap 7.0 mmol/L     Narrative:       The MDRD GFR formula is only valid for adults with stable renal function between ages 18 and 70.    Magnesium [[id number]]  (Normal) Collected:  11/08/18 0414    Specimen:  Blood Updated:  11/08/18 0515     Magnesium 1.5 mg/dL     POC Glucose Once [[id number]]  (Abnormal) Collected:  11/07/18 2059    Specimen:  Blood Updated:  11/07/18 2110     Glucose 143 (H) mg/dL      Comment: : [id number] [facility]: [id number]       POC Glucose Once [[id number]]  (Normal) Collected:  11/07/18 1718    Specimen:  Blood Updated:  11/07/18 1731     Glucose 109 mg/dL      Comment: : [id number] [facility] ID: [id number]       POC Glucose Once [[id number]]  (Abnormal) Collected:  11/07/18 1125    Specimen:  Blood Updated:  11/07/18 1155     Glucose 224 (H) mg/dL      Comment: : [id number] [facility] ID: [id number]       POC Glucose Once [[id number]]  (Abnormal) Collected:  11/07/18 0804    Specimen:  Blood Updated:  11/07/18 0839     Glucose 150 (H) mg/dL      Comment: : [id number] [facility] ID: [id number]       Basic Metabolic Panel [[id number]]  (Abnormal) Collected:  11/07/18 0638    Specimen:  Blood Updated:  11/07/18 0728     Glucose 121 (H) mg/dL      BUN 32 (H) mg/dL      Creatinine 1.13 mg/dL      Sodium 139 mmol/L      Potassium 4.5 mmol/L       Chloride 94 (L) mmol/L      CO2 34.0 (H) mmol/L      Calcium 9.1 mg/dL      eGFR Non African Amer 46 (L) mL/min/1.73      BUN/Creatinine Ratio 28.3 (H)     Anion Gap 11.0 mmol/L     Narrative:       The MDRD GFR formula is only valid for adults with stable renal function between ages 18 and 70.    Magnesium [[id number]]  (Normal) Collected:  11/07/18 0638    Specimen:  Blood Updated:  11/07/18 0728     Magnesium 1.5 mg/dL     POC Glucose Once [[id number]]  (Abnormal) Collected:  11/06/18 1941    Specimen:  Blood Updated:  11/06/18 1953     Glucose 200 (H) mg/dL      Comment: : [id number] [facility] ID: [id number]       POC Glucose Once [[id number]]  (Normal) Collected:  11/06/18 1715    Specimen:  Blood Updated:  11/06/18 1726     Glucose 96 mg/dL      Comment: : [id number] [employer] ID: [id number]       Eosinophil Smear - Urine, Urine, Clean Catch [[id number]] Collected:  11/04/18 0910    Specimen:  Urine from Urine, Clean Catch Updated:  11/06/18 1711     Eosinophil, Urine No Eosinophils Seen %      Comment:                                   <5% few or none seen       Narrative:       Performed at:  [address] [place] [address]  [id number]  : Henok Romero PhD, Phone:  [phone]    POC Glucose Once [[id number]]  (Abnormal) Collected:  11/06/18 1126    Specimen:  Blood Updated:  11/06/18 1155     Glucose 207 (H) mg/dL      Comment: : [id number] [facility]: [id number]       POC Glucose Once [[id number]]  (Normal) Collected:  11/06/18 0805    Specimen:  Blood Updated:  11/06/18 0843     Glucose 119 mg/dL      Comment: : [id number] [facility]: [id number]       Basic Metabolic Panel [[id number]]  (Abnormal) Collected:  11/06/18 0521    Specimen:  Blood Updated:  11/06/18 0618     Glucose 90 mg/dL      BUN 40 (H) mg/dL      Creatinine 1.17 mg/dL      Sodium 138 mmol/L      Potassium 4.3 mmol/L      Comment: Specimen hemolyzed.   Results may be affected.        Chloride 95 (L) mmol/L      CO2 33.0 (H) mmol/L      Calcium 8.7 mg/dL      eGFR Non African Amer 45 (L) mL/min/1.73      BUN/Creatinine Ratio 34.2 (H)     Anion Gap 10.0 mmol/L     Narrative:       The MDRD GFR formula is only valid for adults with stable renal function between ages 18 and 70.    Uric Acid [[id number]]  (Abnormal) Collected:  11/06/18 0521    Specimen:  Blood Updated:  11/06/18 0613     Uric Acid 9.5 (H) mg/dL     POC Glucose Once [[id number]]  (Abnormal) Collected:  11/05/18 2005    Specimen:  Blood Updated:  11/05/18 2016     Glucose 147 (H) mg/dL      Comment: : [id number] [facility] ID: [id number]       POC Glucose Once [[id number]]  (Abnormal) Collected:  11/05/18 1654    Specimen:  Blood Updated:  11/05/18 1727     Glucose 139 (H) mg/dL      Comment: : [id number] [facility] ID: [id number]       POC Glucose Once [[id number]]  (Abnormal) Collected:  11/05/18 1155    Specimen:  Blood Updated:  11/05/18 1234     Glucose 159 (H) mg/dL      Comment: : [id number] [employer] [facility] ID: [id number]       PTH, Intact [[id number]]  (Abnormal) Collected:  11/05/18 0641    Specimen:  Blood Updated:  11/05/18 0822     PTH, Intact 230.7 (H) pg/mL     POC Glucose Once [[id number]]  (Normal) Collected:  11/05/18 0746    Specimen:  Blood Updated:  11/05/18 0821     Glucose 121 mg/dL      Comment: : [id number] [facility] ID: [id number]       Comprehensive Metabolic Panel [[id number]]  (Abnormal) Collected:  11/05/18 0641    Specimen:  Blood Updated:  11/05/18 0738     Glucose 98 mg/dL      BUN 44 (H) mg/dL      Creatinine 1.44 (H) mg/dL      Sodium 137 mmol/L      Potassium 3.7 mmol/L      Chloride 94 (L) mmol/L      CO2 33.0 (H) mmol/L      Calcium 8.2 (L) mg/dL      Total Protein 5.9 (L) g/dL      Albumin 3.00 (L) g/dL      ALT (SGPT) 36 U/L      AST (SGOT) 31 U/L      Alkaline Phosphatase 83 U/L      Total Bilirubin 0.5 mg/dL      eGFR Non   Amer 35 (L) mL/min/1.73      Globulin 2.9 gm/dL      A/G Ratio 1.0 (L) g/dL      BUN/Creatinine Ratio 30.6 (H)     Anion Gap 10.0 mmol/L     Narrative:       The MDRD GFR formula is only valid for adults with stable renal function between ages 18 and 70.    CBC & Differential [[id number]] Collected:  11/05/18 0641    Specimen:  Blood Updated:  11/05/18 0728    Narrative:       The following orders were created for panel order CBC & Differential.  Procedure                               Abnormality         Status                     ---------                               -----------         ------                     CBC Auto Differential[[id number]]        Abnormal            Final result                 Please view results for these tests on the individual orders.    CBC Auto Differential [[id number]]  (Abnormal) Collected:  11/05/18 0641    Specimen:  Blood Updated:  11/05/18 0728     WBC 8.69 10*3/mm3      RBC 3.11 (L) 10*6/mm3      Hemoglobin 9.9 (L) g/dL      Hematocrit 30.5 (L) %      MCV 98.1 (H) fL      MCH 31.8 pg      MCHC 32.5 (L) g/dL      RDW 15.3 (H) %      RDW-SD 54.5 (H) fl      MPV 9.7 fL      Platelets 322 10*3/mm3      Neutrophil % 77.9 %      Lymphocyte % 9.9 (L) %      Monocyte % 10.4 %      Eosinophil % 0.7 %      Basophil % 0.3 %      Immature Grans % 0.8 %      Neutrophils, Absolute 6.77 10*3/mm3      Lymphocytes, Absolute 0.86 10*3/mm3      Monocytes, Absolute 0.90 10*3/mm3      Eosinophils, Absolute 0.06 10*3/mm3      Basophils, Absolute 0.03 10*3/mm3      Immature Grans, Absolute 0.07 (H) 10*3/mm3      nRBC 0.0 /100 WBC     POC Glucose Once [[id number]]  (Abnormal) Collected:  11/04/18 2024    Specimen:  Blood Updated:  11/04/18 2038     Glucose 153 (H) mg/dL      Comment: : [id number] [facility] ID: [id number]       POC Glucose Once [[id number]]  (Normal) Collected:  11/04/18 1623    Specimen:  Blood Updated:  11/04/18 1656     Glucose 119 mg/dL      Comment: :  [id number] [place] [facility] ID: [id number]       Protein, Urine, Random - [[id number]]  (Normal) Collected:  11/04/18 0910    Specimen:  Urine from Urine, Clean Catch Updated:  11/04/18 1647     Total Protein, Urine 12.0 mg/dL     Urea Nitrogen, Urine - [[id number]] Collected:  11/04/18 0910    Specimen:  Urine from Urine, Clean Catch Updated:  11/04/18 1647     Urea Nitrogen, Urine 417 mg/dL     POC Glucose Once [[id number]]  (Abnormal) Collected:  11/04/18 1101    Specimen:  Blood Updated:  11/04/18 1153     Glucose 187 (H) mg/dL      Comment: : [id number] [place] [employer] ID: [id number]       Vitamin D 25 Hydroxy [[id number]]  (Normal) Collected:  11/04/18 0922    Specimen:  Blood Updated:  11/04/18 1043     25 Hydroxy, Vitamin D 38.1 ng/ml     Magnesium [[id number]]  (Normal) Collected:  11/04/18 0922    Specimen:  Blood Updated:  11/04/18 1027     Magnesium 1.7 mg/dL     Phosphorus [[id number]]  (Normal) Collected:  11/04/18 0922    Specimen:  Blood Updated:  11/04/18 1027     Phosphorus 2.7 mg/dL     Osmolality, Urine - Urine, Clean Catch [[id number]]  (Abnormal) Collected:  11/04/18 0910    Specimen:  Urine from Urine, Clean Catch Updated:  11/04/18 0954     Osmolality, Urine 329 (L) mOsm/kg     Sodium, Urine, Random - Urine, Clean Catch [[id number]]  (Normal) Collected:  11/04/18 0910    Specimen:  Urine from Urine, Clean Catch Updated:  11/04/18 0936     Sodium, Urine 66 mmol/L     Creatinine, Urine, Random - Urine, Clean Catch [[id number]] Collected:  11/04/18 0910    Specimen:  Urine from Urine, Clean Catch Updated:  11/04/18 0936     Creatinine, Urine 39.8 mg/dL     Protime-INR [[id number]]  (Abnormal) Collected:  11/04/18 0630    Specimen:  Blood Updated:  11/04/18 0816     Protime 15.2 (H) Seconds      INR 1.16 (H)    POC Glucose Once [[id number]]  (Abnormal) Collected:  11/04/18 0740    Specimen:  Blood Updated:  11/04/18 0814     Glucose 134 (H) mg/dL      Comment: : [id number] [facility] ID: [id number]        Troponin [[id number]]  (Abnormal) Collected:  11/04/18 0414    Specimen:  Blood Updated:  11/04/18 0459     Troponin I 0.065 (H) ng/mL     Basic Metabolic Panel [[id number]]  (Abnormal) Collected:  11/04/18 0414    Specimen:  Blood Updated:  11/04/18 0449     Glucose 71 mg/dL      BUN 52 (H) mg/dL      Creatinine 1.46 (H) mg/dL      Sodium 138 mmol/L      Potassium 3.6 mmol/L      Chloride 94 (L) mmol/L      CO2 34.0 (H) mmol/L      Calcium 7.6 (L) mg/dL      eGFR Non African Amer 35 (L) mL/min/1.73      BUN/Creatinine Ratio 35.6 (H)     Anion Gap 10.0 mmol/L     Narrative:       The MDRD GFR formula is only valid for adults with stable renal function between ages 18 and 70.    CBC (No Diff) [[id number]]  (Abnormal) Collected:  11/04/18 0414    Specimen:  Blood Updated:  11/04/18 0437     WBC 8.19 10*3/mm3      RBC 2.85 (L) 10*6/mm3      Hemoglobin 9.1 (L) g/dL      Hematocrit 28.0 (L) %      MCV 98.2 (H) fL      MCH 31.9 pg      MCHC 32.5 (L) g/dL      RDW 15.1 (H) %      RDW-SD 54.7 (H) fl      MPV 9.8 fL      Platelets 260 10*3/mm3     Troponin [[id number]]  (Abnormal) Collected:  11/04/18 0127    Specimen:  Blood Updated:  11/04/18 0306     Troponin I 0.077 (H) ng/mL     POC Glucose Once [[id number]]  (Abnormal) Collected:  11/03/18 2302    Specimen:  Blood Updated:  11/03/18 2332     Glucose 183 (H) mg/dL      Comment: : [id number] [facility] Ann LisaMeter ID: [id number]       Troponin [[id number]]  (Abnormal) Collected:  11/03/18 2217    Specimen:  Blood Updated:  11/03/18 2252     Troponin I 0.073 (H) ng/mL     Urinalysis With Culture If Indicated - Urine, Clean Catch [[id number]]  (Abnormal) Collected:  11/03/18 2209    Specimen:  Urine from Urine, Clean Catch Updated:  11/03/18 2238     Color, UA Yellow     Appearance, UA Clear     pH, UA <=5.0     Specific Gravity, UA 1.012     Glucose, UA Negative     Ketones, UA Negative     Bilirubin, UA Negative     Blood, UA Negative     Protein, UA Negative     Leuk  Esterase, UA Small (1+) (A)     Nitrite, UA Negative     Urobilinogen, UA 1.0 E.U./dL    Urinalysis, Microscopic Only - Urine, Clean Catch [[id number]]  (Abnormal) Collected:  11/03/18 2209    Specimen:  Urine from Urine, Clean Catch Updated:  11/03/18 2238     RBC, UA 0-2 (A) /HPF      WBC, UA 3-5 (A) /HPF      Bacteria, UA None Seen /HPF      Squamous Epithelial Cells, UA 0-2 /HPF      Hyaline Casts, UA 3-6 /LPF      Methodology Automated Microscopy    [facility] Draw [[id number]] Collected:  11/03/18 1735    Specimen:  Blood Updated:  11/03/18 1845    Narrative:       The following orders were created for panel order [facility].  Procedure                               Abnormality         Status                     ---------                               -----------         ------                     Light Blue Top[[id number]]                                   Final result               Green Top (Gel)[[id number]]                                  Final result               Lavender Top[[id number]]                                     Final result               Red Top[[id number]]                                          Final result                 Please view results for these tests on the individual orders.    Light Blue Top [[id number]] Collected:  11/03/18 1735    Specimen:  Blood Updated:  11/03/18 1845     Extra Tube hold for add-on     Comment: Auto resulted       Green Top (Gel) [[id number]] Collected:  11/03/18 1735    Specimen:  Blood Updated:  11/03/18 1845     Extra Tube Hold for add-ons.     Comment: Auto resulted.       Lavender Top [[id number]] Collected:  11/03/18 1735    Specimen:  Blood Updated:  11/03/18 1845     Extra Tube hold for add-on     Comment: Auto resulted       Red Top [[id number]] Collected:  11/03/18 1735    Specimen:  Blood Updated:  11/03/18 1845     Extra Tube Hold for add-ons.     Comment: Auto resulted.       BNP [[id number]]  (Abnormal) Collected:  11/03/18 1735    Specimen:  Blood  Updated:  11/03/18 1814     proBNP 24,300.0 (H) pg/mL     Troponin [[id number]]  (Abnormal) Collected:  11/03/18 1735    Specimen:  Blood Updated:  11/03/18 1814     Troponin I 0.071 (H) ng/mL     Comprehensive Metabolic Panel [[id number]]  (Abnormal) Collected:  11/03/18 1735    Specimen:  Blood Updated:  11/03/18 1802     Glucose 88 mg/dL      BUN 57 (H) mg/dL      Creatinine 1.63 (H) mg/dL      Sodium 138 mmol/L      Potassium 3.8 mmol/L      Chloride 91 (L) mmol/L      CO2 32.0 (H) mmol/L      Calcium 8.4 mg/dL      Total Protein 6.9 g/dL      Albumin 3.60 g/dL      ALT (SGPT) 32 U/L      AST (SGOT) 48 (H) U/L      Alkaline Phosphatase 97 U/L      Total Bilirubin 0.9 mg/dL      eGFR Non African Amer 30 (L) mL/min/1.73      Globulin 3.3 gm/dL      A/G Ratio 1.1 g/dL      BUN/Creatinine Ratio 35.0 (H)     Anion Gap 15.0 (H) mmol/L     Narrative:       The MDRD GFR formula is only valid for adults with stable renal function between ages 18 and 70.    CBC & Differential [[id number]] Collected:  11/03/18 1735    Specimen:  Blood Updated:  11/03/18 1751    Narrative:       The following orders were created for panel order CBC & Differential.  Procedure                               Abnormality         Status                     ---------                               -----------         ------                     CBC Auto Differential[[id number]]        Abnormal            Final result                 Please view results for these tests on the individual orders.    CBC Auto Differential [[id number]]  (Abnormal) Collected:  11/03/18 1735    Specimen:  Blood Updated:  11/03/18 1751     WBC 11.83 (H) 10*3/mm3      RBC 3.60 (L) 10*6/mm3      Hemoglobin 11.3 (L) g/dL      Hematocrit 34.5 (L) %      MCV 95.8 fL      MCH 31.4 pg      MCHC 32.8 (L) g/dL      RDW 14.9 %      RDW-SD 52.0 fl      MPV 9.8 fL      Platelets 328 10*3/mm3      Neutrophil % 81.3 (H) %      Lymphocyte % 8.5 (L) %      Monocyte % 9.0 %      Eosinophil % 0.4  "%      Basophil % 0.2 %      Immature Grans % 0.6 %      Neutrophils, Absolute 9.61 (H) 10*3/mm3      Lymphocytes, Absolute 1.01 10*3/mm3      Monocytes, Absolute 1.07 10*3/mm3      Eosinophils, Absolute 0.05 10*3/mm3      Basophils, Absolute 0.02 10*3/mm3      Immature Grans, Absolute 0.07 (H) 10*3/mm3      nRBC 0.3 (H) /100 WBC         Hospital Course:  The patient is a 78 y.o. female who presented to [facility] with shortness of breath.  Patient was found to have significant volume overload.  Patient was treated with IV diuretics for several days and subsequently transitioned to PO.  Cardiology adjusted her medications and got her started on entresto.  Patient tolerated well.  Prior authorization pending at time of discharge but patient was provided with a month of samples.    Patient continued to improve with diuresis and kidney function improved back to baseline.  Suspect patient had a component of cardiorenal syndrome.    Follow-up with renal in 1-2 weeks.    Follow-up with cardiology in 1 month.     Moderate protein calorie malnutrition, recommend supplementation.    Outpatient PFTs to confirm diagnosis of COPD.      Physical Exam on Discharge:  /74 (BP Location: Right arm, Patient Position: Sitting)   Pulse 89   Temp 97.2 °F (36.2 °C) (Temporal Artery )   Resp 18   Ht 167.6 cm (66\")   Wt 56.7 kg (125 lb)   SpO2 92%   BMI 20.18 kg/m²   Physical ExamConstitutional: She is oriented to person, place, and time. No distress.   HENT:   Head: Normocephalic and atraumatic.   Eyes: Conjunctivae are normal. No scleral icterus.   Cardiovascular: Normal rate, regular rhythm and intact distal pulses.  Exam reveals no gallop and no friction rub.    Murmur heard.  Bilateral lower extremity edema     Pulmonary/Chest: Effort normal and breath sounds normal. No respiratory distress. She has no wheezes. She has no rales. She exhibits no tenderness.   Abdominal: Soft. Bowel sounds are normal. She " exhibits no distension. There is no tenderness. There is no guarding.   Musculoskeletal: She exhibits edema.   Neurological: She is alert and oriented to person, place, and time.   Skin: Skin is warm and dry. She is not diaphoretic.   varicoities on bilateral lower extremities    Psychiatric: She has a normal mood and affect. Her behavior is normal.   Nursing note and vitals reviewed.    Condition on Discharge: Stable    Discharge Disposition:  Home or Self Care    Discharge Medications:     Discharge Medications      New Medications      Instructions Start Date   allopurinol 100 MG tablet  Commonly known as:  ZYLOPRIM   100 mg, Oral, Daily      calcitriol 0.25 MCG capsule  Commonly known as:  ROCALTROL   0.25 mcg, Oral, Daily      ferrous sulfate 325 (65 FE) MG tablet   325 mg, Oral, Daily With Breakfast      hydrALAZINE 10 MG tablet  Commonly known as:  APRESOLINE   10 mg, Oral, Every 12 Hours Scheduled      isosorbide dinitrate 10 MG tablet  Commonly known as:  ISORDIL   10 mg, Oral, 3 Times Daily (Nitrates)      sacubitril-valsartan 24-26 MG tablet  Commonly known as:  ENTRESTO   1 tablet, Oral, Every 12 Hours Scheduled         Continue These Medications      Instructions Start Date   amiodarone 100 MG tablet  Commonly known as:  PACERONE   100 mg, Oral, Daily      aspirin 81 MG EC tablet   81 mg, Oral, Daily      bumetanide 1 MG tablet  Commonly known as:  BUMEX   1 mg, Oral, Daily      Calcium Carbonate-Vitamin D 600-200 MG-UNIT capsule   1 capsule, Oral, Daily      digoxin 125 MCG tablet  Commonly known as:  LANOXIN   125 mcg, Oral, Daily Digoxin      gabapentin 300 MG capsule  Commonly known as:  NEURONTIN   300 mg, Oral, 3 Times Daily      metFORMIN 1000 MG tablet  Commonly known as:  GLUCOPHAGE   500 mg, Oral, 2 Times Daily      metoprolol succinate XL 25 MG 24 hr tablet  Commonly known as:  TOPROL-XL   25 mg, Oral, Daily      nitroglycerin 0.4 MG SL tablet  Commonly known as:  NITROSTAT   0.4 mg,  Sublingual, Every 5 Minutes PRN, Take no more than 3 doses in 15 minutes.      pravastatin 20 MG tablet  Commonly known as:  PRAVACHOL   20 mg, Oral, Daily      TYLENOL ARTHRITIS PAIN 650 MG 8 hr tablet  Generic drug:  acetaminophen   650 mg, Oral, Every 12 Hours         Stop These Medications    lisinopril 2.5 MG tablet  Commonly known as:  PRINIVIL,ZESTRIL          Discharge Diet:   Diet Instructions     Diet: Cardiac       Discharge Diet:  Cardiac        Activity at Discharge:   Activity Instructions     Activity as Tolerated             Follow-up Appointments:   Future Appointments  Date Time Provider Department Center   12/12/2018 9:30 AM Gilberto Hammer MD [facility] ENT PAD None   12/14/2018 9:00 AM Freeman Kelsey MD [facility] P[facility]   12/18/2018 11:00 AM  PAD PULM LAB ROOM 1  PAD PFT PAD       Test Results Pending at Discharge: None     Gilberto Carrero MD  11/08/18  9:48 AM    Time: 35 minutes.

## 2018-11-08 NOTE — PLAN OF CARE
Problem: Patient Care Overview  Goal: Plan of Care Review  Outcome: Ongoing (interventions implemented as appropriate)   11/08/18 0901   Coping/Psychosocial   Plan of Care Reviewed With patient   Plan of Care Review   Progress improving   OTHER   Outcome Summary Pt was sitting in the chair on room air, O2 sat at 95%. Pt transfered sit to stand independently. Amb 200' with SBA on room air, pt had 2 slight LOB, but was able to self correct. Pt stated if she ambulates outside she uses her cane. He O2 decreased to 88%. After sitting for about a minute O2 increased back to 91%. Instructed pt on home safety and benefits of continued exercise at home.

## 2018-11-08 NOTE — THERAPY TREATMENT NOTE
Acute Care - Physical Therapy Treatment Note  [facility]     Patient Name: Padmini Torres  : 1940  MRN: [id number]  Today's Date: 2018  Onset of Illness/Injury or Date of Surgery: 18  Date of Referral to PT: 18  Referring Physician: Dr. Carrero    Admit Date: 11/3/2018    Visit Dx:    ICD-10-CM ICD-9-CM   1. Acute on chronic congestive heart failure, unspecified heart failure type (CMS/[facility]) I50.9 428.0   2. Chronic obstructive pulmonary disease, unspecified COPD type (CMS/[facility]) J44.9 496   3. Impaired mobility and ADLs Z74.09 799.89   4. Impaired functional mobility, balance, gait, and endurance Z74.09 V49.89     Patient Active Problem List   Diagnosis   • Chronic systolic congestive heart failure (CMS/[facility])   • Congestive heart failure with LV diastolic dysfunction, NYHA class 2 (CMS/[facility])   • Coronary artery disease involving native coronary artery of native heart without angina pectoris   • Mixed hyperlipidemia   • Type 2 diabetes mellitus without complication, without long-term current use of insulin (CMS/[facility])   • Palpitations   • SVT (supraventricular tachycardia) (CMS/[facility])   • Congestive heart failure (CMS/[facility])   • SOB (shortness of breath) on exertion   • Renal insufficiency   • 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   • Varicose veins of both lower extremities   • Acute on chronic congestive heart failure (CMS/[facility])       Therapy Treatment          Rehabilitation Treatment Summary     Row Name 18             Treatment Time/Intention    Discipline physical therapy assistant  -TATE      Document Type therapy note (daily note)  -TATE      Subjective Information weakness;no complaints  -TATE      Existing Precautions/Restrictions fall  -TATE      Recorded by [TATE] Saeid Dexter PTA 18 0912      Row Name 18 09             Vital Signs    Pre SpO2 (%) 95  -TATE      O2 Delivery Pre Treatment room air  -TATE      Intra SpO2 (%) 88  -TATE      O2 Delivery  Intra Treatment room air  -TATE      Post SpO2 (%) 91  -TATE      O2 Delivery Post Treatment room air  -TATE      Pre Patient Position Sitting  -TATE      Intra Patient Position --   amb  -TATE      Post Patient Position Sitting  -TATE      Recorded by [TATE] Saeid Dexter, PTA 11/08/18 0912      Row Name 11/08/18 0901             Bed Mobility Assessment/Treatment    Comment (Bed Mobility) up in the chair  -TATE      Recorded by [TATE] Saeid Dexter, PTA 11/08/18 0912      Row Name 11/08/18 0901             Sit-Stand Transfer    Sit-Stand [place] (Transfers) supervision  -TATE      Recorded by [TATE] Saeid Dexter, PTA 11/08/18 0912      Row Name 11/08/18 0901             Stand-Sit Transfer    Stand-Sit [place] (Transfers) supervision  -TATE      Recorded by [TATE] Saeid Dexter, PTA 11/08/18 0912      Row Name 11/08/18 0901             Gait/Stairs Assessment/Training    85826 - Gait Training Minutes  10  -TATE      [place] Level (Gait) verbal cues;stand by assist  -TATE      Assistive Device (Gait) --   no AD  -TATE      Distance in Feet (Gait) 200  -TATE      Pattern (Gait) step-through  -TATE      Comment (Gait/Stairs) pt had 2 slight LOB, but was able to self correct  -TATE      Recorded by [TATE] Saeid Dexter, PTA 11/08/18 0912      Row Name 11/08/18 0901             Positioning and Restraints    Pre-Treatment Position sitting in chair/recliner  -TATE      Post Treatment Position chair  -TATE      In Chair reclined;call light within reach;encouraged to call for assist  -TATE      Recorded by [TATE] Saeid Dexter, PTA 11/08/18 0912      Row Name 11/08/18 0901             Pain Scale: Numbers Pre/Post-Treatment    Pain Scale: Numbers, Pretreatment 0/10 - no pain  -TATE      Recorded by [TATE] Saeid Dexter, PTA 11/08/18 0912      Row Name                Wound 11/04/18 2000 Right anterior arm skin tear    Wound - Properties Group Date first assessed: 11/04/18 [WS] Time first assessed: 2000 [WS] Present On Admission :  yes;picture taken [WS] Side: Right [WS] Orientation: anterior [WS] Location: arm [WS] Type: skin tear [WS] Additional Comments: present on admit, dressing changed, mepilex added  [WS] Recorded by:  [WS] Valerie Gomes RN 11/04/18 2150    Row Name                Wound 11/04/18 2000 Left midline elbow skin tear    Wound - Properties Group Date first assessed: 11/04/18 [WS] Time first assessed: 2000 [WS] Present On Admission : yes;picture taken [WS] Side: Left [WS] Orientation: midline [WS] Location: elbow [WS] Type: skin tear [WS] Additional Comments: present on admit, dressing changed, mepilex added  [WS] Recorded by:  [WS] Valerie Gomes RN 11/04/18 2152      User Key  (r) = Recorded By, (t) = Taken By, (c) = Cosigned By    Initials Name Effective Dates Discipline    TATE Saeid Dexter PTA 12/08/16 -  PT    Valerie Mi RN 08/02/16 -  Nurse          Wound 11/04/18 2000 Right anterior arm skin tear (Active)   Dressing Appearance dry;intact 11/7/2018  9:27 PM   Base dressing in place, unable to visualize 11/7/2018  9:27 PM       Wound 11/04/18 2000 Left midline elbow skin tear (Active)   Dressing Appearance dry;intact 11/7/2018  9:27 PM   Base dressing in place, unable to visualize 11/7/2018  9:27 PM             Physical Therapy Education     Title: PT OT SLP Therapies (Done)     Topic: Physical Therapy (Done)     Point: Mobility training (Done)    Learning Progress Summary     Learner Status Readiness Method Response Comment Documented by    Patient Done Acceptance E VU home safety, amb at home TATE 11/08/18 0901     Done Acceptance E VU pt educated on energy conservation, having legs elevated and performing ankle pumps to decrease edema, POC and d/c disposition SD 11/07/18 1404          Point: Home exercise program (Done)    Learning Progress Summary     Learner Status Readiness Method Response Comment Documented by    Patient Done Acceptance E VU pt educated on energy conservation, having legs elevated and  performing ankle pumps to decrease edema, POC and d/c disposition SD 11/07/18 1404          Point: Body mechanics (Done)    Learning Progress Summary     Learner Status Readiness Method Response Comment Documented by    Patient Done Acceptance E VU pt educated on energy conservation, having legs elevated and performing ankle pumps to decrease edema, POC and d/c disposition SD 11/07/18 1404          Point: Precautions (Done)    Learning Progress Summary     Learner Status Readiness Method Response Comment Documented by    Patient Done Acceptance E VU pt educated on energy conservation, having legs elevated and performing ankle pumps to decrease edema, POC and d/c disposition SD 11/07/18 1404                      User Key     Initials Effective Dates Name Provider Type Discipline    TATE 12/08/16 -  Saeid Dexter PTA Physical Therapy Assistant PT    SD 08/31/18 -  Jami Rodarte PT Physical Therapist PT                    PT Recommendation and Plan     Plan of Care Reviewed With: patient  Progress: improving  Outcome Summary: Pt was sitting in the chair on room air, O2 sat at 95%.  Pt transfered sit to stand independently.  Amb 200' with SBA on room air, pt had 2 slight LOB, but was able to self correct.  Pt stated if she ambulates outside she uses her cane.  He O2 decreased to  88%.  After sitting for about a minute O2 increased back to 91%.  Instructed pt on home safety and benefits of continued exercise at home.             Outcome Measures     Row Name 11/08/18 0901 11/07/18 1400 11/07/18 1300       How much help from another person do you currently need...    Turning from your back to your side while in flat bed without using bedrails? 4  -TATE 4  -SD  --    Moving from lying on back to sitting on the side of a flat bed without bedrails? 4  -TATE 4  -SD  --    Moving to and from a bed to a chair (including a wheelchair)? 4  -TATE 4  -SD  --    Standing up from a chair using your arms (e.g., wheelchair, bedside  chair)? 4  -TATE 4  -SD  --    Climbing 3-5 steps with a railing? 3  -TATE 3  -SD  --    To walk in hospital room? 4  -TATE 3  -SD  --    AM-PAC 6 Clicks Score 23  -TATE 22  -SD  --       How much help from another is currently needed...    Putting on and taking off regular lower body clothing?  --  -- 4  -MW    Bathing (including washing, rinsing, and drying)  --  -- 4  -MW    Toileting (which includes using toilet bed pan or urinal)  --  -- 4  -MW    Putting on and taking off regular upper body clothing  --  -- 4  -MW    Taking care of personal grooming (such as brushing teeth)  --  -- 4  -MW    Eating meals  --  -- 4  -MW    Score  --  -- 24  -MW       Functional Assessment    Outcome Measure Options AM-PAC 6 Clicks Basic Mobility (PT)  -TATE AM-PAC 6 Clicks Basic Mobility (PT)  -SD AM-PAC 6 Clicks Daily Activity (OT)  -MW    Row Name 11/06/18 1100 11/05/18 1500          How much help from another is currently needed...    Putting on and taking off regular lower body clothing? 4  -MW 3  -MW     Bathing (including washing, rinsing, and drying) 3  -MW 3  -MW     Toileting (which includes using toilet bed pan or urinal) 4  -MW 3  -MW     Putting on and taking off regular upper body clothing 4  -MW 4  -MW     Taking care of personal grooming (such as brushing teeth) 4  -MW 4  -MW     Eating meals 4  -MW 4  -MW     Score 23  -MW 21  -MW        Functional Assessment    Outcome Measure Options AM-PAC 6 Clicks Daily Activity (OT)  -MW AM-PAC 6 Clicks Daily Activity (OT)  -MW       User Key  (r) = Recorded By, (t) = Taken By, (c) = Cosigned By    Initials Name Provider Type    Saeid Mack, PTA Physical Therapy Assistant    Elise Mathur, OTR/L Occupational Therapist    Jami Holden, PT Physical Therapist           Time Calculation:         PT Charges     Row Name 11/08/18 0901             Time Calculation    Start Time 0901  -TATE      Stop Time 0911  -TATE      Time Calculation (min) 10 min  -TATE      PT Received On  11/08/18  -TATE      PT Goal Re-Cert Due Date 11/17/18  -TATE         Time Calculation- PT    Total Timed Code Minutes- PT 10 minute(s)  -TATE         Timed Charges    33635 - Gait Training Minutes  10  -TATE        User Key  (r) = Recorded By, (t) = Taken By, (c) = Cosigned By    Initials Name Provider Type    Saeid Mack PTA Physical Therapy Assistant        Therapy Suggested Charges     Code   Minutes Charges    31311 (CPT®) Hc Pt Neuromusc Re Education Ea 15 Min      88247 (CPT®) Hc Pt Ther Proc Ea 15 Min      27829 (CPT®) Hc Gait Training Ea 15 Min 10 1    82181 (CPT®) Hc Pt Therapeutic Act Ea 15 Min      68041 (CPT®) Hc Pt Manual Therapy Ea 15 Min      41401 (CPT®) Hc Pt Iontophoresis Ea 15 Min      86316 (CPT®) Hc Pt Elec Stim Ea-Per 15 Min      04532 (CPT®) Hc Pt Ultrasound Ea 15 Min      16036 (CPT®) Hc Pt Self Care/Mgmt/Train Ea 15 Min      46738 (CPT®) Hc Pt Prosthetic (S) Train Initial Encounter, Each 15 Min      40232 (CPT®) Hc Pt Orthotic(S)/Prosthetic(S) Encounter, Each 15 Min      34752 (CPT®) Hc Orthotic(S) Mgmt/Train Initial Encounter, Each 15min      Total  10 1        Therapy Charges for Today     Code Description Service Date Service Provider Modifiers Qty    [id number] HC GAIT TRAINING EA 15 MIN 11/8/2018 Saeid Dexter PTA GP, KX 1          PT G-Codes  PT Professional Judgement Used?: Yes  Outcome Measure Options: AM-PAC 6 Clicks Basic Mobility (PT)  AM-PAC 6 Clicks Score: 23  Score: 24  Functional Limitation: Mobility: Walking and moving around  Mobility: Walking and Moving Around Current Status (): At least 20 percent but less than 40 percent impaired, limited or restricted  Mobility: Walking and Moving Around Goal Status (): At least 1 percent but less than 20 percent impaired, limited or restricted    Saeid Dexter PTA  11/8/2018

## 2018-11-08 NOTE — PLAN OF CARE
Problem: Breathing Pattern Ineffective (Adult)  Goal: Identify Related Risk Factors and Signs and Symptoms  Outcome: Resolved for upgrade, new template will be applied    Goal: Anxiety/Fear Reduction  Outcome: Resolved for upgrade, new template will be applied      Problem: Fall Risk (Adult)  Goal: Identify Related Risk Factors and Signs and Symptoms  Outcome: Resolved for upgrade, new template will be applied      Problem: Nutrition, Imbalanced: Inadequate Oral Intake (Adult)  Goal: Improved Oral Intake  Outcome: Resolved for upgrade, new template will be applied    Goal: Prevent Further Weight Loss  Outcome: Resolved for upgrade, new template will be applied

## 2018-11-08 NOTE — PLAN OF CARE
Problem: Patient Care Overview  Goal: Plan of Care Review  Outcome: Ongoing (interventions implemented as appropriate)   11/07/18 1656 11/07/18 2127 11/08/18 0252   Coping/Psychosocial   Plan of Care Reviewed With --  patient --    Plan of Care Review   Progress improving --  --    OTHER   Outcome Summary --  --  VSS. Pt ambulating in room and to bathroom. Soa reported with activity and patient has continued to use oxygen when soa. Will continue to monitor and notify MD of changes.     Goal: Individualization and Mutuality  Outcome: Ongoing (interventions implemented as appropriate)      Problem: Breathing Pattern Ineffective (Adult)  Goal: Identify Related Risk Factors and Signs and Symptoms  Outcome: Ongoing (interventions implemented as appropriate)    Goal: Anxiety/Fear Reduction  Outcome: Ongoing (interventions implemented as appropriate)      Problem: Fall Risk (Adult)  Goal: Identify Related Risk Factors and Signs and Symptoms  Outcome: Ongoing (interventions implemented as appropriate)    Goal: Absence of Fall  Outcome: Ongoing (interventions implemented as appropriate)      Problem: Nutrition, Imbalanced: Inadequate Oral Intake (Adult)  Goal: Improved Oral Intake  Outcome: Ongoing (interventions implemented as appropriate)    Goal: Prevent Further Weight Loss  Outcome: Ongoing (interventions implemented as appropriate)

## 2018-11-08 NOTE — PROGRESS NOTES
Continued Stay Note  [facility]     Patient Name: Padmini Torres  MRN: [id number]  Today's Date: 11/8/2018    Admit Date: 11/3/2018          Discharge Plan     Row Name 11/08/18 1034       Plan    Final Discharge Disposition Code 01 - home or self-care    Final Note PT IS BEING DCD HOME TODAY. STARTED APPEAL PROCESS FOR ENTRESTO. FAXED SUPPORTING DOCUMENTATION FROM PHYSICIAN TO [employer] AT [phone]. [employer] WILL NOTIFY PHYSICIAN OFFICE WITH UPDATES OF APPEAL.     Row Name 11/08/18 1472       Plan    Plan Comments PT [employer] DENIED PRIOR AUTH ON ENTRESTO. DRUG REQUIRES DIAGNOSIS OF NYHA CLASS II, 111, OR IV SYSTOLIC HEART FAILURE.               Discharge Codes    No documentation.       Expected Discharge Date and Time     Expected Discharge Date Expected Discharge Time    Nov 8, 2018             KELLY Devine

## 2018-11-09 NOTE — THERAPY DISCHARGE NOTE
Acute Care - Physical Therapy Discharge Summary  [facility]       Patient Name: Padmini Torres  : 1940  MRN: [id number]    Today's Date: 2018  Onset of Illness/Injury or Date of Surgery: 18    Date of Referral to PT: 18  Referring Physician: Dr. Carrero      Admit Date: 11/3/2018      PT Recommendation and Plan    Visit Dx:    ICD-10-CM ICD-9-CM   1. Acute on chronic congestive heart failure, unspecified heart failure type (CMS/[facility]) I50.9 428.0   2. Chronic obstructive pulmonary disease, unspecified COPD type (CMS/[facility]) J44.9 496   3. Impaired mobility and ADLs Z74.09 799.89   4. Impaired functional mobility, balance, gait, and endurance Z74.09 V49.89             Outcome Measures     Row Name 18 0901 18 1400 18 1300       How much help from another person do you currently need...    Turning from your back to your side while in flat bed without using bedrails? 4  -TATE 4  -SD  --    Moving from lying on back to sitting on the side of a flat bed without bedrails? 4  -TATE 4  -SD  --    Moving to and from a bed to a chair (including a wheelchair)? 4  -TATE 4  -SD  --    Standing up from a chair using your arms (e.g., wheelchair, bedside chair)? 4  -TATE 4  -SD  --    Climbing 3-5 steps with a railing? 3  -TATE 3  -SD  --    To walk in hospital room? 4  -TATE 3  -SD  --    AM-PAC 6 Clicks Score 23  -TATE 22  -SD  --       How much help from another is currently needed...    Putting on and taking off regular lower body clothing?  --  -- 4  -MW    Bathing (including washing, rinsing, and drying)  --  -- 4  -MW    Toileting (which includes using toilet bed pan or urinal)  --  -- 4  -MW    Putting on and taking off regular upper body clothing  --  -- 4  -MW    Taking care of personal grooming (such as brushing teeth)  --  -- 4  -MW    Eating meals  --  -- 4  -MW    Score  --  -- 24  -MW       Functional Assessment    Outcome Measure Options AM-PAC 6 Clicks Basic Mobility (PT)  -TATE AM-PAC 6 Clicks  Basic Mobility (PT)  -SD AM-PAC 6 Clicks Daily Activity (OT)  -MW    Row Name 11/06/18 1100             How much help from another is currently needed...    Putting on and taking off regular lower body clothing? 4  -MW      Bathing (including washing, rinsing, and drying) 3  -MW      Toileting (which includes using toilet bed pan or urinal) 4  -MW      Putting on and taking off regular upper body clothing 4  -MW      Taking care of personal grooming (such as brushing teeth) 4  -MW      Eating meals 4  -MW      Score 23  -MW         Functional Assessment    Outcome Measure Options AM-PAC 6 Clicks Daily Activity (OT)  -MW        User Key  (r) = Recorded By, (t) = Taken By, (c) = Cosigned By    Initials Name Provider Type    Saeid Mack, PTA Physical Therapy Assistant    Elise Mathur, OTR/L Occupational Therapist    Jami Holden, PT Physical Therapist            Therapy Suggested Charges     Code   Minutes Charges    77160 (CPT®) Hc Pt Neuromusc Re Education Ea 15 Min      94087 (CPT®) Hc Pt Ther Proc Ea 15 Min      91799 (CPT®) Hc Gait Training Ea 15 Min 10 1    11633 (CPT®) Hc Pt Therapeutic Act Ea 15 Min      93524 (CPT®) Hc Pt Manual Therapy Ea 15 Min      63104 (CPT®) Hc Pt Iontophoresis Ea 15 Min      56341 (CPT®) Hc Pt Elec Stim Ea-Per 15 Min      72367 (CPT®) Hc Pt Ultrasound Ea 15 Min      99283 (CPT®) Hc Pt Self Care/Mgmt/Train Ea 15 Min      78707 (CPT®) Hc Pt Prosthetic (S) Train Initial Encounter, Each 15 Min      29790 (CPT®) Hc Pt Orthotic(S)/Prosthetic(S) Encounter, Each 15 Min      10144 (CPT®) Hc Orthotic(S) Mgmt/Train Initial Encounter, Each 15min      Total  10 1                PT Rehab Goals     Row Name 11/09/18 0827             Bed Mobility Goal 1 (PT)    Activity/Assistive Device (Bed Mobility Goal 1, PT) bed mobility activities, all  -AH      [place] Level/Cues Needed (Bed Mobility Goal 1, PT) independent  -AH      Time Frame (Bed Mobility Goal 1, PT) by discharge  -       Progress/Outcomes (Bed Mobility Goal 1, PT) goal not met  -AH         Transfer Goal 1 (PT)    Activity/Assistive Device (Transfer Goal 1, PT) sit-to-stand/stand-to-sit  -      [place] Level/Cues Needed (Transfer Goal 1, PT) independent  -AH      Time Frame (Transfer Goal 1, PT) by discharge  -      Progress/Outcome (Transfer Goal 1, PT) goal not met  -AH         Gait Training Goal 1 (PT)    Activity/Assistive Device (Gait Training Goal 1, PT) gait (walking locomotion);increase endurance/gait distance;increase energy conservation  -AH      [place] Level (Gait Training Goal 1, PT) independent  -AH      Distance (Gait Goal 1, PT) 200  -AH      Time Frame (Gait Training Goal 1, PT) by discharge  -      Progress/Outcome (Gait Training Goal 1, PT) goal not met  -AH        User Key  (r) = Recorded By, (t) = Taken By, (c) = Cosigned By    Initials Name Provider Type Discipline    Jenny Kennedy PTA Physical Therapy Assistant PT              PT Discharge Summary  Reason for Discharge: Discharge from facility  Outcomes Achieved: Refer to plan of care for updates on goals achieved  Discharge Destination: Home      Jenny Parks PTA   11/9/2018

## 2018-11-10 NOTE — OUTREACH NOTE
Prep Survey      Responses   Facility patient discharged from?  [facility]   Is patient eligible?  Yes   Discharge diagnosis  Acute on chronic systolic heart failure (EF 30%) with aortic stenosis with aortic regurgitation   Does the patient have one of the following disease processes/diagnoses(primary or secondary)?  CHF   Does the patient have Home health ordered?  No   Is there a DME ordered?  No   Prep survey completed?  Yes          Krystal Chow RN

## 2018-11-14 NOTE — TELEPHONE ENCOUNTER
SPEAKING TO CARLO WITH [employer] PA DEPARTMENT.     PA FOR ENTRO 24-26MG     AUTHORIZATION # [id number] START NOV 9 2018 - NOV 2,2020    REFERENCE # [id number]

## 2018-11-15 NOTE — OUTREACH NOTE
CHF Week 1 Survey      Responses   Facility patient discharged from?  [facility]   Does the patient have one of the following disease processes/diagnoses(primary or secondary)?  CHF   Is there a successful TCM telephone encounter documented?  No   CHF Week 1 attempt successful?  Yes   Call start time  1020   Call end time  1022   Meds reviewed with patient/caregiver?  Yes   Is the patient having any side effects they believe may be caused by any medication additions or changes?  No   Does the patient have all medications ordered at discharge?  Yes   Is the patient taking all medications as directed (includes completed medication regime)?  Yes   Medication comments  No issues with Meds.   Does the patient have a primary care provider?   Yes   Does the patient have an appointment with their PCP within 7 days of discharge?  Yes   Has the patient kept scheduled appointments due by today?  Yes   Comments  Reviewed appts.    Has home health visited the patient within 72 hours of discharge?  N/A   Psychosocial issues?  No   Did the patient receive a copy of their discharge instructions?  Yes   Nursing interventions  Reviewed instructions with patient   What is the patient's perception of their health status since discharge?  Improving   Nursing interventions  Nurse provided patient education   Is the patient weighing daily?  Yes   Does the patient have scales?  Yes   Daily weight interventions  Education provided on importance of daily weight   Is the patient able to teach back Heart Failure diet management?  Yes   Is the patient able to teach back Heart Failure Zones?  Yes   Is the patient able to teach back signs and symptoms of worsening condition? (i.e. weight gain, shortness of air, etc.)  Yes   Is the patient/caregiver able to teach back the hierarchy of who to call/visit for symptoms/problems? PCP, Specialist, Home health nurse, Urgent Care, ED, 911  Yes    CHF Week 1 call completed?  Yes          Issa Sherman RN

## 2018-11-26 NOTE — OUTREACH NOTE
CHF Week 2 Survey      Responses   Facility patient discharged from?  [facility]   Does the patient have one of the following disease processes/diagnoses(primary or secondary)?  CHF   Week 2 attempt successful?  Yes   Call start time  1239   Call end time  1243   Discharge diagnosis  Acute on chronic systolic heart failure (EF 30%) with aortic stenosis with aortic regurgitation   Is patient permission given to speak with other caregiver?  No   Meds reviewed with patient/caregiver?  Yes   Is the patient having any side effects they believe may be caused by any medication additions or changes?  No   Does the patient have all medications ordered at discharge?  Yes   Is the patient taking all medications as directed (includes completed medication regime)?  Yes   Medication comments  Denies medication issues or questions today.    Does the patient have a primary care provider?   Yes   Does the patient have an appointment with their PCP within 7 days of discharge?  Yes   Comments regarding PCP  Dr Александр Yo PCP   Has the patient kept scheduled appointments due by today?  Yes   Comments  Appt with Dr Kelsey, [facility], 12/14/18.    Has home health visited the patient within 72 hours of discharge?  N/A   Psychosocial issues?  No   Did the patient receive a copy of their discharge instructions?  Yes   Nursing interventions  Reviewed instructions with patient   What is the patient's perception of their health status since discharge?  Improving   Nursing interventions  Nurse provided patient education   Is the patient weighing daily?  Yes   Does the patient have scales?  Yes   Daily weight interventions  Education provided on importance of daily weight   Is the patient able to teach back Heart Failure diet management?  Yes   Is the patient able to teach back Heart Failure Zones?  Yes   Is the patient able to teach back signs and symptoms of worsening condition? (i.e. weight gain, shortness of air, etc.)  Yes   Is the  patient/caregiver able to teach back the hierarchy of who to call/visit for symptoms/problems? PCP, Specialist, Home health nurse, Urgent Care, ED, 911  Yes   Additional teach back comments  Patient states that she monitors daily weight, b/p, blood sugar, and oxygen saturation at home.    CHF Week 2 call completed?  Yes          Michelle Hernández RN

## 2018-11-30 PROBLEM — J18.9 PNEUMONIA OF RIGHT LUNG DUE TO INFECTIOUS ORGANISM: Status: ACTIVE | Noted: 2018-01-01

## 2018-11-30 NOTE — OUTREACH NOTE
CHF Week 3 Survey      Responses   Facility patient discharged from?  [facility]   Does the patient have one of the following disease processes/diagnoses(primary or secondary)?  CHF   Week 3 attempt successful?  No   Revoke  Readmitted          Maureen Hidalgo RN

## 2018-12-01 NOTE — PLAN OF CARE
Problem: Patient Care Overview  Goal: Plan of Care Review  Outcome: Ongoing (interventions implemented as appropriate)   12/01/18 0334   Coping/Psychosocial   Plan of Care Reviewed With patient   Plan of Care Review   Progress no change   OTHER   Outcome Summary Vancomycin, Zosyn given this shift per orders. Cont breathing tx as ordered. Resting well.     Goal: Individualization and Mutuality  Outcome: Ongoing (interventions implemented as appropriate)    Goal: Discharge Needs Assessment  Outcome: Ongoing (interventions implemented as appropriate)    Goal: Interprofessional Rounds/Family Conf  Outcome: Ongoing (interventions implemented as appropriate)      Problem: Pneumonia (Adult)  Goal: Signs and Symptoms of Listed Potential Problems Will be Absent, Minimized or Managed (Pneumonia)  Outcome: Ongoing (interventions implemented as appropriate)      Problem: Fall Risk (Adult)  Goal: Identify Related Risk Factors and Signs and Symptoms  Outcome: Outcome(s) achieved Date Met: 12/01/18    Goal: Absence of Fall  Outcome: Ongoing (interventions implemented as appropriate)

## 2018-12-01 NOTE — PROGRESS NOTES
Discharge Planning Assessment  [facility]     Patient Name: Padmini Torres  MRN: [id number]  Today's Date: 12/1/2018    Admit Date: 11/30/2018    Discharge Needs Assessment     Row Name 12/01/18 1503       Living Environment    Lives With  alone    Current Living Arrangements  home/apartment/condo    Primary Care Provided by  self    Provides Primary Care For  no one, unable/limited ability to care for self    Quality of Family Relationships  helpful;involved;supportive    Able to Return to Prior Arrangements  yes       Resource/Environmental Concerns    Resource/Environmental Concerns  none       Transition Planning    Patient/Family Anticipates Transition to  home;home with help/services    Patient/Family Anticipated Services at Transition  home health care    Transportation Anticipated  family or friend will provide       Discharge Needs Assessment    Readmission Within the Last 30 Days  no previous admission in last 30 days    Concerns to be Addressed  no discharge needs identified    Equipment Currently Used at Home  cane, straight;walker, rolling    Anticipated Changes Related to Illness  none    Equipment Needed After Discharge  none    Current Discharge Risk  lives alone;chronically ill    Discharge Coordination/Progress  Pt has PCP and RX coverage.  Pt can afford medications.  Pt would benefit from  services.          Discharge Plan    No documentation.       Destination      No service coordination in this encounter.      Durable Medical Equipment      No service coordination in this encounter.      Dialysis/Infusion      No service coordination in this encounter.      Home Medical Care      No service coordination in this encounter.      Community Resources      No service coordination in this encounter.          Demographic Summary    No documentation.       Functional Status    No documentation.       Psychosocial    No documentation.       Abuse/Neglect    No documentation.       Legal    No  documentation.       Substance Abuse    No documentation.       Patient Forms    No documentation.           NATASHA RoweW

## 2018-12-01 NOTE — PROGRESS NOTES
[facility] Medicine Services  INPATIENT PROGRESS NOTE    Length of Stay: 1  Date of Admission: 11/30/2018  Primary Care Physician: Александр Yo DO    Subjective   Chief Complaint: Pneumonia/lung mass/CAD/CHF/neck pain    HPI   Patient is in pains much improved today.  Patient breathing a little better today.  Patient was unable to sleep due to steroids.  Glucose is high due to steroids.  Again, discussed patient with lung mass.  Patient states she does not want anything done.    Review of Systems   Constitutional: Positive for activity change, appetite change and fatigue. Negative for chills and fever.   HENT: Negative for hearing loss, nosebleeds, tinnitus and trouble swallowing.    Eyes: Negative for visual disturbance.   Respiratory: Positive for shortness of breath and wheezing. Negative for cough and chest tightness.    Cardiovascular: Negative for chest pain, palpitations and leg swelling.   Gastrointestinal: Negative for abdominal distention, abdominal pain, blood in stool, constipation, diarrhea, nausea and vomiting.   Endocrine: Negative for cold intolerance, heat intolerance, polydipsia, polyphagia and polyuria.   Genitourinary: Negative for decreased urine volume, difficulty urinating, dysuria, flank pain, frequency and hematuria.   Musculoskeletal: Positive for arthralgias, gait problem, joint swelling, myalgias and neck pain.   Skin: Negative for rash.   Allergic/Immunologic: Negative for immunocompromised state.   Neurological: Positive for weakness. Negative for dizziness, syncope, light-headedness and headaches.   Hematological: Negative for adenopathy. Does not bruise/bleed easily.   Psychiatric/Behavioral: Negative for confusion and sleep disturbance. The patient is not nervous/anxious.           All pertinent negatives and positives are as above. All other systems have been reviewed and are negative unless otherwise stated.     Objective    Temp:  [97.6 °F  (36.4 °C)-99 °F (37.2 °C)] 97.6 °F (36.4 °C)  Heart Rate:  [65-86] 86  Resp:  [18-21] 20  BP: (100-125)/(40-78) 110/44  No intake or output data in the 24 hours ending 12/01/18 1047  Physical Exam   Constitutional: She is oriented to person, place, and time. She appears well-developed.   HENT:   Head: Normocephalic and atraumatic.   Eyes: Conjunctivae and EOM are normal. Pupils are equal, round, and reactive to light.   Neck: Neck supple. No JVD present. No thyromegaly present.   Cardiovascular: Normal rate, regular rhythm, normal heart sounds and intact distal pulses. Exam reveals no gallop and no friction rub.   No murmur heard.  Pulmonary/Chest: Effort normal. No respiratory distress. She has wheezes. She has no rales. She exhibits no tenderness.   Rhonchi bilateral.   Abdominal: Soft. Bowel sounds are normal. She exhibits no distension. There is no tenderness. There is no rebound and no guarding.   Musculoskeletal: Normal range of motion. She exhibits no edema, tenderness or deformity.   Lymphadenopathy:     She has no cervical adenopathy.   Neurological: She is alert and oriented to person, place, and time. She displays normal reflexes. No cranial nerve deficit. She exhibits abnormal muscle tone. Coordination abnormal.   Skin: Skin is warm and dry. No rash noted.   Psychiatric: She has a normal mood and affect. Her behavior is normal. Judgment and thought content normal.   Nursing note and vitals reviewed.      Results Review:  Lab Results (last 24 hours)     Procedure Component Value Units Date/Time    POC Glucose Once [[id number]]  (Abnormal) Collected:  12/01/18 0810    Specimen:  Blood Updated:  12/01/18 0821     Glucose 264 mg/dL      Comment: : [id number] Nish HernandezMeter ID: [id number]       Comprehensive Metabolic Panel [[id number]]  (Abnormal) Collected:  12/01/18 0544    Specimen:  Blood Updated:  12/01/18 0648     Glucose 241 mg/dL      BUN 37 mg/dL      Creatinine 1.28 mg/dL      Sodium 142  mmol/L      Potassium 4.1 mmol/L      Chloride 98 mmol/L      CO2 25.0 mmol/L      Calcium 8.3 mg/dL      Total Protein 6.3 g/dL      Albumin 3.50 g/dL      ALT (SGPT) <15 U/L      AST (SGOT) 27 U/L      Alkaline Phosphatase 79 U/L      Total Bilirubin 0.5 mg/dL      eGFR Non African Amer 40 mL/min/1.73      Globulin 2.8 gm/dL      A/G Ratio 1.3 g/dL      BUN/Creatinine Ratio 28.9     Anion Gap 19.0 mmol/L     Narrative:       The MDRD GFR formula is only valid for adults with stable renal function between ages 18 and 70.    CBC Auto Differential [[id number]]  (Abnormal) Collected:  12/01/18 0544    Specimen:  Blood Updated:  12/01/18 0623     WBC 4.03 10*3/mm3      RBC 3.81 10*6/mm3      Hemoglobin 12.0 g/dL      Hematocrit 38.3 %      .5 fL      MCH 31.5 pg      MCHC 31.3 g/dL      RDW 17.2 %      RDW-SD 62.7 fl      MPV 10.3 fL      Platelets 187 10*3/mm3      Neutrophil % 90.1 %      Lymphocyte % 6.2 %      Monocyte % 2.2 %      Eosinophil % 0.0 %      Basophil % 0.5 %      Immature Grans % 1.0 %      Neutrophils, Absolute 3.63 10*3/mm3      Lymphocytes, Absolute 0.25 10*3/mm3      Monocytes, Absolute 0.09 10*3/mm3      Eosinophils, Absolute 0.00 10*3/mm3      Basophils, Absolute 0.02 10*3/mm3      Immature Grans, Absolute 0.04 10*3/mm3      nRBC 0.0 /100 WBC     Blood Culture - Blood, Hand, Right [[id number]] Collected:  11/30/18 1236    Specimen:  Blood from Hand, Right Updated:  12/01/18 0200     Blood Culture No growth at less than 24 hours    Blood Culture - Blood, Arm, Right [[id number]] Collected:  11/30/18 1224    Specimen:  Blood from Arm, Right Updated:  12/01/18 0145     Blood Culture No growth at less than 24 hours    POC Glucose Once [[id number]]  (Normal) Collected:  11/30/18 2055    Specimen:  Blood Updated:  11/30/18 2116     Glucose 122 mg/dL      Comment: : [id number] Palmer (Rich) AmandaMeter ID: [id number]       Procalcitonin [[id number]]  (Normal) Collected:  11/30/18 1224    Specimen:   Blood Updated:  11/30/18 1440     Procalcitonin <0.25 ng/mL     Narrative:       SIRS, sepsis, severe sepsis, and septic shock are categorized according to the criteria of the consensus conference of the American College of Chest Physicians/Society of Critical Care Medicine.    PCT < 0.5 ng/mL     Systemic infection (sepsis) is not likely.    PCT >0.5 and < 2.0 ng/mL Systemic infection (sepsis) is possible, but other conditions are known to elevate PCT as well.    PCT > 2.0 ng/mL     Systemic infection (sepsis) is likely, unless other causes are known.      PCT > 10.0 ng/mL    Important systemic inflammatory response, almost exclusively due to severe bacterial sepsis or septic shock.    PCT values of < 0.5 ng/mL do not exclude an infection, because localized infections (without systemic signs) may be associated with such low concentrations, or a systemic infection in its initial stages (<6 hours).  Increased PCT can occur without infection.  PCT concentrations between 0.5 and 2.0 ng/mL should be interpreted taking into account the patients history.  It is recommended to retest PCT within 6-24 hours if any concentrations < 2.0 ng/mL are obtained.    BNP [[id number]]  (Abnormal) Collected:  11/30/18 1224    Specimen:  Blood Updated:  11/30/18 1418     proBNP 7,660.0 pg/mL     Troponin [[id number]]  (Abnormal) Collected:  11/30/18 1224    Specimen:  Blood Updated:  11/30/18 1418     Troponin I 0.048 ng/mL     Influenza Antigen, Rapid - Swab, Nasopharynx [[id number]]  (Normal) Collected:  11/30/18 1330    Specimen:  Swab from Nasopharynx Updated:  11/30/18 1400     Influenza A Ag, EIA Negative     Influenza B Ag, EIA Negative    Narrative:       Recommend confirmation of negative results by viral culture or molecular assay.    Ammonia [[id number]]  (Abnormal) Collected:  11/30/18 1224    Specimen:  Blood Updated:  11/30/18 1339     Ammonia <9 umol/L     Lactic Acid, Plasma [[id number]]  (Normal) Collected:  11/30/18 1224     Specimen:  Blood Updated:  11/30/18 1316     Lactate 1.4 mmol/L     Comprehensive Metabolic Panel [[id number]]  (Abnormal) Collected:  11/30/18 1224    Specimen:  Blood Updated:  11/30/18 1313     Glucose 78 mg/dL      BUN 38 mg/dL      Creatinine 1.18 mg/dL      Sodium 140 mmol/L      Potassium 3.8 mmol/L      Chloride 98 mmol/L      CO2 32.0 mmol/L      Calcium 8.1 mg/dL      Total Protein 6.7 g/dL      Albumin 3.50 g/dL      ALT (SGPT) <15 U/L      AST (SGOT) 23 U/L      Alkaline Phosphatase 87 U/L      Total Bilirubin 0.6 mg/dL      eGFR Non African Amer 44 mL/min/1.73      Globulin 3.2 gm/dL      A/G Ratio 1.1 g/dL      BUN/Creatinine Ratio 32.2     Anion Gap 10.0 mmol/L     Narrative:       The MDRD GFR formula is only valid for adults with stable renal function between ages 18 and 70.    CBC & Differential [[id number]] Collected:  11/30/18 1224    Specimen:  Blood Updated:  11/30/18 1310    Narrative:       The following orders were created for panel order CBC & Differential.  Procedure                               Abnormality         Status                     ---------                               -----------         ------                     CBC Auto Differential[[id number]]        Abnormal            Final result                 Please view results for these tests on the individual orders.    CBC Auto Differential [[id number]]  (Abnormal) Collected:  11/30/18 1224    Specimen:  Blood Updated:  11/30/18 1310     WBC 5.85 10*3/mm3      RBC 3.93 10*6/mm3      Hemoglobin 12.2 g/dL      Hematocrit 38.9 %      MCV 99.0 fL      MCH 31.0 pg      MCHC 31.4 g/dL      RDW 17.1 %      RDW-SD 62.4 fl      MPV 10.3 fL      Platelets 199 10*3/mm3      Neutrophil % 62.8 %      Lymphocyte % 19.7 %      Monocyte % 14.0 %      Eosinophil % 2.1 %      Basophil % 0.9 %      Immature Grans % 0.5 %      Neutrophils, Absolute 3.68 10*3/mm3      Lymphocytes, Absolute 1.15 10*3/mm3      Monocytes, Absolute 0.82 10*3/mm3       Eosinophils, Absolute 0.12 10*3/mm3      Basophils, Absolute 0.05 10*3/mm3      Immature Grans, Absolute 0.03 10*3/mm3      nRBC 0.0 /100 WBC     D-dimer, Quantitative [[id number]]  (Abnormal) Collected:  11/30/18 1224    Specimen:  Blood Updated:  11/30/18 1309     D-Dimer, Quantitative 0.88 mg/L (FEU)     Narrative:       Reference Range is 0-0.50 mg/L FEU. However, results <0.50 mg/L FEU tends to rule out DVT or PE. Results >0.50 mg/L FEU are not useful in predicting absence or presence of DVT or PE.    Protime-INR [[id number]]  (Normal) Collected:  11/30/18 1224    Specimen:  Blood Updated:  11/30/18 1309     Protime 12.6 Seconds      INR 0.92    aPTT [[id number]]  (Normal) Collected:  11/30/18 1224    Specimen:  Blood Updated:  11/30/18 1309     PTT 34.6 seconds     Blood Gas, Arterial [[id number]]  (Abnormal) Collected:  11/30/18 1151    Specimen:  Arterial Blood Updated:  11/30/18 1155     Site Left Radial     Deo's Test Positive     pH, Arterial 7.462 pH units      Comment: 83 Value above reference range        pCO2, Arterial 45.5 mm Hg      Comment: 83 Value above reference range        pO2, Arterial 58.7 mm Hg      Comment: 84 Value below reference range        HCO3, Arterial 32.5 mmol/L      Comment: 83 Value above reference range        Base Excess, Arterial 7.7 mmol/L      Comment: 83 Value above reference range        O2 Saturation, Arterial 89.6 %      Comment: 84 Value below reference range        Temperature 37.0 C      Barometric Pressure for Blood Gas 749 mmHg      Modality Room Air     Ventilator Mode NA     Collected by [id number]     Comment: Meter: [id number]     :  [id number]              Cultures:  Blood Culture   Date Value Ref Range Status   11/30/2018 No growth at less than 24 hours  Preliminary   11/30/2018 No growth at less than 24 hours  Preliminary       Radiology Data:    Imaging Results (last 24 hours)     Procedure Component Value Units Date/Time    CT Cervical Spine Without  Contrast [[id number]] Collected:  11/30/18 2052     Updated:  11/30/18 2124    Narrative:       History:  78-year-old with neck pain.      Reference:   CT soft tissue neck March 2018.     Technique  Routine unenhanced CT cervical spine performed with coronal and sagittal  reformatted images provided.     For this CT exam, one or more of the following dose reduction techniques  was employed:  -automated exposure control  -mA and/or kVp adjustment for patient size  -iterative reconstruction      mGy-cm     Findings:  Mild kyphosis. No acute cervical spine fracture. There is degenerative  retrolisthesis of C4 on C5 by 1 to 2 mm. Similar degenerative  retrolisthesis of C5 on C6. 2 mm degenerative anterolisthesis of C7 on  T1. Minimal degenerative anterolisthesis of T1 on T2 and T2 on T3.     There is complete disc height loss at C4-C5, C5-C6, and C6-C7. Facet  joints are intact. Spinous processes are intact.     C2-C3  No obvious spinal stenosis. Left-sided facet arthropathy with mild bony  neuroforaminal narrowing on the left.     C3-C4  Mild disc osteophyte complex. Mild spinal stenosis is suspected. Mild  left neuroforaminal narrowing.     C4-C5  Large disc osteophyte complex likely causing severe spinal stenosis.  Moderate bony neuroforaminal narrowing bilaterally.     C5-C6  Large disc osteophyte complex causing moderate spinal stenosis. Moderate  bony neuroforaminal narrowing bilaterally, greater on the right.     C6-C7  Mild disc osteophyte causing mild spinal stenosis. Moderate bony  neuroforaminal narrowing bilaterally.     C7-T1  Tiny disc osteophyte with no definite spinal stenosis by CT. Left  neuroforaminal narrowing difficult to quantify.     Right apical opacities, refer to CT chest today. Dependent pleural fluid  in the left apex. Thyroid is prominent in size. Carotid bifurcation  atherosclerotic plaquing.          Impression:       Severe degenerative changes as discussed. Multilevel spinal  stenosis,  severe at C4-C5 owing to reversed curvature and large disc osteophyte  complex.  This report was finalized on 11/30/2018 21:21 by Dr Delfino Manjarrez, .    CT Angiogram Chest With Contrast [[id number]] Collected:  11/30/18 1353     Updated:  11/30/18 1410    Narrative:       CT ANGIOGRAM CHEST W CONTRAST- 11/30/2018 1:37 PM CST      HISTORY: Chest pain, acute, PE suspected, low pretest probe      COMPARISON: None.      DLP: 257 mGy cm     TECHNIQUE: Helical tomographic images of the chest were obtained after  the administration of intravenous contrast following angiogram protocol.  Additionally, 3D reformatted images were provided.        FINDINGS:    Pulmonary arteries: There is adequate enhancement of the pulmonary  arteries to evaluate for central and segmental pulmonary emboli. There  are no filling defects within the main, lobar, segmental or visualized  subsegmental pulmonary arteries. The pulmonary arteries are enlarged,  suggestive of pulmonary artery hypertension..      Aorta and great vessels: There is atherosclerosis in the aorta without  aneurysm or dissection. There is atherosclerosis in the great vessels.     Visualized neck base: The imaged portion of the base of the neck appears  unremarkable.      Lungs: Redemonstration of consolidative process in the right upper lobe  laterally, which appears increased when compared to CT chest dated  9/14/2017. Additionally, there is a right upper lobe nodule measuring 9  mm (image 33, series 6) which is increased from 5 mm. Left pleural-based  5 mm nodule (image 45, series 6) scarring in the left upper lobe,  lingula and right middle lobe also symmetric increased when compared to  CT chest dated 9/14/2017. Bronchiectatic changes in the right middle  lobe with associated consolidative process, not definitely seen on prior  examination. This may reflect on the intraluminal lesion including  mucous plug (image 78, series 6). Trace left pleural effusion..       Heart: Cardiomegaly. Trace pericardial effusion..      Mediastinum and lymph nodes: No enlarged mediastinal, hilar, or axillary  lymph nodes are present. Calcified hilar and mediastinal lymph nodes.      Skeletal and soft tissues: The osseous structures of the thorax and  surrounding soft tissues demonstrate no acute process. Calcified disc  osteophyte at the T8-T9 with suspected severe spinal canal stenosis.     Upper abdomen: The imaged portion of the upper abdomen demonstrates no  acute process.        Impression:       1.   No evidence of acute pulmonary embolus.  2.  Increasing consolidative process in the right upper lobe laterally,  concerning for underlying neoplasm although scar tissue may present  similarly. May be further imaged with PET/CT.  3.  Increasing right upper lobe nodule, also may be further excluded  with PET/CT.  4.  Right middle lobe consolidative process with a suspected  intraluminal lesion such as mucous plug and/or intraluminal lesions.  5.  Left pleural effusion, small.  6.  Suspected severe spinal canal stenosis at T8-T9 related to enlarged  calcified disc extrusion. This is unchanged from 9/14/2017        This report was finalized on 11/30/2018 14:06 by Dr. Shanique Eller MD.    XR Chest 1 View [[id number]] Collected:  11/30/18 1157     Updated:  11/30/18 1202    Narrative:       XR CHEST 1 VW- 11/30/2018 11:36 AM CST     HISTORY: weakness       COMPARISON: 11/4/2018.     FINDINGS:   Obscuration of the left hemidiaphragm with what appears to be a small to  moderate sized left pleural effusion. Prominent cardiomegaly with  central pulmonary congestion. No obvious interstitial or breezy edema. A  prominent skinfold on the right simulates a pleural edge, however, there  is no pneumothorax. Left chest [id number]-f-[id number] identified. Remote  granulomatous calcifications.     The osseous structures and surrounding soft tissues demonstrate no acute  abnormality.       Impression:       1.  Cardiomegaly with central pulmonary congestion. Layering left pleural  effusion.  2. No definite acute lung infiltrate.        This report was finalized on 11/30/2018 11:59 by Dr Kuldip Kolb, .          Allergies   Allergen Reactions   • Zantac [Ranitidine Hcl] Shortness Of Breath   • Fish-Derived Products GI Intolerance       Scheduled meds:     acetylcysteine 3 mL Inhalation BID - RT   allopurinol 100 mg Oral Daily   amiodarone 100 mg Oral Daily   aspirin 81 mg Oral Daily   atorvastatin 10 mg Oral Nightly   bumetanide 1 mg Oral Daily   calcitriol 0.25 mcg Oral Daily   digoxin 125 mcg Oral Daily   enoxaparin 30 mg Subcutaneous Q24H   ferrous sulfate 325 mg Oral Daily With Breakfast   gabapentin 300 mg Oral TID   guaiFENesin 1,200 mg Oral Q12H   hydrALAZINE 10 mg Oral Q12H   insulin lispro 2-7 Units Subcutaneous 4x Daily With Meals & Nightly   ipratropium-albuterol 3 mL Nebulization Q4H - RT   isosorbide dinitrate 10 mg Oral TID - Nitrates   lisinopril 5 mg Oral Q24H   methylPREDNISolone sodium succinate 60 mg Intravenous Q8H   metoprolol succinate XL 25 mg Oral Daily   piperacillin-tazobactam 4.5 g Intravenous Q6H   And      vancomycin 750 mg Intravenous Q24H   sodium chloride 3 mL Intravenous Q12H       PRN meds:  •  acetaminophen  •  baclofen  •  dextrose  •  dextrose  •  glucagon (human recombinant)  •  guaiFENesin-codeine  •  HYDROcodone-acetaminophen  •  nitroglycerin  •  ondansetron **OR** ondansetron ODT **OR** ondansetron  •  [COMPLETED] Insert peripheral IV **AND** sodium chloride  •  sodium chloride    Assessment/Plan       Pneumonia of right lung due to infectious organism      Plan:  Pneumonia of right lung/COPD. Cont Zosyn an Vancomycin. Cont Mucomyst, DuoNeb nebs, Flovent, oxygen, incentive spirometer, Mucinex..  Wean down Solu-Medrol.    Lung mass/history of lymphoma ×2.  Patient refused to have the lung mass evaluated.  Patient wants to leave alone.  Patient does not want any treatment.  Patient  does not want to go to a chemotherapy again.  CTA of the chest-no pulmonary embolus, consolidation right upper lobe laterally - concerning neoplasm, increase right upper lobe lung nodule, right middle lobe consolidation, left pleural effusion-small, severe spinal canal stenosis T8-T9.      CAD/hypertension/CHF.  History of chronically elevated troponin.  Continue an aspirin a day, Lipitor, Bumex, amiodarone, digoxin, hydralazine, Toprol-XL, isosorbide.  Echocardiogram 3/31/18-ejection fraction 30%, severe calcified aortic valve, moderate aortic valve regurgitation, previous echocardiogram 18 shows injection fraction of 55%.    Neck pain.  CT scan of cervical spine- severe degenerative changes, multiple level spinal stenosis, severe C4-C5 due to reversed curvature and large osteophyte complex.  Neck pains better today with steroids and muscle relaxant.    Diabetes.  Sliding scale.    Neuropathy.  Continue Neurontin.    Neck pain.  CT scan of cervical spine- severe degenerative changes-severe C5/C6, multiple level spinal stenosis- severe C4-C5 due to reversed curvature and large osteophyte complex.    History of DVT and PE from chemotherapy.    Gout.  Continue all of her note.    Neuropathy.  Continue    Anemia.  Continue iron sulfate.    History of chronic elevated troponin.    Nutrition.  Cardiac/consistent carb diet    Deconditioning.  PT and OT consult.    Blood culture showed no growth less than 24 hours.    Discharge Plannin-4 days    Rashad Mir MD   18   10:47 AM

## 2018-12-01 NOTE — PROGRESS NOTES
"Pharmacy Dosing Service  Pharmacokinetics  Vancomycin Initial Evaluation    Assessment/Action/Plan:  Initiated Vancomycin 750 mg IVPB every 24 hours. Vancomycin levels not ordered at this time. Pharmacy will monitor renal function and adjust dose accordingly.     Subjective:  Padmini Torres is a 78 y.o. female with a Vancomycin \"Pharmacy to Dose\" consult for the treatment of pneumonia .    Objective:  Ht: 170.2 cm (67\"); Wt: 58.2 kg (128 lb 4.8 oz)  Estimated Creatinine Clearance: 35.5 mL/min (by C-G formula based on SCr of 1.18 mg/dL).   Lab Results   Component Value Date    CREATININE 1.18 11/30/2018    CREATININE 1.14 11/08/2018    CREATININE 1.13 11/07/2018      Lab Results   Component Value Date    WBC 5.85 11/30/2018    WBC 8.69 11/05/2018    WBC 8.19 11/04/2018      Baseline culture results:  Microbiology Results (last 10 days)       Procedure Component Value - Date/Time    Influenza Antigen, Rapid - Swab, Nasopharynx [[id number]]  (Normal) Collected:  11/30/18 1330    Lab Status:  Final result Specimen:  Swab from Nasopharynx Updated:  11/30/18 1400     Influenza A Ag, EIA Negative     Influenza B Ag, EIA Negative    Narrative:       Recommend confirmation of negative results by viral culture or molecular assay.            Shayy Dalton, PharmVIJAY  11/30/18 6:46 PM    "

## 2018-12-01 NOTE — PLAN OF CARE
Problem: Patient Care Overview  Goal: Plan of Care Review  Outcome: Ongoing (interventions implemented as appropriate)   12/01/18 1600   Coping/Psychosocial   Plan of Care Reviewed With patient   Plan of Care Review   Progress improving   OTHER   Outcome Summary pt port was accessed today per MD order okEdouard ROSARIO pt has shakiness from stds. up ad jorge, A/O       Problem: Pneumonia (Adult)  Goal: Signs and Symptoms of Listed Potential Problems Will be Absent, Minimized or Managed (Pneumonia)  Outcome: Outcome(s) achieved Date Met: 12/01/18      Problem: Fall Risk (Adult)  Goal: Absence of Fall  Outcome: Ongoing (interventions implemented as appropriate)

## 2018-12-02 NOTE — PROGRESS NOTES
RT Nebulizer Protocol    Assessment tool to be used for patients with existing breathing treatments ordered by hospitalists                                                                  0  1  2  3  4      Respiratory History   No Smoking   x   Smoking History      1 Pack/Day      Pulmonary Disease      Exacerbation        Respiratory Rate   Normal   x   20-25    Dyspneic      Accessory Muscles      Severe Dyspnea        Breath Sounds   Clear      Crackles   x   Crackles/ Rhonchi      Wheezing      Absent/ Severe Wheezing        Chest   X-ray   Clear      1 Lobe Infiltration/ Consolidation/ PE      2 Lobe Same Lung Infiltration/ Consolidation/ PE       2 Lobe Infiltration/ Both Lungs/ Consolidation/ PE   x   Both Lungs/ More Than 1 Lobe/ Atelectasis/ Consolidation/  PE        Cough   Strong Non- Productive   x   Excessive Secretions/ Strong Cough    Excessive Secretions/ Weak Cough    Thick Bronchial Secretions/ Weak Cough      Thick Bronchial Secretions/ No Cough        Total Patient Score =  4    0-4=Q4 PRN  5-9=TID and Q4 PRN  10-14=QID and Q3 PRN  15-19=Q4 and Q2 PRN  20=Q3 and Q2 PRN    Bronchopulmonary Hygiene (CPT)   Q4 ATC Copious secretions, dyspnea, unable to sleep, mucus plug    QID & Q4 PRN Moderate secretions    TID Small amounts of secretions w/ poor cough and history of secretions    BID Unable to deep breathe and cough spontaneously       Lung Expansion Therapy (PEP)   Q4 & PRN at night Severe atelectasis, poor oxygenation    QID  High risk for persistent atelectasis, existence of same    TID At risk for developing atelectasis    BID Unable to deep breathe and cough spontaneously     Instruct, 1 follow up Patients able to perform well on their own

## 2018-12-02 NOTE — PROGRESS NOTES
"Pharmacy Dosing Service  Pharmacokinetics  Vancomycin Follow-up Evaluation    Assessment/Action/Plan:  Vancomycin trough ordered before dose due tonight at 2200.  Continue Vancomycin 750mg iv q24h.  Patient also receiving Zosyn.  Pharmacy will continue to monitor renal function and adjust dose accordingly.     Subjective:  Padmini Torres is a 78 y.o. female currently on Vancomycin 750 mg IV every 24 hours for the treatment of pneumonia, day 3 of therapy.    Objective:  Ht: 170.2 cm (67\"); Wt: 58 kg (127 lb 12.8 oz)  Estimated Creatinine Clearance: 31.2 mL/min (by C-G formula based on SCr of 1.34 mg/dL).   Lab Results   Component Value Date    CREATININE 1.34 12/02/2018    CREATININE 1.28 12/01/2018    CREATININE 1.18 11/30/2018    CREATININE 1.70 (H) 03/27/2018    CREATININE 1.40 (H) 09/14/2017      Lab Results   Component Value Date    WBC 6.02 12/02/2018    WBC 4.03 (L) 12/01/2018    WBC 5.85 11/30/2018       No results found for: VANCOPEAK, VANCOTROUGH, VANCORANDOM    Culture Results:  Microbiology Results (last 10 days)       Procedure Component Value - Date/Time    Influenza Antigen, Rapid - Swab, Nasopharynx [[id number]]  (Normal) Collected:  11/30/18 1330    Lab Status:  Final result Specimen:  Swab from Nasopharynx Updated:  11/30/18 1400     Influenza A Ag, EIA Negative     Influenza B Ag, EIA Negative    Narrative:       Recommend confirmation of negative results by viral culture or molecular assay.    Blood Culture - Blood, Hand, Right [[id number]] Collected:  11/30/18 1236    Lab Status:  Preliminary result Specimen:  Blood from Hand, Right Updated:  12/02/18 1400     Blood Culture No growth at 2 days    Blood Culture - Blood, Arm, Right [[id number]] Collected:  11/30/18 1224    Lab Status:  Preliminary result Specimen:  Blood from Arm, Right Updated:  12/02/18 1345     Blood Culture No growth at 2 days            Raj Alanis RPH   12/02/18 3:25 PM    "

## 2018-12-02 NOTE — PROGRESS NOTES
[facility] Medicine Services  INPATIENT PROGRESS NOTE    Length of Stay: 2  Date of Admission: 11/30/2018  Primary Care Physician: Александр Yo DO    Subjective   Chief Complaint: Pneumonia/lung mass/CAD/CHF/neck pain    HPI   Patient complains coughing, nonproductive.  Patient denies any chest pain.  Patient still, shortness breath upon ambulating.  Patient states her breathing is improving.  Patient denies any chest pain.  Plan to give her Tessalon Perles for cough for now.  Start chest physio.     Review of Systems   Constitutional: Positive for activity change, appetite change and fatigue. Negative for chills and fever.   HENT: Negative for hearing loss, nosebleeds, tinnitus and trouble swallowing.    Eyes: Negative for visual disturbance.   Respiratory: Positive for shortness of breath and wheezing. Negative for cough and chest tightness.    Cardiovascular: Negative for chest pain, palpitations and leg swelling.   Gastrointestinal: Negative for abdominal distention, abdominal pain, blood in stool, constipation, diarrhea, nausea and vomiting.   Endocrine: Negative for cold intolerance, heat intolerance, polydipsia, polyphagia and polyuria.   Genitourinary: Negative for decreased urine volume, difficulty urinating, dysuria, flank pain, frequency and hematuria.   Musculoskeletal: Positive for arthralgias, gait problem, joint swelling, myalgias and neck pain.   Skin: Negative for rash.   Allergic/Immunologic: Negative for immunocompromised state.   Neurological: Positive for weakness. Negative for dizziness, syncope, light-headedness and headaches.   Hematological: Negative for adenopathy. Does not bruise/bleed easily.   Psychiatric/Behavioral: Negative for confusion and sleep disturbance. The patient is not nervous/anxious.         All pertinent negatives and positives are as above. All other systems have been reviewed and are negative unless otherwise stated.     Objective     Temp:  [97 °F (36.1 °C)-99 °F (37.2 °C)] 97.6 °F (36.4 °C)  Heart Rate:  [] 86  Resp:  [16-20] 18  BP: ()/(42-94) 100/42  No intake or output data in the 24 hours ending 12/02/18 1534  Physical Exam  Constitutional: She is oriented to person, place, and time. She appears well-developed.   HENT:   Head: Normocephalic and atraumatic.   Eyes: Conjunctivae and EOM are normal. Pupils are equal, round, and reactive to light.   Neck: Neck supple. No JVD present. No thyromegaly present.   Cardiovascular: Normal rate, regular rhythm, normal heart sounds and intact distal pulses. Exam reveals no gallop and no friction rub.   No murmur heard.  Pulmonary/Chest:  Diminished breath sound bilateral.  No respiratory distress. She has wheezes. She has no rales. She exhibits no tenderness.   Abdominal: Soft. Bowel sounds are normal. She exhibits no distension. There is no tenderness. There is no rebound and no guarding.   Musculoskeletal: Normal range of motion. She exhibits no edema, tenderness or deformity.   Lymphadenopathy:     She has no cervical adenopathy.   Neurological: She is alert and oriented to person, place, and time. She displays normal reflexes. No cranial nerve deficit. She exhibits abnormal muscle tone. Coordination abnormal.   Skin: Skin is warm and dry. No rash noted.   Psychiatric: She has a normal mood and affect. Her behavior is normal. Judgment and thought content normal.   Nursing note and vitals reviewed.      Results Review:  Lab Results (last 24 hours)     Procedure Component Value Units Date/Time    Blood Culture - Blood, Hand, Right [[id number]] Collected:  11/30/18 1236    Specimen:  Blood from Hand, Right Updated:  12/02/18 1400     Blood Culture No growth at 2 days    Blood Culture - Blood, Arm, Right [[id number]] Collected:  11/30/18 1224    Specimen:  Blood from Arm, Right Updated:  12/02/18 1345     Blood Culture No growth at 2 days    POC Glucose Once [[id number]]  (Abnormal) Collected:   12/02/18 1102    Specimen:  Blood Updated:  12/02/18 1113     Glucose 161 mg/dL      Comment: : [id number] Mockjarret AriasieMeter ID: [id number]       POC Glucose Once [[id number]]  (Abnormal) Collected:  12/02/18 0839    Specimen:  Blood Updated:  12/02/18 0850     Glucose 243 mg/dL      Comment: : [id number] Nish CarusoenzieMeter ID: [id number]       Hemoglobin A1c [[id number]] Collected:  12/02/18 0416    Specimen:  Blood Updated:  12/02/18 0631     Hemoglobin A1C 5.6 %     Narrative:       Less than 6.0           Non-Diabetic Range  6.0-7.0                 ADA Therapeutic Target  Greater than 7.0        Action Suggested    TSH [[id number]]  (Abnormal) Collected:  12/02/18 0416    Specimen:  Blood Updated:  12/02/18 0534     TSH 6.700 mIU/mL     Lipid Panel [[id number]]  (Abnormal) Collected:  12/02/18 0416    Specimen:  Blood Updated:  12/02/18 0515     Total Cholesterol 125 mg/dL      Triglycerides 95 mg/dL      HDL Cholesterol 55 mg/dL      LDL Cholesterol  59 mg/dL      LDL/HDL Ratio 0.93    Comprehensive Metabolic Panel [[id number]]  (Abnormal) Collected:  12/02/18 0416    Specimen:  Blood Updated:  12/02/18 0505     Glucose 154 mg/dL      BUN 33 mg/dL      Creatinine 1.34 mg/dL      Sodium 139 mmol/L      Potassium 3.5 mmol/L      Chloride 97 mmol/L      CO2 27.0 mmol/L      Calcium 8.0 mg/dL      Total Protein 5.3 g/dL      Albumin 2.90 g/dL      ALT (SGPT) 15 U/L      AST (SGOT) 18 U/L      Alkaline Phosphatase 63 U/L      Total Bilirubin 0.3 mg/dL      eGFR Non African Amer 38 mL/min/1.73      Globulin 2.4 gm/dL      A/G Ratio 1.2 g/dL      BUN/Creatinine Ratio 24.6     Anion Gap 15.0 mmol/L     Narrative:       The MDRD GFR formula is only valid for adults with stable renal function between ages 18 and 70.    CBC & Differential [[id number]] Collected:  12/02/18 0416    Specimen:  Blood Updated:  12/02/18 0453    Narrative:       The following orders were created for panel order CBC &  Differential.  Procedure                               Abnormality         Status                     ---------                               -----------         ------                     CBC Auto Differential[[id number]]        Abnormal            Final result                 Please view results for these tests on the individual orders.    CBC Auto Differential [[id number]]  (Abnormal) Collected:  12/02/18 0416    Specimen:  Blood Updated:  12/02/18 0453     WBC 6.02 10*3/mm3      RBC 3.20 10*6/mm3      Hemoglobin 10.1 g/dL      Hematocrit 30.7 %      MCV 95.9 fL      MCH 31.6 pg      MCHC 32.9 g/dL      RDW 17.1 %      RDW-SD 59.8 fl      MPV 10.0 fL      Platelets 161 10*3/mm3      Neutrophil % 88.3 %      Lymphocyte % 4.7 %      Monocyte % 6.3 %      Eosinophil % 0.0 %      Basophil % 0.2 %      Immature Grans % 0.5 %      Neutrophils, Absolute 5.32 10*3/mm3      Lymphocytes, Absolute 0.28 10*3/mm3      Monocytes, Absolute 0.38 10*3/mm3      Eosinophils, Absolute 0.00 10*3/mm3      Basophils, Absolute 0.01 10*3/mm3      Immature Grans, Absolute 0.03 10*3/mm3      nRBC 0.0 /100 WBC     POC Glucose Once [[id number]]  (Abnormal) Collected:  12/01/18 2125    Specimen:  Blood Updated:  12/01/18 2202     Glucose 215 mg/dL      Comment: : [id number] Carl CrisostomoagusferMeter ID: [id number]       POC Glucose Once [[id number]]  (Abnormal) Collected:  12/01/18 1651    Specimen:  Blood Updated:  12/01/18 1702     Glucose 192 mg/dL      Comment: : [id number] Mockjarret AriasieMeter ID: [id number]              Cultures:  Blood Culture   Date Value Ref Range Status   11/30/2018 No growth at 2 days  Preliminary   11/30/2018 No growth at 2 days  Preliminary       Radiology Data:    Imaging Results (last 24 hours)     ** No results found for the last 24 hours. **          Allergies   Allergen Reactions   • Zantac [Ranitidine Hcl] Shortness Of Breath   • Fish-Derived Products GI Intolerance       Scheduled meds:     allopurinol 100  mg Oral Daily   amiodarone 100 mg Oral Daily   aspirin 81 mg Oral Daily   atorvastatin 10 mg Oral Nightly   bumetanide 1 mg Oral Daily   calcitriol 0.25 mcg Oral Daily   digoxin 125 mcg Oral Daily   enoxaparin 30 mg Subcutaneous Q24H   ferrous sulfate 325 mg Oral Daily With Breakfast   gabapentin 300 mg Oral TID   guaiFENesin 1,200 mg Oral Q12H   hydrALAZINE 10 mg Oral Q12H   insulin lispro 2-7 Units Subcutaneous 4x Daily With Meals & Nightly   isosorbide dinitrate 10 mg Oral TID - Nitrates   lisinopril 5 mg Oral Q24H   methylPREDNISolone sodium succinate 40 mg Intravenous Q8H   metoprolol succinate XL 25 mg Oral Daily   pantoprazole 40 mg Oral Q AM   piperacillin-tazobactam 4.5 g Intravenous Q6H   And      vancomycin 750 mg Intravenous Q24H   sodium chloride 3 mL Intravenous Q12H       PRN meds:  •  acetaminophen  •  baclofen  •  dextrose  •  dextrose  •  glucagon (human recombinant)  •  guaiFENesin-codeine  •  HYDROcodone-acetaminophen  •  ipratropium-albuterol  •  nitroglycerin  •  ondansetron **OR** ondansetron ODT **OR** ondansetron  •  [COMPLETED] Insert peripheral IV **AND** sodium chloride  •  sodium chloride    Assessment/Plan       Pneumonia of right lung due to infectious organism      Plan:  Pneumonia of right lung/COPD. Cont Zosyn. DC Vancomycin due to neg culture. Cont DuoNeb nebs, Flovent, oxygen, incentive spirometer, Mucinex..  Wean down Solu-Medrol..  Chest ray in a.m. Chest physio.. Start Tessalon.      Lung mass/history of lymphoma ×2.  Patient refused to have the lung mass evaluated.  Patient wants to leave alone.  Patient does not want any treatment.  Patient does not want to go through chemotherapy again.  CTA of the chest-no pulmonary embolus, consolidation right upper lobe laterally - concerning neoplasm, increase right upper lobe lung nodule, right middle lobe consolidation, left pleural effusion-small, severe spinal canal stenosis T8-T9.      CAD/hypertension/CHF.  History of chronically  elevated troponin.  Continue an aspirin a day, Lipitor, Bumex, amiodarone, digoxin, hydralazine, Toprol-XL, isosorbide.  Echocardiogram 3/31/18-ejection fraction 30%, severe calcified aortic valve, moderate aortic valve regurgitation, previous echocardiogram 18 shows injection fraction of 55%.     Neck pain.  CT scan of cervical spine- severe degenerative changes, multiple level spinal stenosis, severe C4-C5 due to reversed curvature and large osteophyte complex.  Neck pains better today with steroids and muscle relaxant.     Diabetes.  Sliding scale.     Neuropathy.  Continue Neurontin.    History of DVT and PE from chemotherapy.     Gout.  Continue all of her note.    Elevated TSH. Free t4 and free t3.    Neuropathy.  Continue Neurontin     Anemia.  Continue iron sulfate.     History of chronic elevated troponin.     Nutrition.  Cardiac/consistent carb diet     Deconditioning.  PT and OT consult.     Blood culture no growth 2 days.     Discharge Plannin-2 days.     Rashad Mir MD   18   3:34 PM

## 2018-12-02 NOTE — PLAN OF CARE
Problem: Patient Care Overview  Goal: Plan of Care Review  Outcome: Ongoing (interventions implemented as appropriate)   12/02/18 1201   Coping/Psychosocial   Plan of Care Reviewed With patient   Plan of Care Review   Progress improving   OTHER   Outcome Summary up ad jorge, held hydralazine. c/o shakiness d/t stds. BG high d/t stds. no c/o pain. VSS DBP running on lower side.       Problem: Fall Risk (Adult)  Goal: Absence of Fall  Outcome: Ongoing (interventions implemented as appropriate)

## 2018-12-02 NOTE — PLAN OF CARE
Problem: Patient Care Overview  Goal: Plan of Care Review  Outcome: Ongoing (interventions implemented as appropriate)   12/02/18 0555   Coping/Psychosocial   Plan of Care Reviewed With patient   Plan of Care Review   Progress improving   OTHER   Outcome Summary No c/o of pain voiced, VSS. Pt does c/o of shakiness from steroids. Pt is up ad jorge. Will continue to monitor.

## 2018-12-02 NOTE — PROGRESS NOTES
Continued Stay Note   Candi     Patient Name: Padmini Torres  MRN: [id number]  Today's Date: 12/2/2018    Admit Date: 11/30/2018    Discharge Plan     Row Name 12/02/18 0931       Plan    Plan  SW received consult that pt wants hh upon dc.  SW can set this up but will need specific hh orders to arrange.  SW will follow.  ASHLEE Wallace.    Patient/Family in Agreement with Plan  yes        Discharge Codes    No documentation.             ASHLEE Rowe

## 2018-12-03 NOTE — PLAN OF CARE
Problem: Patient Care Overview  Goal: Plan of Care Review  Outcome: Ongoing (interventions implemented as appropriate)   12/03/18 3417   Coping/Psychosocial   Plan of Care Reviewed With patient   Plan of Care Review   Progress improving   OTHER   Outcome Summary pt ambulates ad jorge down the halls without any problems. She asks for cough med and tessalon pearls now. IV abx changed to PO and IV stds decreased.VSS       Problem: Fall Risk (Adult)  Goal: Absence of Fall  Outcome: Ongoing (interventions implemented as appropriate)

## 2018-12-03 NOTE — PROGRESS NOTES
Pneumonia education completed.  We discussed the importance of working closely with her primary care doctor, proper nutrition, staying well hydrated, avoiding environmental irritants and using medications as directed. Literature on pneumonia given to patient.  Kirk Mattson, CRT

## 2018-12-03 NOTE — PROGRESS NOTES
[facility] Medicine Services  INPATIENT PROGRESS NOTE    Patient Name: Padmini Torres  Date of Admission: 11/30/2018  Today's Date: 12/03/18  Length of Stay: 3  Primary Care Physician: Александр Yo DO    Subjective   Chief Complaint: SOA  HPI   Doing well.  No SOA.  Tolerating room air.  Her cough is improving  Afebrile  Ambulating well.  Bowels moving ok.          Review of Systems   Constitutional: Negative for fatigue and fever.   HENT: Negative for congestion and ear pain.    Eyes: Negative for pain and visual disturbance.   Respiratory: Positive for cough. Negative for shortness of breath and wheezing.    Cardiovascular: Negative for chest pain and palpitations.   Gastrointestinal: Negative for diarrhea, nausea and vomiting.   Endocrine: Negative for heat intolerance.   Genitourinary: Negative for dysuria and frequency.   Musculoskeletal: Negative for arthralgias and back pain.   Skin: Negative for rash and wound.   Neurological: Negative for dizziness and light-headedness.   Psychiatric/Behavioral: Negative for confusion. The patient is not nervous/anxious.    All other systems reviewed and are negative.       All pertinent negatives and positives are as above. All other systems have been reviewed and are negative unless otherwise stated.     Objective    Temp:  [96.9 °F (36.1 °C)-97.9 °F (36.6 °C)] 96.9 °F (36.1 °C)  Heart Rate:  [73-86] 73  Resp:  [18-20] 20  BP: (100-122)/(42-55) 112/51  Physical Exam   Constitutional: She is oriented to person, place, and time. She appears well-developed and well-nourished.   HENT:   Head: Normocephalic and atraumatic.   Right Ear: External ear normal.   Left Ear: External ear normal.   Nose: Nose normal.   Mouth/Throat: Oropharynx is clear and moist.   Eyes: Conjunctivae and EOM are normal.   Neck: Normal range of motion. Neck supple.   Cardiovascular: Normal rate, regular rhythm and normal heart sounds.   Pulmonary/Chest: Effort  normal. She has decreased breath sounds.   Abdominal: Soft. Bowel sounds are normal. She exhibits no distension. There is no tenderness.   Musculoskeletal: Normal range of motion.   Neurological: She is alert and oriented to person, place, and time.   Skin: Skin is warm and dry.   Psychiatric: She has a normal mood and affect. Her speech is normal and behavior is normal. Cognition and memory are normal.           Results Review:  I have reviewed the labs, radiology results, and diagnostic studies.    Laboratory Data:   Results from last 7 days   Lab Units  12/03/18   0421 12/02/18 0416  12/01/18   0544   WBC 10*3/mm3  6.25  6.02  4.03*   HEMOGLOBIN g/dL  10.7*  10.1*  12.0   HEMATOCRIT %  32.7*  30.7*  38.3   PLATELETS 10*3/mm3  180  161  187        Results from last 7 days   Lab Units  12/03/18 0421 12/02/18 0416  12/01/18   0544   SODIUM mmol/L  138  139  142   POTASSIUM mmol/L  3.5  3.5  4.1   CHLORIDE mmol/L  97*  97*  98   CO2 mmol/L  30.0  27.0  25.0   BUN mg/dL  35*  33*  37*   CREATININE mg/dL  1.38  1.34  1.28   CALCIUM mg/dL  8.0*  8.0*  8.3*   BILIRUBIN mg/dL  0.4  0.3  0.5   ALK PHOS U/L  55  63  79   ALT (SGPT) U/L  17  15  <15   AST (SGOT) U/L  29  18  27   GLUCOSE mg/dL  143*  154*  241*       Culture Data:   Blood Culture   Date Value Ref Range Status   11/30/2018 No growth at 2 days  Preliminary   11/30/2018 No growth at 2 days  Preliminary       Radiology Data:   Imaging Results (last 24 hours)     Procedure Component Value Units Date/Time    XR Chest PA & Lateral [[id number]] Collected:  12/03/18 0836     Updated:  12/03/18 0842    Narrative:       EXAMINATION:   XR CHEST PA AND LATERAL-  12/3/2018 8:36 AM CST     HISTORY: Pneumonia     PA and lateral views the chest obtained. Bilateral perihilar infiltrates  are present. Small left pleural complex is again noted.     Cardiac silhouettes mildly enlarged.     Skeletal structures are unremarkable. Left subclavian Port-A-Cath is  present.        Impression:       Cardiomegaly with persistent bilateral perihilar  interstitial infiltrates. Again this most likely represents pulmonary  vascular congestion Small left pleural complex is noted. The chest is  not significantly changed from November 30, 2018  This report was finalized on 12/03/2018 08:39 by Dr. Dawit De Los Santos MD.          I have reviewed the patient's current medications.     Assessment/Plan     Active Hospital Problems    Diagnosis   • Pneumonia of right lung due to infectious organism       1.  Pneumonia  -Change abx to PO  -pt unable to give sputum sample    2.  CAD  -ASA  -lipitor  -Imdur  -Isordil  -metoprolol  -Entresto    3.  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  DVT/PE in past  -Lovenox for DVT ppx    5.  Lymphoma  -in remission    7.  SVT  -Amiodarone    9.  Acute on Chronic Diastolic CHF  -IV lasix x 1  -po bumex  -isordil  -metoprolol  -Entresto    10.  HTN  - Entresto  -metoprolol  -Hydralazine    11.  GERD  -protonix    12.  Diabetic neuropathy  -Gabapentin    13.  Lung nodule  -pt aware, says she does not want this worked up      Discharge Planning: I expect the patient to be discharged to home in AM    Joshua Tariq MD   12/03/18   10:11 AM

## 2018-12-03 NOTE — PLAN OF CARE
Problem: Patient Care Overview  Goal: Plan of Care Review  Outcome: Ongoing (interventions implemented as appropriate)   12/03/18 0656   Coping/Psychosocial   Plan of Care Reviewed With patient   Plan of Care Review   Progress improving   OTHER   Outcome Summary No c/o of pain voiced, VSS. Pt up ad jorge. Will continue to monitor.

## 2018-12-04 NOTE — DISCHARGE SUMMARY
"    [facility] Medicine Services  DISCHARGE SUMMARY       Date of Admission: 11/30/2018  Date of Discharge:  12/4/2018  Primary Care Physician: Александр Yo DO    Presenting Problem/History of Present Illness:  Pneumonia of right lung due to infectious organism, unspecified part of lung [J18.9]     Final Discharge Diagnoses:  Active Hospital Problems    Diagnosis   • Pneumonia of right lung due to infectious organism   1.  Pneumonia  2.  Skin lesion on right temple  3.  T2DM  4.  DTV/PE in past  5.  Lymphoma  6.  SVT   7.  Acute on Chronic Diastolic CHF  8.  HTN  9.  GERD  10.  Diabetic neuropathy  11.  Lung nodule  12.  CAD    Consults: NA    Procedures Performed: NA    Pertinent Test Results: NA    Chief Complaint on Day of Discharge: Nausea    History of Present Illness on Day of Discharge:   Doing ok.  Breathing better.  Afebrile.  Nauseated, no vomiting though.  Wants to go home    Hospital Course:  The patient is a 78 y.o. female who presented to [facility] with shortness of breath and cough.  She was found to have pneumonia.  She was placed on IV antibiotics and IV steroids.  She improved and was able to be transitioned to oral medications.  She has continued to do well on oral antibiotics.  She is tolerating room air and afebrile.  She is comfortable with being discharged and with the discharge plan.  She was given the chance to ask questions and all of her questions were answered to her complete satisfaction.    Condition on Discharge:  Stable    Physical Exam on Discharge:  /60 (BP Location: Right arm, Patient Position: Sitting)   Pulse 74   Temp 97.8 °F (36.6 °C) (Temporal)   Resp 18   Ht 170.2 cm (67\")   Wt 59.1 kg (130 lb 3.2 oz)   SpO2 97%   BMI 20.39 kg/m²   Physical Exam   Constitutional: She is oriented to person, place, and time. She appears well-developed and well-nourished.   HENT:   Head: Normocephalic and atraumatic.   Right Ear: " External ear normal.   Left Ear: External ear normal.   Nose: Nose normal.   Mouth/Throat: Oropharynx is clear and moist.   Eyes: Conjunctivae and EOM are normal.   Neck: Normal range of motion. Neck supple.   Cardiovascular: Normal rate, regular rhythm and normal heart sounds.   Pulmonary/Chest: Effort normal. She has decreased breath sounds.   L lung coarse   Abdominal: Soft. Bowel sounds are normal. She exhibits no distension. There is no tenderness.   Musculoskeletal: Normal range of motion.   Neurological: She is alert and oriented to person, place, and time.   Skin: Skin is warm and dry.   Psychiatric: She has a normal mood and affect. Her speech is normal and behavior is normal. Cognition and memory are normal.         Discharge Disposition:  Home or Self Care    Discharge Medications:     Discharge Medications      New Medications      Instructions Start Date   amoxicillin-clavulanate 500-125 MG per tablet  Commonly known as:  AUGMENTIN   1 tablet, Oral, Every 12 Hours Scheduled      benzonatate 200 MG capsule  Commonly known as:  TESSALON   200 mg, Oral, 3 Times Daily PRN      guaiFENesin 600 MG 12 hr tablet  Commonly known as:  MUCINEX   1,200 mg, Oral, Every 12 Hours Scheduled      predniSONE 10 MG tablet  Commonly known as:  DELTASONE   4 tabs po daily x 3 days, then 3 tabs daily x 3 days, then 2 tabs daily x 3 days, then 1 tab daily x 3 days         Continue These Medications      Instructions Start Date   allopurinol 100 MG tablet  Commonly known as:  ZYLOPRIM   100 mg, Oral, Daily      amiodarone 100 MG tablet  Commonly known as:  PACERONE   100 mg, Oral, Daily      aspirin 81 MG EC tablet   81 mg, Oral, Daily      bumetanide 1 MG tablet  Commonly known as:  BUMEX   1 mg, Oral, Daily      calcitriol 0.25 MCG capsule  Commonly known as:  ROCALTROL   0.25 mcg, Oral, Daily      Calcium Carbonate-Vitamin D 600-200 MG-UNIT capsule   1 capsule, Oral, Daily      digoxin 125 MCG tablet  Commonly known as:   LANOXIN   125 mcg, Oral, Daily Digoxin      ferrous sulfate 325 (65 FE) MG tablet   325 mg, Oral, Daily With Breakfast      gabapentin 300 MG capsule  Commonly known as:  NEURONTIN   300 mg, Oral, 3 Times Daily      hydrALAZINE 10 MG tablet  Commonly known as:  APRESOLINE   10 mg, Oral, Every 12 Hours Scheduled      isosorbide dinitrate 10 MG tablet  Commonly known as:  ISORDIL   10 mg, Oral, 3 Times Daily (Nitrates)      metFORMIN 1000 MG tablet  Commonly known as:  GLUCOPHAGE   500 mg, Oral, 2 Times Daily      metoprolol succinate XL 25 MG 24 hr tablet  Commonly known as:  TOPROL-XL   25 mg, Oral, Daily      nitroglycerin 0.4 MG SL tablet  Commonly known as:  NITROSTAT   0.4 mg, Sublingual, Every 5 Minutes PRN, Take no more than 3 doses in 15 minutes.      pravastatin 20 MG tablet  Commonly known as:  PRAVACHOL   20 mg, Oral, Daily      sacubitril-valsartan 24-26 MG tablet  Commonly known as:  ENTRESTO   1 tablet, Oral, Every 12 Hours Scheduled      TYLENOL ARTHRITIS PAIN 650 MG 8 hr tablet  Generic drug:  acetaminophen   650 mg, Oral, Every 12 Hours             Discharge Diet: ADA/Heeart healthy    Activity at Discharge: as tolerated    Discharge Care Plan/Instructions: Go to ER for fever or shortness of breath    Follow-up Appointments:   Future Appointments   Date Time Provider Department Center   12/12/2018  9:30 AM Gilberto Hammer MD [facility] None   12/14/2018  9:00 AM Freeman Kelsey MD [facility]   12/18/2018 11:00 AM  [facility] PULM LAB ROOM 1  [facility] PFT [facility]       Test Results Pending at Discharge: MICHELLE Tariq MD  12/04/18  10:47 AM    Time: 40 minutes

## 2018-12-04 NOTE — PLAN OF CARE
Problem: Patient Care Overview  Goal: Plan of Care Review  Outcome: Ongoing (interventions implemented as appropriate)   12/04/18 9974   Coping/Psychosocial   Plan of Care Reviewed With patient   Plan of Care Review   Progress improving   OTHER   Outcome Summary PT orders received. Attempted eval. Pt up ad jorge in her room and reports she has no needs. Reports currently just waiting on her ride. Will discontinue PT attempts.

## 2018-12-04 NOTE — PLAN OF CARE
Problem: Patient Care Overview  Goal: Plan of Care Review  Outcome: Ongoing (interventions implemented as appropriate)   12/04/18 5461   Coping/Psychosocial   Plan of Care Reviewed With patient   Plan of Care Review   Progress improving   OTHER   Outcome Summary No c/o of pain voiced this shift, pt did receive cough syrup, started on po abx, VSS. Will conitnue to monitor.        Problem: Fall Risk (Adult)  Goal: Absence of Fall  Outcome: Ongoing (interventions implemented as appropriate)

## 2018-12-05 NOTE — OUTREACH NOTE
Prep Survey      Responses   Facility patient discharged from?  [facility]   Is patient eligible?  Yes   Discharge diagnosis  Pneumonia to right lung due to infectious organism, skin lesion on right temple, DM II, DVT/PE in past, lymphoma, SVT, acute on chronic diastolic CHF, HTN, GERD, diabetic neuropathy, lung nodule, CAD   Does the patient have one of the following disease processes/diagnoses(primary or secondary)?  COPD/Pneumonia   Does the patient have Home health ordered?  No   What is the Home health agency?   Case management note, pt. requested HH, no agency noted in AVS.   Is there a DME ordered?  No   Comments regarding appointments  See AVS   Prep survey completed?  Yes          Michelle Nunn RN

## 2018-12-06 NOTE — OUTREACH NOTE
COPD/PN Week 1 Survey      Responses   Facility patient discharged from?  [facility]   Does the patient have one of the following disease processes/diagnoses(primary or secondary)?  COPD/Pneumonia   Is there a successful TCM telephone encounter documented?  No   Was the primary reason for admission:  Pneumonia   Week 1 attempt successful?  No   Unsuccessful attempts  Attempt 1          Maribel Santana, RN

## 2018-12-07 NOTE — OUTREACH NOTE
COPD/PN Week 1 Survey      Responses   Facility patient discharged from?  [facility]   Does the patient have one of the following disease processes/diagnoses(primary or secondary)?  COPD/Pneumonia   Is there a successful TCM telephone encounter documented?  No   Was the primary reason for admission:  Pneumonia   Week 1 attempt successful?  Yes   Call start time  1717   Call end time  1720   Discharge diagnosis  Pneumonia to right lung due to infectious organism, skin lesion on right temple, DM II, DVT/PE in past, lymphoma, SVT, acute on chronic diastolic CHF, HTN, GERD, diabetic neuropathy, lung nodule, CAD   Meds reviewed with patient/caregiver?  Yes   Is the patient having any side effects they believe may be caused by any medication additions or changes?  No   Does the patient have all medications ordered at discharge?  Yes   Is the patient taking all medications as directed (includes completed medication regime)?  Yes   Does the patient have a primary care provider?   Yes   Does the patient have an appointment with their PCP or pulmonologist within 7 days of discharge?  Greater than 7 days   What is preventing the patient from scheduling follow up appointments within 7 days of discharge?  Earlier appointment not available   Nursing Interventions  Verified appointment date/time/provider   Has the patient kept scheduled appointments due by today?  N/A   Has home health visited the patient within 72 hours of discharge?  N/A   Psychosocial issues?  No   Did the patient receive a copy of their discharge instructions?  Yes   Nursing interventions  Reviewed instructions with patient   What is the patient's perception of their health status since discharge?  Improving   Nursing Interventions  Nurse provided patient education   Is the patient/caregiver able to teach back the hierarchy of who to call/visit for symptoms/problems? PCP, Specialist, Home health nurse, Urgent Care, ED, 911  Yes   Is the patient/caregiver able to  teach back signs and symptoms of worsening condition:  Fever/chills, Chest pain, Shortness of breath   Is the patient/caregiver able to teach back importance of completing antibiotic course of treatment?  Yes   Week 1 call completed?  Yes          Alina Woods RN

## 2018-12-14 PROBLEM — I50.9 ACUTE ON CHRONIC CONGESTIVE HEART FAILURE (HCC): Status: RESOLVED | Noted: 2018-01-01 | Resolved: 2018-01-01

## 2018-12-14 PROBLEM — J18.9 PNEUMONIA OF RIGHT LUNG DUE TO INFECTIOUS ORGANISM: Status: RESOLVED | Noted: 2018-01-01 | Resolved: 2018-01-01

## 2018-12-14 NOTE — OUTREACH NOTE
COPD/PN Week 2 Survey      Responses   Facility patient discharged from?  [facility]   Does the patient have one of the following disease processes/diagnoses(primary or secondary)?  COPD/Pneumonia   Was the primary reason for admission:  Pneumonia   Week 2 attempt successful?  Yes   Call start time  1431   Call end time  1434   Discharge diagnosis  Pneumonia to right lung due to infectious organism, skin lesion on right temple, DM II, DVT/PE in past, lymphoma, SVT, acute on chronic diastolic CHF, HTN, GERD, diabetic neuropathy, lung nodule, CAD   Meds reviewed with patient/caregiver?  Yes   Is the patient having any side effects they believe may be caused by any medication additions or changes?  No   Does the patient have all medications ordered at discharge?  Yes   Is the patient taking all medications as directed (includes completed medication regime)?  Yes   Does the patient have a primary care provider?   Yes   Does the patient have an appointment with their PCP or pulmonologist within 7 days of discharge?  Yes   Has the patient kept scheduled appointments due by today?  Yes   Has home health visited the patient within 72 hours of discharge?  N/A   Psychosocial issues?  No   Did the patient receive a copy of their discharge instructions?  Yes   Nursing interventions  Reviewed instructions with patient   What is the patient's perception of their health status since discharge?  Improving   Nursing Interventions  Nurse provided patient education   Are the patient's immunizations up to date?   Yes   Nursing interventions  Educated on importance of maintaining up to date immunizations as advised by provider   Is the patient/caregiver able to teach back the hierarchy of who to call/visit for symptoms/problems? PCP, Specialist, Home health nurse, Urgent Care, ED, 911  Yes   Is the patient/caregiver able to teach back signs and symptoms of worsening condition:  Fever/chills, Chest pain, Shortness of breath   Is the  patient/caregiver able to teach back importance of completing antibiotic course of treatment?  Yes   Week 2 call completed?  Yes          Issa Sherman RN

## 2018-12-14 NOTE — PROGRESS NOTES
Padmini Torres  [id number]  1940  78 y.o.  female    Referring Provider: Александр Yo DO    Reason for Follow-up Visit:  Here for follow up after cardiac testing.    coronary artery disease  Stented coronary artery.   Severe LV dysfunction  She does not want ICD or Lifevest  Engorged left leg veins  congestive heart failure   Overall dramatically better    Subjective    Overall much better   Recent cough    Overall  feeling well   No chest pain   Mild exertional shortness of breath on exertion relieved with rest  No significant cough or wheezing    Going on for several months  Frequent palpitations  No associated chest pain  No significant pedal edema  No fever or chills  No significant expectoration    No hemoptysis  No presyncope or syncope No palpitations  No significant pedal edema  Compliant with medications and dietary advice  Poor effort tolerance    Effort tolerance limited more by orthopedic rather than cardiac related issues.    No presyncope or syncope  Compliant with medications  Orthostatic symptoms better    History of present illness:  Padmini Torres is a 78 y.o. yo female with history of congestive heart failure who presents today for   Chief Complaint   Patient presents with   • Congestive Heart Failure     6 Month follow up - hx of DVT-PE    • Coronary Artery Disease   • Shortness of Breath   • Medication Problem     ENTRESTO IS TO EXPENSIVE FOR THE PATIENT.    .    History  Past Medical History:   Diagnosis Date   • Abdominal pain, left lower quadrant    • Bowel habit changes    • CAD (coronary artery disease)     LAD stent    • CHF (congestive heart failure) (CMS/HCC)    • COPD (chronic obstructive pulmonary disease) (CMS/HCC)    • Diabetes mellitus (CMS/HCC)    • DVT (deep venous thrombosis) (CMS/HCC)    • Dysphagia    • History of tachycardia    • Hypertension    • Lymphoma (CMS/HCC)    • Pulmonary emboli (CMS/HCC)    • Varicose veins of legs    ,   Past Surgical History:   Procedure  Laterality Date   • ANKLE SURGERY     • BACK SURGERY      lower   • CARDIAC CATHETERIZATION     • CHOLECYSTECTOMY     • COLONOSCOPY  04/23/2015    Last colon limted lower diverticulosis left colon, Internal Hemorrhoids   • COLONOSCOPY W/ BIOPSIES AND POLYPECTOMY  07/22/2014    Adenomatous tubular type, Diverticulosis sigmoid colon   • CORONARY STENT PLACEMENT      x 1   • ENDOSCOPY  04/14/2010    Distal ring dilated 48 Fr   • EXPLORATORY LAPAROTOMY  2005    relapse lymphoma   • HEMORRHOIDECTOMY     • HYSTERECTOMY      total abdominal   • LUMBAR SPINE SURGERY     • LUNG BIOPSY N/A 1991    open    • SMALL INTESTINE SURGERY     • TONSILLECTOMY     • UPPER GASTROINTESTINAL ENDOSCOPY  10/23/2015    normal exam   ,   Family History   Problem Relation Age of Onset   • Diabetes Mother    • Cancer Father         prostate with bone metastasis   • Heart disease Maternal Grandmother    • Stroke Brother    • Colon cancer Neg Hx    • Colon polyps Neg Hx    ,   Social History     Tobacco Use   • Smoking status: Never Smoker   • Smokeless tobacco: Never Used   Substance Use Topics   • Alcohol use: No   • Drug use: No   ,     Medications  Current Outpatient Medications   Medication Sig Dispense Refill   • acetaminophen (TYLENOL ARTHRITIS PAIN) 650 MG 8 hr tablet Take 650 mg by mouth Every 12 (Twelve) Hours.     • allopurinol (ZYLOPRIM) 100 MG tablet Take 1 tablet by mouth Daily. 30 tablet 0   • amiodarone (PACERONE) 100 MG tablet Take 1 tablet by mouth Daily. 30 tablet 2   • aspirin 81 MG EC tablet Take 81 mg by mouth Daily.     • benzonatate (TESSALON) 200 MG capsule Take 1 capsule by mouth 3 (Three) Times a Day As Needed for Cough. 30 capsule 1   • bumetanide (BUMEX) 1 MG tablet Take 1 mg by mouth Daily.     • calcitriol (ROCALTROL) 0.25 MCG capsule Take 1 capsule by mouth Daily. 30 capsule 0   • Calcium Carbonate-Vitamin D 600-200 MG-UNIT capsule Take 1 capsule by mouth Daily.     • digoxin (LANOXIN) 125 MCG tablet Take 125 mcg  by mouth Daily.     • ferrous sulfate 325 (65 FE) MG tablet Take 1 tablet by mouth Daily With Breakfast. 30 tablet 0   • gabapentin (NEURONTIN) 300 MG capsule Take 300 mg by mouth 3 (Three) Times a Day.     • guaiFENesin (MUCINEX) 600 MG 12 hr tablet Take 2 tablets by mouth Every 12 (Twelve) Hours. 20 tablet 1   • hydrALAZINE (APRESOLINE) 10 MG tablet Take 1 tablet by mouth Every 12 (Twelve) Hours. 30 tablet 0   • isosorbide dinitrate (ISORDIL) 10 MG tablet Take 1 tablet by mouth 3 (Three) Times a Day. 30 tablet 0   • metFORMIN (GLUCOPHAGE) 1000 MG tablet Take 0.5 tablets by mouth 2 (Two) Times a Day. 60 tablet 2   • metoprolol succinate XL (TOPROL-XL) 25 MG 24 hr tablet Take 25 mg by mouth Daily.     • nitroglycerin (NITROSTAT) 0.4 MG SL tablet Place 1 tablet under the tongue Every 5 (Five) Minutes As Needed for Chest Pain. Take no more than 3 doses in 15 minutes. 25 tablet 12   • pravastatin (PRAVACHOL) 20 MG tablet Take 20 mg by mouth daily.     • predniSONE (DELTASONE) 10 MG tablet 4 tabs po daily x 3 days, then 3 tabs daily x 3 days, then 2 tabs daily x 3 days, then 1 tab daily x 3 days 30 tablet 0   • sacubitril-valsartan (ENTRESTO) 24-26 MG tablet Take 1 tablet by mouth Every 12 (Twelve) Hours. 60 tablet 11     No current facility-administered medications for this visit.        Allergies:  Zantac [ranitidine hcl] and Fish-derived products    Review of Systems  Review of Systems   Constitution: Positive for weakness and malaise/fatigue.   HENT: Negative.    Eyes: Negative.    Cardiovascular: Positive for dyspnea on exertion. Negative for chest pain, claudication, cyanosis, irregular heartbeat, leg swelling, near-syncope, orthopnea, palpitations, paroxysmal nocturnal dyspnea and syncope.   Respiratory: Negative.    Endocrine: Negative.    Hematologic/Lymphatic: Negative.    Skin: Negative.    Musculoskeletal: Positive for arthritis and back pain.   Gastrointestinal: Negative for anorexia.   Genitourinary:  "Negative.    Neurological: Positive for dizziness and light-headedness.   Psychiatric/Behavioral: Negative.        Objective     Physical Exam:  Pulse 80   Ht 170.2 cm (67\")   Wt 59.6 kg (131 lb 6.4 oz)   SpO2 98%   BMI 20.58 kg/m²   Physical Exam   Constitutional: She appears well-developed.   HENT:   Head: Normocephalic.   Neck: Normal carotid pulses and no JVD present. No tracheal tenderness present. Carotid bruit is not present. No tracheal deviation and no edema present.   Cardiovascular: Regular rhythm and normal pulses.   Murmur heard.   Systolic murmur is present with a grade of 2/6.  Pulmonary/Chest: Effort normal. No stridor.   Abdominal: Soft.   Neurological: She is alert. She has normal strength. No cranial nerve deficit or sensory deficit.   Skin: Skin is warm.   Psychiatric: She has a normal mood and affect. Her speech is normal and behavior is normal.   Severely dilated left leg veins    Results Review:           ECG 12 Lead  Date/Time: 12/14/2018 10:17 AM  Performed by: Freeman Kelsey MD  Authorized by: Freeman Kelsey MD   Comparison: compared with previous ECG from 11/30/2018  Similar to previous ECG  Rhythm: sinus rhythm  Conduction: complete RBBB and 1st degree  QRS axis: left  Other findings: LVH with strain  Clinical impression: abnormal ECG          Results for orders placed during the hospital encounter of 10/30/18   Adult Transthoracic Echo Complete With Contrast if Necessary Per Protocol    Narrative · The left ventricular cavity is mildly dilated.  · Estimated EF = 30%.  · There is severe calcification of the aortic valve.  · Moderate aortic valve regurgitation is present.  · Mild aortic valve stenosis is present.  · No evidence of pulmonary hypertension is present.  · Compared to 4/25/18 LVEF has decreased from 55% to 30%      myocardial perfusion scan      · (Calculated EF = 42%).  · GI artifact is present.  · No ischemia or scar. Possible non ischemic cardiomyopathy versus multivessel " distribution balanced ischemia        Assessment/Plan   Patient Active Problem List   Diagnosis   • Chronic systolic congestive heart failure (CMS/HCC)   • Congestive heart failure with LV diastolic dysfunction, NYHA class 2 (CMS/[facility])   • Coronary artery disease involving native coronary artery of native heart without angina pectoris   • Mixed hyperlipidemia   • Type 2 diabetes mellitus without complication, without long-term current use of insulin (CMS/[facility])   • Palpitations   • SVT (supraventricular tachycardia) (CMS/[facility])   • Congestive heart failure (CMS/[facility])   • SOB (shortness of breath) on exertion   • Renal insufficiency   • 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   • Varicose veins of both lower extremities       Stable doing well. No chest pain or excessive dyspnea. No palpitations. No significant pedal edema. Compliant with medications and diet. Latest labs and medications reviewed.  Severe LV dysfunction         Plan        Continue same medications   Orders Placed This Encounter   Procedures   • ECG 12 Lead     This order was created via procedure documentation   • Adult Transthoracic Echo Limited W/ Cont if Necessary Per Protocol     Standing Status:   Future     Standing Expiration Date:   12/14/2019     Order Specific Question:   Reason for exam?     Answer:   Dyspnea     Will see the patient soon per appointment or sooner if required  Patient advised in advance that there may be a longer wait time next follow up with  The patient given the option to see another provider that the patient declined      Keep A1c less than 7 Primary to monitor  Keep LDL below 70 mg/dl. Monitor liver and renal functions.   Monitor CBC, CMP, TSH (as indicated) and Lipid Panel by primary   "      xxxxxxxxxxxxxxxxxxxxxxxxxxxxxxxxxxxxxxxxxxxxxxxxxxxxxxxxxxxxxxxxxxxxxxxxxxxxxxxxxxxxxxxxxxxxxxxxxxxxxxxxxxxxxxxxxxxxxxxxxxxxxxxxxxxxxxxxxxxxxxxxxxxxxxxxxxxxxxxxxxxxxxxxxxxxxxxxxxxxxxxxxxxxxxxxxxxxxxxxxxxxxxxxxxxxxxxxxxxxxxxxxxxxxxxxxxxxxxxxxxxxxxxx  Health maintenance    Low salt/ HTN/ Heart healthy carbohydrate restricted cardiac diet as applicable to this patient's current diagnoses.   This handout has relevant information regarding shopping for food, preparing meals, what to eat at restaurants, tracking of food intake, information regarding sodium intake and salt content, how to read food labels, knowing what to eat, tips reagarding physical activity, calorie count and calorie expenditure. What foods to avoid. Information regarding alcoholic drinks along with \"good\" and \"bad\" fats.  Reiterated prior recommendations     The patient is advised to reduce or avoid caffeine or other cardiac stimulants.     The patient was advised that NSAID-type medications have three  very important potential side effects: cardiovascular complications, gastrointestinal irritation including hemorrhage and renal injuries.  Do not use anti-inflammatories such as Motrin/ibuprofen, Alleve/naprosyn, Mobic and like medications Use Tylenol instead.The patient expresses understanding of these issues and questions were answered.   Monitor for any signs of bleeding including red or dark stools. Fall precautions.       Monitor for any signs of bleeding including red or dark stools. Fall precautions.   Patient is asked to monitor BP at home or work, several times per month and return with written values at next office visit.     Advised staying uptodate with immunizations per established standard guidelines.      Offered to give patient  a copy of my notes and relevant tests/ prior ECG etc for the patient to review and follow specific advise and relevant findings if any, prognosis, along with my current and future plans.      Questions were " encouraged, asked and answered to the patient's  understanding and satisfaction. Questions if any regarding current medications and side effects, need for refills and importance of compliance to medications stressed.    Reviewed available prior notes, consults, prior visits, laboratory findings, radiology and cardiology relevant reports. Updated chart as applicable. I have reviewed the patient's medical history in detail and updated the computerized patient record as relevant.      Updated patient regarding any new or relevant abnormalities on review of records or any new findings on physical exam. Mentioned to patient about purpose of visit and desirable health short and long term goals and objectives.        xxxxxxxxxxxxxxxxxxxxxxxxxxxxxxxxxxxxxxxxxxxxxxxxxxxxxxxxxxxxxxxxxxxxxxxxxxxxxxxxxxxxxxxxxxxxxxxxxxxxxxxxxxxxxxxxxxxxxxxxxxxxxxxxxxxxxxxxxxxxxxxxxxxxxxxxxxxxxxxxxxxxxxxxxxxxxxxxxxxxxxxxxxxxxxxxxxxxxxxxxxxxxxxxxxxxxxxxxxxxxxxxxxxxxxxxxxxxxxxxxxxxxxxx      Return in about 3 months (around 3/14/2019).

## 2018-12-21 NOTE — OUTREACH NOTE
COPD/PN Week 3 Survey      Responses   Facility patient discharged from?  [place]   Does the patient have one of the following disease processes/diagnoses(primary or secondary)?  COPD/Pneumonia   Was the primary reason for admission:  Pneumonia   Week 3 attempt successful?  Yes   Call start time  0821   Call end time  0830   Meds reviewed with patient/caregiver?  Yes   Is the patient having any side effects they believe may be caused by any medication additions or changes?  No   Does the patient have all medications ordered at discharge?  Yes   Is the patient taking all medications as directed (includes completed medication regime)?  Yes   Does the patient have a primary care provider?   Yes   Does the patient have an appointment with their PCP or pulmonologist within 7 days of discharge?  Yes   Has the patient kept scheduled appointments due by today?  Yes   Has home health visited the patient within 72 hours of discharge?  N/A   Psychosocial issues?  No   Comments  States no home health needed at this time.   Did the patient receive a copy of their discharge instructions?  Yes   Nursing interventions  Reviewed instructions with patient   What is the patient's perception of their health status since discharge?  Improving   Nursing Interventions  Nurse provided patient education   Are the patient's immunizations up to date?   Yes   Nursing interventions  Educated on importance of maintaining up to date immunizations as advised by provider   Is the patient/caregiver able to teach back the hierarchy of who to call/visit for symptoms/problems? PCP, Specialist, Home health nurse, Urgent Care, ED, 911  Yes   Is the patient able to teach back COPD zones?  Yes   Nursing interventions  Education provided on various zones   Patient reports what zone on this call?  Green Zone   Green Zone  Reports doing well, Breathing without shortness of breath, Usual activity and exercise level, Usual amount of phlegm/mucus without  difficulty coughing up, Sleeping well, Appetite is good   Green Zone interventions:  Take daily medications, Avoid indoor/outdoor triggers   Is the patient/caregiver able to teach back signs and symptoms of worsening condition:  Fever/chills, Shortness of breath, Chest pain   Is the patient/caregiver able to teach back importance of completing antibiotic course of treatment?  Yes   Week 3 call completed?  Yes   Wrap up additional comments  States feels better regarding pneumonia. Reviewed zones due to lung disease hx-states is in green zone. States is having right hip pain radiating down right leg since yesterday-states stepped back, and pain started. Denies any edema or redness of leg. States will call PCP today for evaluation.          Preethi Guadarrama RN

## 2018-12-31 NOTE — OUTREACH NOTE
COPD/PN Week 4 Survey      Responses   Facility patient discharged from?  [place]   Does the patient have one of the following disease processes/diagnoses(primary or secondary)?  COPD/Pneumonia   Was the primary reason for admission:  Pneumonia   Week 4 attempt successful?  No          Preethi Guadarrama RN

## 2019-01-01 ENCOUNTER — HOSPITAL ENCOUNTER (OUTPATIENT)
Dept: CARDIOLOGY | Facility: HOSPITAL | Age: 79
Discharge: HOME | End: 2019-01-08
Attending: INTERNAL MEDICINE | Admitting: INTERNAL MEDICINE

## 2019-01-01 ENCOUNTER — TRANSCRIBE ORDERS (OUTPATIENT)
Dept: GENERAL RADIOLOGY | Facility: HOSPITAL | Age: 79
End: 2019-01-01

## 2019-01-01 ENCOUNTER — APPOINTMENT (OUTPATIENT)
Dept: CARDIOLOGY | Facility: HOSPITAL | Age: 79
End: 2019-01-01
Attending: INTERNAL MEDICINE

## 2019-01-01 ENCOUNTER — HOSPITAL ENCOUNTER (OUTPATIENT)
Dept: GENERAL RADIOLOGY | Facility: HOSPITAL | Age: 79
Discharge: HOME | End: 2019-01-14
Attending: INTERNAL MEDICINE | Admitting: INTERNAL MEDICINE

## 2019-01-01 VITALS
DIASTOLIC BLOOD PRESSURE: 48 MMHG | HEIGHT: 67 IN | SYSTOLIC BLOOD PRESSURE: 112 MMHG | BODY MASS INDEX: 20.62 KG/M2 | WEIGHT: 131.39 LBS

## 2019-01-01 DIAGNOSIS — J20.9 ACUTE BRONCHITIS, UNSPECIFIED ORGANISM: Primary | ICD-10-CM

## 2019-01-01 DIAGNOSIS — R06.02 SOB (SHORTNESS OF BREATH) ON EXERTION: ICD-10-CM

## 2019-01-01 DIAGNOSIS — J84.9 INTERSTITIAL LUNG DISEASE (HCC): ICD-10-CM

## 2019-01-01 DIAGNOSIS — J20.9 ACUTE BRONCHITIS, UNSPECIFIED ORGANISM: ICD-10-CM

## 2019-01-01 LAB
BH CV ECHO MEAS - AO MAX PG (FULL): 21.6 MMHG
BH CV ECHO MEAS - AO MAX PG: 23.6 MMHG
BH CV ECHO MEAS - AO MEAN PG (FULL): 13 MMHG
BH CV ECHO MEAS - AO MEAN PG: 14 MMHG
BH CV ECHO MEAS - AO ROOT AREA (BSA CORRECTED): 2.1
BH CV ECHO MEAS - AO ROOT AREA: 9.6 CM^2
BH CV ECHO MEAS - AO ROOT DIAM: 3.5 CM
BH CV ECHO MEAS - AO V2 MAX: 243 CM/SEC
BH CV ECHO MEAS - AO V2 MEAN: 176 CM/SEC
BH CV ECHO MEAS - AO V2 VTI: 45.5 CM
BH CV ECHO MEAS - AVA(I,A): 1.5 CM^2
BH CV ECHO MEAS - AVA(I,D): 1.5 CM^2
BH CV ECHO MEAS - AVA(V,A): 1.3 CM^2
BH CV ECHO MEAS - AVA(V,D): 1.3 CM^2
BH CV ECHO MEAS - BSA(HAYCOCK): 1.7 M^2
BH CV ECHO MEAS - BSA: 1.7 M^2
BH CV ECHO MEAS - BZI_BMI: 20.5 KILOGRAMS/M^2
BH CV ECHO MEAS - BZI_METRIC_HEIGHT: 170.2 CM
BH CV ECHO MEAS - BZI_METRIC_WEIGHT: 59.4 KG
BH CV ECHO MEAS - EDV(CUBED): 59.3 ML
BH CV ECHO MEAS - EDV(MOD-SP4): 127 ML
BH CV ECHO MEAS - EDV(TEICH): 65.9 ML
BH CV ECHO MEAS - EF(CUBED): 33.7 %
BH CV ECHO MEAS - EF(MOD-SP4): 26 %
BH CV ECHO MEAS - EF(TEICH): 28 %
BH CV ECHO MEAS - ESV(CUBED): 39.3 ML
BH CV ECHO MEAS - ESV(MOD-SP4): 94 ML
BH CV ECHO MEAS - ESV(TEICH): 47.4 ML
BH CV ECHO MEAS - FS: 12.8 %
BH CV ECHO MEAS - IVS/LVPW: 1.3
BH CV ECHO MEAS - IVSD: 1.4 CM
BH CV ECHO MEAS - LA DIMENSION: 2.9 CM
BH CV ECHO MEAS - LA/AO: 0.83
BH CV ECHO MEAS - LAT PEAK E' VEL: 14.7 CM/SEC
BH CV ECHO MEAS - LV DIASTOLIC VOL/BSA (35-75): 75.2 ML/M^2
BH CV ECHO MEAS - LV MASS(C)D: 169.4 GRAMS
BH CV ECHO MEAS - LV MASS(C)DI: 100.3 GRAMS/M^2
BH CV ECHO MEAS - LV MAX PG: 2 MMHG
BH CV ECHO MEAS - LV MEAN PG: 1 MMHG
BH CV ECHO MEAS - LV SYSTOLIC VOL/BSA (12-30): 55.6 ML/M^2
BH CV ECHO MEAS - LV V1 MAX: 71 CM/SEC
BH CV ECHO MEAS - LV V1 MEAN: 56.2 CM/SEC
BH CV ECHO MEAS - LV V1 VTI: 15.3 CM
BH CV ECHO MEAS - LVIDD: 3.9 CM
BH CV ECHO MEAS - LVIDS: 3.4 CM
BH CV ECHO MEAS - LVLD AP4: 9 CM
BH CV ECHO MEAS - LVLS AP4: 8.3 CM
BH CV ECHO MEAS - LVOT AREA (M): 4.5 CM^2
BH CV ECHO MEAS - LVOT AREA: 4.5 CM^2
BH CV ECHO MEAS - LVOT DIAM: 2.4 CM
BH CV ECHO MEAS - LVPWD: 1.1 CM
BH CV ECHO MEAS - MED PEAK E' VEL: 9.3 CM/SEC
BH CV ECHO MEAS - MV A MAX VEL: 85.2 CM/SEC
BH CV ECHO MEAS - MV DEC TIME: 0.2 SEC
BH CV ECHO MEAS - MV E MAX VEL: 54.5 CM/SEC
BH CV ECHO MEAS - MV E/A: 0.64
BH CV ECHO MEAS - RAP SYSTOLE: 5 MMHG
BH CV ECHO MEAS - RVSP: 43.9 MMHG
BH CV ECHO MEAS - SI(AO): 259.1 ML/M^2
BH CV ECHO MEAS - SI(CUBED): 11.8 ML/M^2
BH CV ECHO MEAS - SI(LVOT): 41 ML/M^2
BH CV ECHO MEAS - SI(MOD-SP4): 19.5 ML/M^2
BH CV ECHO MEAS - SI(TEICH): 10.9 ML/M^2
BH CV ECHO MEAS - SV(AO): 437.8 ML
BH CV ECHO MEAS - SV(CUBED): 20 ML
BH CV ECHO MEAS - SV(LVOT): 69.2 ML
BH CV ECHO MEAS - SV(MOD-SP4): 33 ML
BH CV ECHO MEAS - SV(TEICH): 18.5 ML
BH CV ECHO MEAS - TR MAX VEL: 312 CM/SEC
BH CV ECHO MEASUREMENTS AVERAGE E/E' RATIO: 4.54
LEFT ATRIUM VOLUME INDEX: 24.3 ML/M2
LEFT ATRIUM VOLUME: 41 CM3
LV EF 2D ECHO EST: 55 %
MAXIMAL PREDICTED HEART RATE: 141 BPM
STRESS TARGET HR: 120 BPM

## 2019-01-01 PROCEDURE — 0399T HC MYOCARDL STRAIN IMAG QUAN ASSMT PER SESS: CPT

## 2019-01-01 PROCEDURE — 71046 X-RAY EXAM CHEST 2 VIEWS: CPT

## 2019-01-01 PROCEDURE — 0399T ADULT TRANSTHORACIC ECHO COMPLETE W/ CONT IF NECESSARY PER PROTOCOL: CPT | Performed by: INTERNAL MEDICINE

## 2019-01-01 PROCEDURE — 93306 TTE W/DOPPLER COMPLETE: CPT | Performed by: INTERNAL MEDICINE

## 2019-01-01 PROCEDURE — 93306 TTE W/DOPPLER COMPLETE: CPT

## 2019-01-01 PROCEDURE — 25010000002 PERFLUTREN 6.52 MG/ML SUSPENSION: Performed by: INTERNAL MEDICINE

## 2019-01-01 RX ADMIN — PERFLUTREN 8.48 MG: 6.52 INJECTION, SUSPENSION INTRAVENOUS at 10:47
